# Patient Record
Sex: FEMALE | Race: WHITE | NOT HISPANIC OR LATINO | Employment: OTHER | URBAN - METROPOLITAN AREA
[De-identification: names, ages, dates, MRNs, and addresses within clinical notes are randomized per-mention and may not be internally consistent; named-entity substitution may affect disease eponyms.]

---

## 2017-02-02 DIAGNOSIS — G47.00 INSOMNIA: ICD-10-CM

## 2017-02-02 RX ORDER — QUETIAPINE FUMARATE 200 MG/1
TABLET, FILM COATED ORAL
Qty: 30 TABLET | Refills: 5 | Status: SHIPPED | OUTPATIENT
Start: 2017-02-02 | End: 2017-02-15 | Stop reason: SDUPTHER

## 2017-02-15 ENCOUNTER — OFFICE VISIT (OUTPATIENT)
Dept: INTERNAL MEDICINE | Facility: CLINIC | Age: 42
End: 2017-02-15
Payer: COMMERCIAL

## 2017-02-15 VITALS
HEIGHT: 63 IN | BODY MASS INDEX: 29.23 KG/M2 | HEART RATE: 118 BPM | OXYGEN SATURATION: 98 % | TEMPERATURE: 99 F | RESPIRATION RATE: 20 BRPM | DIASTOLIC BLOOD PRESSURE: 68 MMHG | WEIGHT: 165 LBS | SYSTOLIC BLOOD PRESSURE: 112 MMHG

## 2017-02-15 DIAGNOSIS — J06.9 VIRAL URI WITH COUGH: Primary | ICD-10-CM

## 2017-02-15 PROCEDURE — 3074F SYST BP LT 130 MM HG: CPT | Mod: S$GLB,,, | Performed by: INTERNAL MEDICINE

## 2017-02-15 PROCEDURE — 99213 OFFICE O/P EST LOW 20 MIN: CPT | Mod: S$GLB,,, | Performed by: INTERNAL MEDICINE

## 2017-02-15 PROCEDURE — 3078F DIAST BP <80 MM HG: CPT | Mod: S$GLB,,, | Performed by: INTERNAL MEDICINE

## 2017-02-15 PROCEDURE — 96372 THER/PROPH/DIAG INJ SC/IM: CPT | Mod: S$GLB,,, | Performed by: INTERNAL MEDICINE

## 2017-02-15 PROCEDURE — 99999 PR PBB SHADOW E&M-EST. PATIENT-LVL III: CPT | Mod: PBBFAC,,, | Performed by: INTERNAL MEDICINE

## 2017-02-15 RX ORDER — FOLIC ACID 1 MG/1
1 TABLET ORAL 3 TIMES DAILY PRN
COMMUNITY
Start: 2017-01-12 | End: 2021-07-06

## 2017-02-15 RX ORDER — METHYLPREDNISOLONE ACETATE 40 MG/ML
40 INJECTION, SUSPENSION INTRA-ARTICULAR; INTRALESIONAL; INTRAMUSCULAR; SOFT TISSUE
Status: COMPLETED | OUTPATIENT
Start: 2017-02-15 | End: 2017-02-15

## 2017-02-15 RX ORDER — CLOBETASOL PROPIONATE 0.5 MG/G
CREAM TOPICAL
COMMUNITY
Start: 2017-01-12 | End: 2019-01-14

## 2017-02-15 RX ORDER — LORATADINE 10 MG/1
10 TABLET ORAL DAILY
Qty: 30 TABLET | Refills: 0 | Status: SHIPPED | OUTPATIENT
Start: 2017-02-15 | End: 2018-07-23

## 2017-02-15 RX ORDER — METHOTREXATE 2.5 MG/1
TABLET ORAL
COMMUNITY
Start: 2017-01-19 | End: 2019-07-01

## 2017-02-15 RX ORDER — FLUTICASONE PROPIONATE 50 MCG
1 SPRAY, SUSPENSION (ML) NASAL DAILY
Qty: 1 BOTTLE | Refills: 3 | Status: SHIPPED | OUTPATIENT
Start: 2017-02-15 | End: 2017-03-17

## 2017-02-15 RX ADMIN — METHYLPREDNISOLONE ACETATE 40 MG: 40 INJECTION, SUSPENSION INTRA-ARTICULAR; INTRALESIONAL; INTRAMUSCULAR; SOFT TISSUE at 10:02

## 2017-02-15 NOTE — MR AVS SNAPSHOT
[facility]  [address]  [facility] [address]  Phone: [phone]  Fax: [phone]                  Eva Lane   2/15/2017 10:30 AM   Office Visit    Description:  Female : 1975   Provider:  Suzette Rodas MD   Department:  [facility] - Internal Medicine           Reason for Visit     Cough     Nasal Congestion           Diagnoses this Visit        Comments    Viral URI with cough    -  Primary            To Do List           Future Appointments        Provider Department Dept Phone    2017 9:00 AM Taco Andujar MD [facility] - Neurology [phone]    3/2/2017 9:15 AM Cintia Gustafson NP [facility] [phone]      Goals (5 Years of Data)     None       These Medications        Disp Refills Start End    loratadine (CLARITIN) 10 mg tablet 30 tablet 0 2/15/2017     Take 1 tablet (10 mg total) by mouth once daily. - Oral    Pharmacy: [facility] [address] [place] [address] Ph #: [phone]       fluticasone (FLONASE) 50 mcg/actuation nasal spray 1 Bottle 3 2/15/2017 3/17/2017    1 spray by Each Nare route once daily. - Each Nare    Pharmacy: [facility] [address] [place] [address] Ph #: [phone]         [facility] On Call     [facility] On Call Nurse Care Line -  Assistance  Registered nurses in the [facility] provide clinical advisement, health education, appointment booking, and other advisory services.  Call for this free service at [phone].             Medications           Message regarding Medications     Verify the changes and/or additions to your medication regime listed below are the same as discussed with your clinician today.  If any of these changes or additions are incorrect, please notify your healthcare provider.        START taking these NEW medications        Refills    loratadine (CLARITIN) 10 mg tablet 0    Sig: Take 1 tablet (10 mg total) by mouth  "once daily.    Class: Normal    Route: Oral    fluticasone (FLONASE) 50 mcg/actuation nasal spray 3    Si spray by Each Nare route once daily.    Class: Normal    Route: Each Nare      These medications were administered today        Dose Freq    methylPREDNISolone acetate injection 40 mg 40 mg [facility] 1 time    Sig: Inject 1 mL (40 mg total) into the muscle one time.    Class: Normal    Route: Intramuscular           Verify that the below list of medications is an accurate representation of the medications you are currently taking.  If none reported, the list may be blank. If incorrect, please contact your healthcare provider. Carry this list with you in case of emergency.           Current Medications     albuterol (PROVENTIL) 2.5 mg /3 mL (0.083 %) nebulizer solution USE ONE VIAL IN NEBULIZER EVERY 6 HOURS AS NEEDED FOR  WHEEZING.    blood-glucose meter (FREESTYLE SYSTEM KIT) kit Use as instructed    BREEZE 2 TEST STRIPS Strp USE TO TEST TWICE DAILY    clobetasol (TEMOVATE) 0.05 % cream     CONTOUR TEST STRIPS Strp TEST BLOOD SUGARS 4 TIMES DAILY    divalproex (DEPAKOTE) 500 MG Tb24 Take 1 tablet by mouth every evening.    empagliflozin (JARDIANCE) 25 mg Tab Take 25 mg by mouth once daily.    fluoxetine (PROZAC) 20 MG capsule Take 1 capsule by mouth once daily.    folic acid (FOLVITE) 1 MG tablet     gabapentin (NEURONTIN) 600 MG tablet Take 1 tablet (600 mg total) by mouth 3 (three) times daily.    hydrochlorothiazide (HYDRODIURIL) 25 MG tablet Take 1 tablet (25 mg total) by mouth daily as needed (for edema).    insulin detemir (LEVEMIR FLEXPEN) 100 unit/mL (3 mL) SubQ InPn pen Inject 30 Units into the skin 2 (two) times daily.    insulin needles, disposable, (LITE TOUCH INSULIN PEN NEEDLES) 31 X 5/16 " Ndle 1 Stick by Misc.(Non-Drug; Combo Route) route 2 (two) times daily.    ipratropium (ATROVENT) 0.02 % nebulizer solution     lancets (ONE TOUCH ULTRASOFT LANCETS) Misc 1 Stick by Misc.(Non-Drug; Combo " "Route) route 2 (two) times daily.    metformin (GLUCOPHAGE) 1000 MG tablet TAKE ONE TABLET BY MOUTH TWICE DAILY WITH MEALS    methotrexate 2.5 MG Tab     metoprolol succinate (TOPROL-XL) 50 MG 24 hr tablet Take 1 tablet (50 mg total) by mouth once daily.    naproxen (NAPROSYN) 500 MG tablet Take 1 tablet (500 mg total) by mouth 2 (two) times daily with meals.    NITROSTAT 0.4 mg SL tablet     olmesartan (BENICAR) 20 MG tablet Take 1 tablet (20 mg total) by mouth once daily.    omeprazole (PRILOSEC) 20 MG capsule Take 20 mg by mouth once daily.    ondansetron (ZOFRAN-ODT) 8 MG TbDL Take 1 tablet (8 mg total) by mouth 3 (three) times daily as needed.    quetiapine (SEROQUEL) 200 MG Tab TAKE 1 TABLET BY MOUTH NIGHTLY    sitagliptin (JANUVIA) 50 MG Tab Take 1 tablet (50 mg total) by mouth once daily.    triamcinolone acetonide 0.1% (KENALOG) 0.1 % cream Apply topically 2 (two) times daily.    valacyclovir (VALTREX) 1000 MG tablet Take 2 tablets twice a day for one day at onset of symptoms of fever blister    epinephrine (EPIPEN 2-LARS) 0.3 mg/0.3 mL (1:1,000) AtIn Inject 0.3 mLs (0.3 mg total) into the muscle once.    fluticasone (FLONASE) 50 mcg/actuation nasal spray 1 spray by Each Nare route once daily.    loratadine (CLARITIN) 10 mg tablet Take 1 tablet (10 mg total) by mouth once daily.           Clinical Reference Information           Your Vitals Were     BP Pulse Temp Resp Height Weight    112/68 118 98.6 °F (37 °C) (Oral) 20 5' 3" (1.6 m) 74.8 kg (165 lb)    Last Period SpO2 BMI          12/11/2006 98% 29.23 kg/m2        Blood Pressure          Most Recent Value    BP  112/68      Allergies as of 2/15/2017     Penicillins    Neosporin [Neomycin-bacitracin-polymyxin]    Shrimp      Immunizations Administered on Date of Encounter - 2/15/2017     None      Instructions      Viral Upper Respiratory Illness (Adult)  You have a viral upper respiratory illness (URI), which is another term for the common cold. This " illness is contagious during the first few days. It is spread through the air by coughing and sneezing. It may also be spread by direct contact (touching the sick person and then touching your own eyes, nose, or mouth). Frequent handwashing will decrease risk of spread. Most viral illnesses go away within 7 to 10 days with rest and simple home remedies. Sometimes the illness may last for several weeks. Antibiotics will not kill a virus, and they are generally not prescribed for this condition.    Home care  · If symptoms are severe, rest at home for the first 2 to 3 days. When you resume activity, don't let yourself get too tired.  · Avoid being exposed to cigarette smoke (yours or others).  · You may use acetaminophen or ibuprofen to control pain and fever, unless another medicine was prescribed. (Note: If you have chronic liver or kidney disease, have ever had a stomach ulcer or gastrointestinal bleeding, or are taking blood-thinning medicines, talk with your healthcare provider before using these medicines.) Aspirin should never be given to anyone under 18 years of age who is ill with a viral infection or fever. It may cause severe liver or brain damage.  · Your appetite may be poor, so a light diet is fine. Avoid dehydration by drinking 6 to 8 glasses of fluids per day (water, soft drinks, juices, tea, or soup). Extra fluids will help loosen secretions in the nose and lungs.  · Over-the-counter cold medicines will not shorten the length of time youre sick, but they may be helpful for the following symptoms: cough, sore throat, and nasal and sinus congestion. (Note: Do not use decongestants if you have high blood pressure.)  Follow-up care  Follow up with your healthcare provider, or as advised.  When to seek medical advice  Call your healthcare provider right away if any of these occur:  · Cough with lots of colored sputum (mucus)  · Severe headache; face, neck, or ear pain  · Difficulty swallowing due to  throat pain  · Fever of 100.4°F (38°C)  Call 911, or get immediate medical care  Call emergency services right away if any of these occur:  · Chest pain, shortness of breath, wheezing, or difficulty breathing  · Coughing up blood  · Inability to swallow due to throat pain  Date Last Reviewed: 9/13/2015  © 5168-0737 [employer]. [address] [place] [address], [place] [address]. All rights reserved. This information is not intended as a substitute for professional medical care. Always follow your healthcare professional's instructions.             Smoking Cessation     If you would like to quit smoking:   You may be eligible for free services if you are a [place] resident and started smoking cigarettes before September 1, 1988.  Call the Smoking Cessation Trust (SCT) toll free at [phone] 978-0821 or (823) 373-7732.   Call -558-QUIT-NOW if you do not meet the above criteria.            Language Assistance Services     ATTENTION: Language assistance services are available, free of charge. Please call [phone].      ATENCIÓN: Si habla yovannyañol, tiene a medrano disposición servicios gratuitos de asistencia lingüística. Llame al [phone].     [facility] Ý: N?u b?n nói Ti?ng Vi?t, có các d?ch v? h? tr? ngôn ng? mi?n phí dành cho b?n. G?i s? [phone].         [facility] complies with applicable Federal civil rights laws and does not discriminate on the basis of race, color, national origin, age, disability, or sex.

## 2017-02-15 NOTE — PROGRESS NOTES
Subjective:       Patient ID: Eva Lane is a 41 y.o. female.    Chief Complaint: Cough (x few weeks) and Nasal Congestion      HPI:  Patient is known to me and presents with cough and congestion. Sx started 2 weeks ago. She is having sweats at night but no fevers. Cough is sometimes productive of sputum. No SOB or wheezing. + sore throat with PND. No otalgia, hearing loss. Has tried NyQuil wihtout relief.     Past Medical History   Diagnosis Date    ADHD (attention deficit hyperactivity disorder)     Anxiety     Arthritis     Asthma     Behavioral problem     Bipolar 1 disorder     CHF (congestive heart failure)     COPD (chronic obstructive pulmonary disease)     Depression     Diabetes mellitus type II     Hx of psychiatric care     Hyperlipidemia     Hypertension     Lumbar radiculopathy     Neuralgia     Neuritis     Psychiatric problem     PTSD (post-traumatic stress disorder)     Seizures      Seizure-last 8/2015-shake and fall-doesnt remember anything    Self-harming behavior     Sleep apnea      on CPAP    Stroke 9/22/2015    Stroke     Therapy        Family History   Problem Relation Age of Onset    Heart disease Father     Hyperlipidemia Father     Hypertension Father     Diabetes Father     Breast cancer Neg Hx     Colon cancer Neg Hx     Ovarian cancer Neg Hx        Social History     Social History    Marital status:      Spouse name: N/A    Number of children: 6    Years of education: N/A     Occupational History    Not on file.     Social History Main Topics    Smoking status: Current Every Day Smoker     Packs/day: 0.10     Years: 26.00     Types: Cigarettes     Start date: 7/17/1986    Smokeless tobacco: Never Used      Comment: told about [facility] smoking cessation program-given smoking clinic pamphlet    Alcohol use No    Drug use: No    Sexual activity: Yes     Partners: Male     Birth control/ protection: Surgical      Comment: ,  CAYLA, BSO     Other Topics Concern    Patient Feels They Ought To Cut Down On Drinking/Drug Use No    Patient Annoyed By Others Criticizing Their Drinking/Drug Use No    Patient Has Felt Bad Or Guilty About Drinking/Drug Use No    Patient Has Had A Drink/Used Drugs As An Eye Opener In The Am No     Social History Narrative       Review of Systems   Constitutional: Positive for fatigue. Negative for activity change, fever and unexpected weight change.   HENT: Positive for congestion and sore throat. Negative for ear pain, hearing loss and rhinorrhea.    Eyes: Negative for pain, redness and visual disturbance.   Respiratory: Positive for cough. Negative for shortness of breath and wheezing.    Cardiovascular: Negative for chest pain, palpitations and leg swelling.   Gastrointestinal: Negative for abdominal pain, constipation, diarrhea, nausea and vomiting.   Genitourinary: Negative for dysuria, frequency and urgency.   Musculoskeletal: Negative for back pain, joint swelling and neck pain.   Skin: Negative for color change, rash and wound.   Neurological: Negative for dizziness, tremors, weakness, light-headedness and headaches.         Objective:      Physical Exam   Constitutional: She is oriented to person, place, and time. She appears well-developed and well-nourished. No distress.   HENT:   Head: Normocephalic and atraumatic.   Right Ear: External ear normal.   Left Ear: External ear normal.   Dull TM  Mild posterior oropharyngeal erythema without exudate   Eyes: Conjunctivae and EOM are normal. Pupils are equal, round, and reactive to light. Right eye exhibits no discharge. Left eye exhibits no discharge.   Neck: Neck supple. No tracheal deviation present.   Cardiovascular: Normal rate and regular rhythm.  Exam reveals no gallop and no friction rub.    No murmur heard.  Pulmonary/Chest: Effort normal and breath sounds normal. No respiratory distress. She has no wheezes. She has no rales.   Abdominal: Soft.  Bowel sounds are normal. She exhibits no distension. There is no tenderness.   Lymphadenopathy:     She has no cervical adenopathy.   Neurological: She is alert and oriented to person, place, and time. No cranial nerve deficit.   Skin: Skin is warm and dry.   Psychiatric: She has a normal mood and affect. Her behavior is normal.   Vitals reviewed.      Assessment:       1. Viral URI with cough        Plan:       Eva was seen today for cough and nasal congestion.    Diagnoses and all orders for this visit:    Viral URI with cough  -     methylPREDNISolone acetate injection 40 mg; Inject 1 mL (40 mg total) into the muscle one time.  -     loratadine (CLARITIN) 10 mg tablet; Take 1 tablet (10 mg total) by mouth once daily.  -     fluticasone (FLONASE) 50 mcg/actuation nasal spray; 1 spray by Each Nare route once daily.  slaine nasal spray prn  Rest. Stay hydrated    Call for worsening sx or fever > 101 F

## 2017-02-15 NOTE — PATIENT INSTRUCTIONS
Viral Upper Respiratory Illness (Adult)  You have a viral upper respiratory illness (URI), which is another term for the common cold. This illness is contagious during the first few days. It is spread through the air by coughing and sneezing. It may also be spread by direct contact (touching the sick person and then touching your own eyes, nose, or mouth). Frequent handwashing will decrease risk of spread. Most viral illnesses go away within 7 to 10 days with rest and simple home remedies. Sometimes the illness may last for several weeks. Antibiotics will not kill a virus, and they are generally not prescribed for this condition.    Home care  · If symptoms are severe, rest at home for the first 2 to 3 days. When you resume activity, don't let yourself get too tired.  · Avoid being exposed to cigarette smoke (yours or others).  · You may use acetaminophen or ibuprofen to control pain and fever, unless another medicine was prescribed. (Note: If you have chronic liver or kidney disease, have ever had a stomach ulcer or gastrointestinal bleeding, or are taking blood-thinning medicines, talk with your healthcare provider before using these medicines.) Aspirin should never be given to anyone under 18 years of age who is ill with a viral infection or fever. It may cause severe liver or brain damage.  · Your appetite may be poor, so a light diet is fine. Avoid dehydration by drinking 6 to 8 glasses of fluids per day (water, soft drinks, juices, tea, or soup). Extra fluids will help loosen secretions in the nose and lungs.  · Over-the-counter cold medicines will not shorten the length of time youre sick, but they may be helpful for the following symptoms: cough, sore throat, and nasal and sinus congestion. (Note: Do not use decongestants if you have high blood pressure.)  Follow-up care  Follow up with your healthcare provider, or as advised.  When to seek medical advice  Call your healthcare provider right away if any  of these occur:  · Cough with lots of colored sputum (mucus)  · Severe headache; face, neck, or ear pain  · Difficulty swallowing due to throat pain  · Fever of 100.4°F (38°C)  Call 911, or get immediate medical care  Call emergency services right away if any of these occur:  · Chest pain, shortness of breath, wheezing, or difficulty breathing  · Coughing up blood  · Inability to swallow due to throat pain  Date Last Reviewed: 9/13/2015  © 4791-9710 [employer]. [address] [place] [address]. All rights reserved. This information is not intended as a substitute for professional medical care. Always follow your healthcare professional's instructions.

## 2017-03-02 ENCOUNTER — TELEPHONE (OUTPATIENT)
Dept: INTERNAL MEDICINE | Facility: CLINIC | Age: 42
End: 2017-03-02

## 2017-03-02 DIAGNOSIS — R32 URINARY INCONTINENCE, UNSPECIFIED TYPE: Primary | ICD-10-CM

## 2017-03-02 RX ORDER — OXYBUTYNIN CHLORIDE 5 MG/1
5 TABLET, EXTENDED RELEASE ORAL DAILY
Qty: 30 TABLET | Refills: 2 | Status: SHIPPED | OUTPATIENT
Start: 2017-03-02 | End: 2017-08-22 | Stop reason: SDUPTHER

## 2017-03-02 NOTE — TELEPHONE ENCOUNTER
Pt states she has been wetting the bed at night for about 5months now,  she is wondering if something can be sent in for this.

## 2017-03-08 ENCOUNTER — TELEPHONE (OUTPATIENT)
Dept: INTERNAL MEDICINE | Facility: CLINIC | Age: 42
End: 2017-03-08

## 2017-03-08 DIAGNOSIS — E11.69 UNCONTROLLED TYPE 2 DIABETES MELLITUS WITH OTHER SPECIFIED COMPLICATION, UNSPECIFIED LONG TERM INSULIN USE STATUS: ICD-10-CM

## 2017-03-08 DIAGNOSIS — E11.65 UNCONTROLLED TYPE 2 DIABETES MELLITUS WITH OTHER SPECIFIED COMPLICATION, UNSPECIFIED LONG TERM INSULIN USE STATUS: ICD-10-CM

## 2017-03-08 NOTE — TELEPHONE ENCOUNTER
----- Message from Chika Rosales MA sent at 3/8/2017  1:31 PM CST -----  Contact: [facility]  Eva Lane  MRN: [id number]  : 1975  PCP: Cintia Gustafson  Home Phone      [phone]  Work Phone      Not on file.  Mobile          [phone]      MESSAGE: Please have Cintia send in a script for Glucose Test Strips.  Fax to [facility]-[phone]

## 2017-03-09 ENCOUNTER — NURSE TRIAGE (OUTPATIENT)
Dept: ADMINISTRATIVE | Facility: CLINIC | Age: 42
End: 2017-03-09

## 2017-03-09 ENCOUNTER — HOSPITAL ENCOUNTER (EMERGENCY)
Facility: HOSPITAL | Age: 42
Discharge: HOME | End: 2017-03-10
Attending: SURGERY
Payer: COMMERCIAL

## 2017-03-09 DIAGNOSIS — R73.9 HYPERGLYCEMIA: Primary | ICD-10-CM

## 2017-03-09 LAB
B-OH-BUTYR BLD STRIP-SCNC: 0.3 MMOL/L
BASOPHILS # BLD AUTO: 0.08 K/UL
BASOPHILS NFR BLD: 0.6 %
DIFFERENTIAL METHOD: ABNORMAL
EOSINOPHIL # BLD AUTO: 0.2 K/UL
EOSINOPHIL NFR BLD: 1.8 %
ERYTHROCYTE [DISTWIDTH] IN BLOOD BY AUTOMATED COUNT: 12.4 %
HCT VFR BLD AUTO: 41.1 %
HGB BLD-MCNC: 14.7 G/DL
LYMPHOCYTES # BLD AUTO: 3.9 K/UL
LYMPHOCYTES NFR BLD: 29.3 %
MCH RBC QN AUTO: 32 PG
MCHC RBC AUTO-ENTMCNC: 35.8 %
MCV RBC AUTO: 90 FL
MONOCYTES # BLD AUTO: 0.9 K/UL
MONOCYTES NFR BLD: 6.6 %
NEUTROPHILS # BLD AUTO: 8.2 K/UL
NEUTROPHILS NFR BLD: 61.7 %
PLATELET # BLD AUTO: 257 K/UL
PMV BLD AUTO: 12.5 FL
POCT GLUCOSE: 342 MG/DL (ref 70–110)
POCT GLUCOSE: 414 MG/DL (ref 70–110)
RBC # BLD AUTO: 4.59 M/UL
TROPONIN I SERPL DL<=0.01 NG/ML-MCNC: <0.006 NG/ML
WBC # BLD AUTO: 13.26 K/UL

## 2017-03-09 PROCEDURE — 82010 KETONE BODYS QUAN: CPT

## 2017-03-09 PROCEDURE — 96361 HYDRATE IV INFUSION ADD-ON: CPT

## 2017-03-09 PROCEDURE — 93010 ELECTROCARDIOGRAM REPORT: CPT | Mod: ,,, | Performed by: INTERNAL MEDICINE

## 2017-03-09 PROCEDURE — 96374 THER/PROPH/DIAG INJ IV PUSH: CPT

## 2017-03-09 PROCEDURE — 25000003 PHARM REV CODE 250: Performed by: SURGERY

## 2017-03-09 PROCEDURE — 84484 ASSAY OF TROPONIN QUANT: CPT

## 2017-03-09 PROCEDURE — 85025 COMPLETE CBC W/AUTO DIFF WBC: CPT

## 2017-03-09 PROCEDURE — 99284 EMERGENCY DEPT VISIT MOD MDM: CPT | Mod: 25

## 2017-03-09 PROCEDURE — 82962 GLUCOSE BLOOD TEST: CPT

## 2017-03-09 PROCEDURE — 82553 CREATINE MB FRACTION: CPT

## 2017-03-09 PROCEDURE — 80053 COMPREHEN METABOLIC PANEL: CPT

## 2017-03-09 PROCEDURE — 93005 ELECTROCARDIOGRAM TRACING: CPT

## 2017-03-09 PROCEDURE — 63600175 PHARM REV CODE 636 W HCPCS: Performed by: SURGERY

## 2017-03-09 RX ORDER — SODIUM CHLORIDE 9 MG/ML
1000 INJECTION, SOLUTION INTRAVENOUS
Status: COMPLETED | OUTPATIENT
Start: 2017-03-09 | End: 2017-03-10

## 2017-03-09 RX ADMIN — SODIUM CHLORIDE 1000 ML: 0.9 INJECTION, SOLUTION INTRAVENOUS at 11:03

## 2017-03-09 RX ADMIN — INSULIN HUMAN 10 UNITS: 100 INJECTION, SOLUTION PARENTERAL at 11:03

## 2017-03-09 NOTE — ED AVS SNAPSHOT
[facility]  [address] [facility] [address]               Eva Lane   3/9/2017 10:37 PM   ED    Description:  Female : 1975   Department:  [facility]           Your Care was Coordinated By:     Provider Role From To    Nadeem Cosme MD Attending Provider [phone] --      Reason for Visit     Hyperglycemia           Diagnoses this Visit        Comments    Hyperglycemia    -  Primary       ED Disposition     ED Disposition Condition Comment    Discharge             To Do List           [facility]     [facility] -  Assistance  Registered nurses in the [facility] provide clinical advisement, health education, appointment booking, and other advisory services.  Call for this free service at [phone].             Medications           Message regarding Medications     Verify the changes and/or additions to your medication regime listed below are the same as discussed with your clinician today.  If any of these changes or additions are incorrect, please notify your healthcare provider.        These medications were administered today        Dose Freq    0.9%  NaCl infusion 1,000 mL ED 1 Time    Sig: Inject 1,000 mLs into the vein ED 1 Time.    Class: Normal    Route: Intravenous    insulin regular injection 10 Units 10 Units ED 1 Time    Sig: Inject 10 Units into the vein ED 1 Time.    Class: Normal    Route: Intravenous           Verify that the below list of medications is an accurate representation of the medications you are currently taking.  If none reported, the list may be blank. If incorrect, please contact your healthcare provider. Carry this list with you in case of emergency.           Current Medications     albuterol (PROVENTIL) 2.5 mg /3 mL (0.083 %) nebulizer solution USE ONE VIAL IN NEBULIZER EVERY 6 HOURS AS NEEDED FOR  WHEEZING.    blood sugar diagnostic, disc (BREEZE 2  "TEST STRIPS) Strp USE TO TEST TWICE DAILY    blood sugar diagnostic, disc (BREEZE 2 TEST STRIPS) Strp USE TO TEST TWICE DAILY    blood-glucose meter (FREESTYLE SYSTEM KIT) kit Use as instructed    clobetasol (TEMOVATE) 0.05 % cream     CONTOUR TEST STRIPS Strp TEST BLOOD SUGARS 4 TIMES DAILY    divalproex (DEPAKOTE) 500 MG Tb24 Take 1 tablet by mouth every evening.    empagliflozin (JARDIANCE) 25 mg Tab Take 25 mg by mouth once daily.    epinephrine (EPIPEN 2-LARS) 0.3 mg/0.3 mL (1:1,000) AtIn Inject 0.3 mLs (0.3 mg total) into the muscle once.    fluoxetine (PROZAC) 20 MG capsule Take 1 capsule by mouth once daily.    fluticasone (FLONASE) 50 mcg/actuation nasal spray 1 spray by Each Nare route once daily.    folic acid (FOLVITE) 1 MG tablet     gabapentin (NEURONTIN) 600 MG tablet Take 1 tablet (600 mg total) by mouth 3 (three) times daily.    hydrochlorothiazide (HYDRODIURIL) 25 MG tablet Take 1 tablet (25 mg total) by mouth daily as needed (for edema).    insulin detemir (LEVEMIR FLEXPEN) 100 unit/mL (3 mL) SubQ InPn pen Inject 30 Units into the skin 2 (two) times daily.    insulin needles, disposable, (LITE TOUCH INSULIN PEN NEEDLES) 31 X 5/16 " Ndle 1 Stick by Misc.(Non-Drug; Combo Route) route 2 (two) times daily.    ipratropium (ATROVENT) 0.02 % nebulizer solution     lancets (ONE TOUCH ULTRASOFT LANCETS) Misc 1 Stick by Misc.(Non-Drug; Combo Route) route 2 (two) times daily.    loratadine (CLARITIN) 10 mg tablet Take 1 tablet (10 mg total) by mouth once daily.    metformin (GLUCOPHAGE) 1000 MG tablet TAKE ONE TABLET BY MOUTH TWICE DAILY WITH MEALS    methotrexate 2.5 MG Tab     metoprolol succinate (TOPROL-XL) 50 MG 24 hr tablet Take 1 tablet (50 mg total) by mouth once daily.    naproxen (NAPROSYN) 500 MG tablet Take 1 tablet (500 mg total) by mouth 2 (two) times daily with meals.    NITROSTAT 0.4 mg SL tablet     olmesartan (BENICAR) 20 MG tablet Take 1 tablet (20 mg total) by mouth once daily.    " omeprazole (PRILOSEC) 20 MG capsule Take 20 mg by mouth once daily.    ondansetron (ZOFRAN-ODT) 8 MG TbDL Take 1 tablet (8 mg total) by mouth 3 (three) times daily as needed.    oxybutynin (DITROPAN-XL) 5 MG TR24 Take 1 tablet (5 mg total) by mouth once daily.    quetiapine (SEROQUEL) 200 MG Tab TAKE 1 TABLET BY MOUTH NIGHTLY    sitagliptin (JANUVIA) 50 MG Tab Take 1 tablet (50 mg total) by mouth once daily.    triamcinolone acetonide 0.1% (KENALOG) 0.1 % cream Apply topically 2 (two) times daily.    valacyclovir (VALTREX) 1000 MG tablet Take 2 tablets twice a day for one day at onset of symptoms of fever blister           Clinical Reference Information           Your Vitals Were     BP Pulse Temp Resp Weight Last Period    144/86 (BP Location: Left arm, Patient Position: Sitting) 95 97.7 °F (36.5 °C) (Oral) 17 74.8 kg (165 lb) 12/11/2006    BMI                29.23 kg/m2          Allergies as of 3/10/2017        Reactions    Penicillins Swelling, Rash    Neosporin [Neomycin-bacitracin-polymyxin] Rash    Shrimp Hives      Immunizations Administered on Date of Encounter - 3/10/2017     None      ED Micro, Lab, POCT     Start Ordered       Status Ordering Provider    03/09/17 2358 03/09/17 2357  POCT glucose  Once      Acknowledged     03/09/17 2356 03/09/17 2356  POCT glucose  Once      Final result     03/09/17 2241 03/09/17 2240  POCT glucose  Once      Acknowledged     03/09/17 2239 03/09/17 2239  CBC auto differential  STAT      Final result     03/09/17 2239 03/09/17 2239  Comprehensive metabolic panel  STAT      In process     03/09/17 2239 03/09/17 2239  Beta - Hydroxybutyrate, Serum  STAT      Final result     03/09/17 2239 03/09/17 2239  Troponin I  Now then every 6 hours     Start Status   03/09/17 2239 Final result   03/10/17 0439 Scheduled   03/10/17 1039 Scheduled   03/10/17 1639 Scheduled   03/10/17 2239 Scheduled   03/11/17 0439 Scheduled   03/11/17 1039 Scheduled   03/11/17 1639 Scheduled   03/11/17  2239 Scheduled   03/12/17 0439 Scheduled   03/12/17 1039 Scheduled   03/12/17 1639 Scheduled   03/12/17 2239 Scheduled   03/13/17 0439 Scheduled   03/13/17 1039 Scheduled   03/13/17 1639 Scheduled   03/13/17 2239 Scheduled   03/14/17 0439 Scheduled   03/14/17 1039 Scheduled   03/14/17 1639 Scheduled   03/14/17 2239 Scheduled   03/15/17 0439 Scheduled   03/15/17 1039 Scheduled   03/15/17 1639 Scheduled   03/15/17 2239 Scheduled       Acknowledged     03/09/17 2239 03/09/17 2239  CK  STAT      In process     03/09/17 2239 03/09/17 2239  CK-MB  STAT      Preliminary result     03/09/17 2236 03/09/17 2236  POCT glucose  Once      Final result       ED Imaging Orders     Start Ordered       Status Ordering Provider    03/09/17 2239 03/09/17 2239  CT Head Without Contrast  1 time imaging      In process         Discharge Instructions         High Blood Sugar (Hyperglycemia)     When you have hyperglycemia, drink plenty of water or other sugar-free, caffeine-free liquids.   Too much glucose (sugar) in your blood is called hyperglycemia or high blood sugar. High blood sugar can lead to a dangerous condition called ketoacidosis. In severe cases, it can lead to dehydration and coma.  Possible causes of hyperglycemia  · Inadequate treatment plan for diabetes   · Being sick  · Being under stress  · Taking certain medicines, such as steroids  · Eating too much food, especially carbohydrates  · Being less active than usual  · Not taking enough diabetes medicine  Symptoms of hyperglycemia  Hyperglycemia may not cause symptoms. If you do have symptoms, they may include:  · Thirst  · Frequent need to urinate  · Feeling tired  · Nausea and vomiting  · Itchy, dry skin  · Blurry vision  · Fast breathing and breath that smells fruity   · Weakness  · Dizziness  · Wounds or skin infections that dont heal  · Unexplained weight loss if hyperglycemia lasts for more than a few days   What you should do  Make sure you do the  following:  · Check your blood sugar.  · Drink plenty of sugar-free, caffeine-free liquids such as water. Dont drink fruit juice.  · Check your blood sugar again every 4 hours. If you take insulin or diabetes medicines, follow your sick-day plan for taking medicine. Call your healthcare provider if you are not able to eat.  · Check your blood or urine for ketones as directed.  · Call your healthcare provider if your blood sugar and ketones do not return to your target range.  Preventing high blood sugar  To help keep your blood sugar from getting too high:  · Control stress.  · When you're ill, follow your sick-day plan.   · Follow your meal plan. Eat only the amount of food on your meal plan  · Follow your exercise plan.  · Take your insulin or diabetes medicines as directed by your healthcare team. Also test your blood sugar as directed. If the plan is not working for you, discuss it with your healthcare provider.  Other things to do  · Carry a medical ID card, a compact USB drive, or wear a medical alert bracelet or necklace. It should say that you have diabetes. It should also say what to do in case you pass out or go into a coma.  · Make sure family, friends, and coworkers know the signs of high blood sugar. Tell them what to do if your blood sugar gets very high and you cant help yourself.  · Talk to your healthcare team about other things you can do to prevent high blood sugar.   Special note: Drink plenty of sugar-free and caffeine-free liquids when you feel symptoms of hyperglycemia. Call your healthcare provider if you keep having episodes of hyperglycemia.   Date Last Reviewed: 5/1/2016 © 2000-2016 [employer]. [address] [place] [address], [place] [address]. All rights reserved. This information is not intended as a substitute for professional medical care. Always follow your healthcare professional's instructions.           [facility] complies with applicable Federal civil  rights laws and does not discriminate on the basis of race, color, national origin, age, disability, or sex.        Language Assistance Services     ATTENTION: Language assistance services are available, free of charge. Please call [phone].      ATENCIÓN: Si habla haider, tiene a medrano disposición servicios gratuitos de asistencia lingüística. Llame al [phone].     CHÚ Ý: N?u b?n nói Ti?ng Vi?t, có các d?ch v? h? tr? ngôn ng? mi?n phí dành cho b?n. G?i s? [phone].

## 2017-03-10 VITALS
TEMPERATURE: 98 F | SYSTOLIC BLOOD PRESSURE: 150 MMHG | RESPIRATION RATE: 16 BRPM | DIASTOLIC BLOOD PRESSURE: 70 MMHG | HEART RATE: 90 BPM | BODY MASS INDEX: 29.23 KG/M2 | WEIGHT: 165 LBS

## 2017-03-10 LAB
ALBUMIN SERPL BCP-MCNC: 4 G/DL
ALP SERPL-CCNC: 148 U/L
ALT SERPL W/O P-5'-P-CCNC: 15 U/L
ANION GAP SERPL CALC-SCNC: 14 MMOL/L
AST SERPL-CCNC: 12 U/L
BILIRUB SERPL-MCNC: 0.4 MG/DL
BUN SERPL-MCNC: 13 MG/DL
CALCIUM SERPL-MCNC: 9.6 MG/DL
CHLORIDE SERPL-SCNC: 96 MMOL/L
CK MB SERPL-MCNC: 0.4 NG/ML
CK MB SERPL-RTO: 1 %
CK SERPL-CCNC: 42 U/L
CK SERPL-CCNC: 42 U/L
CO2 SERPL-SCNC: 23 MMOL/L
CREAT SERPL-MCNC: 1.2 MG/DL
EST. GFR  (AFRICAN AMERICAN): >60 ML/MIN/1.73 M^2
EST. GFR  (NON AFRICAN AMERICAN): 56 ML/MIN/1.73 M^2
GLUCOSE SERPL-MCNC: 550 MG/DL
POCT GLUCOSE: 197 MG/DL (ref 70–110)
POTASSIUM SERPL-SCNC: 4.8 MMOL/L
PROT SERPL-MCNC: 8.1 G/DL
SODIUM SERPL-SCNC: 133 MMOL/L

## 2017-03-10 NOTE — ED PROVIDER NOTES
"[facility]                                        March 10, 2017                   Chief Complaint  41 y.o. female with Hyperglycemia    History of Present Illness  Eva Lane presents to the emergency room with hyperglycemia today  Patient states her blood sugar was elevated at home, otherwise is asymptomatic  Patient states she does have a headache which she thinks is unrelated to her sugar  Patient has no nausea vomiting, is alert and oriented ×3 with a GCS 15 in the ER  Pt has a history of anxiety and conversion disorder, she states "I'm much better"  Blood sugar responded well in emergency room to IV insulin, afebrile and stable    The history is provided by the patient    Past Medical History   -- ADHD        -- Anxiety        -- Arthritis        -- Asthma        -- Behavioral problem        -- Bipolar 1 disorder        -- CHF (congestive heart failure)        -- COPD        -- Depression        -- Diabetes mellitus type II        -- Hx of psychiatric care        -- Hyperlipidemia        -- Hypertension        -- Lumbar radiculopathy        -- Neuralgia        -- Neuritis        -- Psychiatric problem        -- PTSD (post-traumatic stress disorder)        -- Seizures        -- Self-harming behavior        -- Sleep apnea        -- Stroke        -- Stroke        -- Therapy            Past Surgical History   -- Oophorectomy           -- Tubal ligation           -- Dilation and curettage of uterus           -- Hysterectomy           -- Total abdominal hysterectomy           -- Cyst removal           -- Colonoscopy               Allergies   -- Penicillins        -- Neosporin [Neomycin-Bacitracin-Polymyxin]          Review of Systems and Physical Exam     Review of Systems  -- Constitution - no fever, denies fatigue, no weakness, no chills  -- Eyes - no tearing or redness, no visual disturbance  -- Ear, Nose - no tinnitus or earache, no nasal congestion or discharge  -- Mouth,Throat - " no sore throat, no toothache, normal voice, normal swallowing  -- Respiratory - denies cough and congestion, no shortness of breath, no PERRY  -- Cardiovascular - denies chest pain, no palpitations, denies claudication  -- Gastrointestinal - denies abdominal pain, nausea, vomiting, or diarrhea  -- Musculoskeletal - denies back pain, negative for myalgias and arthralgias   -- Neurological - headache, denies weakness or seizure; no LOC  -- Skin - denies pallor, rash, or changes in skin. no hives or welts noted    Vital Signs  -- Her blood pressure is 150/70 and her pulse is 90. Her respiration is 16.      Physical Exam  -- Nursing note and vitals reviewed  -- Constitutional: Appears well-developed and well-nourished  -- Head: Atraumatic. Normocephalic. No obvious abnormality  -- Eyes: Pupils are equal and reactive to light. Normal conjunctiva and lids  -- Cardiac: Normal rate, regular rhythm and normal heart sounds  -- Pulmonary: Normal respiratory effort, breath sounds clear to auscultation  -- Abdominal: Soft, no tenderness. Normal bowel sounds. Normal liver edge  -- Musculoskeletal: Normal range of motion, no effusions. Joints stable   -- Neurological: No focal deficits. Showed good interaction with staff  -- Vascular: Posterior tibial, dorsalis pedis and radial pulses 2+ bilaterally      Emergency Room Course     Treatment and Evaluation  -- The CT of the head performed in the ER today was negative for acute pathology     Abnormal lab values  -- WBC 13.26 (*)   -- Sodium 133 (*)   -- Glucose 550 (*)   -- Alkaline Phosphatase 148 (*)   -- eGFR if non  56 (*)   -- POCT Glucose 414 (*)   -- POCT Glucose 342 (*)   -- POCT Glucose 197 (*)     Medications Given  -- 0.9%  NaCl infusion (0 mLs Intravenous Stopped 3/10/17 0029)   -- insulin regular injection 10 Units (10 Units Intravenous Given 3/9/17 2309)     Diagnosis  -- The encounter diagnosis was Hyperglycemia.    Disposition and Plan  -- Disposition:  home  -- Condition: stable  -- Follow-up: Patient to follow up with Cintia Gustafson NP in 1-2 days.  -- I advised the patient that we have found no life threatening condition today  -- At this time, I believe the patient is clinically stable for discharge.   -- The patient acknowledges that close follow up with a MD is required   -- Patient agrees to comply with all instruction and direction given in the ER    This note is dictated on Dragon Natural Speaking word recognition program.  There are word recognition mistakes that are occasionally missed on review.           Nadeem Cosme MD  03/10/17 0819

## 2017-03-10 NOTE — TELEPHONE ENCOUNTER
Reason for Disposition   Blood glucose > 500 mg/dl (27.5 mmol/l)    Protocols used:  DIABETES - HIGH BLOOD SUGAR-A-    Patient reporting blood glucose this evening of 508. She took levemir about 1 hour ago and it was 575 and then went down to 535 which was the most recent check right before speaking to nurse. She states her vision is blurry and she has a very bad headache. Advised to be seen in ED for evaluation and have another adult to drive. She agrees to go. Please contact caller with any further care advice.

## 2017-03-10 NOTE — DISCHARGE INSTRUCTIONS
High Blood Sugar (Hyperglycemia)     When you have hyperglycemia, drink plenty of water or other sugar-free, caffeine-free liquids.   Too much glucose (sugar) in your blood is called hyperglycemia or high blood sugar. High blood sugar can lead to a dangerous condition called ketoacidosis. In severe cases, it can lead to dehydration and coma.  Possible causes of hyperglycemia  · Inadequate treatment plan for diabetes   · Being sick  · Being under stress  · Taking certain medicines, such as steroids  · Eating too much food, especially carbohydrates  · Being less active than usual  · Not taking enough diabetes medicine  Symptoms of hyperglycemia  Hyperglycemia may not cause symptoms. If you do have symptoms, they may include:  · Thirst  · Frequent need to urinate  · Feeling tired  · Nausea and vomiting  · Itchy, dry skin  · Blurry vision  · Fast breathing and breath that smells fruity   · Weakness  · Dizziness  · Wounds or skin infections that dont heal  · Unexplained weight loss if hyperglycemia lasts for more than a few days   What you should do  Make sure you do the following:  · Check your blood sugar.  · Drink plenty of sugar-free, caffeine-free liquids such as water. Dont drink fruit juice.  · Check your blood sugar again every 4 hours. If you take insulin or diabetes medicines, follow your sick-day plan for taking medicine. Call your healthcare provider if you are not able to eat.  · Check your blood or urine for ketones as directed.  · Call your healthcare provider if your blood sugar and ketones do not return to your target range.  Preventing high blood sugar  To help keep your blood sugar from getting too high:  · Control stress.  · When you're ill, follow your sick-day plan.   · Follow your meal plan. Eat only the amount of food on your meal plan  · Follow your exercise plan.  · Take your insulin or diabetes medicines as directed by your healthcare team. Also test your blood sugar as directed. If the  plan is not working for you, discuss it with your healthcare provider.  Other things to do  · Carry a medical ID card, a compact USB drive, or wear a medical alert bracelet or necklace. It should say that you have diabetes. It should also say what to do in case you pass out or go into a coma.  · Make sure family, friends, and coworkers know the signs of high blood sugar. Tell them what to do if your blood sugar gets very high and you cant help yourself.  · Talk to your healthcare team about other things you can do to prevent high blood sugar.   Special note: Drink plenty of sugar-free and caffeine-free liquids when you feel symptoms of hyperglycemia. Call your healthcare provider if you keep having episodes of hyperglycemia.   Date Last Reviewed: 5/1/2016 © 2000-2016 [employer], [employer]. [address] [place] [address], [place] [address]. All rights reserved. This information is not intended as a substitute for professional medical care. Always follow your healthcare professional's instructions.

## 2017-03-31 DIAGNOSIS — E11.9 TYPE 2 DIABETES MELLITUS WITHOUT COMPLICATION: ICD-10-CM

## 2017-06-20 DIAGNOSIS — I10 BENIGN ESSENTIAL HTN: ICD-10-CM

## 2017-06-21 RX ORDER — METOPROLOL SUCCINATE 50 MG/1
TABLET, EXTENDED RELEASE ORAL
Qty: 30 TABLET | Refills: 5 | Status: SHIPPED | OUTPATIENT
Start: 2017-06-21 | End: 2017-08-22 | Stop reason: SDUPTHER

## 2017-08-22 ENCOUNTER — OFFICE VISIT (OUTPATIENT)
Dept: INTERNAL MEDICINE | Facility: CLINIC | Age: 42
End: 2017-08-22
Payer: COMMERCIAL

## 2017-08-22 VITALS
HEART RATE: 70 BPM | RESPIRATION RATE: 20 BRPM | SYSTOLIC BLOOD PRESSURE: 110 MMHG | DIASTOLIC BLOOD PRESSURE: 82 MMHG | HEIGHT: 66 IN | BODY MASS INDEX: 23.38 KG/M2 | OXYGEN SATURATION: 96 % | WEIGHT: 145.5 LBS

## 2017-08-22 DIAGNOSIS — Z72.0 TOBACCO ABUSE: ICD-10-CM

## 2017-08-22 DIAGNOSIS — J01.90 ACUTE SINUSITIS, RECURRENCE NOT SPECIFIED, UNSPECIFIED LOCATION: ICD-10-CM

## 2017-08-22 DIAGNOSIS — Z71.6 TOBACCO ABUSE COUNSELING: ICD-10-CM

## 2017-08-22 DIAGNOSIS — I10 BENIGN ESSENTIAL HTN: ICD-10-CM

## 2017-08-22 DIAGNOSIS — R32 URINARY INCONTINENCE, UNSPECIFIED TYPE: ICD-10-CM

## 2017-08-22 DIAGNOSIS — E78.5 HYPERLIPIDEMIA LDL GOAL <70: ICD-10-CM

## 2017-08-22 DIAGNOSIS — G47.00 INSOMNIA, UNSPECIFIED TYPE: ICD-10-CM

## 2017-08-22 DIAGNOSIS — R60.9 EDEMA, UNSPECIFIED TYPE: ICD-10-CM

## 2017-08-22 DIAGNOSIS — Z12.39 BREAST CANCER SCREENING: ICD-10-CM

## 2017-08-22 PROCEDURE — 3008F BODY MASS INDEX DOCD: CPT | Mod: S$GLB,,, | Performed by: NURSE PRACTITIONER

## 2017-08-22 PROCEDURE — 96372 THER/PROPH/DIAG INJ SC/IM: CPT | Mod: S$GLB,,, | Performed by: NURSE PRACTITIONER

## 2017-08-22 PROCEDURE — 3074F SYST BP LT 130 MM HG: CPT | Mod: S$GLB,,, | Performed by: NURSE PRACTITIONER

## 2017-08-22 PROCEDURE — 99214 OFFICE O/P EST MOD 30 MIN: CPT | Mod: S$GLB,,, | Performed by: NURSE PRACTITIONER

## 2017-08-22 PROCEDURE — 3045F PR MOST RECENT HEMOGLOBIN A1C LEVEL 7.0-9.0%: CPT | Mod: S$GLB,,, | Performed by: NURSE PRACTITIONER

## 2017-08-22 PROCEDURE — 99999 PR PBB SHADOW E&M-EST. PATIENT-LVL V: CPT | Mod: PBBFAC,,, | Performed by: NURSE PRACTITIONER

## 2017-08-22 PROCEDURE — 3079F DIAST BP 80-89 MM HG: CPT | Mod: S$GLB,,, | Performed by: NURSE PRACTITIONER

## 2017-08-22 RX ORDER — METOPROLOL SUCCINATE 50 MG/1
50 TABLET, EXTENDED RELEASE ORAL DAILY
Qty: 30 TABLET | Refills: 2 | Status: SHIPPED | OUTPATIENT
Start: 2017-08-22 | End: 2018-02-26

## 2017-08-22 RX ORDER — FLUOXETINE HYDROCHLORIDE 20 MG/1
20 CAPSULE ORAL DAILY
Qty: 30 CAPSULE | Refills: 2 | Status: SHIPPED | OUTPATIENT
Start: 2017-08-22 | End: 2017-10-27

## 2017-08-22 RX ORDER — DIVALPROEX SODIUM 500 MG/1
500 TABLET, FILM COATED, EXTENDED RELEASE ORAL NIGHTLY
Qty: 30 TABLET | Refills: 2 | Status: SHIPPED | OUTPATIENT
Start: 2017-08-22 | End: 2018-05-29

## 2017-08-22 RX ORDER — OXYBUTYNIN CHLORIDE 5 MG/1
5 TABLET, EXTENDED RELEASE ORAL DAILY
Qty: 30 TABLET | Refills: 2 | Status: SHIPPED | OUTPATIENT
Start: 2017-08-22 | End: 2018-01-23 | Stop reason: SDUPTHER

## 2017-08-22 RX ORDER — HYDROCHLOROTHIAZIDE 25 MG/1
25 TABLET ORAL DAILY PRN
Qty: 30 TABLET | Refills: 3 | Status: SHIPPED | OUTPATIENT
Start: 2017-08-22 | End: 2017-09-21

## 2017-08-22 RX ORDER — QUETIAPINE FUMARATE 200 MG/1
200 TABLET, FILM COATED ORAL NIGHTLY
Qty: 30 TABLET | Refills: 2 | Status: SHIPPED | OUTPATIENT
Start: 2017-08-22 | End: 2018-01-23

## 2017-08-22 RX ORDER — METFORMIN HYDROCHLORIDE 1000 MG/1
1000 TABLET ORAL 2 TIMES DAILY WITH MEALS
Qty: 60 TABLET | Refills: 2 | Status: SHIPPED | OUTPATIENT
Start: 2017-08-22 | End: 2018-07-07 | Stop reason: SDUPTHER

## 2017-08-22 RX ORDER — METHYLPREDNISOLONE ACETATE 80 MG/ML
80 INJECTION, SUSPENSION INTRA-ARTICULAR; INTRALESIONAL; INTRAMUSCULAR; SOFT TISSUE
Status: COMPLETED | OUTPATIENT
Start: 2017-08-22 | End: 2017-08-22

## 2017-08-22 RX ADMIN — METHYLPREDNISOLONE ACETATE 80 MG: 80 INJECTION, SUSPENSION INTRA-ARTICULAR; INTRALESIONAL; INTRAMUSCULAR; SOFT TISSUE at 11:08

## 2017-08-22 NOTE — TELEPHONE ENCOUNTER
Pharmacy states that they no longer do flex pen needs flex touch Rx sent in and the needles for the flex touch is 32 gauge that also needs to be sent in with it  Please advise  Thanks

## 2017-08-22 NOTE — TELEPHONE ENCOUNTER
----- Message from Chika Rosales MA sent at 2017  2:11 PM CDT -----  Contact: Patient  Eva Lane  MRN: [id number]  : 1975  PCP: Cintia Gustafson  Home Phone      [phone]  Work Phone      Not on file.  Mobile          [phone]      MESSAGE: Patient needs the needles for the Levemir Flex pens that Cintia sent in.  Send to [facility] ([place])

## 2017-08-22 NOTE — PATIENT INSTRUCTIONS
Diabetes and Heart Disease     Take your medicines as directed each day, even if you feel fine.   If you have diabetes, you are two to four times more likely to have heart disease than someone without diabetes. This higher risk is due to diabetes, but it is also due to other risk factors for heart disease that happen in people with diabetes. But theres good news. You can help control your health risks by making some changes in your life. You can take steps to reduce your risk of heart disease by half--similar to the risk in people who don't have diabetes.  Your main risk factors  Three major risk factors for heart disease are high blood sugar, high blood pressure, and high levels of lipids. By keeping risk factors under control, you can help keep your heart and arteries healthy. This may reduce your chances of a heart attack.  · Blood sugar. High blood sugar can make artery walls tough and rough. Plaque (waxy material in the blood) can then build up along the artery walls, making it harder for blood to flow through the arteries. Having high blood sugar increases the chances of having high blood pressure and high cholesterol.  · Blood pressure. When blood pressure is high all the time it causes your heart to work harder to pump blood. Artery walls become damaged. This increases the risk for plaque build up.  · Lipids. The body needs some lipids in the blood to stay healthy. But lipid levels that are too high can damage the artery walls. Lipids include cholesterol and triglycerides. There are two kinds of cholesterol. LDL (bad) cholesterol can damage the arteries. But HDL (good) cholesterol helps clear LDL cholesterol from the blood vessels. This helps keep the arteries healthy. When blood sugar is high, the level of triglycerides in the blood may also be high. High blood triglyceride levels can cause plaque to form.   Other risk factors  Certain lifestyle factors can increase levels of your blood sugar, blood  pressure, and lipids. Such increases raise your risk of heart disease:  · Smoking damages the lining of your arteries. This allows plaque to build up in the artery walls. Smoking also constricts (narrows) the arteries. This can raise blood pressure and cause chest pain or angina. Smoking also increases your risk of getting type 2 diabetes.  · Not being active makes it harder for your heart to do its work. Inactivity is linked to many other risk factors, such as high blood pressure and poor cholesterol levels. Inactivity also increases your risk of getting type 2 diabetes.  · Being overweight makes it harder for your body to use insulin. It also makes your heart work too hard. Being overweight is also the main contributor to the development of type 2 diabetes,   Changes you can make  Following a few simple steps can help keep your risk factors under control. Work with your healthcare team to reach your goals.  · Quitting smoking could save your life. Smoking damages the lining of the blood vessels and raises blood pressure. Smoking also affects how your body uses insulin. This makes it harder to keep blood sugar under control. If you smoke and need help quitting, talk to your healthcare team.   · Testing your blood sugar is the only way to know whether it is under control. Be sure to test your blood sugar yourself. Also get your blood tested in the lab, as directed.  · Monitoring your blood pressure and lipid levels can help you achieve safe levels. Visit your healthcare team as scheduled.  · Taking medicines as directed can help control blood sugar, blood pressure, blood clotting, and/or cholesterol levels.  · Eating right can reduce your risk factors and help you lose weight. Try to limit the amount of processed or refined carbohydrates you eat at one time. Cut back on your total calorie intake. Eat foods low in saturated fat and cholesterol. Eat fiber, including vegetables and whole grains, and cut down on salt. A  dietitian or diabetes educator can help form a meal plan that works for you--even if you are on a low budget.   · Being active can help reduce your weight, strengthen your heart, and lower your lipid levels and blood pressure. Exercise and activity are good for your whole body. Talk to your healthcare team about increasing your activity safely over time.  · Keeping your appointments with your healthcare provider helps you stay healthy. Go in for checkups and lab tests as scheduled.  Date Last Reviewed: 5/19/2016  © 5375-5977 [employer]. [address] [place] [address], [place] [address]. All rights reserved. This information is not intended as a substitute for professional medical care. Always follow your healthcare professional's instructions.

## 2017-08-22 NOTE — PROGRESS NOTES
Subjective:       Patient ID: Eva Lane is a 41 y.o. female.    Chief Complaint: Follow-up (routine check up); Sore Throat (x 1 week- with cough); and Otalgia    Patient is known, to me and presents with   Chief Complaint   Patient presents with    Follow-up     routine check up    Sore Throat     x 1 week- with cough    Otalgia   .  Denies chest pain and shortness of breath.  Patient presents with check up and lab work. She is having some congestion. She also states that her blood sugars are fluctuating. She states that she has been off of her meds for anxiety and depression. She is not suicidal or homicidal     HPI  Review of Systems   Constitutional: Negative for activity change, appetite change, fatigue, fever and unexpected weight change.   HENT: Positive for congestion, postnasal drip and sore throat. Negative for ear discharge, ear pain, hearing loss and tinnitus.    Eyes: Negative for photophobia, pain and visual disturbance.   Respiratory: Positive for cough. Negative for shortness of breath, wheezing and stridor.    Cardiovascular: Negative for chest pain, palpitations and leg swelling.   Gastrointestinal: Negative for abdominal distention.   Genitourinary: Negative for difficulty urinating, dysuria, frequency, hematuria and urgency.   Musculoskeletal: Negative for arthralgias, back pain, gait problem, joint swelling and neck pain.   Skin: Negative.    Neurological: Negative for dizziness, seizures, syncope, weakness, light-headedness, numbness and headaches.   Hematological: Negative for adenopathy. Does not bruise/bleed easily.   Psychiatric/Behavioral: Negative for behavioral problems, confusion, hallucinations, sleep disturbance and suicidal ideas. The patient is not nervous/anxious.        Objective:      Physical Exam   Constitutional: She is oriented to person, place, and time. She appears well-developed and well-nourished. No distress.   HENT:   Head: Normocephalic and atraumatic.    Right Ear: External ear normal. Tympanic membrane mobility is abnormal.   Left Ear: External ear normal. Tympanic membrane mobility is abnormal.   Nose: Mucosal edema present.   Mouth/Throat: Posterior oropharyngeal erythema present. No oropharyngeal exudate.   Eyes: Conjunctivae and EOM are normal. Pupils are equal, round, and reactive to light. Right eye exhibits no discharge. Left eye exhibits no discharge.   Neck: Normal range of motion. Neck supple. No JVD present. No thyromegaly present.   Cardiovascular: Normal rate, regular rhythm, normal heart sounds and intact distal pulses.  Exam reveals no gallop and no friction rub.    No murmur heard.  Pulmonary/Chest: Effort normal and breath sounds normal. No stridor. No respiratory distress. She has no wheezes. She has no rales. She exhibits no tenderness.   Abdominal: Soft. Bowel sounds are normal. She exhibits no distension and no mass. There is no tenderness. There is no rebound.   Musculoskeletal: Normal range of motion. She exhibits no edema or tenderness.   Lymphadenopathy:     She has no cervical adenopathy.   Neurological: She is alert and oriented to person, place, and time. She has normal reflexes. She displays normal reflexes. No cranial nerve deficit. She exhibits normal muscle tone. Coordination normal.   Skin: Skin is warm and dry. Capillary refill takes less than 2 seconds. No rash noted. No erythema. No pallor.   Psychiatric: She has a normal mood and affect. Her behavior is normal. Judgment and thought content normal.       Assessment:       1. Uncontrolled type 2 diabetes mellitus without complication, with long-term current use of insulin    2. Acute sinusitis, recurrence not specified, unspecified location    3. Benign essential HTN    4. Hyperlipidemia LDL goal <70    5. Insomnia, unspecified type    6. Urinary incontinence, unspecified type    7. Edema, unspecified type    8. Tobacco abuse    9. Tobacco abuse counseling    10. Breast cancer  screening        Plan:   Eva was seen today for follow-up, sore throat and otalgia.    Diagnoses and all orders for this visit:    Uncontrolled type 2 diabetes mellitus without complication, with long-term current use of insulin  -     CBC auto differential; Future  -     Comprehensive metabolic panel; Future  -     Microalbumin/creatinine urine ratio; Future  -     Hemoglobin A1c; Future    Acute sinusitis, recurrence not specified, unspecified location  -     methylPREDNISolone acetate injection 80 mg; Inject 1 mL (80 mg total) into the muscle one time.    Benign essential HTN  -     CBC auto differential; Future  -     Comprehensive metabolic panel; Future  -     Microalbumin/creatinine urine ratio; Future  -     metoprolol succinate (TOPROL-XL) 50 MG 24 hr tablet; Take 1 tablet (50 mg total) by mouth once daily.  -     hydrochlorothiazide (HYDRODIURIL) 25 MG tablet; Take 1 tablet (25 mg total) by mouth daily as needed (for edema).    Hyperlipidemia LDL goal <70  -     CBC auto differential; Future  -     Comprehensive metabolic panel; Future  -     Lipid panel; Future  -     TSH; Future    Insomnia, unspecified type  -     CBC auto differential; Future  -     Comprehensive metabolic panel; Future  -     quetiapine (SEROQUEL) 200 MG Tab; Take 1 tablet (200 mg total) by mouth nightly.    Urinary incontinence, unspecified type  -     CBC auto differential; Future  -     Comprehensive metabolic panel; Future  -     oxybutynin (DITROPAN-XL) 5 MG TR24; Take 1 tablet (5 mg total) by mouth once daily.    Edema, unspecified type  -     CBC auto differential; Future  -     Comprehensive metabolic panel; Future  -     hydrochlorothiazide (HYDRODIURIL) 25 MG tablet; Take 1 tablet (25 mg total) by mouth daily as needed (for edema).    Tobacco abuse    Tobacco abuse counseling    Breast cancer screening  -     Mammo Digital Screening Bilateral With CAD; Future    Other orders  -     metformin (GLUCOPHAGE) 1000 MG tablet;  "Take 1 tablet (1,000 mg total) by mouth 2 (two) times daily with meals.  -     insulin detemir (LEVEMIR FLEXPEN) 100 unit/mL (3 mL) SubQ InPn pen; Inject 30 Units into the skin 2 (two) times daily.  -     fluoxetine (PROZAC) 20 MG capsule; Take 1 capsule (20 mg total) by mouth once daily.  -     divalproex ER (DEPAKOTE) 500 MG Tb24; Take 1 tablet (500 mg total) by mouth every evening.    "This note will not be shared with the patient."  Check CBC, CMP, TSH, Fasting lipid profile, HgA1c, Microalbuminuria in three months.  Medication compliance was discussed with the patient.  The patient was instructed to monitor glucoses closely using glucometer at home and to record the values in a log.  The patient was instructed to obtain an Optometry exam and microalbumin q year.  The patient was instructed to obtain a hemoglobin A1C q 3-4 months.  The patient was instructed to check the feet visually daily and to monitor for infection.  The patient was instructed to exercise at least 3 times a week.  The patient was instructed to follow a 1800 ADA diet.  The patient was instructed to monitor weight daily and try to keep it as close to ideal body weight as possible.  The patient was instructed on using exercise frequently to reduce BP.  The patient was instructed on using a low salt diet.  Monitor BP at home and to record the values in a log.  Watch diet with steroid shot  RTC in three months.  "

## 2017-08-23 ENCOUNTER — HOSPITAL ENCOUNTER (OUTPATIENT)
Dept: RADIOLOGY | Facility: HOSPITAL | Age: 42
Discharge: HOME | End: 2017-08-23
Attending: NURSE PRACTITIONER
Payer: COMMERCIAL

## 2017-08-23 VITALS — HEIGHT: 66 IN | WEIGHT: 145 LBS | BODY MASS INDEX: 23.3 KG/M2

## 2017-08-23 DIAGNOSIS — Z12.39 BREAST CANCER SCREENING: ICD-10-CM

## 2017-08-23 PROCEDURE — 77067 SCR MAMMO BI INCL CAD: CPT | Mod: 26,,, | Performed by: RADIOLOGY

## 2017-08-23 PROCEDURE — 77063 BREAST TOMOSYNTHESIS BI: CPT | Mod: 26,,, | Performed by: RADIOLOGY

## 2017-08-23 PROCEDURE — 77067 SCR MAMMO BI INCL CAD: CPT | Mod: TC

## 2017-08-24 ENCOUNTER — HOSPITAL ENCOUNTER (OUTPATIENT)
Dept: RADIOLOGY | Facility: HOSPITAL | Age: 42
Discharge: HOME | End: 2017-08-24
Attending: NURSE PRACTITIONER
Payer: COMMERCIAL

## 2017-08-24 DIAGNOSIS — R92.8 ABNORMAL MAMMOGRAM: ICD-10-CM

## 2017-08-24 PROCEDURE — 77061 BREAST TOMOSYNTHESIS UNI: CPT | Mod: TC,LT

## 2017-08-24 PROCEDURE — 77061 BREAST TOMOSYNTHESIS UNI: CPT | Mod: 26,LT,, | Performed by: RADIOLOGY

## 2017-08-24 PROCEDURE — 77065 DX MAMMO INCL CAD UNI: CPT | Mod: 26,LT,, | Performed by: RADIOLOGY

## 2017-08-25 ENCOUNTER — TELEPHONE (OUTPATIENT)
Dept: INTERNAL MEDICINE | Facility: CLINIC | Age: 42
End: 2017-08-25

## 2017-08-25 DIAGNOSIS — E78.5 HYPERLIPIDEMIA LDL GOAL <70: ICD-10-CM

## 2017-08-25 RX ORDER — PEN NEEDLE, DIABETIC 30 GX3/16"
1 NEEDLE, DISPOSABLE MISCELLANEOUS 2 TIMES DAILY
Qty: 100 EACH | Refills: 3 | Status: SHIPPED | OUTPATIENT
Start: 2017-08-25 | End: 2018-09-21 | Stop reason: SDUPTHER

## 2017-08-25 NOTE — TELEPHONE ENCOUNTER
----- Message from Chika Rosales MA sent at 2017  3:07 PM CDT -----  Contact: Patient  Eva Lane  MRN: [id number]  : 1975  PCP: Cintia Gustafson  Home Phone      [phone]  Work Phone      Not on file.  Mobile          [phone]      MESSAGE: Patient is waiting on needles for her Levemir Flex Touch pens to be sent in to [facility])  Also inquring about her bloodwork results.    Her phone # [phone]

## 2017-08-25 NOTE — TELEPHONE ENCOUNTER
Pt notified , voiced understanding stating she is taking all her meds as prescribed but will redo her BW in 3 months   Please advise  Thanks!

## 2017-10-27 ENCOUNTER — OFFICE VISIT (OUTPATIENT)
Dept: INTERNAL MEDICINE | Facility: CLINIC | Age: 42
End: 2017-10-27
Payer: COMMERCIAL

## 2017-10-27 VITALS
RESPIRATION RATE: 18 BRPM | SYSTOLIC BLOOD PRESSURE: 124 MMHG | WEIGHT: 149.94 LBS | HEART RATE: 91 BPM | BODY MASS INDEX: 25.6 KG/M2 | OXYGEN SATURATION: 97 % | DIASTOLIC BLOOD PRESSURE: 88 MMHG | HEIGHT: 64 IN

## 2017-10-27 DIAGNOSIS — T14.8XXA ABRASION: ICD-10-CM

## 2017-10-27 DIAGNOSIS — F41.9 ANXIETY: ICD-10-CM

## 2017-10-27 DIAGNOSIS — Z23 NEEDS FLU SHOT: ICD-10-CM

## 2017-10-27 PROCEDURE — 99213 OFFICE O/P EST LOW 20 MIN: CPT | Mod: 25,S$GLB,, | Performed by: NURSE PRACTITIONER

## 2017-10-27 PROCEDURE — 90686 IIV4 VACC NO PRSV 0.5 ML IM: CPT | Mod: S$GLB,,, | Performed by: INTERNAL MEDICINE

## 2017-10-27 PROCEDURE — 99999 PR PBB SHADOW E&M-EST. PATIENT-LVL V: CPT | Mod: PBBFAC,,, | Performed by: NURSE PRACTITIONER

## 2017-10-27 PROCEDURE — 90471 IMMUNIZATION ADMIN: CPT | Mod: S$GLB,,, | Performed by: INTERNAL MEDICINE

## 2017-10-27 RX ORDER — SULFAMETHOXAZOLE AND TRIMETHOPRIM 800; 160 MG/1; MG/1
1 TABLET ORAL 2 TIMES DAILY
Qty: 14 TABLET | Refills: 0 | Status: SHIPPED | OUTPATIENT
Start: 2017-10-27 | End: 2017-11-03

## 2017-10-27 RX ORDER — FLUOXETINE HYDROCHLORIDE 40 MG/1
40 CAPSULE ORAL DAILY
Qty: 30 CAPSULE | Refills: 2 | Status: SHIPPED | OUTPATIENT
Start: 2017-10-27 | End: 2017-11-26

## 2017-10-27 RX ORDER — HYDROCHLOROTHIAZIDE 25 MG/1
25 TABLET ORAL DAILY
COMMUNITY
Start: 2017-10-19 | End: 2021-04-27

## 2017-10-27 NOTE — PROGRESS NOTES
Subjective:       Patient ID: Eva Lane is a 41 y.o. female.    Chief Complaint: Fall (x 2 days- scuffed R. knee - wih redness ) and Flu Vaccine    Patient is known, to me and presents with   Chief Complaint   Patient presents with    Fall     x 2 days- scuffed R. knee - wih redness     Flu Vaccine   .  Denies chest pain and shortness of breath.  Patient presents with fall and now with abrasion to right knee with erythema surrounding sore. No drainage noted. Also wants to have flu shot  HPI  Review of Systems   Constitutional: Negative.    Respiratory: Negative.    Cardiovascular: Negative.    Musculoskeletal: Negative.    Skin: Positive for color change. Negative for pallor, rash and wound.   Hematological: Negative.    Psychiatric/Behavioral: Negative for sleep disturbance and suicidal ideas. The patient is nervous/anxious.        Objective:      Physical Exam   Constitutional: She is oriented to person, place, and time. She appears well-developed and well-nourished. No distress.   Neck: Normal range of motion. Neck supple. No JVD present. No tracheal deviation present. No thyromegaly present.   Cardiovascular: Normal rate, regular rhythm and normal heart sounds.    No murmur heard.  Pulmonary/Chest: Effort normal and breath sounds normal. No stridor. She has no wheezes.   Musculoskeletal: She exhibits no deformity.   Lymphadenopathy:     She has no cervical adenopathy.   Neurological: She is alert and oriented to person, place, and time. She displays normal reflexes. No cranial nerve deficit or sensory deficit. She exhibits normal muscle tone. Coordination normal.   Skin: Skin is warm and dry. Capillary refill takes less than 2 seconds. Abrasion noted. No rash noted. She is not diaphoretic. There is erythema. No pallor.        Scabbing noted to right knee with mild erythema no drainage   Psychiatric: She has a normal mood and affect. Her behavior is normal. Judgment and thought content normal.  "  Negative suicidal or homicidal ideations       Assessment:       1. Needs flu shot    2. Abrasion    3. Anxiety        Plan:   Eva was seen today for fall and flu vaccine.    Diagnoses and all orders for this visit:    Abrasion  -     sulfamethoxazole-trimethoprim 800-160mg (BACTRIM DS) 800-160 mg Tab; Take 1 tablet by mouth 2 (two) times daily.    Anxiety  -     FLUoxetine (PROZAC) 40 MG capsule; Take 1 capsule (40 mg total) by mouth once daily.    Needs flu shot  -     Influenza - Quadrivalent (3 years & older) (PF)    "This note will not be shared with the patient."  Clean with dial soap and water  Apply bactroban bid  Will go up on prozac and she will let me know if helps  RTC as scheduled  "

## 2017-10-27 NOTE — PATIENT INSTRUCTIONS
Anxiety Reaction  Anxiety is the feeling we all get when we think something bad might happen. It is a normal response to stress and usually causes only a mild reaction. When anxiety becomes more severe, it can interfere with daily life. In some cases, you may not even be aware of what it is youre anxious about. There may also be a genetic link or it may be a learned behavior in the home.  Both psychological and physical triggers cause stress reaction. It's often a response to fear or emotional stress, real or imagined. This stress may come from home, family, work, or social relationships.  During an anxiety reaction, you may feel:  · Helpless  · Nervous  · Depressed  · Irritable  Your body may show signs of anxiety in many ways. You may experience:  · Dry mouth  · Shakiness  · Dizziness  · Weakness  · Trouble breathing  · Breathing fast (hyperventilating)  · Chest pressure  · Sweating  · Headache  · Nausea  · Diarrhea  · Tiredness  · Inability to sleep  · Sexual problems  Home care  · Try to locate the sources of stress in your life. They may not be obvious. These may include:  ¨ Daily hassles of life (traffic jams, missed appointments, car troubles, etc.)  ¨ Major life changes, both good (new baby, job promotion) and bad (loss of job, loss of loved one)  ¨ Overload: feeling that you have too many responsibilities and can't take care of all of them at once  ¨ Feeling helpless, feeling that your problems are beyond what youre able to solve  · Notice how your body reacts to stress. Learn to listen to your body signals. This will help you take action before the stress becomes severe.  · When you can, do something about the source of your stress. (Avoid hassles, limit the amount of change that happens in your life at one time and take a break when you feel overloaded).  · Unfortunately, many stressful situations can't be avoided. It is necessary to learn how to better manage stress. There are many proven methods  that will reduce your anxiety. These include simple things like exercise, good nutrition and adequate rest. Also, there are certain techniques that are helpful:  ¨ Relaxation  ¨ Breathing exercises  ¨ Visualization  ¨ Biofeedback  ¨ Meditation  For more information about this, consult your doctor or go to a local bookstore and review the many books and tapes available on this subject.  Follow-up care  If you feel that your anxiety is not responding to self-help measures, contact your doctor or make an appointment with a counselor. You may need short-term psychological counseling and temporary medicine to help you manage stress.  Call 911  Call your healthcare provider right away if any of these occur:  · Trouble breathing  · Confusion  · Drowsiness or trouble wakening  · Fainting or loss of consciousness  · Rapid heart rate  · Seizure  · New chest pain that becomes more severe, lasts longer, or spreads into your shoulder, arm, neck, jaw, or back  When to seek medical advice  Call your healthcare provider right away if any of these occur:  · Your symptoms get worse  · Severe headache not relieved by rest and mild pain reliever  Date Last Reviewed: 9/29/2015  © 8621-7017 [employer]. [address] [place] [address]. All rights reserved. This information is not intended as a substitute for professional medical care. Always follow your healthcare professional's instructions.

## 2018-01-23 ENCOUNTER — OFFICE VISIT (OUTPATIENT)
Dept: INTERNAL MEDICINE | Facility: CLINIC | Age: 43
End: 2018-01-23
Payer: COMMERCIAL

## 2018-01-23 VITALS
DIASTOLIC BLOOD PRESSURE: 80 MMHG | SYSTOLIC BLOOD PRESSURE: 122 MMHG | HEIGHT: 64 IN | HEART RATE: 82 BPM | RESPIRATION RATE: 18 BRPM | BODY MASS INDEX: 26.29 KG/M2 | OXYGEN SATURATION: 100 % | TEMPERATURE: 98 F | WEIGHT: 154 LBS

## 2018-01-23 DIAGNOSIS — F33.1 MDD (MAJOR DEPRESSIVE DISORDER), RECURRENT EPISODE, MODERATE: ICD-10-CM

## 2018-01-23 DIAGNOSIS — E78.5 HYPERLIPIDEMIA LDL GOAL <70: ICD-10-CM

## 2018-01-23 DIAGNOSIS — G47.00 INSOMNIA, UNSPECIFIED TYPE: ICD-10-CM

## 2018-01-23 DIAGNOSIS — Z72.0 TOBACCO USE: ICD-10-CM

## 2018-01-23 DIAGNOSIS — J06.9 VIRAL UPPER RESPIRATORY ILLNESS: ICD-10-CM

## 2018-01-23 DIAGNOSIS — I10 BENIGN ESSENTIAL HTN: ICD-10-CM

## 2018-01-23 DIAGNOSIS — E66.3 OVERWEIGHT (BMI 25.0-29.9): ICD-10-CM

## 2018-01-23 DIAGNOSIS — Z71.6 TOBACCO ABUSE COUNSELING: ICD-10-CM

## 2018-01-23 DIAGNOSIS — J45.909 ASTHMA, UNSPECIFIED ASTHMA SEVERITY, UNSPECIFIED WHETHER COMPLICATED, UNSPECIFIED WHETHER PERSISTENT: ICD-10-CM

## 2018-01-23 DIAGNOSIS — R32 URINARY INCONTINENCE, UNSPECIFIED TYPE: ICD-10-CM

## 2018-01-23 DIAGNOSIS — F41.1 GAD (GENERALIZED ANXIETY DISORDER): ICD-10-CM

## 2018-01-23 LAB
ALBUMIN SERPL BCP-MCNC: 3.5 G/DL
ALP SERPL-CCNC: 105 U/L
ALT SERPL W/O P-5'-P-CCNC: 16 U/L
ANION GAP SERPL CALC-SCNC: 9 MMOL/L
AST SERPL-CCNC: 19 U/L
BASOPHILS # BLD AUTO: 0.06 K/UL
BASOPHILS NFR BLD: 0.6 %
BILIRUB SERPL-MCNC: 0.2 MG/DL
BUN SERPL-MCNC: 11 MG/DL
CALCIUM SERPL-MCNC: 9.2 MG/DL
CHLORIDE SERPL-SCNC: 106 MMOL/L
CHOLEST SERPL-MCNC: 212 MG/DL
CHOLEST/HDLC SERPL: 8.2 {RATIO}
CO2 SERPL-SCNC: 24 MMOL/L
CREAT SERPL-MCNC: 0.7 MG/DL
CREAT UR-MCNC: 155 MG/DL
DIFFERENTIAL METHOD: ABNORMAL
EOSINOPHIL # BLD AUTO: 0.3 K/UL
EOSINOPHIL NFR BLD: 2.7 %
ERYTHROCYTE [DISTWIDTH] IN BLOOD BY AUTOMATED COUNT: 11.8 %
EST. GFR  (AFRICAN AMERICAN): >60 ML/MIN/1.73 M^2
EST. GFR  (NON AFRICAN AMERICAN): >60 ML/MIN/1.73 M^2
ESTIMATED AVG GLUCOSE: 146 MG/DL
GLUCOSE SERPL-MCNC: 164 MG/DL
HBA1C MFR BLD HPLC: 6.7 %
HCT VFR BLD AUTO: 40.5 %
HDLC SERPL-MCNC: 26 MG/DL
HDLC SERPL: 12.3 %
HGB BLD-MCNC: 13.5 G/DL
IMM GRANULOCYTES # BLD AUTO: 0.04 K/UL
IMM GRANULOCYTES NFR BLD AUTO: 0.4 %
LDLC SERPL CALC-MCNC: ABNORMAL MG/DL
LYMPHOCYTES # BLD AUTO: 3.7 K/UL
LYMPHOCYTES NFR BLD: 34.7 %
MCH RBC QN AUTO: 31.2 PG
MCHC RBC AUTO-ENTMCNC: 33.3 G/DL
MCV RBC AUTO: 94 FL
MICROALBUMIN UR DL<=1MG/L-MCNC: 12 UG/ML
MICROALBUMIN/CREATININE RATIO: 7.7 UG/MG
MONOCYTES # BLD AUTO: 0.8 K/UL
MONOCYTES NFR BLD: 7.2 %
NEUTROPHILS # BLD AUTO: 5.8 K/UL
NEUTROPHILS NFR BLD: 54.4 %
NONHDLC SERPL-MCNC: 186 MG/DL
NRBC BLD-RTO: 0 /100 WBC
PLATELET # BLD AUTO: 304 K/UL
PMV BLD AUTO: 12.8 FL
POTASSIUM SERPL-SCNC: 4 MMOL/L
PROT SERPL-MCNC: 7.4 G/DL
RBC # BLD AUTO: 4.33 M/UL
SODIUM SERPL-SCNC: 139 MMOL/L
TRIGL SERPL-MCNC: 432 MG/DL
TSH SERPL DL<=0.005 MIU/L-ACNC: 1.82 UIU/ML
WBC # BLD AUTO: 10.63 K/UL

## 2018-01-23 PROCEDURE — 84443 ASSAY THYROID STIM HORMONE: CPT

## 2018-01-23 PROCEDURE — 99999 PR PBB SHADOW E&M-EST. PATIENT-LVL V: CPT | Mod: PBBFAC,,, | Performed by: NURSE PRACTITIONER

## 2018-01-23 PROCEDURE — 80061 LIPID PANEL: CPT

## 2018-01-23 PROCEDURE — 99214 OFFICE O/P EST MOD 30 MIN: CPT | Mod: 25,S$GLB,, | Performed by: NURSE PRACTITIONER

## 2018-01-23 PROCEDURE — 83036 HEMOGLOBIN GLYCOSYLATED A1C: CPT

## 2018-01-23 PROCEDURE — 80053 COMPREHEN METABOLIC PANEL: CPT

## 2018-01-23 PROCEDURE — 82043 UR ALBUMIN QUANTITATIVE: CPT

## 2018-01-23 PROCEDURE — 85025 COMPLETE CBC W/AUTO DIFF WBC: CPT

## 2018-01-23 PROCEDURE — 36415 COLL VENOUS BLD VENIPUNCTURE: CPT | Mod: S$GLB,,, | Performed by: NURSE PRACTITIONER

## 2018-01-23 PROCEDURE — 96372 THER/PROPH/DIAG INJ SC/IM: CPT | Mod: S$GLB,,, | Performed by: INTERNAL MEDICINE

## 2018-01-23 PROCEDURE — 36415 COLL VENOUS BLD VENIPUNCTURE: CPT

## 2018-01-23 RX ORDER — METHYLPREDNISOLONE ACETATE 80 MG/ML
80 INJECTION, SUSPENSION INTRA-ARTICULAR; INTRALESIONAL; INTRAMUSCULAR; SOFT TISSUE
Status: COMPLETED | OUTPATIENT
Start: 2018-01-23 | End: 2018-01-23

## 2018-01-23 RX ORDER — QUETIAPINE FUMARATE 400 MG/1
400 TABLET, FILM COATED ORAL NIGHTLY
Qty: 30 TABLET | Refills: 2 | Status: SHIPPED | OUTPATIENT
Start: 2018-01-23 | End: 2018-05-26 | Stop reason: SDUPTHER

## 2018-01-23 RX ORDER — ALBUTEROL SULFATE 90 UG/1
2 AEROSOL, METERED RESPIRATORY (INHALATION) EVERY 6 HOURS PRN
Qty: 18 G | Refills: 1 | Status: SHIPPED | OUTPATIENT
Start: 2018-01-23 | End: 2019-01-23

## 2018-01-23 RX ORDER — OXYBUTYNIN CHLORIDE 5 MG/1
5 TABLET, EXTENDED RELEASE ORAL DAILY
Qty: 30 TABLET | Refills: 2 | Status: SHIPPED | OUTPATIENT
Start: 2018-01-23 | End: 2019-07-01

## 2018-01-23 RX ADMIN — METHYLPREDNISOLONE ACETATE 80 MG: 80 INJECTION, SUSPENSION INTRA-ARTICULAR; INTRALESIONAL; INTRAMUSCULAR; SOFT TISSUE at 08:01

## 2018-01-23 NOTE — PATIENT INSTRUCTIONS
Diabetes: Activity Tips    Being more active can help you manage your diabetes. The tips on this sheet can help you get the most from your exercise. They can also help you stay safe.  Staying Active  Its important for adults to spend less time sitting and being inactive. This is especially true if you have type 2 diabetes. When you are sitting for long periods of time, get up for short sessions of light activity every 30 minutes.  You should aim for at least 150 minutes a week of exercise or physical activity. Dont let more than 2 days go by without being active.  Benefit from briskness  Brisk activity gets your heart beating faster. This can help you increase your fitness, lose extra weight, and manage your blood sugar level. Try brisk walking. Or, if you have foot or leg problems, you can try swimming or bike riding. You can break up your exercise into chunks throughout the day. Work up to at least 30 minutes of steady, brisk exercise on most days.  Warm up and cool down  Warming up and cooling down reduce your risk of injury. They also help limit muscle soreness. Do a mild version of your activity for 5 minutes before and after your routine. You can also learn stretches that will help keep your muscles loose. Your healthcare provider may show you good ways to warm up and stretch.  Do the talk-sing test  The talk-sing test is a simple way to tell how hard youre exercising. If you can talk while exercising, youre in a safe range. If youre out of breath, slow down. If you can carry a tune, its time to  the pace. Walk up a hill. Increase the resistance on your stationary bike. Or swim faster.  What about eating?  You may be told to plan your exercise for 1 to 2 hours after a meal. In most cases, you dont need to eat while being active. If you take insulin or medicine that can cause low blood sugar, test your blood sugar before exercising. And carry a fast-acting sugar that will raise your blood sugar  level quickly. This includes glucose tablets or hard candy. Use it if you feel low blood sugar symptoms.  Safety tips  These tips can help you stay safe as you become fit:  · Exercise with a friend or carry a cell phone if you have one.  · Carry or wear identification, such as a necklace or bracelet, that says you have diabetes.  · Use the proper footwear and safety equipment for your activity.  · Drink water before, during, and after exercise.  · Dress properly for the weather.  · Dont exercise in very hot or very cold weather.  · Dont exercise if you are sick.  · If you are instructed to do so, test your blood sugar before and after you exercise. Have a small carbohydrate snack if your blood sugar is low before you start exercising.   When to stop exercising and call your healthcare provider  Stop exercising and call your healthcare provider right away if you notice any of the following:  · Pain, pressure, tightness, or heaviness in the chest  · Pain or heaviness in the neck, shoulders, back, arms, legs, or feet  · Unusual shortness of breath  · Dizziness or lightheadedness  · Unusually rapid or slow pulse  · Increased joint or muscle pain  · Nausea or vomiting  Date Last Reviewed: 5/1/2016  © 6668-0981 [employer]. [address] [place] [address], [place] [address]. All rights reserved. This information is not intended as a substitute for professional medical care. Always follow your healthcare professional's instructions.

## 2018-01-23 NOTE — PROGRESS NOTES
Subjective:       Patient ID: Eva Lane is a 42 y.o. female.    Chief Complaint: Follow-up (3 month check up ); Cough (x 3 weeks); and Nasal Congestion    Patient is known, to me and presents with   Chief Complaint   Patient presents with    Follow-up     3 month check up     Cough     x 3 weeks    Nasal Congestion   .  Denies chest pain and shortness of breath.  Patient presents with check up and also having cough and nasal congestion. Her blood sugars range from over 400 at times. Not adherent to diet or exercise and is trying to quit smoking   HPI  Review of Systems   Constitutional: Negative for activity change, appetite change, fatigue, fever and unexpected weight change.   HENT: Positive for congestion and postnasal drip. Negative for ear discharge, ear pain, hearing loss and tinnitus.    Eyes: Negative for photophobia, pain and visual disturbance.   Respiratory: Positive for cough. Negative for shortness of breath, wheezing and stridor.    Cardiovascular: Negative for chest pain, palpitations and leg swelling.   Gastrointestinal: Negative for abdominal distention.   Genitourinary: Negative for difficulty urinating, dysuria, frequency, hematuria and urgency.   Musculoskeletal: Negative for arthralgias, back pain, gait problem, joint swelling and neck pain.   Skin: Negative.    Neurological: Negative for dizziness, seizures, syncope, weakness, light-headedness, numbness and headaches.   Hematological: Negative for adenopathy. Does not bruise/bleed easily.   Psychiatric/Behavioral: Positive for sleep disturbance. Negative for behavioral problems, confusion, hallucinations and suicidal ideas. The patient is nervous/anxious.        Objective:      Physical Exam   Constitutional: She is oriented to person, place, and time. She appears well-developed and well-nourished. No distress.   HENT:   Head: Normocephalic and atraumatic.   Right Ear: External ear normal. Tympanic membrane mobility is abnormal.    Left Ear: External ear normal. Tympanic membrane mobility is abnormal.   Nose: Mucosal edema present.   Mouth/Throat: Oropharynx is clear and moist. No oropharyngeal exudate or posterior oropharyngeal erythema.   Eyes: Conjunctivae and EOM are normal. Pupils are equal, round, and reactive to light. Right eye exhibits no discharge. Left eye exhibits no discharge.   Neck: Normal range of motion. Neck supple. No JVD present. No thyromegaly present.   Cardiovascular: Normal rate, regular rhythm, normal heart sounds and intact distal pulses.  Exam reveals no gallop and no friction rub.    No murmur heard.  Pulmonary/Chest: Effort normal. No stridor. No respiratory distress. She has wheezes in the right upper field and the left upper field. She has no rales. She exhibits no tenderness.   Abdominal: Soft. Bowel sounds are normal. She exhibits no distension and no mass. There is no tenderness. There is no rebound.   Musculoskeletal: Normal range of motion. She exhibits no edema or tenderness.   Lymphadenopathy:     She has no cervical adenopathy.   Neurological: She is alert and oriented to person, place, and time. She has normal reflexes. She displays normal reflexes. No cranial nerve deficit. She exhibits normal muscle tone. Coordination normal.   Skin: Skin is warm and dry. Capillary refill takes less than 2 seconds. No rash noted. No erythema. No pallor.   Psychiatric: She has a normal mood and affect. Her behavior is normal. Judgment and thought content normal.   Negative SI/HI       Assessment:       1. Uncontrolled type 2 diabetes mellitus without complication, with long-term current use of insulin    2. Benign essential HTN    3. Hyperlipidemia LDL goal <70    4. MDD (major depressive disorder), recurrent episode, moderate    5. HILARIA (generalized anxiety disorder)    6. Tobacco use    7. Tobacco abuse counseling    8. Urinary incontinence, unspecified type    9. Insomnia, unspecified type    10. Viral upper  respiratory illness    11. Asthma, unspecified asthma severity, unspecified whether complicated, unspecified whether persistent    12. Overweight (BMI 25.0-29.9)        Plan:   Eva was seen today for follow-up, cough, nasal congestion, insomnia, diarrhea and blister.    Diagnoses and all orders for this visit:    Uncontrolled type 2 diabetes mellitus without complication, with long-term current use of insulin  -     CBC auto differential; Future  -     Comprehensive metabolic panel; Future  -     Microalbumin/creatinine urine ratio; Future  -     Hemoglobin A1c; Future    Benign essential HTN  -     CBC auto differential; Future  -     Comprehensive metabolic panel; Future  -     Microalbumin/creatinine urine ratio; Future    Hyperlipidemia LDL goal <70  -     CBC auto differential; Future  -     Comprehensive metabolic panel; Future  -     TSH; Future  -     Lipid panel; Future    MDD (major depressive disorder), recurrent episode, moderate  -     CBC auto differential; Future  -     Comprehensive metabolic panel; Future    HILARIA (generalized anxiety disorder)  -     CBC auto differential; Future  -     Comprehensive metabolic panel; Future    Tobacco use  -     CBC auto differential; Future  -     Comprehensive metabolic panel; Future    Tobacco abuse counseling  -     CBC auto differential; Future  -     Comprehensive metabolic panel; Future    Urinary incontinence, unspecified type  -     oxybutynin (DITROPAN-XL) 5 MG TR24; Take 1 tablet (5 mg total) by mouth once daily.    Insomnia, unspecified type  -     QUEtiapine (SEROQUEL) 400 MG tablet; Take 1 tablet (400 mg total) by mouth every evening.    Viral upper respiratory illness  -     methylPREDNISolone acetate injection 80 mg; Inject 1 mL (80 mg total) into the muscle one time.    Asthma, unspecified asthma severity, unspecified whether complicated, unspecified whether persistent  -     albuterol 90 mcg/actuation inhaler; Inhale 2 puffs into the lungs every 6  "(six) hours as needed for Wheezing. Rescue    Overweight (BMI 25.0-29.9)  -     CBC auto differential; Future  -     Comprehensive metabolic panel; Future    "This note will not be shared with the patient."  Trying to quit smoking  Watch diet with steroid shot  Check CBC, CMP, TSH, Fasting lipid profile, HgA1c, Microalbuminuria in three months.  Medication compliance was discussed with the patient.  The patient was instructed to monitor glucoses closely using glucometer at home and to record the values in a log.  The patient was instructed to obtain an Optometry exam and microalbumin q year.  The patient was instructed to obtain a hemoglobin A1C q 3-4 months.  The patient was instructed to check the feet visually daily and to monitor for infection.  The patient was instructed to exercise at least 3 times a week.  The patient was instructed to follow a 1800 ADA diet.  The patient was instructed to monitor weight daily and try to keep it as close to ideal body weight as possible.  The patient was instructed on using exercise frequently to reduce BP.  The patient was instructed on using a low salt diet.  Monitor BP at home and to record the values in a log.  RTC in three months.  "

## 2018-02-05 ENCOUNTER — TELEPHONE (OUTPATIENT)
Dept: INTERNAL MEDICINE | Facility: CLINIC | Age: 43
End: 2018-02-05

## 2018-02-05 DIAGNOSIS — E78.5 HYPERLIPIDEMIA, UNSPECIFIED HYPERLIPIDEMIA TYPE: Primary | ICD-10-CM

## 2018-02-05 NOTE — TELEPHONE ENCOUNTER
----- Message from Chika Rosales MA sent at 2018  9:56 AM CST -----  Contact: Patient  Eva Lane  MRN: [id number]  : 1975  PCP: Cintia Gsutafson  Home Phone      [phone]  Work Phone      Not on file.  Mobile          [phone]      MESSAGE: Patient is calling for results of bloodwork from 2018.  Please call [phone]

## 2018-02-06 NOTE — TELEPHONE ENCOUNTER
Pt notified of results, voiced understanding stating she thinks she was on something in the past but not sure but is ok with starting to send it over to [facility]  Please advise  Thanks!

## 2018-02-07 RX ORDER — ATORVASTATIN CALCIUM 20 MG/1
20 TABLET, FILM COATED ORAL NIGHTLY
Qty: 30 TABLET | Refills: 5 | Status: SHIPPED | OUTPATIENT
Start: 2018-02-07 | End: 2018-03-09

## 2018-02-22 ENCOUNTER — HOSPITAL ENCOUNTER (EMERGENCY)
Facility: HOSPITAL | Age: 43
Discharge: HOME | End: 2018-02-22
Attending: SURGERY
Payer: COMMERCIAL

## 2018-02-22 VITALS
RESPIRATION RATE: 18 BRPM | OXYGEN SATURATION: 98 % | HEART RATE: 119 BPM | WEIGHT: 145 LBS | SYSTOLIC BLOOD PRESSURE: 139 MMHG | TEMPERATURE: 97 F | DIASTOLIC BLOOD PRESSURE: 102 MMHG | BODY MASS INDEX: 24.89 KG/M2

## 2018-02-22 DIAGNOSIS — M79.672 LEFT FOOT PAIN: Primary | ICD-10-CM

## 2018-02-22 DIAGNOSIS — M25.572 LEFT ANKLE PAIN: ICD-10-CM

## 2018-02-22 PROBLEM — I10 BENIGN ESSENTIAL HTN: Status: RESOLVED | Noted: 2017-08-22 | Resolved: 2018-02-22

## 2018-02-22 PROBLEM — Z87.19 HISTORY OF ESOPHAGITIS: Status: ACTIVE | Noted: 2018-02-22

## 2018-02-22 PROBLEM — K21.9 HIATAL HERNIA WITH GERD: Status: ACTIVE | Noted: 2018-02-22

## 2018-02-22 PROBLEM — E78.5 HYPERLIPIDEMIA LDL GOAL <70: Status: RESOLVED | Noted: 2017-08-22 | Resolved: 2018-02-22

## 2018-02-22 PROBLEM — K44.9 HIATAL HERNIA WITH GERD: Status: ACTIVE | Noted: 2018-02-22

## 2018-02-22 PROBLEM — I70.0 AORTIC ATHEROSCLEROSIS: Status: ACTIVE | Noted: 2018-02-22

## 2018-02-22 PROCEDURE — 99283 EMERGENCY DEPT VISIT LOW MDM: CPT | Mod: 25

## 2018-02-22 PROCEDURE — 63600175 PHARM REV CODE 636 W HCPCS: Performed by: SURGERY

## 2018-02-22 PROCEDURE — 96372 THER/PROPH/DIAG INJ SC/IM: CPT

## 2018-02-22 RX ORDER — CYCLOBENZAPRINE HCL 10 MG
10 TABLET ORAL 3 TIMES DAILY PRN
Qty: 10 TABLET | Refills: 0 | Status: SHIPPED | OUTPATIENT
Start: 2018-02-22 | End: 2018-02-27

## 2018-02-22 RX ORDER — KETOROLAC TROMETHAMINE 10 MG/1
10 TABLET, FILM COATED ORAL EVERY 6 HOURS PRN
Qty: 15 TABLET | Refills: 0 | Status: SHIPPED | OUTPATIENT
Start: 2018-02-22 | End: 2018-02-26

## 2018-02-22 RX ORDER — KETOROLAC TROMETHAMINE 30 MG/ML
60 INJECTION, SOLUTION INTRAMUSCULAR; INTRAVENOUS
Status: COMPLETED | OUTPATIENT
Start: 2018-02-22 | End: 2018-02-22

## 2018-02-22 RX ADMIN — KETOROLAC TROMETHAMINE 60 MG: 30 INJECTION, SOLUTION INTRAMUSCULAR at 02:02

## 2018-02-22 NOTE — ED PROVIDER NOTES
[facility]                                        February 21, 2018               Chief Complaint  42 y.o. female with Foot Pain    History of Present Illness  Eva Lane presents to the emergency room with left foot and ankle pain today  Patient had an awkward twist and fall with residual left foot and ankle pain after the incident  Pt on exam is no obvious bruising or deformity, patient states she has pain with walking  Pt has no fractures on ER evaluation and x-ray, good distal pulses and capillary refill noted  Patient's only complaint today is left foot and ankle pain, afebrile with stable vital signs now    The history is provided by the patient     Past Medical History   -- ADHD        -- Anxiety        -- Arthritis        -- Asthma        -- Behavioral problem        -- Bipolar 1 disorder        -- CHF (congestive heart failure)        -- COPD        -- Depression        -- Diabetes mellitus type II        -- Hx of psychiatric care        -- Hyperlipidemia        -- Hypertension        -- Lumbar radiculopathy        -- Neuralgia        -- Neuritis        -- Psychiatric problem        -- PTSD (post-traumatic stress disorder)        -- Seizures        -- Self-harming behavior        -- Sleep apnea        -- Stroke        -- Stroke        -- Therapy            Past Surgical History   -- Oophorectomy           -- Tubal ligation           -- Dilation and curettage of uterus           -- Hysterectomy           -- Total abdominal hysterectomy           -- Cyst removal           -- Colonoscopy               Allergies   -- Penicillins        -- Neosporin [Neomycin-Bacitracin-Polymyxin]       Review of Systems and Physical Exam      Review of Systems  -- Constitution - no fever, denies fatigue, no weakness, no chills  -- Eyes - no tearing or redness, no visual disturbance  -- Ear, Nose - no tinnitus or earache, no nasal congestion or discharge  -- Mouth,Throat - no sore throat, no  toothache, normal voice, normal swallowing  -- Respiratory - denies cough and congestion, no shortness of breath, no PERRY  -- Cardiovascular - denies chest pain, no palpitations, denies claudication  -- Gastrointestinal - denies abdominal pain, nausea, vomiting, or diarrhea  -- Musculoskeletal - left foot and ankle pain after a slip and fall  -- Neurological - no headache, denies weakness or seizure; no LOC  -- Skin - denies pallor, rash, or changes in skin. no hives or welts noted    BP (!) 139/102  Pulse (!) 119   Temp 97 °F (36.1 °C) (Oral)   Resp 18     Physical Exam  -- Nursing note and vitals reviewed  -- Constitutional: Appears well-developed and well-nourished  -- Head: Atraumatic. Normocephalic. No obvious abnormality  -- Eyes: Pupils are equal and reactive to light. Normal conjunctiva and lids  -- Neck: Normal range of motion. Neck supple. No masses, trachea midline  -- Cardiac: Normal rate, regular rhythm and normal heart sounds  -- Pulmonary: Normal respiratory effort, breath sounds clear to auscultation  -- Abdominal: Soft, no tenderness. Normal bowel sounds. Normal liver edge  -- Musculoskeletal: Normal range of motion, no effusions. Joints stable   -- Neurological: No focal deficits. Showed good interaction with staff  -- Vascular: Posterior tibial, dorsalis pedis and radial pulses 2+ bilaterally      Emergency Room Course      Treatment and Evaluation  -- Preliminary ER x-ray readings showed no evidence of fracture or dislocation  -- All x-rays are reviewed with a final disposition given by the radiologist   -- An ankle ace wrap is placed on the affected ankle by the CNA  -- Crutches were also given and taught for ambulation      Medications Given  -- IM 60 mg Toradol given today in the ER    Diagnosis  -- The primary encounter diagnosis was Left foot pain.  --  A diagnosis of Left ankle pain was also pertinent to this visit.    Disposition and Plan  -- Disposition: home  -- Condition: stable  --  Follow-up: Patient to follow up with Cintia Gustafson NP in 1-2 days.  -- I advised the patient that we have found no life threatening condition today  -- At this time, I believe the patient is clinically stable for discharge.   -- The patient acknowledges that close follow up with a MD is required   -- Patient agrees to comply with all instruction and direction given in the ER    This note is dictated on Dragon Natural Speaking word recognition program.  There are word recognition mistakes that are occasionally missed on review.           Nadeem Cosme MD  02/22/18 2644

## 2018-02-26 ENCOUNTER — OFFICE VISIT (OUTPATIENT)
Dept: INTERNAL MEDICINE | Facility: CLINIC | Age: 43
End: 2018-02-26
Payer: COMMERCIAL

## 2018-02-26 VITALS
OXYGEN SATURATION: 98 % | HEART RATE: 70 BPM | BODY MASS INDEX: 28.17 KG/M2 | HEIGHT: 64 IN | DIASTOLIC BLOOD PRESSURE: 80 MMHG | RESPIRATION RATE: 20 BRPM | SYSTOLIC BLOOD PRESSURE: 136 MMHG | WEIGHT: 165 LBS

## 2018-02-26 DIAGNOSIS — I10 BENIGN ESSENTIAL HTN: ICD-10-CM

## 2018-02-26 DIAGNOSIS — K58.0 IRRITABLE BOWEL SYNDROME WITH DIARRHEA: ICD-10-CM

## 2018-02-26 DIAGNOSIS — S93.402D SPRAIN OF LEFT ANKLE, UNSPECIFIED LIGAMENT, SUBSEQUENT ENCOUNTER: ICD-10-CM

## 2018-02-26 PROCEDURE — 99999 PR PBB SHADOW E&M-EST. PATIENT-LVL IV: CPT | Mod: PBBFAC,,, | Performed by: NURSE PRACTITIONER

## 2018-02-26 PROCEDURE — 3008F BODY MASS INDEX DOCD: CPT | Mod: S$GLB,,, | Performed by: NURSE PRACTITIONER

## 2018-02-26 PROCEDURE — 99214 OFFICE O/P EST MOD 30 MIN: CPT | Mod: S$GLB,,, | Performed by: NURSE PRACTITIONER

## 2018-02-26 PROCEDURE — 96372 THER/PROPH/DIAG INJ SC/IM: CPT | Mod: S$GLB,,, | Performed by: INTERNAL MEDICINE

## 2018-02-26 RX ORDER — OLMESARTAN MEDOXOMIL 20 MG/1
20 TABLET ORAL DAILY
Qty: 30 TABLET | Refills: 5 | Status: SHIPPED | OUTPATIENT
Start: 2018-02-26 | End: 2021-04-27

## 2018-02-26 RX ORDER — KETOROLAC TROMETHAMINE 30 MG/ML
60 INJECTION, SOLUTION INTRAMUSCULAR; INTRAVENOUS
Status: COMPLETED | OUTPATIENT
Start: 2018-02-26 | End: 2018-02-26

## 2018-02-26 RX ORDER — DICYCLOMINE HYDROCHLORIDE 20 MG/1
20 TABLET ORAL 2 TIMES DAILY PRN
Qty: 60 TABLET | Refills: 5 | Status: SHIPPED | OUTPATIENT
Start: 2018-02-26 | End: 2018-03-28

## 2018-02-26 RX ORDER — TRAMADOL HYDROCHLORIDE 50 MG/1
50 TABLET ORAL EVERY 6 HOURS PRN
Qty: 28 TABLET | Refills: 0 | Status: SHIPPED | OUTPATIENT
Start: 2018-02-26 | End: 2018-03-05

## 2018-02-26 RX ADMIN — KETOROLAC TROMETHAMINE 60 MG: 30 INJECTION, SOLUTION INTRAMUSCULAR; INTRAVENOUS at 11:02

## 2018-02-26 NOTE — PATIENT INSTRUCTIONS
ACE Wrap  Minor muscle or joint injuries are often treated with an elastic bandage. The bandage provides support and compression to the injured area. An elastic bandage is a stretchy, rolled bandage. Elastic bandages range in width from 2 to 6 inches. They can be used for a variety of injuries. The bandages are often called ACE bandages, after the most common brand name.  If used correctly, elastic bandages help control swelling and ease pain. An elastic bandage is also a good reminder not to overuse the injured area. However, elastic bandages do not provide a lot of support and will not prevent reinjury.  Home care    To apply an elastic bandage:  · Check the skin before wrapping the injury. It should be clean, dry, and free of drainage.  · Start wrapping below the injury and work your way toward the body. For an ankle sprain, start wrapping around the foot and work up toward the calf. This will help control swelling.  · Overlap the edges of the bandage so it stays snuggly in place.  · Wrap the bandage firmly, but not too tightly. A tight bandage can increase swelling on either end of the bandage. Make sure the bandage is wrinkle free.  · Leave fingers and toes exposed.  · Secure ends of the bandage (even self-sticking ones) with clips or tape.  · Check frequently to ensure adequate circulation, especially in the fingers and toes. Loosen the bandage if there is local swelling, numbness, tingling, discomfort, coldness, or discoloration (skin pale or bluish in color).  · Rewrap the bandage as needed during the day. Reroll the bandage as you unwind it.  Continue using the elastic bandage until the pain and swelling are gone or as your healthcare provider advises.  If you have been told to ice the area, the ice can be secured in place with the elastic bandage. Wrap the ice pack with a thin towel to protect the skin. Do not put ice or an ice pack directly on the skin.  Ice the area for no more than 20 minutes at a  time.    Follow-up care  Follow up with your healthcare provider, as advised.  When to seek medical advice  Call your healthcare provider for any of the following:  · Pain and swelling that doesn't get better or gets worse  · Trouble moving injured area  · Skin discoloration, numbness, or tingling that doesnt go away after bandage is removed  Date Last Reviewed: 9/13/2015  © 8553-3967 [employer]. [address] [place] [address], [place] [address]. All rights reserved. This information is not intended as a substitute for professional medical care. Always follow your healthcare professional's instructions.        Understanding Ankle Sprain    The ankle is the joint where the leg and foot meet. Bones are held in place by connective tissue called ligaments. When ankle ligaments are stretched to the point of pain and injury, it is called an ankle sprain. A sprain can tear the ligaments. These tears can be very small but still cause pain. Ankle sprains can be mild or severe.  What causes an ankle sprain?  A sprain may occur when you twist your ankle or bend it too far. This can happen when you stumble or fall. Things that can make an ankle sprain more likely include:  · Having had an ankle sprain before  · Playing sports that involve running and jumping. Or playing contact sports such as football or hockey.  · Wearing shoes that dont support your feet and ankles well  · Having ankles with poor strength and flexibility  Symptoms of an ankle sprain  Symptoms may include:  · Pain or soreness in the ankle  · Swelling  · Redness or bruising  · Not being able to walk or put weight on the affected foot  · Reduced range of motion in the ankle  · A popping or tearing feeling at the time the sprain occurs  · An abnormal or dislocated look to the ankle  · Instability or too much range of motion in the ankle  Treatment for an ankle sprain  Treatment focuses on reducing pain and swelling, and avoiding further injury.  Treatments may include:  · Resting the ankle. Avoid putting weight on it. This may mean using crutches until the sprain heals.  · Prescription or over-the-counter pain medicines. These help reduce swelling and pain.  · Cold packs. These help reduce pain and swelling.  · Raising your ankle above your heart. This helps reduce swelling.  · Wrapping the ankle with an elastic bandage or ankle brace. This helps reduce swelling and gives some support to the ankle. In rare cases, you may need a cast or boot.  · Stretching and other exercises. These improve flexibility and strength.  · Heat packs. These may be recommended before doing ankle exercises.  Possible complications of an ankle sprain  An ankle that has been weakened by a sprain can be more likely to have repeated sprains afterward. Doing exercises to strengthen your ankle and improve balance can reduce your risk for repeated sprains. Other possible complications are long-term (chronic) pain or an ankle that remains unstable.  When to call your healthcare provider  Call your healthcare provider right away if you have any of these:  · Fever of 100.4°F (38°C) or higher, or as directed  · Pain, numbness, discoloration, or coldness in the foot or toes  · Pain that gets worse  · Symptoms that dont get better, or get worse  · New symptoms   Date Last Reviewed: 3/10/2016  © 8161-6010 [employer]. [address] [place] [address], [place] [address]. All rights reserved. This information is not intended as a substitute for professional medical care. Always follow your healthcare professional's instructions.

## 2018-02-26 NOTE — PROGRESS NOTES
Subjective:       Patient ID: Eva Lane is a 42 y.o. female.    Chief Complaint: Follow-up ( X ER - L. foot  and L. hip - fall PS:10); Hypertension; and Abdominal Pain (with diarrhea - x 1month )    Patient is known, to me and presents with   Chief Complaint   Patient presents with    Follow-up      X ER - L. foot  and L. hip - fall PS:10    Hypertension    Abdominal Pain     with diarrhea - x 1month    .  Denies chest pain and shortness of breath.  Patient presents with ER follow up for left ankle roll and now with swelling, pain, and bruising. X-rays reveal soft tissue swelling Still with some diarrhea after meals.   States that she blood pressure has been elevated and thinks that is why she fell due to elevated blood pressure  States that she is having pain to left ankle about a 10 on pain scale. Does have hx of IBS and has not been taking her blood pressure meds so will need refill on benicar  HPI  Review of Systems   Constitutional: Negative for activity change, appetite change, fatigue, fever and unexpected weight change.   HENT: Negative for congestion, ear discharge, ear pain, hearing loss, postnasal drip and tinnitus.    Eyes: Negative for photophobia, pain and visual disturbance.   Respiratory: Negative for cough, shortness of breath, wheezing and stridor.    Cardiovascular: Negative for chest pain, palpitations and leg swelling.   Gastrointestinal: Positive for diarrhea. Negative for abdominal distention, abdominal pain, anal bleeding, blood in stool, constipation, nausea, rectal pain and vomiting.   Genitourinary: Negative for difficulty urinating, dysuria, frequency, hematuria and urgency.   Musculoskeletal: Positive for arthralgias. Negative for back pain, gait problem, joint swelling, myalgias and neck pain.   Skin: Negative.    Neurological: Negative for dizziness, seizures, syncope, weakness, light-headedness, numbness and headaches.   Hematological: Negative for adenopathy. Does not  bruise/bleed easily.   Psychiatric/Behavioral: Negative for behavioral problems, confusion, hallucinations, sleep disturbance and suicidal ideas. The patient is not nervous/anxious.        Objective:      Physical Exam   Constitutional: She is oriented to person, place, and time. She appears well-developed and well-nourished. No distress.   HENT:   Head: Normocephalic and atraumatic.   Right Ear: External ear normal.   Left Ear: External ear normal.   Mouth/Throat: Oropharynx is clear and moist. No oropharyngeal exudate.   Eyes: Conjunctivae and EOM are normal. Pupils are equal, round, and reactive to light. Right eye exhibits no discharge. Left eye exhibits no discharge.   Neck: Normal range of motion. Neck supple. No JVD present. No thyromegaly present.   Cardiovascular: Normal rate, regular rhythm, normal heart sounds and intact distal pulses.  Exam reveals no gallop and no friction rub.    No murmur heard.  Pulmonary/Chest: Effort normal and breath sounds normal. No stridor. No respiratory distress. She has no wheezes. She has no rales. She exhibits no tenderness.   Abdominal: Soft. Bowel sounds are normal. She exhibits no distension and no mass. There is no tenderness. There is no rebound.   Musculoskeletal: She exhibits no edema.        Left ankle: She exhibits decreased range of motion, swelling and ecchymosis. Tenderness.        Feet:    Lymphadenopathy:     She has no cervical adenopathy.   Neurological: She is alert and oriented to person, place, and time. She has normal reflexes. She displays normal reflexes. No cranial nerve deficit. She exhibits normal muscle tone. Coordination normal.   Skin: Skin is warm and dry. Capillary refill takes less than 2 seconds. No rash noted. No erythema. No pallor.   Psychiatric: She has a normal mood and affect. Her behavior is normal. Judgment and thought content normal.       Assessment:       1. Sprain of left ankle, unspecified ligament, subsequent encounter    2.  "Benign essential HTN    3. Irritable bowel syndrome with diarrhea        Plan:   Eva was seen today for follow-up, hypertension and abdominal pain.    Diagnoses and all orders for this visit:    Sprain of left ankle, unspecified ligament, subsequent encounter  -     traMADol (ULTRAM) 50 mg tablet; Take 1 tablet (50 mg total) by mouth every 6 (six) hours as needed.  -     ketorolac injection 60 mg; Inject 60 mg into the muscle one time.    Benign essential HTN  -     olmesartan (BENICAR) 20 MG tablet; Take 1 tablet (20 mg total) by mouth once daily.    Irritable bowel syndrome with diarrhea  -     dicyclomine (BENTYL) 20 mg tablet; Take 1 tablet (20 mg total) by mouth 2 (two) times daily as needed.    "This note will not be shared with the patient."  Will give tramadol for seven days to help with pain  Instructed ON RICE  Has not been taking benicar so will resume that  Also will begin bentyl for IBS  RTC as scheduled  "

## 2018-05-26 DIAGNOSIS — G47.00 INSOMNIA, UNSPECIFIED TYPE: ICD-10-CM

## 2018-05-26 RX ORDER — QUETIAPINE FUMARATE 400 MG/1
TABLET, FILM COATED ORAL
Qty: 30 TABLET | Refills: 2 | Status: SHIPPED | OUTPATIENT
Start: 2018-05-26 | End: 2018-07-16

## 2018-05-29 ENCOUNTER — OFFICE VISIT (OUTPATIENT)
Dept: INTERNAL MEDICINE | Facility: CLINIC | Age: 43
End: 2018-05-29
Payer: COMMERCIAL

## 2018-05-29 ENCOUNTER — LAB VISIT (OUTPATIENT)
Dept: LAB | Facility: HOSPITAL | Age: 43
End: 2018-05-29
Attending: NURSE PRACTITIONER
Payer: COMMERCIAL

## 2018-05-29 VITALS
TEMPERATURE: 99 F | OXYGEN SATURATION: 97 % | RESPIRATION RATE: 20 BRPM | WEIGHT: 154.31 LBS | DIASTOLIC BLOOD PRESSURE: 84 MMHG | BODY MASS INDEX: 26.34 KG/M2 | HEIGHT: 64 IN | SYSTOLIC BLOOD PRESSURE: 124 MMHG | HEART RATE: 80 BPM

## 2018-05-29 DIAGNOSIS — F41.9 ANXIETY: ICD-10-CM

## 2018-05-29 DIAGNOSIS — R31.9 URINARY TRACT INFECTION WITH HEMATURIA, SITE UNSPECIFIED: ICD-10-CM

## 2018-05-29 DIAGNOSIS — F32.A DEPRESSION, UNSPECIFIED DEPRESSION TYPE: ICD-10-CM

## 2018-05-29 DIAGNOSIS — N39.0 URINARY TRACT INFECTION WITH HEMATURIA, SITE UNSPECIFIED: ICD-10-CM

## 2018-05-29 DIAGNOSIS — G44.229 CHRONIC TENSION-TYPE HEADACHE, NOT INTRACTABLE: Primary | ICD-10-CM

## 2018-05-29 PROCEDURE — 99999 PR PBB SHADOW E&M-EST. PATIENT-LVL V: CPT | Mod: PBBFAC,,, | Performed by: NURSE PRACTITIONER

## 2018-05-29 PROCEDURE — 99214 OFFICE O/P EST MOD 30 MIN: CPT | Mod: 25,S$GLB,, | Performed by: NURSE PRACTITIONER

## 2018-05-29 PROCEDURE — 3079F DIAST BP 80-89 MM HG: CPT | Mod: CPTII,S$GLB,, | Performed by: NURSE PRACTITIONER

## 2018-05-29 PROCEDURE — 87086 URINE CULTURE/COLONY COUNT: CPT

## 2018-05-29 PROCEDURE — 81002 URINALYSIS NONAUTO W/O SCOPE: CPT | Mod: S$GLB,,, | Performed by: NURSE PRACTITIONER

## 2018-05-29 PROCEDURE — 96372 THER/PROPH/DIAG INJ SC/IM: CPT | Mod: S$GLB,,, | Performed by: NURSE PRACTITIONER

## 2018-05-29 PROCEDURE — 3008F BODY MASS INDEX DOCD: CPT | Mod: CPTII,S$GLB,, | Performed by: NURSE PRACTITIONER

## 2018-05-29 PROCEDURE — 3074F SYST BP LT 130 MM HG: CPT | Mod: CPTII,S$GLB,, | Performed by: NURSE PRACTITIONER

## 2018-05-29 RX ORDER — FLUOXETINE HYDROCHLORIDE 40 MG/1
CAPSULE ORAL
COMMUNITY
Start: 2018-03-16 | End: 2018-05-29 | Stop reason: SDUPTHER

## 2018-05-29 RX ORDER — ALPRAZOLAM 0.5 MG/1
0.5 TABLET ORAL 2 TIMES DAILY PRN
Qty: 60 TABLET | Refills: 2 | Status: SHIPPED | OUTPATIENT
Start: 2018-05-29 | End: 2018-06-28

## 2018-05-29 RX ORDER — NITROFURANTOIN (MACROCRYSTALS) 100 MG/1
100 CAPSULE ORAL EVERY 12 HOURS
Qty: 14 CAPSULE | Refills: 0 | Status: SHIPPED | OUTPATIENT
Start: 2018-05-29 | End: 2018-06-05

## 2018-05-29 RX ORDER — KETOROLAC TROMETHAMINE 30 MG/ML
60 INJECTION, SOLUTION INTRAMUSCULAR; INTRAVENOUS
Status: COMPLETED | OUTPATIENT
Start: 2018-05-29 | End: 2018-05-29

## 2018-05-29 RX ORDER — FLUOXETINE HYDROCHLORIDE 40 MG/1
40 CAPSULE ORAL DAILY
Qty: 30 CAPSULE | Refills: 2 | Status: SHIPPED | OUTPATIENT
Start: 2018-05-29 | End: 2018-07-16

## 2018-05-29 RX ORDER — METOPROLOL SUCCINATE 50 MG/1
TABLET, EXTENDED RELEASE ORAL
COMMUNITY
Start: 2018-03-16 | End: 2018-07-16 | Stop reason: SDUPTHER

## 2018-05-29 RX ADMIN — KETOROLAC TROMETHAMINE 60 MG: 30 INJECTION, SOLUTION INTRAMUSCULAR; INTRAVENOUS at 02:05

## 2018-05-29 NOTE — PATIENT INSTRUCTIONS
Understanding Anxiety Disorders  Almost everyone gets nervous now and then. Its normal to have knots in your stomach before a test, or for your heart to race on a first date. But an anxiety disorder is much more than a case of nerves. In fact, its symptoms may be overwhelming. But treatment can relieve many of these symptoms. Talking to your healthcare provider is the first step.    What are anxiety disorders?  An anxiety disorder causes intense feelings of panic and fear. These feelings may arise for no apparent reason. And they tend to recur again and again. They may prevent you from coping with life and cause you great distress. As a result, you may avoid anything that triggers your fear. In extreme cases, you may never leave the house. Anxiety disorders may cause other symptoms, such as:  · Obsessive thoughts you cant control  · Constant nightmares or painful thoughts of the past  · Nausea, sweating, and muscle tension  · Trouble sleeping or concentrating  What causes anxiety disorders?  Anxiety disorders tend to run in families. For some people, childhood abuse or neglect may play a role. For others, stressful life events or trauma may trigger anxiety disorders. Anxiety can trigger low self-esteem and poor coping skills.  Common anxiety disorders  · Panic disorder. This causes an intense fear of being in danger.  · Phobias. These are extreme fears of certain objects, places, or events.  · Obsessive-compulsive disorder. This causes you to have unwanted thoughts and urges. You also may perform certain actions over and over.  · Posttraumatic stress disorder. This occurs in people who have survived a terrible ordeal. It can cause nightmares and flashbacks about the event.  · Generalized anxiety disorder. This causes constant worry that can greatly disrupt your life.   Getting better  You may believe that nothing can help you. Or, you might fear what others may think. But most anxiety symptoms can be eased.  Having an anxiety disorder is nothing to be ashamed of. Most people do best with treatment that combines medicine and therapy. These arent cures. But they can help you live a healthier life.  Date Last Reviewed: 2/1/2017  © 8020-9528 [employer]. [address] [place] [address], [place] [address]. All rights reserved. This information is not intended as a substitute for professional medical care. Always follow your healthcare professional's instructions.

## 2018-05-29 NOTE — PROGRESS NOTES
Subjective:       Patient ID: Eva Lane is a 42 y.o. female.    Chief Complaint: Abdominal Pain (in lower R. side - x 1 week with sharp sudden pain PS:0); Back Pain (in Lower R. side ); Migraine (x 2 weeks); Leg Pain (in upper thigh area and lower ankle - comes and goes ); and Nasal Congestion (with sore throat and R. ear pain - sinus x 3 days )    Patient is known, to me and presents with   Chief Complaint   Patient presents with    Abdominal Pain     in lower R. side - x 1 week with sharp sudden pain PS:0    Back Pain     in Lower R. side     Migraine     x 2 weeks    Leg Pain     in upper thigh area and lower ankle - comes and goes     Nasal Congestion     with sore throat and R. ear pain - sinus x 3 days    .  Denies chest pain and shortness of breath.  Patient presents with above s/s. Taking tylenol for the leg pain to right thigh region. Also states that she is congested and having headaches. Having a lot of stress due to personal issues. Needs to get back on her anxiety and depression meds    HPI  Review of Systems   Constitutional: Negative for activity change, appetite change, fatigue, fever and unexpected weight change.   HENT: Positive for congestion and postnasal drip. Negative for ear discharge, ear pain, hearing loss and tinnitus.    Eyes: Negative for photophobia, pain and visual disturbance.   Respiratory: Negative for cough, shortness of breath, wheezing and stridor.    Cardiovascular: Negative for chest pain, palpitations and leg swelling.   Gastrointestinal: Positive for abdominal pain. Negative for abdominal distention, anal bleeding, blood in stool, constipation, diarrhea, nausea, rectal pain and vomiting.   Genitourinary: Negative for difficulty urinating, dysuria, frequency, hematuria and urgency.   Musculoskeletal: Positive for arthralgias and back pain. Negative for gait problem, joint swelling and neck pain.   Skin: Negative.    Neurological: Positive for headaches. Negative  for dizziness, seizures, syncope, weakness, light-headedness and numbness.   Hematological: Negative for adenopathy. Does not bruise/bleed easily.   Psychiatric/Behavioral: Negative for behavioral problems, confusion, hallucinations, sleep disturbance and suicidal ideas. The patient is not nervous/anxious.        Objective:      Physical Exam   Constitutional: She is oriented to person, place, and time. She appears well-developed and well-nourished. No distress.   HENT:   Head: Normocephalic and atraumatic.   Right Ear: Tympanic membrane mobility is abnormal.   Left Ear: Tympanic membrane mobility is abnormal.   Nose: Mucosal edema present.   Mouth/Throat: No oropharyngeal exudate or posterior oropharyngeal erythema.   Eyes: Conjunctivae and EOM are normal. Pupils are equal, round, and reactive to light. Right eye exhibits no discharge. Left eye exhibits no discharge.   Neck: Normal range of motion. Neck supple. No JVD present. No thyromegaly present.   Cardiovascular: Normal rate, regular rhythm, normal heart sounds and intact distal pulses.  Exam reveals no gallop and no friction rub.    No murmur heard.  Pulmonary/Chest: Effort normal and breath sounds normal. No stridor. No respiratory distress. She has no wheezes. She has no rales. She exhibits no tenderness.   Abdominal: Soft. Bowel sounds are normal. She exhibits no distension and no mass. There is no tenderness. There is no rebound.   Genitourinary:   Genitourinary Comments: See dip   Musculoskeletal: Normal range of motion. She exhibits no edema or tenderness.   Lymphadenopathy:     She has no cervical adenopathy.   Neurological: She is alert and oriented to person, place, and time. She has normal reflexes. She displays normal reflexes. No cranial nerve deficit. She exhibits normal muscle tone. Coordination normal.   Skin: Skin is warm and dry. Capillary refill takes less than 2 seconds. No rash noted. No erythema. No pallor.   Psychiatric: She has a normal  "mood and affect. Her behavior is normal. Judgment and thought content normal.   Negative SI/HI       Assessment:       1. Chronic tension-type headache, not intractable    2. Urinary tract infection with hematuria, site unspecified    3. Anxiety    4. Depression, unspecified depression type        Plan:   Eva was seen today for abdominal pain, back pain, migraine, leg pain and nasal congestion.    Diagnoses and all orders for this visit:    Chronic tension-type headache, not intractable  -     ketorolac injection 60 mg; Inject 2 mLs (60 mg total) into the muscle one time.    Urinary tract infection with hematuria, site unspecified  -     POCT URINE DIPSTICK WITHOUT MICROSCOPE  -     Urine culture; Future  -     nitrofurantoin (MACRODANTIN) 100 MG capsule; Take 1 capsule (100 mg total) by mouth every 12 (twelve) hours.    Anxiety  -     FLUoxetine (PROZAC) 40 MG capsule; Take 1 capsule (40 mg total) by mouth once daily.  -     ALPRAZolam (XANAX) 0.5 MG tablet; Take 1 tablet (0.5 mg total) by mouth 2 (two) times daily as needed for Anxiety.    Depression, unspecified depression type  -     FLUoxetine (PROZAC) 40 MG capsule; Take 1 capsule (40 mg total) by mouth once daily.    "This note will not be shared with the patient."  Will start again on prozac and xanax  Stop meds if any thoughts of suicide or homicide occur  If headaches are not relieved will need to let me k now  rtc as scheduled  "

## 2018-05-30 LAB
BILIRUB SERPL-MCNC: ABNORMAL MG/DL
BLOOD URINE, POC: ABNORMAL
COLOR, POC UA: CLEAR
GLUCOSE UR QL STRIP: 100
KETONES UR QL STRIP: ABNORMAL
LEUKOCYTE ESTERASE URINE, POC: ABNORMAL
NITRITE, POC UA: ABNORMAL
PH, POC UA: 5
PROTEIN, POC: ABNORMAL
SPECIFIC GRAVITY, POC UA: 1.01
UROBILINOGEN, POC UA: 1

## 2018-05-31 LAB
BACTERIA UR CULT: NORMAL
BACTERIA UR CULT: NORMAL

## 2018-06-07 DIAGNOSIS — Z13.5 DIABETIC RETINOPATHY SCREENING: ICD-10-CM

## 2018-07-07 DIAGNOSIS — I10 BENIGN ESSENTIAL HTN: ICD-10-CM

## 2018-07-09 RX ORDER — METOPROLOL SUCCINATE 50 MG/1
TABLET, EXTENDED RELEASE ORAL
Qty: 30 TABLET | Refills: 2 | Status: SHIPPED | OUTPATIENT
Start: 2018-07-09 | End: 2022-03-22

## 2018-07-09 RX ORDER — METFORMIN HYDROCHLORIDE 1000 MG/1
TABLET ORAL
Qty: 60 TABLET | Refills: 2 | Status: SHIPPED | OUTPATIENT
Start: 2018-07-09 | End: 2019-08-23 | Stop reason: SDUPTHER

## 2018-07-16 ENCOUNTER — OFFICE VISIT (OUTPATIENT)
Dept: INTERNAL MEDICINE | Facility: CLINIC | Age: 43
End: 2018-07-16
Payer: COMMERCIAL

## 2018-07-16 VITALS
BODY MASS INDEX: 26.63 KG/M2 | SYSTOLIC BLOOD PRESSURE: 120 MMHG | HEIGHT: 64 IN | WEIGHT: 156 LBS | HEART RATE: 80 BPM | DIASTOLIC BLOOD PRESSURE: 70 MMHG | OXYGEN SATURATION: 97 % | RESPIRATION RATE: 20 BRPM

## 2018-07-16 DIAGNOSIS — M47.816 LUMBAR FACET ARTHROPATHY: ICD-10-CM

## 2018-07-16 DIAGNOSIS — Z72.0 TOBACCO USE: ICD-10-CM

## 2018-07-16 DIAGNOSIS — F60.3 BORDERLINE PERSONALITY DISORDER: ICD-10-CM

## 2018-07-16 DIAGNOSIS — F33.1 MDD (MAJOR DEPRESSIVE DISORDER), RECURRENT EPISODE, MODERATE: ICD-10-CM

## 2018-07-16 DIAGNOSIS — I70.0 AORTIC ATHEROSCLEROSIS: ICD-10-CM

## 2018-07-16 DIAGNOSIS — E78.2 MIXED HYPERLIPIDEMIA: ICD-10-CM

## 2018-07-16 DIAGNOSIS — F41.1 GAD (GENERALIZED ANXIETY DISORDER): ICD-10-CM

## 2018-07-16 DIAGNOSIS — E66.3 OVERWEIGHT (BMI 25.0-29.9): ICD-10-CM

## 2018-07-16 DIAGNOSIS — I10 ESSENTIAL HYPERTENSION: ICD-10-CM

## 2018-07-16 DIAGNOSIS — Z71.6 TOBACCO ABUSE COUNSELING: ICD-10-CM

## 2018-07-16 PROCEDURE — 3074F SYST BP LT 130 MM HG: CPT | Mod: CPTII,S$GLB,, | Performed by: NURSE PRACTITIONER

## 2018-07-16 PROCEDURE — 99999 PR PBB SHADOW E&M-EST. PATIENT-LVL V: CPT | Mod: PBBFAC,,, | Performed by: NURSE PRACTITIONER

## 2018-07-16 PROCEDURE — 3008F BODY MASS INDEX DOCD: CPT | Mod: CPTII,S$GLB,, | Performed by: NURSE PRACTITIONER

## 2018-07-16 PROCEDURE — 3044F HG A1C LEVEL LT 7.0%: CPT | Mod: CPTII,S$GLB,, | Performed by: NURSE PRACTITIONER

## 2018-07-16 PROCEDURE — 3078F DIAST BP <80 MM HG: CPT | Mod: CPTII,S$GLB,, | Performed by: NURSE PRACTITIONER

## 2018-07-16 PROCEDURE — 99214 OFFICE O/P EST MOD 30 MIN: CPT | Mod: S$GLB,,, | Performed by: NURSE PRACTITIONER

## 2018-07-16 RX ORDER — QUETIAPINE FUMARATE 400 MG/1
800 TABLET, FILM COATED ORAL DAILY
Qty: 60 TABLET | Refills: 5 | Status: SHIPPED | OUTPATIENT
Start: 2018-07-16 | End: 2019-02-20 | Stop reason: SDUPTHER

## 2018-07-16 RX ORDER — ALPRAZOLAM 0.5 MG/1
0.5 TABLET ORAL
COMMUNITY
Start: 2018-07-08 | End: 2018-07-16

## 2018-07-16 RX ORDER — ATORVASTATIN CALCIUM 20 MG/1
20 TABLET, FILM COATED ORAL DAILY
COMMUNITY
Start: 2018-07-08 | End: 2021-04-13 | Stop reason: SDUPTHER

## 2018-07-16 RX ORDER — DICYCLOMINE HYDROCHLORIDE 20 MG/1
20 TABLET ORAL 4 TIMES DAILY
COMMUNITY
Start: 2018-07-08 | End: 2021-04-27

## 2018-07-16 RX ORDER — ALPRAZOLAM 1 MG/1
1 TABLET ORAL 2 TIMES DAILY PRN
Qty: 60 TABLET | Refills: 5 | Status: SHIPPED | OUTPATIENT
Start: 2018-07-16 | End: 2019-02-20 | Stop reason: SDUPTHER

## 2018-07-16 RX ORDER — FLUOXETINE HYDROCHLORIDE 20 MG/1
20 CAPSULE ORAL DAILY
Qty: 30 CAPSULE | Refills: 5 | Status: SHIPPED | OUTPATIENT
Start: 2018-07-16 | End: 2019-03-25

## 2018-07-16 RX ORDER — FLUOXETINE HYDROCHLORIDE 40 MG/1
40 CAPSULE ORAL DAILY
Qty: 30 CAPSULE | Refills: 5 | Status: SHIPPED | OUTPATIENT
Start: 2018-07-16 | End: 2018-08-15

## 2018-07-16 NOTE — PROGRESS NOTES
Subjective:       Patient ID: Eva Lane is a 42 y.o. female.    Chief Complaint: Follow-up (med check up)    Patient is known, to me and presents with   Chief Complaint   Patient presents with    Follow-up     med check up   .  Denies chest pain and shortness of breath.  Patient presents for check up and needs to have labs. She is having breakthrough anxiety and will need to go up on meds. No SI/HI noted.   HPI  Review of Systems   Constitutional: Negative for activity change, appetite change, fatigue, fever and unexpected weight change.   HENT: Negative for congestion, ear discharge, ear pain, hearing loss, postnasal drip and tinnitus.    Eyes: Negative for photophobia, pain and visual disturbance.   Respiratory: Negative for cough, shortness of breath, wheezing and stridor.    Cardiovascular: Negative for chest pain, palpitations and leg swelling.   Gastrointestinal: Negative for abdominal distention.   Genitourinary: Negative for difficulty urinating, dysuria, frequency, hematuria and urgency.   Musculoskeletal: Negative for arthralgias, back pain, gait problem, joint swelling and neck pain.   Skin: Negative.    Neurological: Negative for dizziness, seizures, syncope, weakness, light-headedness, numbness and headaches.   Hematological: Negative for adenopathy. Does not bruise/bleed easily.   Psychiatric/Behavioral: Positive for sleep disturbance. Negative for behavioral problems, confusion, hallucinations and suicidal ideas. The patient is nervous/anxious.        Objective:      Physical Exam   Constitutional: She is oriented to person, place, and time. She appears well-developed and well-nourished. No distress.   HENT:   Head: Normocephalic and atraumatic.   Right Ear: External ear normal.   Left Ear: External ear normal.   Mouth/Throat: Oropharynx is clear and moist. No oropharyngeal exudate.   Eyes: Conjunctivae and EOM are normal. Pupils are equal, round, and reactive to light. Right eye exhibits  no discharge. Left eye exhibits no discharge.   Neck: Normal range of motion. Neck supple. No JVD present. No thyromegaly present.   Cardiovascular: Normal rate, regular rhythm, normal heart sounds and intact distal pulses.  Exam reveals no gallop and no friction rub.    No murmur heard.  Pulmonary/Chest: Effort normal and breath sounds normal. No stridor. No respiratory distress. She has no wheezes. She has no rales. She exhibits no tenderness.   Abdominal: Soft. Bowel sounds are normal. She exhibits no distension and no mass. There is no tenderness. There is no rebound.   Musculoskeletal: Normal range of motion. She exhibits no edema or tenderness.   Lymphadenopathy:     She has no cervical adenopathy.   Neurological: She is alert and oriented to person, place, and time. She has normal reflexes. She displays normal reflexes. No cranial nerve deficit. She exhibits normal muscle tone. Coordination normal.   Skin: Skin is warm and dry. Capillary refill takes less than 2 seconds. No rash noted. No erythema. No pallor.   Psychiatric: She has a normal mood and affect. Her behavior is normal. Judgment and thought content normal.   Negative SI/HI       Assessment:       1. Uncontrolled type 2 diabetes mellitus without complication, with long-term current use of insulin    2. Mixed hyperlipidemia    3. Aortic atherosclerosis    4. Borderline personality disorder    5. Essential hypertension    6. HILARIA (generalized anxiety disorder)    7. Lumbar facet arthropathy    8. MDD (major depressive disorder), recurrent episode, moderate    9. Overweight (BMI 25.0-29.9)    10. Tobacco use    11. Tobacco abuse counseling        Plan:   Eva was seen today for follow-up and medication refill.    Diagnoses and all orders for this visit:    Uncontrolled type 2 diabetes mellitus without complication, with long-term current use of insulin  -     CBC auto differential; Future  -     Comprehensive metabolic panel; Future  -      Microalbumin/creatinine urine ratio; Future  -     Hemoglobin A1c; Future    Mixed hyperlipidemia  -     CBC auto differential; Future  -     Comprehensive metabolic panel; Future  -     TSH; Future  -     Lipid panel; Future    Aortic atherosclerosis  -     CBC auto differential; Future  -     Comprehensive metabolic panel; Future    Borderline personality disorder  -     CBC auto differential; Future  -     Comprehensive metabolic panel; Future  -     FLUoxetine (PROZAC) 40 MG capsule; Take 1 capsule (40 mg total) by mouth once daily.  -     FLUoxetine (PROZAC) 20 MG capsule; Take 1 capsule (20 mg total) by mouth once daily.  -     QUEtiapine (SEROQUEL) 400 MG tablet; Take 2 tablets (800 mg total) by mouth once daily.    Essential hypertension  -     CBC auto differential; Future  -     Comprehensive metabolic panel; Future  -     Microalbumin/creatinine urine ratio; Future    HILARIA (generalized anxiety disorder)  -     CBC auto differential; Future  -     Comprehensive metabolic panel; Future  -     FLUoxetine (PROZAC) 40 MG capsule; Take 1 capsule (40 mg total) by mouth once daily.  -     FLUoxetine (PROZAC) 20 MG capsule; Take 1 capsule (20 mg total) by mouth once daily.  -     QUEtiapine (SEROQUEL) 400 MG tablet; Take 2 tablets (800 mg total) by mouth once daily.  -     ALPRAZolam (XANAX) 1 MG tablet; Take 1 tablet (1 mg total) by mouth 2 (two) times daily as needed for Anxiety.    Lumbar facet arthropathy  -     CBC auto differential; Future  -     Comprehensive metabolic panel; Future    MDD (major depressive disorder), recurrent episode, moderate  -     CBC auto differential; Future  -     Comprehensive metabolic panel; Future  -     FLUoxetine (PROZAC) 40 MG capsule; Take 1 capsule (40 mg total) by mouth once daily.  -     FLUoxetine (PROZAC) 20 MG capsule; Take 1 capsule (20 mg total) by mouth once daily.  -     QUEtiapine (SEROQUEL) 400 MG tablet; Take 2 tablets (800 mg total) by mouth once  "daily.    Overweight (BMI 25.0-29.9)  -     CBC auto differential; Future  -     Comprehensive metabolic panel; Future    Tobacco use  -     CBC auto differential; Future  -     Comprehensive metabolic panel; Future    Tobacco abuse counseling    "This note will not be shared with the patient."  Check CBC, CMP, TSH, Fasting lipid profile, HgA1c, Microalbuminuria in tomorrow   Medication compliance was discussed with the patient.  The patient was instructed to monitor glucoses closely using glucometer at home and to record the values in a log.  The patient was instructed to obtain an Optometry exam and microalbumin q year.  The patient was instructed to obtain a hemoglobin A1C q 3-4 months.  The patient was instructed to check the feet visually daily and to monitor for infection.  The patient was instructed to exercise at least 3 times a week.  The patient was instructed to follow a 1800 ADA diet.  The patient was instructed to monitor weight daily and try to keep it as close to ideal body weight as possible.  The patient was instructed on using exercise frequently to reduce BP.  The patient was instructed on using a low salt diet.  Monitor BP at home and to record the values in a log.  Changes in meds see above  If any thoughts of suicide or homicide occur stop meds and go to ER  RTC in FOUR WEEKS  "

## 2018-07-16 NOTE — PATIENT INSTRUCTIONS
Diabetes: Activity Tips    Being more active can help you manage your diabetes. The tips on this sheet can help you get the most from your exercise. They can also help you stay safe.  Staying Active  Its important for adults to spend less time sitting and being inactive. This is especially true if you have type 2 diabetes. When you are sitting for long periods of time, get up for short sessions of light activity every 30 minutes.  You should aim for at least 150 minutes a week of exercise or physical activity. Dont let more than 2 days go by without being active.  Benefit from briskness  Brisk activity gets your heart beating faster. This can help you increase your fitness, lose extra weight, and manage your blood sugar level. Try brisk walking. Or, if you have foot or leg problems, you can try swimming or bike riding. You can break up your exercise into chunks throughout the day. Work up to at least 30 minutes of steady, brisk exercise on most days.  Warm up and cool down  Warming up and cooling down reduce your risk of injury. They also help limit muscle soreness. Do a mild version of your activity for 5 minutes before and after your routine. You can also learn stretches that will help keep your muscles loose. Your healthcare provider may show you good ways to warm up and stretch.  Do the talk-sing test  The talk-sing test is a simple way to tell how hard youre exercising. If you can talk while exercising, youre in a safe range. If youre out of breath, slow down. If you can carry a tune, its time to  the pace. Walk up a hill. Increase the resistance on your stationary bike. Or swim faster.  What about eating?  You may be told to plan your exercise for 1 to 2 hours after a meal. In most cases, you dont need to eat while being active. If you take insulin or medicine that can cause low blood sugar, test your blood sugar before exercising. And carry a fast-acting sugar that will raise your blood sugar  level quickly. This includes glucose tablets or hard candy. Use it if you feel low blood sugar symptoms.  Safety tips  These tips can help you stay safe as you become fit:  · Exercise with a friend or carry a cell phone if you have one.  · Carry or wear identification, such as a necklace or bracelet, that says you have diabetes.  · Use the proper footwear and safety equipment for your activity.  · Drink water before, during, and after exercise.  · Dress properly for the weather.  · Dont exercise in very hot or very cold weather.  · Dont exercise if you are sick.  · If you are instructed to do so, test your blood sugar before and after you exercise. Have a small carbohydrate snack if your blood sugar is low before you start exercising.   When to stop exercising and call your healthcare provider  Stop exercising and call your healthcare provider right away if you notice any of the following:  · Pain, pressure, tightness, or heaviness in the chest  · Pain or heaviness in the neck, shoulders, back, arms, legs, or feet  · Unusual shortness of breath  · Dizziness or lightheadedness  · Unusually rapid or slow pulse  · Increased joint or muscle pain  · Nausea or vomiting  Date Last Reviewed: 5/1/2016  © 4570-0169 [employer]. [address] [place] [address], [place] [address]. All rights reserved. This information is not intended as a substitute for professional medical care. Always follow your healthcare professional's instructions.

## 2018-07-18 ENCOUNTER — CLINICAL SUPPORT (OUTPATIENT)
Dept: INTERNAL MEDICINE | Facility: CLINIC | Age: 43
End: 2018-07-18
Payer: COMMERCIAL

## 2018-07-18 DIAGNOSIS — M47.816 LUMBAR FACET ARTHROPATHY: ICD-10-CM

## 2018-07-18 DIAGNOSIS — E66.3 OVERWEIGHT (BMI 25.0-29.9): ICD-10-CM

## 2018-07-18 DIAGNOSIS — Z72.0 TOBACCO USE: ICD-10-CM

## 2018-07-18 DIAGNOSIS — E78.2 MIXED HYPERLIPIDEMIA: ICD-10-CM

## 2018-07-18 DIAGNOSIS — F41.1 GAD (GENERALIZED ANXIETY DISORDER): ICD-10-CM

## 2018-07-18 DIAGNOSIS — F33.1 MDD (MAJOR DEPRESSIVE DISORDER), RECURRENT EPISODE, MODERATE: ICD-10-CM

## 2018-07-18 DIAGNOSIS — I10 ESSENTIAL HYPERTENSION: ICD-10-CM

## 2018-07-18 DIAGNOSIS — F60.3 BORDERLINE PERSONALITY DISORDER: ICD-10-CM

## 2018-07-18 DIAGNOSIS — I70.0 AORTIC ATHEROSCLEROSIS: ICD-10-CM

## 2018-07-18 LAB
ALBUMIN SERPL BCP-MCNC: 3.3 G/DL
ALP SERPL-CCNC: 134 U/L
ALT SERPL W/O P-5'-P-CCNC: 28 U/L
ANION GAP SERPL CALC-SCNC: 11 MMOL/L
AST SERPL-CCNC: 41 U/L
BASOPHILS # BLD AUTO: 0.06 K/UL
BASOPHILS NFR BLD: 0.6 %
BILIRUB SERPL-MCNC: 0.3 MG/DL
BUN SERPL-MCNC: 13 MG/DL
CALCIUM SERPL-MCNC: 8.9 MG/DL
CHLORIDE SERPL-SCNC: 105 MMOL/L
CHOLEST SERPL-MCNC: 170 MG/DL
CHOLEST/HDLC SERPL: 5.7 {RATIO}
CO2 SERPL-SCNC: 23 MMOL/L
CREAT SERPL-MCNC: 0.8 MG/DL
CREAT UR-MCNC: 54 MG/DL
DIFFERENTIAL METHOD: ABNORMAL
EOSINOPHIL # BLD AUTO: 0.4 K/UL
EOSINOPHIL NFR BLD: 4.4 %
ERYTHROCYTE [DISTWIDTH] IN BLOOD BY AUTOMATED COUNT: 12.8 %
EST. GFR  (AFRICAN AMERICAN): >60 ML/MIN/1.73 M^2
EST. GFR  (NON AFRICAN AMERICAN): >60 ML/MIN/1.73 M^2
ESTIMATED AVG GLUCOSE: 240 MG/DL
GLUCOSE SERPL-MCNC: 167 MG/DL
HBA1C MFR BLD HPLC: 10 %
HCT VFR BLD AUTO: 42 %
HDLC SERPL-MCNC: 30 MG/DL
HDLC SERPL: 17.6 %
HGB BLD-MCNC: 13.5 G/DL
IMM GRANULOCYTES # BLD AUTO: 0.06 K/UL
IMM GRANULOCYTES NFR BLD AUTO: 0.6 %
LDLC SERPL CALC-MCNC: ABNORMAL MG/DL
LYMPHOCYTES # BLD AUTO: 3.5 K/UL
LYMPHOCYTES NFR BLD: 35 %
MCH RBC QN AUTO: 30.3 PG
MCHC RBC AUTO-ENTMCNC: 32.1 G/DL
MCV RBC AUTO: 94 FL
MICROALBUMIN UR DL<=1MG/L-MCNC: 5 UG/ML
MICROALBUMIN/CREATININE RATIO: 9.3 UG/MG
MONOCYTES # BLD AUTO: 0.6 K/UL
MONOCYTES NFR BLD: 6.2 %
NEUTROPHILS # BLD AUTO: 5.3 K/UL
NEUTROPHILS NFR BLD: 53.2 %
NONHDLC SERPL-MCNC: 140 MG/DL
NRBC BLD-RTO: 0 /100 WBC
PLATELET # BLD AUTO: 287 K/UL
PMV BLD AUTO: 12.3 FL
POTASSIUM SERPL-SCNC: 3.7 MMOL/L
PROT SERPL-MCNC: 7 G/DL
RBC # BLD AUTO: 4.45 M/UL
SODIUM SERPL-SCNC: 139 MMOL/L
TRIGL SERPL-MCNC: 512 MG/DL
TSH SERPL DL<=0.005 MIU/L-ACNC: 2.95 UIU/ML
WBC # BLD AUTO: 10.02 K/UL

## 2018-07-18 PROCEDURE — 99999 PR PBB SHADOW E&M-EST. PATIENT-LVL I: CPT | Mod: PBBFAC,,,

## 2018-07-18 PROCEDURE — 82043 UR ALBUMIN QUANTITATIVE: CPT

## 2018-07-18 PROCEDURE — 80053 COMPREHEN METABOLIC PANEL: CPT

## 2018-07-18 PROCEDURE — 80061 LIPID PANEL: CPT

## 2018-07-18 PROCEDURE — 84443 ASSAY THYROID STIM HORMONE: CPT

## 2018-07-18 PROCEDURE — 36415 COLL VENOUS BLD VENIPUNCTURE: CPT | Mod: S$GLB,,, | Performed by: NURSE PRACTITIONER

## 2018-07-18 PROCEDURE — 36415 COLL VENOUS BLD VENIPUNCTURE: CPT

## 2018-07-18 PROCEDURE — 83036 HEMOGLOBIN GLYCOSYLATED A1C: CPT

## 2018-07-18 PROCEDURE — 85025 COMPLETE CBC W/AUTO DIFF WBC: CPT

## 2018-07-21 ENCOUNTER — NURSE TRIAGE (OUTPATIENT)
Dept: ADMINISTRATIVE | Facility: CLINIC | Age: 43
End: 2018-07-21

## 2018-07-21 NOTE — TELEPHONE ENCOUNTER
Reason for Disposition   Confusion, disorientation, or hallucinations is main symptom   Shock suspected (e.g., cold/pale/clammy skin, too weak to stand, low BP, rapid pulse)    Protocols used: ST NEUROLOGIC DEFICIT-A-AH, ST CONFUSION - DELIRIUM-A-AH    Eva reporting she has long standing problems with confusion (years) but it has gotten worse in the last two weeks. She states that she doesn't sometimes recognize her  when he gets in the bed or her children when they walk past her. She states she is also weak. She states sometimes she is too weak to stand which is how she feels today. Advised per protocol to call 911 and be seen in ED for evaluation. She states she is going to take it easy and see how she feels today and follow up with her pcp on Monday. Please contact caller with any further care advice.

## 2018-07-23 ENCOUNTER — OFFICE VISIT (OUTPATIENT)
Dept: INTERNAL MEDICINE | Facility: CLINIC | Age: 43
End: 2018-07-23
Payer: COMMERCIAL

## 2018-07-23 VITALS
RESPIRATION RATE: 20 BRPM | WEIGHT: 156.75 LBS | DIASTOLIC BLOOD PRESSURE: 68 MMHG | HEIGHT: 64 IN | SYSTOLIC BLOOD PRESSURE: 90 MMHG | HEART RATE: 82 BPM | OXYGEN SATURATION: 98 % | BODY MASS INDEX: 26.76 KG/M2

## 2018-07-23 DIAGNOSIS — R42 DIZZINESS: ICD-10-CM

## 2018-07-23 DIAGNOSIS — R53.1 WEAKNESS: ICD-10-CM

## 2018-07-23 PROCEDURE — 3074F SYST BP LT 130 MM HG: CPT | Mod: CPTII,S$GLB,, | Performed by: NURSE PRACTITIONER

## 2018-07-23 PROCEDURE — 3008F BODY MASS INDEX DOCD: CPT | Mod: CPTII,S$GLB,, | Performed by: NURSE PRACTITIONER

## 2018-07-23 PROCEDURE — 3078F DIAST BP <80 MM HG: CPT | Mod: CPTII,S$GLB,, | Performed by: NURSE PRACTITIONER

## 2018-07-23 PROCEDURE — 99999 PR PBB SHADOW E&M-EST. PATIENT-LVL III: CPT | Mod: PBBFAC,,, | Performed by: NURSE PRACTITIONER

## 2018-07-23 PROCEDURE — 99214 OFFICE O/P EST MOD 30 MIN: CPT | Mod: S$GLB,,, | Performed by: NURSE PRACTITIONER

## 2018-07-23 PROCEDURE — 3046F HEMOGLOBIN A1C LEVEL >9.0%: CPT | Mod: CPTII,S$GLB,, | Performed by: NURSE PRACTITIONER

## 2018-07-23 RX ORDER — GLIMEPIRIDE 1 MG/1
1 TABLET ORAL 2 TIMES DAILY WITH MEALS
Qty: 60 TABLET | Refills: 2 | Status: SHIPPED | OUTPATIENT
Start: 2018-07-23 | End: 2020-05-11 | Stop reason: SDUPTHER

## 2018-07-23 NOTE — PATIENT INSTRUCTIONS
Diabetes: Activity Tips    Being more active can help you manage your diabetes. The tips on this sheet can help you get the most from your exercise. They can also help you stay safe.  Staying Active  Its important for adults to spend less time sitting and being inactive. This is especially true if you have type 2 diabetes. When you are sitting for long periods of time, get up for short sessions of light activity every 30 minutes.  You should aim for at least 150 minutes a week of exercise or physical activity. Dont let more than 2 days go by without being active.  Benefit from briskness  Brisk activity gets your heart beating faster. This can help you increase your fitness, lose extra weight, and manage your blood sugar level. Try brisk walking. Or, if you have foot or leg problems, you can try swimming or bike riding. You can break up your exercise into chunks throughout the day. Work up to at least 30 minutes of steady, brisk exercise on most days.  Warm up and cool down  Warming up and cooling down reduce your risk of injury. They also help limit muscle soreness. Do a mild version of your activity for 5 minutes before and after your routine. You can also learn stretches that will help keep your muscles loose. Your healthcare provider may show you good ways to warm up and stretch.  Do the talk-sing test  The talk-sing test is a simple way to tell how hard youre exercising. If you can talk while exercising, youre in a safe range. If youre out of breath, slow down. If you can carry a tune, its time to  the pace. Walk up a hill. Increase the resistance on your stationary bike. Or swim faster.  What about eating?  You may be told to plan your exercise for 1 to 2 hours after a meal. In most cases, you dont need to eat while being active. If you take insulin or medicine that can cause low blood sugar, test your blood sugar before exercising. And carry a fast-acting sugar that will raise your blood sugar  level quickly. This includes glucose tablets or hard candy. Use it if you feel low blood sugar symptoms.  Safety tips  These tips can help you stay safe as you become fit:  · Exercise with a friend or carry a cell phone if you have one.  · Carry or wear identification, such as a necklace or bracelet, that says you have diabetes.  · Use the proper footwear and safety equipment for your activity.  · Drink water before, during, and after exercise.  · Dress properly for the weather.  · Dont exercise in very hot or very cold weather.  · Dont exercise if you are sick.  · If you are instructed to do so, test your blood sugar before and after you exercise. Have a small carbohydrate snack if your blood sugar is low before you start exercising.   When to stop exercising and call your healthcare provider  Stop exercising and call your healthcare provider right away if you notice any of the following:  · Pain, pressure, tightness, or heaviness in the chest  · Pain or heaviness in the neck, shoulders, back, arms, legs, or feet  · Unusual shortness of breath  · Dizziness or lightheadedness  · Unusually rapid or slow pulse  · Increased joint or muscle pain  · Nausea or vomiting  Date Last Reviewed: 5/1/2016  © 0465-4393 [employer]. [address] [place] [address], [place] [id number]. All rights reserved. This information is not intended as a substitute for professional medical care. Always follow your healthcare professional's instructions.        Diabetes and Heart Disease     Take your medicines as directed each day, even if you feel fine.   If you have diabetes, you are two to four times more likely to have heart disease than someone without diabetes. This higher risk is due to diabetes, but it is also due to other risk factors for heart disease that happen in people with diabetes. But theres good news. You can help control your health risks by making some changes in your life. You can take steps to reduce your risk of  heart disease by half--similar to the risk in people who don't have diabetes.  Your main risk factors  Three major risk factors for heart disease are high blood sugar, high blood pressure, and high levels of lipids. By keeping risk factors under control, you can help keep your heart and arteries healthy. This may reduce your chances of a heart attack.  · Blood sugar. High blood sugar can make artery walls tough and rough. Plaque (waxy material in the blood) can then build up along the artery walls, making it harder for blood to flow through the arteries. Having high blood sugar increases the chances of having high blood pressure and high cholesterol.  · Blood pressure. When blood pressure is high all the time it causes your heart to work harder to pump blood. Artery walls become damaged. This increases the risk for plaque build up.  · Lipids. The body needs some lipids in the blood to stay healthy. But lipid levels that are too high can damage the artery walls. Lipids include cholesterol and triglycerides. There are two kinds of cholesterol. LDL (bad) cholesterol can damage the arteries. But HDL (good) cholesterol helps clear LDL cholesterol from the blood vessels. This helps keep the arteries healthy. When blood sugar is high, the level of triglycerides in the blood may also be high. High blood triglyceride levels can cause plaque to form.   Other risk factors  Certain lifestyle factors can increase levels of your blood sugar, blood pressure, and lipids. Such increases raise your risk of heart disease:  · Smoking damages the lining of your arteries. This allows plaque to build up in the artery walls. Smoking also constricts (narrows) the arteries. This can raise blood pressure and cause chest pain or angina. Smoking also increases your risk of getting type 2 diabetes.  · Not being active makes it harder for your heart to do its work. Inactivity is linked to many other risk factors, such as high blood pressure  and poor cholesterol levels. Inactivity also increases your risk of getting type 2 diabetes.  · Being overweight makes it harder for your body to use insulin. It also makes your heart work too hard. Being overweight is also the main contributor to the development of type 2 diabetes,   Changes you can make  Following a few simple steps can help keep your risk factors under control. Work with your healthcare team to reach your goals.  · Quitting smoking could save your life. Smoking damages the lining of the blood vessels and raises blood pressure. Smoking also affects how your body uses insulin. This makes it harder to keep blood sugar under control. If you smoke and need help quitting, talk to your healthcare team.   · Testing your blood sugar is the only way to know whether it is under control. Be sure to test your blood sugar yourself. Also get your blood tested in the lab, as directed.  · Monitoring your blood pressure and lipid levels can help you achieve safe levels. Visit your healthcare team as scheduled.  · Taking medicines as directed can help control blood sugar, blood pressure, blood clotting, and/or cholesterol levels.  · Eating right can reduce your risk factors and help you lose weight. Try to limit the amount of processed or refined carbohydrates you eat at one time. Cut back on your total calorie intake. Eat foods low in saturated fat and cholesterol. Eat fiber, including vegetables and whole grains, and cut down on salt. A dietitian or diabetes educator can help form a meal plan that works for you--even if you are on a low budget.   · Being active can help reduce your weight, strengthen your heart, and lower your lipid levels and blood pressure. Exercise and activity are good for your whole body. Talk to your healthcare team about increasing your activity safely over time.  · Keeping your appointments with your healthcare provider helps you stay healthy. Go in for checkups and lab tests as  scheduled.  Date Last Reviewed: 5/19/2016  © 6241-0630 [employer]. [address] [place] [address], [place] [address]. All rights reserved. This information is not intended as a substitute for professional medical care. Always follow your healthcare professional's instructions.

## 2018-07-23 NOTE — PROGRESS NOTES
Subjective:       Patient ID: Eva Lane is a 42 y.o. female.    Chief Complaint: Follow-up    Patient is known, to me and presents with   Chief Complaint   Patient presents with    Follow-up   .  Denies chest pain and shortness of breath.  Patient presents with dizziness and weakness more to the left. For the past two days she has been noticing this as well as her . She reports that she is not able to sit up due to feeling like she will fall to left side. Also when occurs will slur speech. She does not adhere to diet or exercise. She eats little wolfgang cakes and drinks coke every day. Does not really check her blood sugars regularly.   HPI  Review of Systems   Constitutional: Negative for activity change, appetite change, fatigue, fever and unexpected weight change.   HENT: Negative for congestion, ear discharge, ear pain, hearing loss, postnasal drip and tinnitus.    Eyes: Negative for photophobia, pain and visual disturbance.   Respiratory: Negative for cough, shortness of breath, wheezing and stridor.    Cardiovascular: Negative for chest pain, palpitations and leg swelling.   Gastrointestinal: Negative for abdominal distention.   Genitourinary: Negative for difficulty urinating, dysuria, frequency, hematuria and urgency.   Musculoskeletal: Negative for arthralgias, back pain, gait problem, joint swelling and neck pain.   Skin: Negative.    Neurological: Positive for dizziness, weakness and headaches. Negative for seizures, syncope, light-headedness and numbness.   Hematological: Negative for adenopathy. Does not bruise/bleed easily.   Psychiatric/Behavioral: Negative for behavioral problems, confusion, hallucinations, sleep disturbance and suicidal ideas. The patient is not nervous/anxious.        Objective:      Physical Exam   Constitutional: She is oriented to person, place, and time. She appears well-developed and well-nourished. No distress.   HENT:   Head: Normocephalic and atraumatic.    Right Ear: External ear normal.   Left Ear: External ear normal.   Mouth/Throat: Oropharynx is clear and moist. No oropharyngeal exudate.   Eyes: Conjunctivae and EOM are normal. Pupils are equal, round, and reactive to light. Right eye exhibits no discharge. Left eye exhibits no discharge.   Neck: Normal range of motion. Neck supple. No JVD present. No thyromegaly present.   Cardiovascular: Normal rate, regular rhythm, normal heart sounds and intact distal pulses.  Exam reveals no gallop and no friction rub.    No murmur heard.  Pulmonary/Chest: Effort normal and breath sounds normal. No stridor. No respiratory distress. She has no wheezes. She has no rales. She exhibits no tenderness.   Abdominal: Soft. Bowel sounds are normal. She exhibits no distension and no mass. There is no tenderness. There is no rebound.   Musculoskeletal: Normal range of motion. She exhibits no edema or tenderness.   Lymphadenopathy:     She has no cervical adenopathy.   Neurological: She is alert and oriented to person, place, and time. She has normal reflexes. She displays normal reflexes. No cranial nerve deficit. She exhibits normal muscle tone. Coordination normal.   Skin: Skin is warm and dry. Capillary refill takes less than 2 seconds. No rash noted. No erythema. No pallor.   Psychiatric: She has a normal mood and affect. Her behavior is normal. Judgment and thought content normal.   Negative si/hi       Assessment:       1. Uncontrolled type 2 diabetes mellitus without complication, with long-term current use of insulin    2. Dizziness    3. Weakness        Plan:   Eva was seen today for follow-up.    Diagnoses and all orders for this visit:    Uncontrolled type 2 diabetes mellitus without complication, with long-term current use of insulin  -     glimepiride (AMARYL) 1 MG tablet; Take 1 tablet (1 mg total) by mouth 2 (two) times daily with meals.    Dizziness  -     CT Head Without Contrast; Future    Weakness  -     CT Head  "Without Contrast; Future    "This note will not be shared with the patient."  Will add amaryl but needs to stop drinking coke and eating sweets  Also will do CT to rule out any pathology   Check CBC, CMP, TSH, Fasting lipid profile, HgA1c, Microalbuminuria in .Medication compliance was discussed with the patient.  The patient was instructed to monitor glucoses closely using glucometer at home and to record the values in a log.  The patient was instructed to obtain an Optometry exam and microalbumin q year.  The patient was instructed to obtain a hemoglobin A1C q 3-4 months.  The patient was instructed to check the feet visually daily and to monitor for infection.  The patient was instructed to exercise at least 3 times a week.  The patient was instructed to follow a 1800 ADA diet.  The patient was instructed to monitor weight daily and try to keep it as close to ideal body weight as possible.  The patient was instructed on using exercise frequently to reduce BP.  The patient was instructed on using a low salt diet.  Monitor BP at home and to record the values in a log.  RTC in rtc in two weeks  "

## 2018-07-24 ENCOUNTER — HOSPITAL ENCOUNTER (OUTPATIENT)
Dept: RADIOLOGY | Facility: HOSPITAL | Age: 43
Discharge: HOME | End: 2018-07-24
Attending: NURSE PRACTITIONER
Payer: COMMERCIAL

## 2018-07-24 DIAGNOSIS — R53.1 WEAKNESS: ICD-10-CM

## 2018-07-24 DIAGNOSIS — R42 DIZZINESS: ICD-10-CM

## 2018-07-24 PROCEDURE — 70450 CT HEAD/BRAIN W/O DYE: CPT | Mod: TC

## 2018-07-24 PROCEDURE — 70450 CT HEAD/BRAIN W/O DYE: CPT | Mod: 26,,, | Performed by: RADIOLOGY

## 2018-08-16 ENCOUNTER — OFFICE VISIT (OUTPATIENT)
Dept: INTERNAL MEDICINE | Facility: CLINIC | Age: 43
End: 2018-08-16
Payer: COMMERCIAL

## 2018-08-16 VITALS
HEART RATE: 83 BPM | DIASTOLIC BLOOD PRESSURE: 66 MMHG | SYSTOLIC BLOOD PRESSURE: 112 MMHG | BODY MASS INDEX: 26.98 KG/M2 | OXYGEN SATURATION: 97 % | WEIGHT: 158 LBS | RESPIRATION RATE: 20 BRPM | HEIGHT: 64 IN

## 2018-08-16 DIAGNOSIS — F41.1 GAD (GENERALIZED ANXIETY DISORDER): ICD-10-CM

## 2018-08-16 DIAGNOSIS — E78.2 MIXED HYPERLIPIDEMIA: ICD-10-CM

## 2018-08-16 DIAGNOSIS — Z72.0 TOBACCO USE: ICD-10-CM

## 2018-08-16 DIAGNOSIS — I10 ESSENTIAL HYPERTENSION: ICD-10-CM

## 2018-08-16 DIAGNOSIS — Z71.6 TOBACCO ABUSE COUNSELING: ICD-10-CM

## 2018-08-16 DIAGNOSIS — L03.311 CELLULITIS OF ABDOMINAL WALL: ICD-10-CM

## 2018-08-16 DIAGNOSIS — F33.1 MDD (MAJOR DEPRESSIVE DISORDER), RECURRENT EPISODE, MODERATE: ICD-10-CM

## 2018-08-16 PROCEDURE — 3078F DIAST BP <80 MM HG: CPT | Mod: CPTII,S$GLB,, | Performed by: NURSE PRACTITIONER

## 2018-08-16 PROCEDURE — 99999 PR PBB SHADOW E&M-EST. PATIENT-LVL III: CPT | Mod: PBBFAC,,, | Performed by: NURSE PRACTITIONER

## 2018-08-16 PROCEDURE — 3074F SYST BP LT 130 MM HG: CPT | Mod: CPTII,S$GLB,, | Performed by: NURSE PRACTITIONER

## 2018-08-16 PROCEDURE — 3008F BODY MASS INDEX DOCD: CPT | Mod: CPTII,S$GLB,, | Performed by: NURSE PRACTITIONER

## 2018-08-16 PROCEDURE — 3046F HEMOGLOBIN A1C LEVEL >9.0%: CPT | Mod: CPTII,S$GLB,, | Performed by: NURSE PRACTITIONER

## 2018-08-16 PROCEDURE — 99214 OFFICE O/P EST MOD 30 MIN: CPT | Mod: S$GLB,,, | Performed by: NURSE PRACTITIONER

## 2018-08-16 RX ORDER — MUPIROCIN 20 MG/G
OINTMENT TOPICAL 3 TIMES DAILY
Qty: 30 G | Refills: 1 | Status: SHIPPED | OUTPATIENT
Start: 2018-08-16 | End: 2018-08-23

## 2018-08-16 NOTE — PATIENT INSTRUCTIONS
Diabetes: Activity Tips    Being more active can help you manage your diabetes. The tips on this sheet can help you get the most from your exercise. They can also help you stay safe.  Staying Active  Its important for adults to spend less time sitting and being inactive. This is especially true if you have type 2 diabetes. When you are sitting for long periods of time, get up for short sessions of light activity every 30 minutes.  You should aim for at least 150 minutes a week of exercise or physical activity. Dont let more than 2 days go by without being active.  Benefit from briskness  Brisk activity gets your heart beating faster. This can help you increase your fitness, lose extra weight, and manage your blood sugar level. Try brisk walking. Or, if you have foot or leg problems, you can try swimming or bike riding. You can break up your exercise into chunks throughout the day. Work up to at least 30 minutes of steady, brisk exercise on most days.  Warm up and cool down  Warming up and cooling down reduce your risk of injury. They also help limit muscle soreness. Do a mild version of your activity for 5 minutes before and after your routine. You can also learn stretches that will help keep your muscles loose. Your healthcare provider may show you good ways to warm up and stretch.  Do the talk-sing test  The talk-sing test is a simple way to tell how hard youre exercising. If you can talk while exercising, youre in a safe range. If youre out of breath, slow down. If you can carry a tune, its time to  the pace. Walk up a hill. Increase the resistance on your stationary bike. Or swim faster.  What about eating?  You may be told to plan your exercise for 1 to 2 hours after a meal. In most cases, you dont need to eat while being active. If you take insulin or medicine that can cause low blood sugar, test your blood sugar before exercising. And carry a fast-acting sugar that will raise your blood sugar  level quickly. This includes glucose tablets or hard candy. Use it if you feel low blood sugar symptoms.  Safety tips  These tips can help you stay safe as you become fit:  · Exercise with a friend or carry a cell phone if you have one.  · Carry or wear identification, such as a necklace or bracelet, that says you have diabetes.  · Use the proper footwear and safety equipment for your activity.  · Drink water before, during, and after exercise.  · Dress properly for the weather.  · Dont exercise in very hot or very cold weather.  · Dont exercise if you are sick.  · If you are instructed to do so, test your blood sugar before and after you exercise. Have a small carbohydrate snack if your blood sugar is low before you start exercising.   When to stop exercising and call your healthcare provider  Stop exercising and call your healthcare provider right away if you notice any of the following:  · Pain, pressure, tightness, or heaviness in the chest  · Pain or heaviness in the neck, shoulders, back, arms, legs, or feet  · Unusual shortness of breath  · Dizziness or lightheadedness  · Unusually rapid or slow pulse  · Increased joint or muscle pain  · Nausea or vomiting  Date Last Reviewed: 5/1/2016  © 0711-8005 [employer]. [address] [place] [address], [place] [address]. All rights reserved. This information is not intended as a substitute for professional medical care. Always follow your healthcare professional's instructions.

## 2018-08-16 NOTE — PROGRESS NOTES
Subjective:       Patient ID: Eva Lane is a 42 y.o. female.    Chief Complaint: Follow-up (x 1 month)    Patient is known, to me and presents with   Chief Complaint   Patient presents with    Follow-up     x 1 month   .  Denies chest pain and shortness of breath.  Patient presents with follow up blood sugars. She reports that her blood sugars are much better but her blood sugars drop to 40. Since it it dropping to 40 she is off of amaryl. Also her anxiety is improved. Accidentally had hot grease hit her stomach and now with small amount of redness and scabbing to mid abdomen   HPI  Review of Systems   Constitutional: Negative for activity change, appetite change, fatigue, fever and unexpected weight change.   HENT: Negative for congestion, ear discharge, ear pain, hearing loss, postnasal drip and tinnitus.    Eyes: Negative for photophobia, pain and visual disturbance.   Respiratory: Negative for cough, shortness of breath, wheezing and stridor.    Cardiovascular: Negative for chest pain, palpitations and leg swelling.   Gastrointestinal: Negative for abdominal distention.   Genitourinary: Negative for difficulty urinating, dysuria, frequency, hematuria and urgency.   Musculoskeletal: Negative for arthralgias, back pain, gait problem, joint swelling and neck pain.   Skin: Positive for color change and wound. Negative for pallor and rash.   Neurological: Negative for dizziness, seizures, syncope, weakness, light-headedness, numbness and headaches.   Hematological: Negative for adenopathy. Does not bruise/bleed easily.   Psychiatric/Behavioral: Positive for sleep disturbance. Negative for behavioral problems, confusion, decreased concentration, dysphoric mood, hallucinations, self-injury and suicidal ideas. The patient is not nervous/anxious and is not hyperactive.        Objective:      Physical Exam   Constitutional: She is oriented to person, place, and time. She appears well-developed and  well-nourished. No distress.   HENT:   Head: Normocephalic and atraumatic.   Right Ear: External ear normal.   Left Ear: External ear normal.   Mouth/Throat: Oropharynx is clear and moist. No oropharyngeal exudate.   Eyes: Conjunctivae and EOM are normal. Pupils are equal, round, and reactive to light. Right eye exhibits no discharge. Left eye exhibits no discharge.   Neck: Normal range of motion. Neck supple. No JVD present. No thyromegaly present.   Cardiovascular: Normal rate, regular rhythm, normal heart sounds and intact distal pulses. Exam reveals no gallop and no friction rub.   No murmur heard.  Pulmonary/Chest: Effort normal and breath sounds normal. No stridor. No respiratory distress. She has no wheezes. She has no rales. She exhibits no tenderness.   Abdominal: Soft. Bowel sounds are normal. She exhibits no distension and no mass. There is no tenderness. There is no rebound.   Musculoskeletal: Normal range of motion. She exhibits no edema or tenderness.   Lymphadenopathy:     She has no cervical adenopathy.   Neurological: She is alert and oriented to person, place, and time. She has normal reflexes. She displays normal reflexes. No cranial nerve deficit. She exhibits normal muscle tone. Coordination normal.   Skin: Skin is warm and dry. Capillary refill takes less than 2 seconds. No rash noted. There is erythema. No pallor.        Erythema noted to umbilical region with scabbing no drainage noted   Psychiatric: She has a normal mood and affect. Her behavior is normal. Judgment and thought content normal.   Negative si/hi       Assessment:       1. Uncontrolled type 2 diabetes mellitus without complication, with long-term current use of insulin    2. Essential hypertension    3. Mixed hyperlipidemia    4. MDD (major depressive disorder), recurrent episode, moderate    5. HILARIA (generalized anxiety disorder)    6. Tobacco use    7. Tobacco abuse counseling    8. Cellulitis of abdominal wall        Plan:  "  Eva was seen today for follow-up.    Diagnoses and all orders for this visit:    Uncontrolled type 2 diabetes mellitus without complication, with long-term current use of insulin  -     CBC auto differential; Future  -     Comprehensive metabolic panel; Future  -     Microalbumin/creatinine urine ratio; Future  -     Hemoglobin A1c; Future    Essential hypertension  -     CBC auto differential; Future  -     Comprehensive metabolic panel; Future  -     Microalbumin/creatinine urine ratio; Future    Mixed hyperlipidemia  -     CBC auto differential; Future  -     Comprehensive metabolic panel; Future  -     TSH; Future  -     Lipid panel; Future    MDD (major depressive disorder), recurrent episode, moderate  -     CBC auto differential; Future  -     Comprehensive metabolic panel; Future    HILARIA (generalized anxiety disorder)  -     CBC auto differential; Future  -     Comprehensive metabolic panel; Future    Tobacco use  -     CBC auto differential; Future  -     Comprehensive metabolic panel; Future    Tobacco abuse counseling    Cellulitis of abdominal wall  -     mupirocin (BACTROBAN) 2 % ointment; Apply topically 3 (three) times daily. for 7 days    "This note will not be shared with the patient."  Check CBC, CMP, TSH, Fasting lipid profile, HgA1c, Microalbuminuria in three months.  Medication compliance was discussed with the patient.  The patient was instructed to monitor glucoses closely using glucometer at home and to record the values in a log.  The patient was instructed to obtain an Optometry exam and microalbumin q year.  The patient was instructed to obtain a hemoglobin A1C q 3-4 months.  The patient was instructed to check the feet visually daily and to monitor for infection.  The patient was instructed to exercise at least 3 times a week.  The patient was instructed to follow a 1800 ADA diet.  The patient was instructed to monitor weight daily and try to keep it as close to ideal body weight as " possible.  The patient was instructed on using exercise frequently to reduce BP.  The patient was instructed on using a low salt diet.  Monitor BP at home and to record the values in a log.  RTC in three months.

## 2018-09-07 ENCOUNTER — TELEPHONE (OUTPATIENT)
Dept: INTERNAL MEDICINE | Facility: CLINIC | Age: 43
End: 2018-09-07

## 2018-09-07 ENCOUNTER — TELEPHONE (OUTPATIENT)
Dept: OBSTETRICS AND GYNECOLOGY | Facility: CLINIC | Age: 43
End: 2018-09-07

## 2018-09-07 NOTE — TELEPHONE ENCOUNTER
----- Message from Suzette Balderas MA sent at 9/7/2018  2:57 PM CDT -----  Contact: self  Eva called stating she had a hysterectomy years ago and has been falling a lot lately. If she falls hard enough she starts with vaginal bleeding. She fell a few days ago and has been bleeding since. She put in a call to her PCP and he referred her to contact her GYN.     Eva Ariana [phone]

## 2018-09-25 RX ORDER — INSULIN DETEMIR 100 [IU]/ML
INJECTION, SOLUTION SUBCUTANEOUS
Qty: 15 ML | Refills: 17 | Status: SHIPPED | OUTPATIENT
Start: 2018-09-25 | End: 2019-03-30

## 2018-09-25 RX ORDER — PEN NEEDLE, DIABETIC 30 GX3/16"
NEEDLE, DISPOSABLE MISCELLANEOUS
Qty: 50 EACH | Refills: 7 | Status: SHIPPED | OUTPATIENT
Start: 2018-09-25 | End: 2020-05-11 | Stop reason: SDUPTHER

## 2018-10-16 ENCOUNTER — TELEPHONE (OUTPATIENT)
Dept: INTERNAL MEDICINE | Facility: CLINIC | Age: 43
End: 2018-10-16

## 2018-10-16 RX ORDER — HYDROCORTISONE 25 MG/G
CREAM TOPICAL 2 TIMES DAILY
Qty: 30 G | Refills: 1 | Status: SHIPPED | OUTPATIENT
Start: 2018-10-16 | End: 2018-10-26

## 2018-10-16 NOTE — TELEPHONE ENCOUNTER
Pt called with issues with Constipation and blood in stool with pain seeing if something can be called in for hemorrhoids  Please advise  Thanks!  WM

## 2018-11-02 ENCOUNTER — TELEPHONE (OUTPATIENT)
Dept: INTERNAL MEDICINE | Facility: CLINIC | Age: 43
End: 2018-11-02

## 2018-11-02 DIAGNOSIS — K62.89 RECTAL PAIN: Primary | ICD-10-CM

## 2018-11-02 NOTE — TELEPHONE ENCOUNTER
Lump on inside of rectum with pain , x 1.5 weeks seeing if a referral can be put to get checked out   Please advise  Thanks!

## 2018-11-05 ENCOUNTER — TELEPHONE (OUTPATIENT)
Dept: INTERNAL MEDICINE | Facility: CLINIC | Age: 43
End: 2018-11-05

## 2018-11-05 DIAGNOSIS — K62.89 RECTAL PAIN: Primary | ICD-10-CM

## 2018-11-05 NOTE — TELEPHONE ENCOUNTER
----- Message from Chika Rosales MA sent at 11/5/2018  2:40 PM CST -----  Contact: Patient   Please have Cintia put in referral to Dr. Cabrera.   [facility] at [facility] has no availability until December.

## 2019-01-03 ENCOUNTER — TELEPHONE (OUTPATIENT)
Dept: INTERNAL MEDICINE | Facility: CLINIC | Age: 44
End: 2019-01-03

## 2019-01-03 RX ORDER — IBUPROFEN 800 MG/1
800 TABLET ORAL 3 TIMES DAILY PRN
Qty: 90 TABLET | Refills: 0 | Status: SHIPPED | OUTPATIENT
Start: 2019-01-03 | End: 2019-06-21 | Stop reason: SDUPTHER

## 2019-01-03 NOTE — TELEPHONE ENCOUNTER
----- Message from Chika oRsales MA sent at 1/3/2019  2:11 PM CST -----  Contact: Patient   Patient c/o falling a few days ago, now with hip pain.  Requesting meds for pain.      Please Advise [phone]    Send meds to [facility] ([place])

## 2019-01-14 ENCOUNTER — OFFICE VISIT (OUTPATIENT)
Dept: OBSTETRICS AND GYNECOLOGY | Facility: CLINIC | Age: 44
End: 2019-01-14
Payer: COMMERCIAL

## 2019-01-14 VITALS
DIASTOLIC BLOOD PRESSURE: 68 MMHG | HEART RATE: 72 BPM | SYSTOLIC BLOOD PRESSURE: 114 MMHG | BODY MASS INDEX: 25.03 KG/M2 | RESPIRATION RATE: 14 BRPM | WEIGHT: 146.63 LBS | HEIGHT: 64 IN

## 2019-01-14 DIAGNOSIS — L30.9 VULVAR DERMATITIS: Primary | ICD-10-CM

## 2019-01-14 PROCEDURE — 3074F SYST BP LT 130 MM HG: CPT | Mod: CPTII,S$GLB,, | Performed by: OBSTETRICS & GYNECOLOGY

## 2019-01-14 PROCEDURE — 3074F PR MOST RECENT SYSTOLIC BLOOD PRESSURE < 130 MM HG: ICD-10-PCS | Mod: CPTII,S$GLB,, | Performed by: OBSTETRICS & GYNECOLOGY

## 2019-01-14 PROCEDURE — 3008F PR BODY MASS INDEX (BMI) DOCUMENTED: ICD-10-PCS | Mod: CPTII,S$GLB,, | Performed by: OBSTETRICS & GYNECOLOGY

## 2019-01-14 PROCEDURE — 99213 OFFICE O/P EST LOW 20 MIN: CPT | Mod: S$GLB,,, | Performed by: OBSTETRICS & GYNECOLOGY

## 2019-01-14 PROCEDURE — 99213 PR OFFICE/OUTPT VISIT, EST, LEVL III, 20-29 MIN: ICD-10-PCS | Mod: S$GLB,,, | Performed by: OBSTETRICS & GYNECOLOGY

## 2019-01-14 PROCEDURE — 3008F BODY MASS INDEX DOCD: CPT | Mod: CPTII,S$GLB,, | Performed by: OBSTETRICS & GYNECOLOGY

## 2019-01-14 PROCEDURE — 99999 PR PBB SHADOW E&M-EST. PATIENT-LVL III: ICD-10-PCS | Mod: PBBFAC,,, | Performed by: OBSTETRICS & GYNECOLOGY

## 2019-01-14 PROCEDURE — 3078F DIAST BP <80 MM HG: CPT | Mod: CPTII,S$GLB,, | Performed by: OBSTETRICS & GYNECOLOGY

## 2019-01-14 PROCEDURE — 3078F PR MOST RECENT DIASTOLIC BLOOD PRESSURE < 80 MM HG: ICD-10-PCS | Mod: CPTII,S$GLB,, | Performed by: OBSTETRICS & GYNECOLOGY

## 2019-01-14 PROCEDURE — 99999 PR PBB SHADOW E&M-EST. PATIENT-LVL III: CPT | Mod: PBBFAC,,, | Performed by: OBSTETRICS & GYNECOLOGY

## 2019-01-14 RX ORDER — NYSTATIN AND TRIAMCINOLONE ACETONIDE 100000; 1 [USP'U]/G; MG/G
CREAM TOPICAL
Qty: 30 G | Refills: 1 | Status: SHIPPED | OUTPATIENT
Start: 2019-01-14 | End: 2022-03-22

## 2019-01-14 NOTE — PROGRESS NOTES
Subjective:    Patient ID: Eva Lane is a 43 y.o. y.o. female.     Chief Complaint:   Chief Complaint   Patient presents with    Rash     vagina        History of Present Illness   Eva presents today complaining of burning, pain and vulvar itching. Symptoms began about 3-4 days ago and have been persistent. She reports that she has noticed some light bleeding on her underwear where the rash is.  Patient's last menstrual period was 12/11/2006.. She also denies dysuria and abdominal pain. She is sexually active.    ROS:   CONSTITUTIONAL: fever, chills, diaphoresis, weakness, fatigue  GASTROINTESTINAL: nausea, constipation, Denies: abdominal pain, flank pain, vomiting, diarrhea  BREAST: negative for breast  tenderness, breast mass, nipple discharge, or skin changes  GENITOURINARY: negative for dysuria, frequency/urgency, hematuria, genital discharge, vaginal bleeding, irregular menses, heavy menses, pelvic pain  HEMATOLOGIC/LYMPHATIC: negative for swollen lymph nodes, bleeding, bruising  MUSCULOSKELETAL: negative for back pain, joint pain, joint stiffness, joint swelling, muscle pain, muscle weakness  ENDOCRINE: negative for polydipsia/polyuria, palpitations, skin changes, temperature intolerance, unexpected weight changes      Objective:    Vital Signs:  Vitals:    01/14/19 1412   BP: 114/68   Pulse: 72   Resp: 14       Physical Exam:  General:  alert, cooperative, no distress   Neuro/Psych: AAOx3, appropriate mood and affect   Head: Normocephalic, atraumatic   Neck: Supple, normal ROM   Resp: Normal effort   Skin:  Skin color, texture, turgor normal. No rashes or lesions   Abdomen:  soft, NT   Pelvis: External genitalia: abnormal pigmentation; erythematous on labia majora and minor; no sores/ulcerations         Assessment:      1. Vulvar dermatitis          Plan:      Vulvar dermatitis  -     nystatin-triamcinolone (MYCOLOG II) cream; Apply to affected area 2 times daily  Dispense: 30 g; Refill:  1

## 2019-02-15 ENCOUNTER — TELEPHONE (OUTPATIENT)
Dept: INTERNAL MEDICINE | Facility: CLINIC | Age: 44
End: 2019-02-15

## 2019-02-15 RX ORDER — ACYCLOVIR 50 MG/G
OINTMENT TOPICAL
Qty: 30 G | Refills: 0 | Status: SHIPPED | OUTPATIENT
Start: 2019-02-15 | End: 2019-07-01

## 2019-02-20 DIAGNOSIS — F60.3 BORDERLINE PERSONALITY DISORDER: ICD-10-CM

## 2019-02-20 DIAGNOSIS — F33.1 MDD (MAJOR DEPRESSIVE DISORDER), RECURRENT EPISODE, MODERATE: ICD-10-CM

## 2019-02-20 DIAGNOSIS — F41.1 GAD (GENERALIZED ANXIETY DISORDER): ICD-10-CM

## 2019-02-21 ENCOUNTER — TELEPHONE (OUTPATIENT)
Dept: INTERNAL MEDICINE | Facility: CLINIC | Age: 44
End: 2019-02-21

## 2019-02-21 RX ORDER — QUETIAPINE FUMARATE 400 MG/1
TABLET, FILM COATED ORAL
Qty: 60 TABLET | Refills: 5 | Status: SHIPPED | OUTPATIENT
Start: 2019-02-21 | End: 2019-08-23 | Stop reason: SDUPTHER

## 2019-02-21 RX ORDER — ALPRAZOLAM 1 MG/1
TABLET ORAL
Qty: 60 TABLET | Refills: 5 | Status: SHIPPED | OUTPATIENT
Start: 2019-02-21 | End: 2019-06-21

## 2019-02-21 RX ORDER — ACYCLOVIR 50 MG/G
OINTMENT TOPICAL
Qty: 30 G | Refills: 1 | Status: SHIPPED | OUTPATIENT
Start: 2019-02-21 | End: 2019-07-01

## 2019-02-21 NOTE — TELEPHONE ENCOUNTER
----- Message from Chika Rosales MA sent at 2/21/2019  2:42 PM CST -----  Contact: Patient  Patient needs refills on Xanax and Seroquel.  Send to [facility] ([place])    Also, the cream for her fever blisters could not be filled until [facility] calls them,   Possible needs PA

## 2019-03-25 ENCOUNTER — OFFICE VISIT (OUTPATIENT)
Dept: INTERNAL MEDICINE | Facility: CLINIC | Age: 44
End: 2019-03-25
Payer: COMMERCIAL

## 2019-03-25 VITALS
HEART RATE: 106 BPM | OXYGEN SATURATION: 95 % | RESPIRATION RATE: 16 BRPM | WEIGHT: 146.19 LBS | HEIGHT: 64 IN | DIASTOLIC BLOOD PRESSURE: 60 MMHG | SYSTOLIC BLOOD PRESSURE: 100 MMHG | BODY MASS INDEX: 24.96 KG/M2

## 2019-03-25 DIAGNOSIS — F33.1 MDD (MAJOR DEPRESSIVE DISORDER), RECURRENT EPISODE, MODERATE: ICD-10-CM

## 2019-03-25 DIAGNOSIS — I70.0 AORTIC ATHEROSCLEROSIS: ICD-10-CM

## 2019-03-25 DIAGNOSIS — Z72.0 TOBACCO USE: ICD-10-CM

## 2019-03-25 DIAGNOSIS — F41.1 GAD (GENERALIZED ANXIETY DISORDER): ICD-10-CM

## 2019-03-25 DIAGNOSIS — F43.10 PTSD (POST-TRAUMATIC STRESS DISORDER): ICD-10-CM

## 2019-03-25 DIAGNOSIS — I10 ESSENTIAL HYPERTENSION: ICD-10-CM

## 2019-03-25 DIAGNOSIS — G47.00 INSOMNIA, UNSPECIFIED TYPE: ICD-10-CM

## 2019-03-25 DIAGNOSIS — E11.65 UNCONTROLLED TYPE 2 DIABETES MELLITUS WITH HYPERGLYCEMIA: Primary | ICD-10-CM

## 2019-03-25 DIAGNOSIS — Z71.6 TOBACCO ABUSE COUNSELING: ICD-10-CM

## 2019-03-25 DIAGNOSIS — Z12.39 BREAST CANCER SCREENING: ICD-10-CM

## 2019-03-25 DIAGNOSIS — E78.2 MIXED HYPERLIPIDEMIA: ICD-10-CM

## 2019-03-25 LAB
ALBUMIN SERPL BCP-MCNC: 3.9 G/DL (ref 3.5–5.2)
ALBUMIN/CREAT UR: 12.5 UG/MG (ref 0–30)
ALP SERPL-CCNC: 142 U/L (ref 55–135)
ALT SERPL W/O P-5'-P-CCNC: 9 U/L (ref 10–44)
ANION GAP SERPL CALC-SCNC: 13 MMOL/L (ref 8–16)
AST SERPL-CCNC: 17 U/L (ref 10–40)
BASOPHILS # BLD AUTO: 0.05 K/UL (ref 0–0.2)
BASOPHILS NFR BLD: 0.5 % (ref 0–1.9)
BILIRUB SERPL-MCNC: 0.3 MG/DL (ref 0.1–1)
BUN SERPL-MCNC: 15 MG/DL (ref 6–20)
CALCIUM SERPL-MCNC: 10.5 MG/DL (ref 8.7–10.5)
CHLORIDE SERPL-SCNC: 97 MMOL/L (ref 95–110)
CHOLEST SERPL-MCNC: 257 MG/DL (ref 120–199)
CHOLEST/HDLC SERPL: 8.9 {RATIO} (ref 2–5)
CO2 SERPL-SCNC: 26 MMOL/L (ref 23–29)
CREAT SERPL-MCNC: 1 MG/DL (ref 0.5–1.4)
CREAT UR-MCNC: 56 MG/DL (ref 15–325)
DIFFERENTIAL METHOD: ABNORMAL
EOSINOPHIL # BLD AUTO: 0.3 K/UL (ref 0–0.5)
EOSINOPHIL NFR BLD: 2.5 % (ref 0–8)
ERYTHROCYTE [DISTWIDTH] IN BLOOD BY AUTOMATED COUNT: 12.2 % (ref 11.5–14.5)
EST. GFR  (AFRICAN AMERICAN): >60 ML/MIN/1.73 M^2
EST. GFR  (NON AFRICAN AMERICAN): >60 ML/MIN/1.73 M^2
ESTIMATED AVG GLUCOSE: 315 MG/DL (ref 68–131)
GLUCOSE SERPL-MCNC: 400 MG/DL (ref 70–110)
HBA1C MFR BLD HPLC: 12.6 % (ref 4–5.6)
HCT VFR BLD AUTO: 44.4 % (ref 37–48.5)
HDLC SERPL-MCNC: 29 MG/DL (ref 40–75)
HDLC SERPL: 11.3 % (ref 20–50)
HGB BLD-MCNC: 14.6 G/DL (ref 12–16)
IMM GRANULOCYTES # BLD AUTO: 0.04 K/UL (ref 0–0.04)
IMM GRANULOCYTES NFR BLD AUTO: 0.4 % (ref 0–0.5)
LDLC SERPL CALC-MCNC: ABNORMAL MG/DL (ref 63–159)
LYMPHOCYTES # BLD AUTO: 3.3 K/UL (ref 1–4.8)
LYMPHOCYTES NFR BLD: 33.2 % (ref 18–48)
MCH RBC QN AUTO: 30.9 PG (ref 27–31)
MCHC RBC AUTO-ENTMCNC: 32.9 G/DL (ref 32–36)
MCV RBC AUTO: 94 FL (ref 82–98)
MICROALBUMIN UR DL<=1MG/L-MCNC: 7 UG/ML
MONOCYTES # BLD AUTO: 0.6 K/UL (ref 0.3–1)
MONOCYTES NFR BLD: 6.3 % (ref 4–15)
NEUTROPHILS # BLD AUTO: 5.7 K/UL (ref 1.8–7.7)
NEUTROPHILS NFR BLD: 57.1 % (ref 38–73)
NONHDLC SERPL-MCNC: 228 MG/DL
NRBC BLD-RTO: 0 /100 WBC
PLATELET # BLD AUTO: 250 K/UL (ref 150–350)
PMV BLD AUTO: 13.1 FL (ref 9.2–12.9)
POTASSIUM SERPL-SCNC: 3.8 MMOL/L (ref 3.5–5.1)
PROT SERPL-MCNC: 7.8 G/DL (ref 6–8.4)
RBC # BLD AUTO: 4.73 M/UL (ref 4–5.4)
SODIUM SERPL-SCNC: 136 MMOL/L (ref 136–145)
TRIGL SERPL-MCNC: 489 MG/DL (ref 30–150)
TSH SERPL DL<=0.005 MIU/L-ACNC: 1.64 UIU/ML (ref 0.4–4)
WBC # BLD AUTO: 9.99 K/UL (ref 3.9–12.7)

## 2019-03-25 PROCEDURE — 3008F PR BODY MASS INDEX (BMI) DOCUMENTED: ICD-10-PCS | Mod: CPTII,S$GLB,, | Performed by: NURSE PRACTITIONER

## 2019-03-25 PROCEDURE — 36415 COLL VENOUS BLD VENIPUNCTURE: CPT

## 2019-03-25 PROCEDURE — 3074F PR MOST RECENT SYSTOLIC BLOOD PRESSURE < 130 MM HG: ICD-10-PCS | Mod: CPTII,S$GLB,, | Performed by: NURSE PRACTITIONER

## 2019-03-25 PROCEDURE — 85025 COMPLETE CBC W/AUTO DIFF WBC: CPT

## 2019-03-25 PROCEDURE — 3078F PR MOST RECENT DIASTOLIC BLOOD PRESSURE < 80 MM HG: ICD-10-PCS | Mod: CPTII,S$GLB,, | Performed by: NURSE PRACTITIONER

## 2019-03-25 PROCEDURE — 80061 LIPID PANEL: CPT

## 2019-03-25 PROCEDURE — 99214 PR OFFICE/OUTPT VISIT, EST, LEVL IV, 30-39 MIN: ICD-10-PCS | Mod: S$GLB,,, | Performed by: NURSE PRACTITIONER

## 2019-03-25 PROCEDURE — 3008F BODY MASS INDEX DOCD: CPT | Mod: CPTII,S$GLB,, | Performed by: NURSE PRACTITIONER

## 2019-03-25 PROCEDURE — 99999 PR PBB SHADOW E&M-EST. PATIENT-LVL V: ICD-10-PCS | Mod: PBBFAC,,, | Performed by: NURSE PRACTITIONER

## 2019-03-25 PROCEDURE — 99214 OFFICE O/P EST MOD 30 MIN: CPT | Mod: S$GLB,,, | Performed by: NURSE PRACTITIONER

## 2019-03-25 PROCEDURE — 99999 PR PBB SHADOW E&M-EST. PATIENT-LVL V: CPT | Mod: PBBFAC,,, | Performed by: NURSE PRACTITIONER

## 2019-03-25 PROCEDURE — 3074F SYST BP LT 130 MM HG: CPT | Mod: CPTII,S$GLB,, | Performed by: NURSE PRACTITIONER

## 2019-03-25 PROCEDURE — 83036 HEMOGLOBIN GLYCOSYLATED A1C: CPT

## 2019-03-25 PROCEDURE — 3046F PR MOST RECENT HEMOGLOBIN A1C LEVEL > 9.0%: ICD-10-PCS | Mod: CPTII,S$GLB,, | Performed by: NURSE PRACTITIONER

## 2019-03-25 PROCEDURE — 80053 COMPREHEN METABOLIC PANEL: CPT

## 2019-03-25 PROCEDURE — 84443 ASSAY THYROID STIM HORMONE: CPT

## 2019-03-25 PROCEDURE — 82043 UR ALBUMIN QUANTITATIVE: CPT

## 2019-03-25 PROCEDURE — 3046F HEMOGLOBIN A1C LEVEL >9.0%: CPT | Mod: CPTII,S$GLB,, | Performed by: NURSE PRACTITIONER

## 2019-03-25 PROCEDURE — 3078F DIAST BP <80 MM HG: CPT | Mod: CPTII,S$GLB,, | Performed by: NURSE PRACTITIONER

## 2019-03-25 RX ORDER — AMITRIPTYLINE HYDROCHLORIDE 25 MG/1
25 TABLET, FILM COATED ORAL NIGHTLY PRN
Qty: 30 TABLET | Refills: 5 | Status: SHIPPED | OUTPATIENT
Start: 2019-03-25 | End: 2021-04-27

## 2019-03-25 RX ORDER — FLUOXETINE HYDROCHLORIDE 40 MG/1
40 CAPSULE ORAL DAILY
Qty: 30 CAPSULE | Refills: 5 | Status: SHIPPED | OUTPATIENT
Start: 2019-03-25 | End: 2019-07-01

## 2019-03-25 NOTE — PATIENT INSTRUCTIONS
Diabetes: Activity Tips    Being more active can help you manage your diabetes. The tips on this sheet can help you get the most from your exercise. They can also help you stay safe.  Staying Active  Its important for adults to spend less time sitting and being inactive. This is especially true if you have type 2 diabetes. When you are sitting for long periods of time, get up for short sessions of light activity every 30 minutes.  You should aim for at least 150 minutes a week of exercise or physical activity. Dont let more than 2 days go by without being active.  Benefit from briskness  Brisk activity gets your heart beating faster. This can help you increase your fitness, lose extra weight, and manage your blood sugar level. Try brisk walking. Or, if you have foot or leg problems, you can try swimming or bike riding. You can break up your exercise into chunks throughout the day. Work up to at least 30 minutes of steady, brisk exercise on most days.  Warm up and cool down  Warming up and cooling down reduce your risk of injury. They also help limit muscle soreness. Do a mild version of your activity for 5 minutes before and after your routine. You can also learn stretches that will help keep your muscles loose. Your healthcare provider may show you good ways to warm up and stretch.  Do the talk-sing test  The talk-sing test is a simple way to tell how hard youre exercising. If you can talk while exercising, youre in a safe range. If youre out of breath, slow down. If you can carry a tune, its time to  the pace. Walk up a hill. Increase the resistance on your stationary bike. Or swim faster.  What about eating?  You may be told to plan your exercise for 1 to 2 hours after a meal. In most cases, you dont need to eat while being active. If you take insulin or medicine that can cause low blood sugar, test your blood sugar before exercising. And carry a fast-acting sugar that will raise your blood sugar  level quickly. This includes glucose tablets or hard candy. Use it if you feel low blood sugar symptoms.  Safety tips  These tips can help you stay safe as you become fit:  · Exercise with a friend or carry a cell phone if you have one.  · Carry or wear identification, such as a necklace or bracelet, that says you have diabetes.  · Use the proper footwear and safety equipment for your activity.  · Drink water before, during, and after exercise.  · Dress properly for the weather.  · Dont exercise in very hot or very cold weather.  · Dont exercise if you are sick.  · If you are instructed to do so, test your blood sugar before and after you exercise. Have a small carbohydrate snack if your blood sugar is low before you start exercising.   When to stop exercising and call your healthcare provider  Stop exercising and call your healthcare provider right away if you notice any of the following:  · Pain, pressure, tightness, or heaviness in the chest  · Pain or heaviness in the neck, shoulders, back, arms, legs, or feet  · Unusual shortness of breath  · Dizziness or lightheadedness  · Unusually rapid or slow pulse  · Increased joint or muscle pain  · Nausea or vomiting  Date Last Reviewed: 5/1/2016  © 4927-9225 [employer]. [address] [place] [address], [place] [address]. All rights reserved. This information is not intended as a substitute for professional medical care. Always follow your healthcare professional's instructions.

## 2019-03-25 NOTE — PROGRESS NOTES
Subjective:       Patient ID: Eva Lane is a 43 y.o. female.    Chief Complaint: Follow-up and Insomnia    Patient is known, to me and presents with   Chief Complaint   Patient presents with    Follow-up    Insomnia   .  Denies chest pain and shortness of breath.  Patient presents with check up and suffering with insomnia. She reports that she started with worsening of insomnia for the past week  HPI  Review of Systems   Constitutional: Negative for activity change, appetite change, fatigue, fever and unexpected weight change.   HENT: Negative for congestion, ear discharge, ear pain, hearing loss, postnasal drip and tinnitus.    Eyes: Negative for photophobia, pain and visual disturbance.   Respiratory: Negative for cough, shortness of breath, wheezing and stridor.    Cardiovascular: Negative for chest pain, palpitations and leg swelling.   Gastrointestinal: Negative for abdominal distention.   Genitourinary: Negative for difficulty urinating, dysuria, frequency, hematuria and urgency.   Musculoskeletal: Negative for arthralgias, back pain, gait problem, joint swelling and neck pain.   Skin: Negative.    Neurological: Negative for dizziness, seizures, syncope, weakness, light-headedness, numbness and headaches.   Hematological: Negative for adenopathy. Does not bruise/bleed easily.   Psychiatric/Behavioral: Positive for sleep disturbance. Negative for behavioral problems, confusion, dysphoric mood, hallucinations and suicidal ideas. The patient is not nervous/anxious.        Objective:      Physical Exam   Constitutional: She is oriented to person, place, and time. She appears well-developed and well-nourished. No distress.   HENT:   Head: Normocephalic and atraumatic.   Right Ear: External ear normal.   Left Ear: External ear normal.   Mouth/Throat: Oropharynx is clear and moist. No oropharyngeal exudate.   Eyes: Pupils are equal, round, and reactive to light. Conjunctivae and EOM are normal. Right eye  exhibits no discharge. Left eye exhibits no discharge.   Neck: Normal range of motion. Neck supple. No JVD present. No thyromegaly present.   Cardiovascular: Normal rate, regular rhythm, normal heart sounds and intact distal pulses. Exam reveals no gallop and no friction rub.   No murmur heard.  Pulmonary/Chest: Effort normal and breath sounds normal. No stridor. No respiratory distress. She has no wheezes. She has no rales. She exhibits no tenderness.   Abdominal: Soft. Bowel sounds are normal. She exhibits no distension and no mass. There is no tenderness. There is no rebound.   Musculoskeletal: Normal range of motion. She exhibits no edema or tenderness.   Lymphadenopathy:     She has no cervical adenopathy.   Neurological: She is alert and oriented to person, place, and time. She has normal reflexes. She displays normal reflexes. No cranial nerve deficit. She exhibits normal muscle tone. Coordination normal.   Skin: Skin is warm and dry. Capillary refill takes less than 2 seconds. No rash noted. No erythema. No pallor.   Psychiatric: She has a normal mood and affect. Her behavior is normal. Judgment and thought content normal.       Assessment:       1. Uncontrolled type 2 diabetes mellitus with hyperglycemia    2. Essential hypertension    3. Mixed hyperlipidemia    4. Aortic atherosclerosis    5. MDD (major depressive disorder), recurrent episode, moderate    6. HILARIA (generalized anxiety disorder)    7. PTSD (post-traumatic stress disorder)    8. Tobacco use    9. Tobacco abuse counseling    10. Breast cancer screening    11. Insomnia, unspecified type        Plan:   Eva was seen today for follow-up and insomnia.    Diagnoses and all orders for this visit:    Uncontrolled type 2 diabetes mellitus with hyperglycemia  -     CBC auto differential; Future  -     Comprehensive metabolic panel; Future  -     Microalbumin/creatinine urine ratio; Future  -     Hemoglobin A1c; Future    Essential hypertension  -      "CBC auto differential; Future  -     Comprehensive metabolic panel; Future  -     Microalbumin/creatinine urine ratio; Future  -     Ambulatory referral to Cardiology    Mixed hyperlipidemia  -     CBC auto differential; Future  -     Comprehensive metabolic panel; Future  -     TSH; Future  -     Lipid panel; Future  -     Ambulatory referral to Cardiology    Aortic atherosclerosis  -     CBC auto differential; Future  -     Comprehensive metabolic panel; Future    MDD (major depressive disorder), recurrent episode, moderate  -     CBC auto differential; Future  -     Comprehensive metabolic panel; Future  -     FLUoxetine 40 MG capsule; Take 1 capsule (40 mg total) by mouth once daily.    HILARIA (generalized anxiety disorder)  -     CBC auto differential; Future  -     Comprehensive metabolic panel; Future  -     FLUoxetine 40 MG capsule; Take 1 capsule (40 mg total) by mouth once daily.    PTSD (post-traumatic stress disorder)  -     CBC auto differential; Future  -     Comprehensive metabolic panel; Future  -     FLUoxetine 40 MG capsule; Take 1 capsule (40 mg total) by mouth once daily.    Tobacco use  -     CBC auto differential; Future  -     Comprehensive metabolic panel; Future    Tobacco abuse counseling    Breast cancer screening  -     Mammo Digital Screening Bilateral With CAD; Future    Insomnia, unspecified type  -     amitriptyline (ELAVIL) 25 MG tablet; Take 1 tablet (25 mg total) by mouth nightly as needed for Insomnia.    "This note will not be shared with the patient."  Continue with plan of care  Will send to Dr Curtis for check up  Will add elavil to regimen and increase prozac  Check CBC, CMP, TSH, Fasting lipid profile, HgA1c, Microalbuminuria in 6 months.  Medication compliance was discussed with the patient.  The patient was instructed to monitor glucoses closely using glucometer at home and to record the values in a log.  The patient was instructed to obtain an Optometry exam and microalbumin q " year.  The patient was instructed to obtain a hemoglobin A1C q 3-4 months.  The patient was instructed to check the feet visually daily and to monitor for infection.  The patient was instructed to exercise at least 3 times a week.  The patient was instructed to follow a 1800 ADA diet.  The patient was instructed to monitor weight daily and try to keep it as close to ideal body weight as possible.  The patient was instructed on using exercise frequently to reduce BP.  The patient was instructed on using a low salt diet.  Monitor BP at home and to record the values in a log.  RTC in 6 months.

## 2019-03-29 ENCOUNTER — TELEPHONE (OUTPATIENT)
Dept: INTERNAL MEDICINE | Facility: CLINIC | Age: 44
End: 2019-03-29

## 2019-03-29 NOTE — TELEPHONE ENCOUNTER
Needs to do 40 units bid for now and really watch what she eats. Also drink plenty water. Have let me know how her blood sugars are running end of next week. Also make sure she is taking her chol meds

## 2019-03-29 NOTE — TELEPHONE ENCOUNTER
----- Message from Chika Rosales MA sent at 3/29/2019 11:33 AM CDT -----  Contact: Patient  Patient is calling for results from her bloodwork.    Please call  [phone]

## 2019-04-29 ENCOUNTER — TELEPHONE (OUTPATIENT)
Dept: INTERNAL MEDICINE | Facility: CLINIC | Age: 44
End: 2019-04-29

## 2019-04-29 NOTE — TELEPHONE ENCOUNTER
----- Message from Chika Rosales MA sent at 4/29/2019  2:34 PM CDT -----  Contact: --Amado  Patient's  states she is stumbling and very weak.  Her blood sugar was 45,  She ate candy and drank a coke, it went up to 210,  She gave herself an injection and it dropped down to 32.     states she is weak and stumbling again.  Please call him @ [phone]

## 2019-04-29 NOTE — TELEPHONE ENCOUNTER
----- Message from Chika Rosales MA sent at 4/29/2019  2:34 PM CDT -----  Contact: --Amado  Patient's  states she is stumbling and very weak.  Her blood sugar was 45,  She ate candy and drank a coke, it went up to 210,  She gave herself an injection and it dropped down to 32.     states she is weak and stumbling again.  Please call him @ [phone]

## 2019-04-29 NOTE — TELEPHONE ENCOUNTER
If blood sugars are that low, she should report to ER for evaluation based on s/s    Quality 226: Preventive Care And Screening: Tobacco Use: Screening And Cessation Intervention: Patient screened for tobacco and never smoked Detail Level: Detailed Quality 111:Pneumonia Vaccination Status For Older Adults: Pneumococcal Vaccination Previously Received

## 2019-04-30 ENCOUNTER — HOSPITAL ENCOUNTER (EMERGENCY)
Facility: HOSPITAL | Age: 44
Discharge: HOME | End: 2019-04-30
Attending: SURGERY
Payer: COMMERCIAL

## 2019-04-30 VITALS
WEIGHT: 150.69 LBS | BODY MASS INDEX: 25.86 KG/M2 | DIASTOLIC BLOOD PRESSURE: 78 MMHG | OXYGEN SATURATION: 98 % | TEMPERATURE: 97 F | SYSTOLIC BLOOD PRESSURE: 132 MMHG | HEART RATE: 77 BPM

## 2019-04-30 DIAGNOSIS — R42 DIZZINESS: ICD-10-CM

## 2019-04-30 LAB
ALBUMIN SERPL BCP-MCNC: 3.7 G/DL (ref 3.5–5.2)
ALP SERPL-CCNC: 70 U/L (ref 55–135)
ALT SERPL W/O P-5'-P-CCNC: 25 U/L (ref 10–44)
ANION GAP SERPL CALC-SCNC: 7 MMOL/L (ref 8–16)
AST SERPL-CCNC: 21 U/L (ref 10–40)
BASOPHILS # BLD AUTO: 0.03 K/UL (ref 0–0.2)
BASOPHILS NFR BLD: 0.4 % (ref 0–1.9)
BILIRUB SERPL-MCNC: 0.2 MG/DL (ref 0.1–1)
BUN SERPL-MCNC: 15 MG/DL (ref 6–20)
CALCIUM SERPL-MCNC: 9.5 MG/DL (ref 8.7–10.5)
CHLORIDE SERPL-SCNC: 106 MMOL/L (ref 95–110)
CK MB SERPL-MCNC: 0.4 NG/ML (ref 0.1–6.5)
CK MB SERPL-RTO: 1.4 % (ref 0–5)
CK SERPL-CCNC: 29 U/L (ref 20–180)
CK SERPL-CCNC: 29 U/L (ref 20–180)
CO2 SERPL-SCNC: 27 MMOL/L (ref 23–29)
CREAT SERPL-MCNC: 0.9 MG/DL (ref 0.5–1.4)
DIFFERENTIAL METHOD: ABNORMAL
EOSINOPHIL # BLD AUTO: 0.3 K/UL (ref 0–0.5)
EOSINOPHIL NFR BLD: 3.9 % (ref 0–8)
ERYTHROCYTE [DISTWIDTH] IN BLOOD BY AUTOMATED COUNT: 12.7 % (ref 11.5–14.5)
EST. GFR  (AFRICAN AMERICAN): >60 ML/MIN/1.73 M^2
EST. GFR  (NON AFRICAN AMERICAN): >60 ML/MIN/1.73 M^2
GLUCOSE SERPL-MCNC: 177 MG/DL (ref 70–110)
HCT VFR BLD AUTO: 40.5 % (ref 37–48.5)
HGB BLD-MCNC: 13.4 G/DL (ref 12–16)
LYMPHOCYTES # BLD AUTO: 2.5 K/UL (ref 1–4.8)
LYMPHOCYTES NFR BLD: 36.5 % (ref 18–48)
MCH RBC QN AUTO: 30.9 PG (ref 27–31)
MCHC RBC AUTO-ENTMCNC: 33.1 G/DL (ref 32–36)
MCV RBC AUTO: 94 FL (ref 82–98)
MONOCYTES # BLD AUTO: 0.4 K/UL (ref 0.3–1)
MONOCYTES NFR BLD: 6.2 % (ref 4–15)
NEUTROPHILS # BLD AUTO: 3.7 K/UL (ref 1.8–7.7)
NEUTROPHILS NFR BLD: 53 % (ref 38–73)
PLATELET # BLD AUTO: 375 K/UL (ref 150–350)
PMV BLD AUTO: 10.4 FL (ref 9.2–12.9)
POCT GLUCOSE: 206 MG/DL (ref 70–110)
POTASSIUM SERPL-SCNC: 3.8 MMOL/L (ref 3.5–5.1)
PROT SERPL-MCNC: 7.5 G/DL (ref 6–8.4)
RBC # BLD AUTO: 4.33 M/UL (ref 4–5.4)
SODIUM SERPL-SCNC: 140 MMOL/L (ref 136–145)
TROPONIN I SERPL DL<=0.01 NG/ML-MCNC: <0.006 NG/ML (ref 0–0.03)
WBC # BLD AUTO: 6.96 K/UL (ref 3.9–12.7)

## 2019-04-30 PROCEDURE — 93010 EKG 12-LEAD: ICD-10-PCS | Mod: ,,, | Performed by: INTERNAL MEDICINE

## 2019-04-30 PROCEDURE — 99285 EMERGENCY DEPT VISIT HI MDM: CPT | Mod: 25

## 2019-04-30 PROCEDURE — 93010 ELECTROCARDIOGRAM REPORT: CPT | Mod: ,,, | Performed by: INTERNAL MEDICINE

## 2019-04-30 PROCEDURE — 80053 COMPREHEN METABOLIC PANEL: CPT

## 2019-04-30 PROCEDURE — 36415 COLL VENOUS BLD VENIPUNCTURE: CPT

## 2019-04-30 PROCEDURE — 85025 COMPLETE CBC W/AUTO DIFF WBC: CPT

## 2019-04-30 PROCEDURE — 82550 ASSAY OF CK (CPK): CPT

## 2019-04-30 PROCEDURE — 82962 GLUCOSE BLOOD TEST: CPT

## 2019-04-30 PROCEDURE — 84484 ASSAY OF TROPONIN QUANT: CPT

## 2019-04-30 PROCEDURE — 82553 CREATINE MB FRACTION: CPT

## 2019-04-30 PROCEDURE — 93005 ELECTROCARDIOGRAM TRACING: CPT

## 2019-04-30 NOTE — ED PROVIDER NOTES
Encounter Date: 4/30/2019       History     Chief Complaint   Patient presents with    Dizziness     Patient is 43-year-old white female who complains of dizziness associated with some left-sided weakness.  She believes that she may have had some voice changes over the past day or so.  The symptoms started about a day ago.  She has history of similar complaints in the past, but negative workup for organic causes.        Review of patient's allergies indicates:   Allergen Reactions    Penicillins Swelling and Rash    Neosporin [neomycin-bacitracin-polymyxin] Rash    Shellfish containing products     Shrimp Hives     Past Medical History:   Diagnosis Date    ADHD (attention deficit hyperactivity disorder)     Anxiety     Arthritis     Asthma     Behavioral problem     Bipolar 1 disorder     CHF (congestive heart failure)     COPD (chronic obstructive pulmonary disease)     Depression     Diabetes mellitus type II     Hx of psychiatric care     Hyperlipidemia     Hypertension     Lumbar radiculopathy     Neuralgia     Neuritis     Psychiatric problem     PTSD (post-traumatic stress disorder)     Seizures     Seizure-last 8/2015-shake and fall-doesnt remember anything    Self-harming behavior     Sleep apnea     on CPAP    Stroke 9/22/2015    Stroke     Therapy      Past Surgical History:   Procedure Laterality Date    BLOCK-LUMBAR-SYMPATHETIC Left 9/4/2015    [facility]    BLOCK-LUMBAR-SYMPATHETIC Left 7/18/2014    [facility] at [facility]    COLONOSCOPY N/A 7/1/2016    Performed [facility] ([facility]TH F[facility])    CYST REMOVAL  2000    Fatty cyst on right face cheek and left upper arm     DILATION AND CURETTAGE OF UTERUS  2006    Miscarriage    ESOPHAGOGASTRODUODENOSCOPY (EGD) N/A 7/1/2016    Performed by Shoaib Naylor MD at [facility] ENDO (4TH FLR)    HYSTERECTOMY  7/08    DUB - Total    INJECTION, STEROID, SPINE, LUMBAR,  EPIDURAL N/A 4/25/2014    [facility]    OOPHORECTOMY  7/2008    TOTAL ABDOMINAL HYSTERECTOMY  7/2008    TUBAL LIGATION  2007     Family History   Problem Relation Age of Onset    Heart disease Father     Hyperlipidemia Father     Hypertension Father     Diabetes Father     Breast cancer Neg Hx     Colon cancer Neg Hx     Ovarian cancer Neg Hx      Social History     Tobacco Use    Smoking status: Current Every Day Smoker     Packs/day: 0.10     Years: 26.00     Pack years: 2.60     Types: Cigarettes     Start date: 7/17/1986    Smokeless tobacco: Never Used    Tobacco comment: told about [facility] smoking cessation program-given smoking clinic pamphlet   Substance Use Topics    Alcohol use: No    Drug use: No     Review of Systems   Constitutional: Negative for fever.   HENT: Negative for congestion, ear pain, facial swelling, rhinorrhea, sore throat and trouble swallowing.    Eyes: Negative for pain.   Respiratory: Negative for cough, shortness of breath and wheezing.    Cardiovascular: Negative for chest pain, palpitations and leg swelling.   Gastrointestinal: Negative for abdominal pain, constipation, diarrhea and nausea.   Genitourinary: Negative for difficulty urinating, dysuria, flank pain, frequency, hematuria and urgency.   Musculoskeletal: Negative for arthralgias, back pain, myalgias and neck pain.   Skin: Negative for rash and wound.   Neurological: Positive for dizziness and weakness. Negative for facial asymmetry, speech difficulty and headaches.   Hematological: Does not bruise/bleed easily.       Physical Exam     Initial Vitals [04/30/19 1244]   BP Pulse Resp Temp SpO2   -- -- -- -- 98 %      MAP       --         Physical Exam    Nursing note and vitals reviewed.  Constitutional: No distress.   HENT:   Head: Normocephalic and atraumatic.   Right Ear: External ear normal.   Left Ear: External ear normal.   Nose: Nose normal.   Mouth/Throat: Oropharynx is  clear and moist.   Eyes: Conjunctivae and EOM are normal. Pupils are equal, round, and reactive to light.   Neck: Neck supple.   Cardiovascular: Normal rate, regular rhythm, normal heart sounds and intact distal pulses.   Pulmonary/Chest: Breath sounds normal. No respiratory distress. She has no wheezes. She has no rhonchi. She has no rales.   Abdominal: Soft. Bowel sounds are normal. There is no tenderness.   Musculoskeletal: Normal range of motion.        Left elbow: Normal.        Arms:  Neurological: She is alert and oriented to person, place, and time. She has normal strength.   Skin: Skin is warm and dry.         ED Course   Procedures  Labs Reviewed   CBC W/ AUTO DIFFERENTIAL - Abnormal; Notable for the following components:       Result Value    Platelets 375 (*)     All other components within normal limits   COMPREHENSIVE METABOLIC PANEL - Abnormal; Notable for the following components:    Glucose 177 (*)     Anion Gap 7 (*)     All other components within normal limits   POCT GLUCOSE - Abnormal; Notable for the following components:    POCT Glucose 206 (*)     All other components within normal limits   TROPONIN I   CK   CK-MB   POCT GLUCOSE MONITORING CONTINUOUS          Imaging Results          CT Head Without Contrast (Final result)  Result time 04/30/19 13:30:17    Final result by Chandu Webb MD (04/30/19 13:30:17)                 Impression:      No CT evidence of an acute intracranial abnormality.      Electronically signed by: Chandu Webb MD  Date:    04/30/2019  Time:    13:30             Narrative:    EXAMINATION:  CT HEAD WITHOUT CONTRAST    CLINICAL HISTORY:  Dizziness;    TECHNIQUE:  Axial images were obtained through the head.  Coronal and sagittal reformations were also performed.  Contrast was not administered.    COMPARISON:  July 24, 2018.    FINDINGS:  CT head without contrast dated April 30, 2019.    There is no CT evidence of an acute intracranial abnormality.  No evidence  of acute infarct, hemorrhage, mass or midline shift.    No displaced skull fracture.  No visualized significant paranasal sinus disease.  Hypoplastic right mastoid air cells.                                neurological workup in the ED today unremarkable.  No acute changes noted on head CT scan.  In discussing insulin management with the patient and her  there may be some confusion as to when the patient is to take a full dose of daily insulin.  She may be becoming hypoglycemic from being insulin overdosed when her blood sugar is not elevated.  I have asked her to get in touch with Ms. Gustafson about further guidelines and parameters as to when and how much insulin to take.                       Clinical Impression:       ICD-10-CM ICD-9-CM   1. Dizziness R42 780.4         Disposition:   Disposition: Discharged  Condition: Stable                        Ashish Daniels Jr., MD  04/30/19 4218

## 2019-04-30 NOTE — ED TRIAGE NOTES
43 y.o. female presents to ER LEPE 01/LEPE 01   Chief Complaint   Patient presents with    Dizziness   Pt reports dizziness since yesterday, reports she feels like she is having weakness on her left side. No deficiets noted. No acute distress noted.

## 2019-06-18 ENCOUNTER — TELEPHONE (OUTPATIENT)
Dept: INTERNAL MEDICINE | Facility: CLINIC | Age: 44
End: 2019-06-18

## 2019-06-18 RX ORDER — IBUPROFEN 800 MG/1
800 TABLET ORAL 3 TIMES DAILY PRN
Qty: 90 TABLET | Refills: 0 | Status: SHIPPED | OUTPATIENT
Start: 2019-06-18 | End: 2019-08-19 | Stop reason: SDUPTHER

## 2019-06-18 NOTE — TELEPHONE ENCOUNTER
----- Message from Chika Rosales MA sent at 6/18/2019 10:04 AM CDT -----  Contact: Patient  Patient c/o falling last night, she hit her ribs and head on the bathtub.     Requesting something for pain.  Please Advise--[phone]    Send meds to [facility] ([place])

## 2019-06-19 NOTE — TELEPHONE ENCOUNTER
Pt called still having L. Side rib area pain and her head too where she hit from fall , tried ibuprofen that was sent in seeing if something else she can try for pain bc its not helping  please advise  Thanks!

## 2019-06-20 RX ORDER — TRAMADOL HYDROCHLORIDE 50 MG/1
50 TABLET ORAL EVERY 6 HOURS
Qty: 28 TABLET | Refills: 0 | Status: SHIPPED | OUTPATIENT
Start: 2019-06-20 | End: 2019-06-27

## 2019-06-21 ENCOUNTER — OFFICE VISIT (OUTPATIENT)
Dept: INTERNAL MEDICINE | Facility: CLINIC | Age: 44
End: 2019-06-21
Payer: COMMERCIAL

## 2019-06-21 VITALS
OXYGEN SATURATION: 97 % | BODY MASS INDEX: 26.34 KG/M2 | SYSTOLIC BLOOD PRESSURE: 124 MMHG | HEIGHT: 64 IN | DIASTOLIC BLOOD PRESSURE: 78 MMHG | HEART RATE: 95 BPM | RESPIRATION RATE: 16 BRPM | WEIGHT: 154.31 LBS

## 2019-06-21 DIAGNOSIS — R56.9 SEIZURE: Primary | ICD-10-CM

## 2019-06-21 DIAGNOSIS — F41.9 ANXIETY: ICD-10-CM

## 2019-06-21 DIAGNOSIS — E11.9 TYPE 2 DIABETES MELLITUS WITHOUT COMPLICATION, UNSPECIFIED WHETHER LONG TERM INSULIN USE: ICD-10-CM

## 2019-06-21 PROCEDURE — 99214 OFFICE O/P EST MOD 30 MIN: CPT | Mod: S$GLB,,, | Performed by: NURSE PRACTITIONER

## 2019-06-21 PROCEDURE — 99999 PR PBB SHADOW E&M-EST. PATIENT-LVL V: CPT | Mod: PBBFAC,,, | Performed by: NURSE PRACTITIONER

## 2019-06-21 PROCEDURE — 3074F SYST BP LT 130 MM HG: CPT | Mod: CPTII,S$GLB,, | Performed by: NURSE PRACTITIONER

## 2019-06-21 PROCEDURE — 3078F PR MOST RECENT DIASTOLIC BLOOD PRESSURE < 80 MM HG: ICD-10-PCS | Mod: CPTII,S$GLB,, | Performed by: NURSE PRACTITIONER

## 2019-06-21 PROCEDURE — 3074F PR MOST RECENT SYSTOLIC BLOOD PRESSURE < 130 MM HG: ICD-10-PCS | Mod: CPTII,S$GLB,, | Performed by: NURSE PRACTITIONER

## 2019-06-21 PROCEDURE — 3008F BODY MASS INDEX DOCD: CPT | Mod: CPTII,S$GLB,, | Performed by: NURSE PRACTITIONER

## 2019-06-21 PROCEDURE — 3008F PR BODY MASS INDEX (BMI) DOCUMENTED: ICD-10-PCS | Mod: CPTII,S$GLB,, | Performed by: NURSE PRACTITIONER

## 2019-06-21 PROCEDURE — 99999 PR PBB SHADOW E&M-EST. PATIENT-LVL V: ICD-10-PCS | Mod: PBBFAC,,, | Performed by: NURSE PRACTITIONER

## 2019-06-21 PROCEDURE — 99214 PR OFFICE/OUTPT VISIT, EST, LEVL IV, 30-39 MIN: ICD-10-PCS | Mod: S$GLB,,, | Performed by: NURSE PRACTITIONER

## 2019-06-21 PROCEDURE — 3078F DIAST BP <80 MM HG: CPT | Mod: CPTII,S$GLB,, | Performed by: NURSE PRACTITIONER

## 2019-06-21 RX ORDER — ALPRAZOLAM 1 MG/1
1 TABLET ORAL 3 TIMES DAILY PRN
Qty: 90 TABLET | Refills: 2 | Status: SHIPPED | OUTPATIENT
Start: 2019-06-21 | End: 2019-08-06

## 2019-06-21 RX ORDER — GABAPENTIN 600 MG/1
600 TABLET ORAL 3 TIMES DAILY
Qty: 90 TABLET | Refills: 2 | Status: SHIPPED | OUTPATIENT
Start: 2019-06-21 | End: 2021-04-27

## 2019-06-21 NOTE — PROGRESS NOTES
Subjective:       Patient ID: Eva Lane is a 43 y.o. female.    Chief Complaint: Fall (x 2 days- fell in tub and cant remember what happen ); Seizures (x yesterday - and another episode today); and Hip Pain (pain in both sides PS:10)    Patient is known, to me and presents with   Chief Complaint   Patient presents with    Fall     x 2 days- fell in tub and cant remember what happen     Seizures     x yesterday - and another episode today    Hip Pain     pain in both sides PS:10   .  Denies chest pain and shortness of breath.  Patient presents with fall secondary to seizure. Has been off her seizure meds for two months now. States that she fell on her head and now with small hematoma to posterior head and hip pain to both sides. No seizure activity today. Family is driving her now  HPI  Review of Systems   Constitutional: Negative for activity change, appetite change, fatigue, fever and unexpected weight change.   HENT: Negative for congestion, ear discharge, ear pain, hearing loss, postnasal drip and tinnitus.    Eyes: Negative for photophobia, pain and visual disturbance.   Respiratory: Negative for cough, shortness of breath, wheezing and stridor.    Cardiovascular: Negative for chest pain, palpitations and leg swelling.   Gastrointestinal: Negative for abdominal distention.   Genitourinary: Negative for difficulty urinating, dysuria, frequency, hematuria and urgency.   Musculoskeletal: Negative for arthralgias, back pain, gait problem, joint swelling and neck pain.   Skin: Negative.    Neurological: Positive for seizures. Negative for dizziness, syncope, weakness, light-headedness, numbness and headaches.   Hematological: Negative for adenopathy. Does not bruise/bleed easily.   Psychiatric/Behavioral: Positive for sleep disturbance. Negative for behavioral problems, confusion, hallucinations and suicidal ideas. The patient is nervous/anxious.        Objective:      Physical Exam   Constitutional:  She is oriented to person, place, and time. She appears well-developed and well-nourished. No distress.   HENT:   Head: Normocephalic and atraumatic.   Right Ear: External ear normal.   Left Ear: External ear normal.   Mouth/Throat: Oropharynx is clear and moist. No oropharyngeal exudate.   Eyes: Pupils are equal, round, and reactive to light. Conjunctivae and EOM are normal. Right eye exhibits no discharge. Left eye exhibits no discharge.   Neck: Normal range of motion. Neck supple. No JVD present. No thyromegaly present.   Cardiovascular: Normal rate, regular rhythm, normal heart sounds and intact distal pulses. Exam reveals no gallop and no friction rub.   No murmur heard.  Pulmonary/Chest: Effort normal and breath sounds normal. No stridor. No respiratory distress. She has no wheezes. She has no rales. She exhibits no tenderness.   Abdominal: Soft. Bowel sounds are normal. She exhibits no distension and no mass. There is no tenderness. There is no rebound.   Musculoskeletal: Normal range of motion. She exhibits no edema or tenderness.   Lymphadenopathy:     She has no cervical adenopathy.   Neurological: She is alert and oriented to person, place, and time. She has normal reflexes. She displays normal reflexes. No cranial nerve deficit. She exhibits normal muscle tone. Coordination normal.   Skin: Skin is warm and dry. Capillary refill takes less than 2 seconds. No rash noted. No erythema. No pallor.   Quarter size hematoma to posterior head    Psychiatric: She has a normal mood and affect. Her behavior is normal. Judgment and thought content normal.   Negative si/hi        Assessment:       1. Seizure    2. Anxiety        Plan:   Eva was seen today for fall, seizures and hip pain.    Diagnoses and all orders for this visit:    Seizure  -     gabapentin (NEURONTIN) 600 MG tablet; Take 1 tablet (600 mg total) by mouth 3 (three) times daily.    Anxiety  -     ALPRAZolam (XANAX) 1 MG tablet; Take 1 tablet (1 mg  "total) by mouth 3 (three) times daily as needed for Anxiety.    "This note will not be shared with the patient."  Will send in her seizure meds and start taking three times a day  Also will go up on xanax due to increase anxiety   No driving for six months and may need neuro consult if seizures do not improve  Will let me know how she is doing next week  rtc as sheduled    "

## 2019-06-24 ENCOUNTER — HOSPITAL ENCOUNTER (EMERGENCY)
Facility: HOSPITAL | Age: 44
Discharge: HOME | End: 2019-06-24
Attending: EMERGENCY MEDICINE
Payer: COMMERCIAL

## 2019-06-24 VITALS
HEART RATE: 106 BPM | TEMPERATURE: 99 F | RESPIRATION RATE: 18 BRPM | DIASTOLIC BLOOD PRESSURE: 80 MMHG | OXYGEN SATURATION: 96 % | SYSTOLIC BLOOD PRESSURE: 118 MMHG | WEIGHT: 154 LBS | BODY MASS INDEX: 26.43 KG/M2
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
## 2019-06-24 DIAGNOSIS — S80.02XA CONTUSION OF LEFT KNEE, INITIAL ENCOUNTER: Primary | ICD-10-CM

## 2019-06-24 DIAGNOSIS — S80.12XA CONTUSION OF MULTIPLE SITES OF LEFT LOWER EXTREMITY, INITIAL ENCOUNTER: ICD-10-CM

## 2019-06-24 DIAGNOSIS — R56.9 SEIZURE: ICD-10-CM

## 2019-06-24 PROCEDURE — 25000003 PHARM REV CODE 250: Performed by: EMERGENCY MEDICINE

## 2019-06-24 PROCEDURE — 99284 EMERGENCY DEPT VISIT MOD MDM: CPT

## 2019-06-24 RX ORDER — TRAMADOL HYDROCHLORIDE 50 MG/1
50 TABLET ORAL EVERY 6 HOURS PRN
Qty: 12 TABLET | Refills: 0 | Status: SHIPPED | OUTPATIENT
Start: 2019-06-24 | End: 2019-07-04

## 2019-06-24 RX ORDER — TRAMADOL HYDROCHLORIDE 50 MG/1
50 TABLET ORAL
Status: COMPLETED | OUTPATIENT
Start: 2019-06-24 | End: 2019-06-24

## 2019-06-24 RX ORDER — CYCLOBENZAPRINE HCL 10 MG
10 TABLET ORAL
Status: COMPLETED | OUTPATIENT
Start: 2019-06-24 | End: 2019-06-24

## 2019-06-24 RX ORDER — ACETAMINOPHEN 500 MG
500 TABLET ORAL
Status: COMPLETED | OUTPATIENT
Start: 2019-06-24 | End: 2019-06-24

## 2019-06-24 RX ADMIN — TRAMADOL HYDROCHLORIDE 50 MG: 50 TABLET, FILM COATED ORAL at 09:06

## 2019-06-24 RX ADMIN — CYCLOBENZAPRINE HYDROCHLORIDE 10 MG: 10 TABLET, FILM COATED ORAL at 09:06

## 2019-06-24 RX ADMIN — ACETAMINOPHEN 500 MG: 500 TABLET, FILM COATED ORAL at 08:06

## 2019-06-25 NOTE — ED PROVIDER NOTES
Encounter Date: 6/24/2019       History     Chief Complaint   Patient presents with    Fall     Patient states possible seizure.   States long-standing history of seizures    The history is provided by the patient.   Fall   The accident occurred just prior to arrival. The fall occurred while standing. She landed on a hard floor. The point of impact was the head and left knee. The pain is at a severity of 3/10. She was ambulatory at the scene. There was no entrapment after the fall. There was no drug use involved in the accident. There was no alcohol use involved in the accident. Associated symptoms include headaches. Pertinent negatives include no neck pain, no back pain, no fever, no numbness, no abdominal pain, no nausea, no vomiting and no loss of consciousness. The symptoms are aggravated by activity. She has tried nothing for the symptoms.     Review of patient's allergies indicates:   Allergen Reactions    Penicillins Swelling and Rash    Neosporin [neomycin-bacitracin-polymyxin] Rash    Shellfish containing products     Shrimp Hives     Past Medical History:   Diagnosis Date    ADHD (attention deficit hyperactivity disorder)     Anxiety     Arthritis     Asthma     Behavioral problem     Bipolar 1 disorder     CHF (congestive heart failure)     COPD (chronic obstructive pulmonary disease)     Depression     Diabetes mellitus type II     Hx of psychiatric care     Hyperlipidemia     Hypertension     Lumbar radiculopathy     Neuralgia     Neuritis     Psychiatric problem     PTSD (post-traumatic stress disorder)     Seizures     Seizure-last 8/2015-shake and fall-doesnt remember anything    Self-harming behavior     Sleep apnea     on CPAP    Stroke 9/22/2015    Stroke     Therapy      Past Surgical History:   Procedure Laterality Date    BLOCK-LUMBAR-SYMPATHETIC Left 9/4/2015    Performed by [facility]ine [facility]arks [facility] at [facility] OR    BLOCK-LUMBAR-SYMPATHETIC Left 7/18/2014    Per[facility] OR    COLONOSCOPY N/A 7/1/2016    Performed by Robert Hernandez [facility] at [facility] ENDO (4TH FLR)    CYST REMOVAL  2000    Fatty cyst on right face cheek and left upper arm     DILATION AND CURETTAGE OF UTERUS  2006    Miscarriage    ESOPHAGOGASTRODUODENOSCOPY (EGD) N/A 7/1/2016    Performed by Shoaib Naylor MD at [facility] ENDO (4TH FLR)    HYSTERECTOMY  7/08    DUB - Total    INJECTION, STEROID, SPINE, LUMBAR, EPIDURAL N/A 4/25/2014    Per[facility] OR    OOPHORECTOMY  7/2008    TOTAL ABDOMINAL HYSTERECTOMY  7/2008    TUBAL LIGATION  2007     Family History   Problem Relation Age of Onset    Heart disease Father     Hyperlipidemia Father     Hypertension Father     Diabetes Father     Breast cancer Neg Hx     Colon cancer Neg Hx     Ovarian cancer Neg Hx      Social History     Tobacco Use    Smoking status: Current Every Day Smoker     Packs/day: 0.10     Years: 26.00     Pack years: 2.60     Types: Cigarettes     Start date: 7/17/1986    Smokeless tobacco: Never Used    Tobacco comment: told about [facility] smoking cessation program-given smoking clinic pamphlet   Substance Use Topics    Alcohol use: No    Drug use: No     Review of Systems   Constitutional: Negative for fever.   HENT: Negative for ear discharge and ear pain.    Eyes: Negative for pain and redness.   Respiratory: Negative for cough.    Cardiovascular: Negative for chest pain.   Gastrointestinal: Negative for abdominal pain, nausea and vomiting.   Genitourinary: Negative for dysuria.   Musculoskeletal: Positive for joint swelling. Negative for back pain and neck pain.   Skin: Negative for rash.   Neurological: Positive for headaches. Negative for loss of consciousness, weakness and numbness.       Physical Exam     Initial Vitals [06/24/19 1931]   BP Pulse Resp Temp SpO2   118/80 106 18 99 °F (37.2 °C) 96 %      MAP       --         Physical Exam    Nursing note and vitals  reviewed.  Constitutional: She appears well-developed and well-nourished. She is not diaphoretic. No distress.   Eyes: EOM are normal. Pupils are equal, round, and reactive to light. Right eye exhibits no discharge. Left eye exhibits no discharge.   Neck: Normal range of motion. Neck supple. No JVD present.   Pulmonary/Chest: Breath sounds normal. No stridor. No respiratory distress. She has no wheezes. She has no rhonchi. She has no rales. She exhibits no tenderness.   Abdominal: Soft. Bowel sounds are normal. She exhibits no distension. There is no tenderness. There is no rebound and no guarding.   Musculoskeletal: Normal range of motion. She exhibits tenderness. She exhibits no edema.   Neurological: She is alert and oriented to person, place, and time. No sensory deficit.   Skin: No rash noted.         ED Course   Procedures  Labs Reviewed - No data to display       Imaging Results    None                               Clinical Impression:       ICD-10-CM ICD-9-CM   1. Contusion of left knee, initial encounter S80.02XA 924.11   2. Fall W19.XXXA E888.9   3. Contusion of multiple sites of left lower extremity, initial encounter S80.12XA 924.4   4. Seizure R56.9 780.39         Disposition:   Disposition: Discharged  Condition: Stable                        Sidney Nicolas MD  06/24/19 5546

## 2019-06-25 NOTE — ED TRIAGE NOTES
43 y.o. female presents to ER ED 01/ED 01B   Chief Complaint   Patient presents with    Fall   Pt fell after having seizure, c/o pain to left ankle/leg. No acute distress noted.

## 2019-06-27 ENCOUNTER — HOSPITAL ENCOUNTER (EMERGENCY)
Facility: HOSPITAL | Age: 44
Discharge: HOME | End: 2019-06-27
Attending: SURGERY
Payer: COMMERCIAL

## 2019-06-27 VITALS
RESPIRATION RATE: 18 BRPM | DIASTOLIC BLOOD PRESSURE: 83 MMHG | OXYGEN SATURATION: 99 % | SYSTOLIC BLOOD PRESSURE: 128 MMHG | HEART RATE: 100 BPM | TEMPERATURE: 98 F

## 2019-06-27 DIAGNOSIS — R53.83 FATIGUE: ICD-10-CM

## 2019-06-27 DIAGNOSIS — F44.9 CONVERSION DISORDER: Primary | ICD-10-CM

## 2019-06-27 LAB
ALBUMIN SERPL BCP-MCNC: 4.1 G/DL (ref 3.5–5.2)
ALP SERPL-CCNC: 86 U/L (ref 55–135)
ALT SERPL W/O P-5'-P-CCNC: 39 U/L (ref 10–44)
AMPHET+METHAMPHET UR QL: NEGATIVE
ANION GAP SERPL CALC-SCNC: 12 MMOL/L (ref 8–16)
AST SERPL-CCNC: 31 U/L (ref 10–40)
BARBITURATES UR QL SCN>200 NG/ML: NEGATIVE
BASOPHILS # BLD AUTO: 0.08 K/UL (ref 0–0.2)
BASOPHILS NFR BLD: 0.7 % (ref 0–1.9)
BENZODIAZ UR QL SCN>200 NG/ML: NORMAL
BILIRUB SERPL-MCNC: 0.3 MG/DL (ref 0.1–1)
BILIRUB UR QL STRIP: NEGATIVE
BNP SERPL-MCNC: <10 PG/ML (ref 0–99)
BUN SERPL-MCNC: 9 MG/DL (ref 6–20)
BZE UR QL SCN: NEGATIVE
CALCIUM SERPL-MCNC: 9.5 MG/DL (ref 8.7–10.5)
CANNABINOIDS UR QL SCN: NEGATIVE
CHLORIDE SERPL-SCNC: 100 MMOL/L (ref 95–110)
CK MB SERPL-MCNC: 1.1 NG/ML (ref 0.1–6.5)
CK MB SERPL-RTO: 0.7 % (ref 0–5)
CK SERPL-CCNC: 152 U/L (ref 20–180)
CK SERPL-CCNC: 152 U/L (ref 20–180)
CLARITY UR: CLEAR
CO2 SERPL-SCNC: 24 MMOL/L (ref 23–29)
COLOR UR: YELLOW
CREAT SERPL-MCNC: 0.9 MG/DL (ref 0.5–1.4)
CREAT UR-MCNC: 30.9 MG/DL (ref 15–325)
DIFFERENTIAL METHOD: ABNORMAL
EOSINOPHIL # BLD AUTO: 0.4 K/UL (ref 0–0.5)
EOSINOPHIL NFR BLD: 3.3 % (ref 0–8)
ERYTHROCYTE [DISTWIDTH] IN BLOOD BY AUTOMATED COUNT: 12.8 % (ref 11.5–14.5)
EST. GFR  (AFRICAN AMERICAN): >60 ML/MIN/1.73 M^2
EST. GFR  (NON AFRICAN AMERICAN): >60 ML/MIN/1.73 M^2
GLUCOSE SERPL-MCNC: 205 MG/DL (ref 70–110)
GLUCOSE UR QL STRIP: ABNORMAL
HCT VFR BLD AUTO: 38.8 % (ref 37–48.5)
HGB BLD-MCNC: 13.1 G/DL (ref 12–16)
HGB UR QL STRIP: NEGATIVE
IMM GRANULOCYTES # BLD AUTO: 0.09 K/UL (ref 0–0.04)
IMM GRANULOCYTES NFR BLD AUTO: 0.8 % (ref 0–0.5)
KETONES UR QL STRIP: NEGATIVE
LEUKOCYTE ESTERASE UR QL STRIP: NEGATIVE
LYMPHOCYTES # BLD AUTO: 3.1 K/UL (ref 1–4.8)
LYMPHOCYTES NFR BLD: 28.5 % (ref 18–48)
MAGNESIUM SERPL-MCNC: 1.8 MG/DL (ref 1.6–2.6)
MCH RBC QN AUTO: 30.5 PG (ref 27–31)
MCHC RBC AUTO-ENTMCNC: 33.8 G/DL (ref 32–36)
MCV RBC AUTO: 90 FL (ref 82–98)
METHADONE UR QL SCN>300 NG/ML: NEGATIVE
MONOCYTES # BLD AUTO: 0.7 K/UL (ref 0.3–1)
MONOCYTES NFR BLD: 6.1 % (ref 4–15)
NEUTROPHILS # BLD AUTO: 6.7 K/UL (ref 1.8–7.7)
NEUTROPHILS NFR BLD: 60.6 % (ref 38–73)
NITRITE UR QL STRIP: NEGATIVE
NRBC BLD-RTO: 0 /100 WBC
OPIATES UR QL SCN: NEGATIVE
PCP UR QL SCN>25 NG/ML: NEGATIVE
PH UR STRIP: 6 [PH] (ref 5–8)
PHOSPHATE SERPL-MCNC: 3.3 MG/DL (ref 2.7–4.5)
PLATELET # BLD AUTO: 366 K/UL (ref 150–350)
PMV BLD AUTO: 11 FL (ref 9.2–12.9)
POTASSIUM SERPL-SCNC: 3.9 MMOL/L (ref 3.5–5.1)
PROT SERPL-MCNC: 8.2 G/DL (ref 6–8.4)
PROT UR QL STRIP: NEGATIVE
RBC # BLD AUTO: 4.3 M/UL (ref 4–5.4)
SODIUM SERPL-SCNC: 136 MMOL/L (ref 136–145)
SP GR UR STRIP: <=1.005 (ref 1–1.03)
TOXICOLOGY INFORMATION: NORMAL
TROPONIN I SERPL DL<=0.01 NG/ML-MCNC: <0.006 NG/ML (ref 0–0.03)
TSH SERPL DL<=0.005 MIU/L-ACNC: 1.31 UIU/ML (ref 0.4–4)
URN SPEC COLLECT METH UR: ABNORMAL
UROBILINOGEN UR STRIP-ACNC: NEGATIVE EU/DL
WBC # BLD AUTO: 10.99 K/UL (ref 3.9–12.7)

## 2019-06-27 PROCEDURE — 36415 COLL VENOUS BLD VENIPUNCTURE: CPT

## 2019-06-27 PROCEDURE — 99285 EMERGENCY DEPT VISIT HI MDM: CPT | Mod: 25

## 2019-06-27 PROCEDURE — 84100 ASSAY OF PHOSPHORUS: CPT

## 2019-06-27 PROCEDURE — 84443 ASSAY THYROID STIM HORMONE: CPT

## 2019-06-27 PROCEDURE — 84484 ASSAY OF TROPONIN QUANT: CPT

## 2019-06-27 PROCEDURE — 82550 ASSAY OF CK (CPK): CPT

## 2019-06-27 PROCEDURE — 83735 ASSAY OF MAGNESIUM: CPT

## 2019-06-27 PROCEDURE — 80307 DRUG TEST PRSMV CHEM ANLYZR: CPT

## 2019-06-27 PROCEDURE — 81003 URINALYSIS AUTO W/O SCOPE: CPT | Mod: 59

## 2019-06-27 PROCEDURE — 93005 ELECTROCARDIOGRAM TRACING: CPT

## 2019-06-27 PROCEDURE — 82553 CREATINE MB FRACTION: CPT

## 2019-06-27 PROCEDURE — 80053 COMPREHEN METABOLIC PANEL: CPT

## 2019-06-27 PROCEDURE — 93010 ELECTROCARDIOGRAM REPORT: CPT | Mod: ,,, | Performed by: INTERNAL MEDICINE

## 2019-06-27 PROCEDURE — 85025 COMPLETE CBC W/AUTO DIFF WBC: CPT

## 2019-06-27 PROCEDURE — 93010 EKG 12-LEAD: ICD-10-PCS | Mod: ,,, | Performed by: INTERNAL MEDICINE

## 2019-06-27 PROCEDURE — 83880 ASSAY OF NATRIURETIC PEPTIDE: CPT
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
## 2019-06-28 NOTE — ED NOTES
Pt given urine speciman cup, mark soap towelettewipe, and instructions for [facility]; understanding verbalized

## 2019-07-01 ENCOUNTER — OFFICE VISIT (OUTPATIENT)
Dept: NEUROLOGY | Facility: CLINIC | Age: 44
End: 2019-07-01
Payer: COMMERCIAL

## 2019-07-01 VITALS
HEIGHT: 63 IN | DIASTOLIC BLOOD PRESSURE: 80 MMHG | HEART RATE: 110 BPM | BODY MASS INDEX: 26.8 KG/M2 | SYSTOLIC BLOOD PRESSURE: 108 MMHG | WEIGHT: 151.25 LBS | RESPIRATION RATE: 16 BRPM

## 2019-07-01 DIAGNOSIS — Z86.69 HISTORY OF SEIZURES AS A CHILD: ICD-10-CM

## 2019-07-01 DIAGNOSIS — F44.5 PSEUDOSEIZURE: Primary | ICD-10-CM

## 2019-07-01 DIAGNOSIS — M54.16 LUMBAR RADICULOPATHY: ICD-10-CM

## 2019-07-01 PROCEDURE — 99999 PR PBB SHADOW E&M-EST. PATIENT-LVL III: CPT | Mod: PBBFAC,,, | Performed by: PSYCHIATRY & NEUROLOGY

## 2019-07-01 PROCEDURE — 99214 PR OFFICE/OUTPT VISIT, EST, LEVL IV, 30-39 MIN: ICD-10-PCS | Mod: S$GLB,,, | Performed by: PSYCHIATRY & NEUROLOGY

## 2019-07-01 PROCEDURE — 3079F DIAST BP 80-89 MM HG: CPT | Mod: CPTII,S$GLB,, | Performed by: PSYCHIATRY & NEUROLOGY

## 2019-07-01 PROCEDURE — 3074F PR MOST RECENT SYSTOLIC BLOOD PRESSURE < 130 MM HG: ICD-10-PCS | Mod: CPTII,S$GLB,, | Performed by: PSYCHIATRY & NEUROLOGY

## 2019-07-01 PROCEDURE — 3074F SYST BP LT 130 MM HG: CPT | Mod: CPTII,S$GLB,, | Performed by: PSYCHIATRY & NEUROLOGY

## 2019-07-01 PROCEDURE — 3079F PR MOST RECENT DIASTOLIC BLOOD PRESSURE 80-89 MM HG: ICD-10-PCS | Mod: CPTII,S$GLB,, | Performed by: PSYCHIATRY & NEUROLOGY

## 2019-07-01 PROCEDURE — 99214 OFFICE O/P EST MOD 30 MIN: CPT | Mod: S$GLB,,, | Performed by: PSYCHIATRY & NEUROLOGY

## 2019-07-01 PROCEDURE — 99999 PR PBB SHADOW E&M-EST. PATIENT-LVL III: ICD-10-PCS | Mod: PBBFAC,,, | Performed by: PSYCHIATRY & NEUROLOGY

## 2019-07-01 PROCEDURE — 3008F PR BODY MASS INDEX (BMI) DOCUMENTED: ICD-10-PCS | Mod: CPTII,S$GLB,, | Performed by: PSYCHIATRY & NEUROLOGY

## 2019-07-01 PROCEDURE — 3008F BODY MASS INDEX DOCD: CPT | Mod: CPTII,S$GLB,, | Performed by: PSYCHIATRY & NEUROLOGY
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
## 2019-08-06 ENCOUNTER — OFFICE VISIT (OUTPATIENT)
Dept: NEUROLOGY | Facility: CLINIC | Age: 44
End: 2019-08-06
Payer: COMMERCIAL

## 2019-08-06 VITALS
SYSTOLIC BLOOD PRESSURE: 119 MMHG | HEIGHT: 63 IN | BODY MASS INDEX: 27.93 KG/M2 | HEART RATE: 92 BPM | DIASTOLIC BLOOD PRESSURE: 80 MMHG | WEIGHT: 157.63 LBS

## 2019-08-06 DIAGNOSIS — R56.9 CONVULSIONS, UNSPECIFIED CONVULSION TYPE: Primary | ICD-10-CM

## 2019-08-06 PROCEDURE — 99215 OFFICE O/P EST HI 40 MIN: CPT | Mod: S$GLB,,, | Performed by: PSYCHIATRY & NEUROLOGY

## 2019-08-06 PROCEDURE — 3074F SYST BP LT 130 MM HG: CPT | Mod: CPTII,S$GLB,, | Performed by: PSYCHIATRY & NEUROLOGY

## 2019-08-06 PROCEDURE — 99999 PR PBB SHADOW E&M-EST. PATIENT-LVL III: ICD-10-PCS | Mod: PBBFAC,,, | Performed by: PSYCHIATRY & NEUROLOGY

## 2019-08-06 PROCEDURE — 3074F PR MOST RECENT SYSTOLIC BLOOD PRESSURE < 130 MM HG: ICD-10-PCS | Mod: CPTII,S$GLB,, | Performed by: PSYCHIATRY & NEUROLOGY

## 2019-08-06 PROCEDURE — 99215 PR OFFICE/OUTPT VISIT, EST, LEVL V, 40-54 MIN: ICD-10-PCS | Mod: S$GLB,,, | Performed by: PSYCHIATRY & NEUROLOGY

## 2019-08-06 PROCEDURE — 3008F BODY MASS INDEX DOCD: CPT | Mod: CPTII,S$GLB,, | Performed by: PSYCHIATRY & NEUROLOGY

## 2019-08-06 PROCEDURE — 3008F PR BODY MASS INDEX (BMI) DOCUMENTED: ICD-10-PCS | Mod: CPTII,S$GLB,, | Performed by: PSYCHIATRY & NEUROLOGY

## 2019-08-06 PROCEDURE — 3079F DIAST BP 80-89 MM HG: CPT | Mod: CPTII,S$GLB,, | Performed by: PSYCHIATRY & NEUROLOGY

## 2019-08-06 PROCEDURE — 99354 PR PROLONGED SVC, OUPT, 1ST HR: ICD-10-PCS | Mod: S$GLB,,, | Performed by: PSYCHIATRY & NEUROLOGY

## 2019-08-06 PROCEDURE — 99354 PR PROLONGED SVC, OUPT, 1ST HR: CPT | Mod: S$GLB,,, | Performed by: PSYCHIATRY & NEUROLOGY

## 2019-08-06 PROCEDURE — 3079F PR MOST RECENT DIASTOLIC BLOOD PRESSURE 80-89 MM HG: ICD-10-PCS | Mod: CPTII,S$GLB,, | Performed by: PSYCHIATRY & NEUROLOGY

## 2019-08-06 PROCEDURE — 99999 PR PBB SHADOW E&M-EST. PATIENT-LVL III: CPT | Mod: PBBFAC,,, | Performed by: PSYCHIATRY & NEUROLOGY

## 2019-08-06 NOTE — LETTER
August 7, 2019      Taco Andujar MD  [address]  [facility] [address]           [facility] - Neurology Epilepsy  [address]  [facility] [address]  Phone: [phone]  Fax: [phone]          Patient: Eva Lane   MR Number: [id number]   YOB: 1975   Date of Visit: 8/6/2019       Dear Dr. Taco Andujar:    Thank you for referring Eva Lane to me for evaluation. Attached you will find relevant portions of my assessment and plan of care.    If you have questions, please do not hesitate to call me. I look forward to following Eva Lane along with you.    Sincerely,    Julián Bills MD    Enclosure  CC:  No Recipients    If you would like to receive this communication electronically, please contact [email or web address] or [phone] to request more information on bright box Link access.    For providers and/or their staff who would like to refer a patient to [facility], please contact us through our one-stop-shop provider referral line, [facility], at [phone].    If you feel you have received this communication in error or would no longer like to receive these types of communications, please e-mail [email or web address]
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
## 2019-08-09 ENCOUNTER — TELEPHONE (OUTPATIENT)
Dept: INTERNAL MEDICINE | Facility: CLINIC | Age: 44
End: 2019-08-09

## 2019-08-19 ENCOUNTER — TELEPHONE (OUTPATIENT)
Dept: INTERNAL MEDICINE | Facility: CLINIC | Age: 44
End: 2019-08-19

## 2019-08-19 RX ORDER — IBUPROFEN 800 MG/1
800 TABLET ORAL 3 TIMES DAILY PRN
Qty: 90 TABLET | Refills: 0 | Status: SHIPPED | OUTPATIENT
Start: 2019-08-19 | End: 2020-04-13

## 2019-08-22 DIAGNOSIS — F33.1 MDD (MAJOR DEPRESSIVE DISORDER), RECURRENT EPISODE, MODERATE: ICD-10-CM

## 2019-08-22 DIAGNOSIS — F60.3 BORDERLINE PERSONALITY DISORDER: ICD-10-CM

## 2019-08-22 DIAGNOSIS — F41.1 GAD (GENERALIZED ANXIETY DISORDER): ICD-10-CM

## 2019-08-23 RX ORDER — QUETIAPINE FUMARATE 400 MG/1
TABLET, FILM COATED ORAL
Qty: 60 TABLET | Refills: 5 | Status: SHIPPED | OUTPATIENT
Start: 2019-08-23 | End: 2020-04-13

## 2019-08-23 RX ORDER — METFORMIN HYDROCHLORIDE 1000 MG/1
TABLET ORAL
Qty: 60 TABLET | Refills: 2 | Status: SHIPPED | OUTPATIENT
Start: 2019-08-23 | End: 2020-05-11 | Stop reason: SDUPTHER

## 2019-08-25 ENCOUNTER — HOSPITAL ENCOUNTER (EMERGENCY)
Facility: HOSPITAL | Age: 44
Discharge: HOME | End: 2019-08-25
Attending: SURGERY
Payer: COMMERCIAL

## 2019-08-25 VITALS
SYSTOLIC BLOOD PRESSURE: 159 MMHG | HEART RATE: 89 BPM | RESPIRATION RATE: 20 BRPM | OXYGEN SATURATION: 96 % | TEMPERATURE: 98 F | WEIGHT: 140 LBS | DIASTOLIC BLOOD PRESSURE: 93 MMHG | BODY MASS INDEX: 24.8 KG/M2

## 2019-08-25 DIAGNOSIS — S41.112A LACERATION OF LEFT UPPER EXTREMITY, INITIAL ENCOUNTER: Primary | ICD-10-CM

## 2019-08-25 PROCEDURE — 99284 EMERGENCY DEPT VISIT MOD MDM: CPT | Mod: 25

## 2019-08-25 PROCEDURE — 90471 IMMUNIZATION ADMIN: CPT | Performed by: SURGERY

## 2019-08-25 PROCEDURE — 63600175 PHARM REV CODE 636 W HCPCS: Performed by: SURGERY

## 2019-08-25 PROCEDURE — 25000003 PHARM REV CODE 250: Performed by: SURGERY

## 2019-08-25 PROCEDURE — 90715 TDAP VACCINE 7 YRS/> IM: CPT | Performed by: SURGERY

## 2019-08-25 PROCEDURE — 12002 RPR S/N/AX/GEN/TRNK2.6-7.5CM: CPT

## 2019-08-25 RX ORDER — LIDOCAINE HYDROCHLORIDE 10 MG/ML
10 INJECTION, SOLUTION EPIDURAL; INFILTRATION; INTRACAUDAL; PERINEURAL
Status: COMPLETED | OUTPATIENT
Start: 2019-08-25 | End: 2019-08-25

## 2019-08-25 RX ORDER — CLINDAMYCIN HYDROCHLORIDE 300 MG/1
300 CAPSULE ORAL 4 TIMES DAILY
Qty: 28 CAPSULE | Refills: 0 | Status: SHIPPED | OUTPATIENT
Start: 2019-08-25 | End: 2019-09-01

## 2019-08-25 RX ORDER — MUPIROCIN 20 MG/G
1 OINTMENT TOPICAL
Status: COMPLETED | OUTPATIENT
Start: 2019-08-25 | End: 2019-08-25

## 2019-08-25 RX ORDER — MUPIROCIN 20 MG/G
OINTMENT TOPICAL 3 TIMES DAILY
Qty: 15 G | Refills: 0 | Status: SHIPPED | OUTPATIENT
Start: 2019-08-25 | End: 2019-09-04

## 2019-08-25 RX ADMIN — MUPIROCIN 22 G: 20 OINTMENT TOPICAL at 11:08

## 2019-08-25 RX ADMIN — LIDOCAINE HYDROCHLORIDE 100 MG: 10 INJECTION, SOLUTION EPIDURAL; INFILTRATION; INTRACAUDAL; PERINEURAL at 11:08

## 2019-08-25 RX ADMIN — CLOSTRIDIUM TETANI TOXOID ANTIGEN (FORMALDEHYDE INACTIVATED), CORYNEBACTERIUM DIPHTHERIAE TOXOID ANTIGEN (FORMALDEHYDE INACTIVATED), BORDETELLA PERTUSSIS TOXOID ANTIGEN (GLUTARALDEHYDE INACTIVATED), BORDETELLA PERTUSSIS FILAMENTOUS HEMAGGLUTININ ANTIGEN (FORMALDEHYDE INACTIVATED), BORDETELLA PERTUSSIS PERTACTIN ANTIGEN, AND BORDETELLA PERTUSSIS FIMBRIAE 2/3 ANTIGEN 0.5 ML: 5; 2; 2.5; 5; 3; 5 INJECTION, SUSPENSION INTRAMUSCULAR at 11:08

## 2019-08-26 ENCOUNTER — HOSPITAL ENCOUNTER (EMERGENCY)
Facility: HOSPITAL | Age: 44
Discharge: PSYCHIATRIC OR SUBSTANCE USE FACILITY | End: 2019-08-26
Attending: SURGERY
Payer: COMMERCIAL

## 2019-08-26 VITALS
SYSTOLIC BLOOD PRESSURE: 137 MMHG | HEIGHT: 64 IN | DIASTOLIC BLOOD PRESSURE: 85 MMHG | BODY MASS INDEX: 26.89 KG/M2 | OXYGEN SATURATION: 98 % | TEMPERATURE: 98 F | HEART RATE: 75 BPM | WEIGHT: 157.5 LBS | RESPIRATION RATE: 18 BRPM

## 2019-08-26 DIAGNOSIS — R45.851 DEPRESSION WITH SUICIDAL IDEATION: Primary | ICD-10-CM

## 2019-08-26 DIAGNOSIS — F32.A DEPRESSION WITH SUICIDAL IDEATION: Primary | ICD-10-CM

## 2019-08-26 LAB
25(OH)D3+25(OH)D2 SERPL-MCNC: 7 NG/ML (ref 30–96)
ALBUMIN SERPL BCP-MCNC: 4 G/DL (ref 3.5–5.2)
ALP SERPL-CCNC: 75 U/L (ref 55–135)
ALT SERPL W/O P-5'-P-CCNC: 17 U/L (ref 10–44)
AMPHET+METHAMPHET UR QL: NEGATIVE
ANION GAP SERPL CALC-SCNC: 12 MMOL/L (ref 8–16)
APAP SERPL-MCNC: <3 UG/ML (ref 10–20)
AST SERPL-CCNC: 15 U/L (ref 10–40)
BACTERIA #/AREA URNS HPF: NORMAL /HPF
BARBITURATES UR QL SCN>200 NG/ML: NEGATIVE
BASOPHILS # BLD AUTO: 0.06 K/UL (ref 0–0.2)
BASOPHILS NFR BLD: 0.6 % (ref 0–1.9)
BENZODIAZ UR QL SCN>200 NG/ML: NORMAL
BILIRUB SERPL-MCNC: <0.1 MG/DL (ref 0.1–1)
BILIRUB UR QL STRIP: NEGATIVE
BUN SERPL-MCNC: 13 MG/DL (ref 6–20)
BZE UR QL SCN: NEGATIVE
CALCIUM SERPL-MCNC: 9.8 MG/DL (ref 8.7–10.5)
CANNABINOIDS UR QL SCN: NEGATIVE
CHLORIDE SERPL-SCNC: 98 MMOL/L (ref 95–110)
CLARITY UR: CLEAR
CO2 SERPL-SCNC: 25 MMOL/L (ref 23–29)
COLOR UR: YELLOW
CREAT SERPL-MCNC: 1.1 MG/DL (ref 0.5–1.4)
CREAT UR-MCNC: 42.6 MG/DL (ref 15–325)
DIFFERENTIAL METHOD: ABNORMAL
EOSINOPHIL # BLD AUTO: 0.4 K/UL (ref 0–0.5)
EOSINOPHIL NFR BLD: 4.2 % (ref 0–8)
ERYTHROCYTE [DISTWIDTH] IN BLOOD BY AUTOMATED COUNT: 12.2 % (ref 11.5–14.5)
EST. GFR  (AFRICAN AMERICAN): >60 ML/MIN/1.73 M^2
EST. GFR  (NON AFRICAN AMERICAN): >60 ML/MIN/1.73 M^2
ETHANOL SERPL-MCNC: <10 MG/DL
FOLATE SERPL-MCNC: 6.8 NG/ML (ref 4–24)
GLUCOSE SERPL-MCNC: 377 MG/DL (ref 70–110)
GLUCOSE UR QL STRIP: ABNORMAL
HCT VFR BLD AUTO: 38.8 % (ref 37–48.5)
HGB BLD-MCNC: 12.8 G/DL (ref 12–16)
HGB UR QL STRIP: NEGATIVE
IMM GRANULOCYTES # BLD AUTO: 0.06 K/UL (ref 0–0.04)
IMM GRANULOCYTES NFR BLD AUTO: 0.6 % (ref 0–0.5)
KETONES UR QL STRIP: NEGATIVE
LEUKOCYTE ESTERASE UR QL STRIP: NEGATIVE
LYMPHOCYTES # BLD AUTO: 3.1 K/UL (ref 1–4.8)
LYMPHOCYTES NFR BLD: 32.7 % (ref 18–48)
MCH RBC QN AUTO: 30.2 PG (ref 27–31)
MCHC RBC AUTO-ENTMCNC: 33 G/DL (ref 32–36)
MCV RBC AUTO: 92 FL (ref 82–98)
METHADONE UR QL SCN>300 NG/ML: NEGATIVE
MICROSCOPIC COMMENT: NORMAL
MONOCYTES # BLD AUTO: 0.6 K/UL (ref 0.3–1)
MONOCYTES NFR BLD: 5.7 % (ref 4–15)
NEUTROPHILS # BLD AUTO: 5.4 K/UL (ref 1.8–7.7)
NEUTROPHILS NFR BLD: 56.2 % (ref 38–73)
NITRITE UR QL STRIP: NEGATIVE
NRBC BLD-RTO: 0 /100 WBC
OPIATES UR QL SCN: NEGATIVE
PCP UR QL SCN>25 NG/ML: NEGATIVE
PH UR STRIP: 6 [PH] (ref 5–8)
PLATELET # BLD AUTO: 326 K/UL (ref 150–350)
PMV BLD AUTO: 11.5 FL (ref 9.2–12.9)
POTASSIUM SERPL-SCNC: 3.7 MMOL/L (ref 3.5–5.1)
PROT SERPL-MCNC: 8 G/DL (ref 6–8.4)
PROT UR QL STRIP: NEGATIVE
RBC # BLD AUTO: 4.24 M/UL (ref 4–5.4)
RBC #/AREA URNS HPF: 1 /HPF (ref 0–4)
SALICYLATES SERPL-MCNC: <5 MG/DL (ref 15–30)
SODIUM SERPL-SCNC: 135 MMOL/L (ref 136–145)
SP GR UR STRIP: 1.01 (ref 1–1.03)
SQUAMOUS #/AREA URNS HPF: 1 /HPF
T3FREE SERPL-MCNC: 1.9 PG/ML (ref 2.3–4.2)
T4 FREE SERPL-MCNC: 0.83 NG/DL (ref 0.71–1.51)
TOXICOLOGY INFORMATION: NORMAL
TSH SERPL DL<=0.005 MIU/L-ACNC: 2.97 UIU/ML (ref 0.4–4)
URN SPEC COLLECT METH UR: ABNORMAL
UROBILINOGEN UR STRIP-ACNC: NEGATIVE EU/DL
VIT B12 SERPL-MCNC: 345 PG/ML (ref 210–950)
WBC # BLD AUTO: 9.61 K/UL (ref 3.9–12.7)
WBC #/AREA URNS HPF: 1 /HPF (ref 0–5)
YEAST URNS QL MICRO: NORMAL

## 2019-08-26 PROCEDURE — 84481 FREE ASSAY (FT-3): CPT

## 2019-08-26 PROCEDURE — 80053 COMPREHEN METABOLIC PANEL: CPT

## 2019-08-26 PROCEDURE — 80307 DRUG TEST PRSMV CHEM ANLYZR: CPT

## 2019-08-26 PROCEDURE — 84439 ASSAY OF FREE THYROXINE: CPT

## 2019-08-26 PROCEDURE — 82746 ASSAY OF FOLIC ACID SERUM: CPT

## 2019-08-26 PROCEDURE — 36415 COLL VENOUS BLD VENIPUNCTURE: CPT

## 2019-08-26 PROCEDURE — 82607 VITAMIN B-12: CPT

## 2019-08-26 PROCEDURE — 80329 ANALGESICS NON-OPIOID 1 OR 2: CPT

## 2019-08-26 PROCEDURE — 82306 VITAMIN D 25 HYDROXY: CPT

## 2019-08-26 PROCEDURE — 81000 URINALYSIS NONAUTO W/SCOPE: CPT | Mod: 59

## 2019-08-26 PROCEDURE — 84443 ASSAY THYROID STIM HORMONE: CPT

## 2019-08-26 PROCEDURE — 85025 COMPLETE CBC W/AUTO DIFF WBC: CPT

## 2019-08-26 PROCEDURE — 80320 DRUG SCREEN QUANTALCOHOLS: CPT

## 2019-08-26 PROCEDURE — 99285 EMERGENCY DEPT VISIT HI MDM: CPT | Mod: 25

## 2019-08-26 RX ORDER — LORAZEPAM 2 MG/ML
2 INJECTION INTRAMUSCULAR EVERY 4 HOURS PRN
Status: DISCONTINUED | OUTPATIENT
Start: 2019-08-26 | End: 2019-08-26 | Stop reason: HOSPADM

## 2019-08-26 RX ORDER — HALOPERIDOL 5 MG/ML
5 INJECTION INTRAMUSCULAR EVERY 4 HOURS PRN
Status: DISCONTINUED | OUTPATIENT
Start: 2019-08-26 | End: 2019-08-26 | Stop reason: HOSPADM

## 2019-08-26 RX ORDER — DIPHENHYDRAMINE HYDROCHLORIDE 50 MG/ML
50 INJECTION INTRAMUSCULAR; INTRAVENOUS EVERY 4 HOURS PRN
Status: DISCONTINUED | OUTPATIENT
Start: 2019-08-26 | End: 2019-08-26 | Stop reason: HOSPADM

## 2019-08-26 RX ORDER — IBUPROFEN 200 MG
1 TABLET ORAL
Status: DISCONTINUED | OUTPATIENT
Start: 2019-08-26 | End: 2019-08-26 | Stop reason: HOSPADM

## 2019-08-26 NOTE — ED PROVIDER NOTES
[facility]                                                 Chief Complaint  43 y.o. female with Laceration    History of Present Illness  Eva Lane presents to the emergency room with left forearm laceration  Patient accidentally dropped a  while she was cutting something this p.m.  Patient has a 4 centimeter laceration left wrist, superficial, no active bleeding noted  Patient's tetanus status is up-to-date, will require sutures in the emergency room  Patient has good distal pulses and capillary refill the normal motor neuro exam now    The history is provided by the patient   device was not used during this ER visit    Past Medical History   -- ADHD        -- Anxiety        -- Arthritis        -- Asthma        -- Behavioral problem        -- Bipolar 1 disorder        -- CHF (congestive heart failure)        -- COPD        -- Depression        -- Diabetes mellitus type II        -- Hx of psychiatric care        -- Hyperlipidemia        -- Hypertension        -- Lumbar radiculopathy        -- Neuralgia        -- Neuritis        -- Psychiatric problem        -- PTSD (post-traumatic stress disorder)        -- Seizures        -- Self-harming behavior        -- Sleep apnea        -- Stroke        -- Stroke        -- Therapy            Past Surgical History   -- Oophorectomy           -- Tubal ligation           -- Dilation and curettage of uterus           -- Hysterectomy           -- Total abdominal hysterectomy           -- Cyst removal           -- Colonoscopy               Allergies:  Penicillin, Neosporin, shellfish and shrimp    I have reviewed all of this patient's past medical, surgical, family, and social   histories as well as active allergies and medications documented in the  electronic medical record    Review of Systems and Physical Exam      Review of Systems  -- Constitution - no fever, denies fatigue, no weakness, no chills  -- Eyes - no  tearing or redness, no visual disturbance  -- Ear, Nose - no tinnitus or earache, no nasal congestion or discharge  -- Mouth,Throat - no sore throat, no toothache, normal voice, normal swallowing  -- Respiratory - denies cough and congestion, no shortness of breath, no PERRY  -- Cardiovascular - denies chest pain, no palpitations, denies claudication  -- Gastrointestinal - denies abdominal pain, nausea, vomiting, or diarrhea  -- Genitourinary - no dysuria, no hematuria, no flank pain, no bladder pain  -- Musculoskeletal - denies back pain, negative for trauma or injury  -- Neurological - no headache, denies weakness or seizure; no LOC  -- Skin - left forearm laceration    Vital Signs  Her oral temperature is 97.7 °F (36.5 °C).   Her blood pressure is 159/93 and her pulse is 89.   Her respiration is 20 and oxygen saturation is 96%.     Physical Exam  -- Nursing note and vitals reviewed  -- Constitutional: Appears well-developed and well-nourished  -- Head: Atraumatic. Normocephalic. No obvious abnormality  -- Eyes: Pupils are equal and reactive to light. Normal conjunctiva and lids  -- Cardiac: Normal rate, regular rhythm and normal heart sounds  -- Pulmonary: Normal respiratory effort, breath sounds clear to auscultation  -- Abdominal: Soft, no tenderness. Normal bowel sounds. Normal liver edge  -- Musculoskeletal: Normal range of motion, no effusions. Joints stable   -- Neurological: No focal deficits. Showed good interaction with staff  -- Skin: 4 centimeter longitudinal left ventral forearm laceration    Emergency Room Course      Laceration Repair  -- Performed by: NO NORWOOD  -- Consent Done: Emergent Situation  -- Body area:  Left forearm  -- Laceration length: 4 centimeter  -- Tendon involvement: none  -- Nerve involvement: none  -- Vascular damage: no  -- Anesthesia: local infiltration  -- Local anesthetic: lidocaine 1%   -- Anesthetic total: 10 ml  -- Irrigation solution: saline  -- Amount of cleaning:  standard  -- Skin closure: 4-0 nylon  -- Number of sutures: 5  -- Approximation difficulty: simple  -- The affected area was cleaned and bactroban applied in the ER today   -- The area was bandaged prior to discharge         Medications Given  lidocaine (PF) 10 mg/ml (1%) injection 100 mg (100 mg Infiltration Given 8/25/19 2302)   Tdap vaccine injection 0.5 mL (0.5 mLs Intramuscular Given 8/25/19 2301)   mupirocin 2 % ointment 22 g (22 g Topical (Top) Given 8/25/19 2302)     ED Management  -- this patient needed left forearm sutures in the emergency room this evening  -- patient states this injury was accidental, her daughter agrees with this assessment  -- patient has no previous history of suicidal ideation, no previous suicide attempt  -- patient has never been a Behavioral Health Unit before per her family's history  -- I discussed this with the patient at length with her father and daughter in discussion  -- patient denies any suicidal ideation, family agrees that this was accidental tonight    Diagnosis  -- The encounter diagnosis was Laceration of left upper extremity, initial encounter.    Disposition and Plan  -- Disposition: home  -- Condition: stable  -- Follow-up: Patient to follow up with Cintia Gustafson NP in 1-2 days.  -- I advised the patient that we have found no life threatening condition today  -- At this time, I believe the patient is clinically stable for discharge.   -- The patient acknowledges that close follow up with a MD is required   -- Patient agrees to comply with all instruction and direction given in the ER    This note is dictated on M*Modal word recognition program.  There are word recognition mistakes that are occasionally missed on review.          Nadeem Cosme MD  08/25/19 [id number]

## 2019-08-26 NOTE — ED NOTES
: Amado: [phone].  Daughter: Ricardo: [phone]    Requesting updates on patient's disposition and transfer.  Will bring clothes.

## 2019-08-26 NOTE — ED TRIAGE NOTES
43 y.o. female presents to ER   Chief Complaint   Patient presents with    Suicide Attempt     laceration to left wrist. States + SI, + visual & hallucinations. Denies HI.   . No acute distress noted.  Was seen yesterday for laceration.  Sutures in place.  Dressing removed.  Patient states she has been depressed x 1 month secondary to issues with her mother.  Denies previous psychiatric history or suicide attempts (however, chart says multiple psychiatry dx and medications).  Patient acknowledged that she gave false information during the previous visit.    Daughters stated that last night patient had medication bottles lined up to take the medication and that the patient said multiple times that she wanted to hurt her  (during these visits.)  Daughters stated they were scared to tell the MD before about those facts.
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
## 2019-08-26 NOTE — ED TRIAGE NOTES
"Patient cut left wrist with  while trying to "shave a table to make it even and fell holding the ".  "

## 2019-09-03 ENCOUNTER — TELEPHONE (OUTPATIENT)
Dept: NEUROLOGY | Facility: CLINIC | Age: 44
End: 2019-09-03

## 2019-09-09 ENCOUNTER — TELEPHONE (OUTPATIENT)
Dept: NEUROLOGY | Facility: CLINIC | Age: 44
End: 2019-09-09

## 2019-09-09 ENCOUNTER — HOSPITAL ENCOUNTER (INPATIENT)
Facility: HOSPITAL | Age: 44
LOS: 1 days | Discharge: HOME | DRG: 101 | End: 2019-09-09
Attending: PSYCHIATRY & NEUROLOGY | Admitting: PSYCHIATRY & NEUROLOGY
Payer: COMMERCIAL

## 2019-09-09 VITALS
OXYGEN SATURATION: 95 % | WEIGHT: 157.63 LBS | DIASTOLIC BLOOD PRESSURE: 62 MMHG | RESPIRATION RATE: 16 BRPM | BODY MASS INDEX: 26.26 KG/M2 | SYSTOLIC BLOOD PRESSURE: 110 MMHG | TEMPERATURE: 98 F | HEIGHT: 65 IN | HEART RATE: 91 BPM

## 2019-09-09 DIAGNOSIS — E11.65 UNCONTROLLED TYPE 2 DIABETES MELLITUS WITH HYPERGLYCEMIA: ICD-10-CM

## 2019-09-09 DIAGNOSIS — G40.219 COMPLEX PARTIAL EPILEPSY WITH GENERALIZATION AND WITH INTRACTABLE EPILEPSY: ICD-10-CM

## 2019-09-09 DIAGNOSIS — R56.9 SEIZURES: Primary | ICD-10-CM

## 2019-09-09 LAB
ALBUMIN SERPL BCP-MCNC: 3.6 G/DL (ref 3.5–5.2)
ALP SERPL-CCNC: 52 U/L (ref 55–135)
ALT SERPL W/O P-5'-P-CCNC: 23 U/L (ref 10–44)
ANION GAP SERPL CALC-SCNC: 9 MMOL/L (ref 8–16)
AST SERPL-CCNC: 22 U/L (ref 10–40)
BASOPHILS # BLD AUTO: 0.07 K/UL (ref 0–0.2)
BASOPHILS NFR BLD: 0.7 % (ref 0–1.9)
BILIRUB SERPL-MCNC: 0.3 MG/DL (ref 0.1–1)
BUN SERPL-MCNC: 35 MG/DL (ref 6–20)
CALCIUM SERPL-MCNC: 9.2 MG/DL (ref 8.7–10.5)
CHLORIDE SERPL-SCNC: 101 MMOL/L (ref 95–110)
CO2 SERPL-SCNC: 25 MMOL/L (ref 23–29)
CREAT SERPL-MCNC: 1.2 MG/DL (ref 0.5–1.4)
DIFFERENTIAL METHOD: ABNORMAL
EOSINOPHIL # BLD AUTO: 0.4 K/UL (ref 0–0.5)
EOSINOPHIL NFR BLD: 3.3 % (ref 0–8)
ERYTHROCYTE [DISTWIDTH] IN BLOOD BY AUTOMATED COUNT: 12.3 % (ref 11.5–14.5)
EST. GFR  (AFRICAN AMERICAN): >60 ML/MIN/1.73 M^2
EST. GFR  (NON AFRICAN AMERICAN): 55.1 ML/MIN/1.73 M^2
ESTIMATED AVG GLUCOSE: 243 MG/DL (ref 68–131)
GLUCOSE SERPL-MCNC: 241 MG/DL (ref 70–110)
HBA1C MFR BLD HPLC: 10.1 % (ref 4–5.6)
HCT VFR BLD AUTO: 34.8 % (ref 37–48.5)
HGB BLD-MCNC: 11.1 G/DL (ref 12–16)
IMM GRANULOCYTES # BLD AUTO: 0.1 K/UL (ref 0–0.04)
IMM GRANULOCYTES NFR BLD AUTO: 1 % (ref 0–0.5)
LYMPHOCYTES # BLD AUTO: 3.1 K/UL (ref 1–4.8)
LYMPHOCYTES NFR BLD: 29.6 % (ref 18–48)
MCH RBC QN AUTO: 30.2 PG (ref 27–31)
MCHC RBC AUTO-ENTMCNC: 31.9 G/DL (ref 32–36)
MCV RBC AUTO: 95 FL (ref 82–98)
MONOCYTES # BLD AUTO: 0.8 K/UL (ref 0.3–1)
MONOCYTES NFR BLD: 7.8 % (ref 4–15)
NEUTROPHILS # BLD AUTO: 6 K/UL (ref 1.8–7.7)
NEUTROPHILS NFR BLD: 57.6 % (ref 38–73)
NRBC BLD-RTO: 0 /100 WBC
PLATELET # BLD AUTO: 412 K/UL (ref 150–350)
PMV BLD AUTO: 11.3 FL (ref 9.2–12.9)
POTASSIUM SERPL-SCNC: 4.2 MMOL/L (ref 3.5–5.1)
PROT SERPL-MCNC: 7.9 G/DL (ref 6–8.4)
RBC # BLD AUTO: 3.67 M/UL (ref 4–5.4)
SODIUM SERPL-SCNC: 135 MMOL/L (ref 136–145)
WBC # BLD AUTO: 10.45 K/UL (ref 3.9–12.7)

## 2019-09-09 PROCEDURE — 20600001 HC STEP DOWN PRIVATE ROOM

## 2019-09-09 PROCEDURE — 93010 ELECTROCARDIOGRAM REPORT: CPT | Mod: ,,, | Performed by: INTERNAL MEDICINE

## 2019-09-09 PROCEDURE — 83036 HEMOGLOBIN GLYCOSYLATED A1C: CPT

## 2019-09-09 PROCEDURE — 36415 COLL VENOUS BLD VENIPUNCTURE: CPT

## 2019-09-09 PROCEDURE — 99223 1ST HOSP IP/OBS HIGH 75: CPT | Mod: ,,, | Performed by: PSYCHIATRY & NEUROLOGY

## 2019-09-09 PROCEDURE — 93005 ELECTROCARDIOGRAM TRACING: CPT

## 2019-09-09 PROCEDURE — 93010 EKG 12-LEAD: ICD-10-PCS | Mod: ,,, | Performed by: INTERNAL MEDICINE

## 2019-09-09 PROCEDURE — 80053 COMPREHEN METABOLIC PANEL: CPT

## 2019-09-09 PROCEDURE — 85025 COMPLETE CBC W/AUTO DIFF WBC: CPT

## 2019-09-09 PROCEDURE — 99223 PR INITIAL HOSPITAL CARE,LEVL III: ICD-10-PCS | Mod: ,,, | Performed by: PSYCHIATRY & NEUROLOGY

## 2019-09-09 RX ORDER — ARIPIPRAZOLE 2 MG/1
2 TABLET ORAL DAILY
COMMUNITY
End: 2021-04-12

## 2019-09-09 RX ORDER — IBUPROFEN 200 MG
24 TABLET ORAL
Status: DISCONTINUED | OUTPATIENT
Start: 2019-09-09 | End: 2019-09-09 | Stop reason: HOSPADM

## 2019-09-09 RX ORDER — FLUOXETINE HYDROCHLORIDE 20 MG/1
40 CAPSULE ORAL DAILY
Status: DISCONTINUED | OUTPATIENT
Start: 2019-09-09 | End: 2019-09-09 | Stop reason: HOSPADM

## 2019-09-09 RX ORDER — ACETAMINOPHEN 325 MG/1
650 TABLET ORAL EVERY 4 HOURS PRN
Status: DISCONTINUED | OUTPATIENT
Start: 2019-09-09 | End: 2019-09-09 | Stop reason: HOSPADM

## 2019-09-09 RX ORDER — ONDANSETRON 8 MG/1
8 TABLET, ORALLY DISINTEGRATING ORAL EVERY 8 HOURS PRN
Status: DISCONTINUED | OUTPATIENT
Start: 2019-09-09 | End: 2019-09-09 | Stop reason: HOSPADM

## 2019-09-09 RX ORDER — ARIPIPRAZOLE 2 MG/1
2 TABLET ORAL DAILY
Status: DISCONTINUED | OUTPATIENT
Start: 2019-09-09 | End: 2019-09-09 | Stop reason: HOSPADM

## 2019-09-09 RX ORDER — SODIUM CHLORIDE 0.9 % (FLUSH) 0.9 %
10 SYRINGE (ML) INJECTION
Status: DISCONTINUED | OUTPATIENT
Start: 2019-09-09 | End: 2019-09-09 | Stop reason: HOSPADM

## 2019-09-09 RX ORDER — IBUPROFEN 200 MG
16 TABLET ORAL
Status: DISCONTINUED | OUTPATIENT
Start: 2019-09-09 | End: 2019-09-09 | Stop reason: HOSPADM

## 2019-09-09 RX ORDER — INSULIN ASPART 100 [IU]/ML
0-5 INJECTION, SOLUTION INTRAVENOUS; SUBCUTANEOUS
Status: DISCONTINUED | OUTPATIENT
Start: 2019-09-09 | End: 2019-09-09 | Stop reason: HOSPADM

## 2019-09-09 RX ORDER — ALBUTEROL SULFATE 2.5 MG/.5ML
2.5 SOLUTION RESPIRATORY (INHALATION) EVERY 6 HOURS PRN
Status: DISCONTINUED | OUTPATIENT
Start: 2019-09-09 | End: 2019-09-09 | Stop reason: HOSPADM

## 2019-09-09 RX ORDER — PROPRANOLOL HYDROCHLORIDE 10 MG/1
10 TABLET ORAL 3 TIMES DAILY
Status: DISCONTINUED | OUTPATIENT
Start: 2019-09-09 | End: 2019-09-09 | Stop reason: HOSPADM

## 2019-09-09 RX ORDER — DOCUSATE SODIUM 100 MG/1
100 CAPSULE, LIQUID FILLED ORAL 2 TIMES DAILY PRN
Status: DISCONTINUED | OUTPATIENT
Start: 2019-09-09 | End: 2019-09-09 | Stop reason: HOSPADM

## 2019-09-09 RX ORDER — METOPROLOL SUCCINATE 50 MG/1
50 TABLET, EXTENDED RELEASE ORAL DAILY
Status: DISCONTINUED | OUTPATIENT
Start: 2019-09-10 | End: 2019-09-09 | Stop reason: HOSPADM

## 2019-09-09 RX ORDER — FLUOXETINE HYDROCHLORIDE 40 MG/1
40 CAPSULE ORAL DAILY
COMMUNITY
End: 2021-04-27

## 2019-09-09 RX ORDER — GLUCAGON 1 MG
1 KIT INJECTION
Status: DISCONTINUED | OUTPATIENT
Start: 2019-09-09 | End: 2019-09-09 | Stop reason: HOSPADM

## 2019-09-09 NOTE — NURSING
PIV d/c, pt tolerated well. Pt. Left facility after it being determined that pt. Had what was assumed to be head lice. Pt left floor stable with family members.

## 2019-09-09 NOTE — HOSPITAL COURSE
Patient presented as direct admit to EMU 9/9/19 for characterization of events. When EEG technologist working to hook patient up to vEEG, patient noted to have lice on her scalp/in her hair. Patient discharged home for further treatment, recommended to reschedule EMU stay once confirmed that it has been treated. No medication changes at this time as unable to gather any information during this admission. Recommend close follow up with PCP for diabetes management and medication adjustment.

## 2019-09-09 NOTE — NURSING
EMU NURSING INTERVIEW  Pt admitted to EMU room 744, EMU team notified. Yes - Line, PA   Onset-When did your seizures start? Infant   Aura-Do you experience an aura?feels like butterflies in flies   Symptoms-What symptoms do you experience? Falls and shakes   Triggers-Do you have any Triggers? anger   Do you bite your tongue? No   Do become incontinent of bladder or bowels?no  Have you been told your BP or oxygen drops during an event? No     Bed locked, bed alarm on and call light in reach. Educated to call staff before ambulating. ducated to use event button when patient feels like they will have an event or when an event is suspected. Pt and family verbalize understanding.

## 2019-09-09 NOTE — TELEPHONE ENCOUNTER
Patient noted to have head lice by eeg tech. Confirmed by Luz Elena jain. Rashad devine. Spoke to patient. She will call to make appt to return to emu after full lice treatment done.

## 2019-09-09 NOTE — ASSESSMENT & PLAN NOTE
- Recent admission to psychiatric facility after suicide attempt/suicidal ideation  - Continue home meds - Abilify, Fluoxetine, Seroquel  - Hold home Xanax

## 2019-09-09 NOTE — SUBJECTIVE & OBJECTIVE
Past Medical History:   Diagnosis Date    ADHD (attention deficit hyperactivity disorder)     Anxiety     Arthritis     Asthma     Behavioral problem     Bipolar 1 disorder     CHF (congestive heart failure)     COPD (chronic obstructive pulmonary disease)     Depression     Diabetes mellitus type II     Hx of psychiatric care     Hyperlipidemia     Hypertension     Lumbar radiculopathy     Neuralgia     Neuritis     Psychiatric problem     PTSD (post-traumatic stress disorder)     Seizures     Seizure-last 8/2015-shake and fall-doesnt remember anything    Self-harming behavior     Sleep apnea     on CPAP    Stroke 9/22/2015    Stroke     Therapy        Past Surgical History:   Procedure Laterality Date    BLOCK-LUMBAR-SYMPATHETIC Left 9/4/2015    [facility]    BLOCK-LUMBAR-SYMPATHETIC Left 7/18/2014    [facility] at [facility]    COLONOSCOPY N/A 7/1/2016    Performed by [facility] ([facility]    CYST REMOVAL  2000    Fatty cyst on right face cheek and left upper arm     DILATION AND CURETTAGE OF UTERUS  2006    Miscarriage    ESOPHAGOGASTRODUODENOSCOPY (EGD) N/A 7/1/2016    Performed by Shoaib Naylor [facility] at [facility] (4TH FLR)    HYSTERECTOMY  7/08    DUB - Total    INJECTION, STEROID, SPINE, LUMBAR, EPIDURAL N/A 4/25/2014    Per[facility] at [facility] OR    OOPHORECTOMY  7/2008    TOTAL ABDOMINAL HYSTERECTOMY  7/2008    TUBAL LIGATION  2007       Review of patient's allergies indicates:   Allergen Reactions    Penicillins Swelling and Rash    Neosporin [neomycin-bacitracin-polymyxin] Rash    Shellfish containing products     Shrimp Hives       No current facility-administered medications on file prior to encounter.      Current Outpatient Medications on File Prior to Encounter   Medication Sig    ARIPiprazole (ABILIFY) 2 MG Tab Take 2 mg by mouth once daily.    FLUoxetine 40 MG capsule Take 40  "mg by mouth once daily.    metoprolol succinate (TOPROL-XL) 50 MG 24 hr tablet TAKE ONE TABLET BY MOUTH ONCE DAILY    albuterol (PROVENTIL) 2.5 mg /3 mL (0.083 %) nebulizer solution USE ONE VIAL IN NEBULIZER EVERY 6 HOURS AS NEEDED FOR  WHEEZING.    ALPRAZolam (XANAX) 1 MG tablet Take 1 tablet (1 mg total) by mouth 3 (three) times daily as needed for Anxiety.    amitriptyline (ELAVIL) 25 MG tablet Take 1 tablet (25 mg total) by mouth nightly as needed for Insomnia.    atorvastatin (LIPITOR) 20 MG tablet Take 20 mg by mouth once daily.     blood-glucose meter (FREESTYLE SYSTEM KIT) kit Use as instructed    CONTOUR TEST STRIPS Strp TEST BLOOD SUGARS 4 TIMES DAILY    dicyclomine (BENTYL) 20 mg tablet Take 20 mg by mouth 4 (four) times daily.     epinephrine (EPIPEN 2-LARS) 0.3 mg/0.3 mL (1:1,000) AtIn Inject 0.3 mLs (0.3 mg total) into the muscle once.    folic acid (FOLVITE) 1 MG tablet Take 1 mg by mouth 3 (three) times daily as needed.     gabapentin (NEURONTIN) 600 MG tablet Take 1 tablet (600 mg total) by mouth 3 (three) times daily.    glimepiride (AMARYL) 1 MG tablet Take 1 tablet (1 mg total) by mouth 2 (two) times daily with meals.    hydroCHLOROthiazide (HYDRODIURIL) 25 MG tablet Take 25 mg by mouth once daily.     insulin detemir U-100 (LEVEMIR FLEXTOUCH U-100 INSULN) 100 unit/mL (3 mL) SubQ InPn pen Inject 40 Units into the skin 2 (two) times daily.    ipratropium (ATROVENT) 0.02 % nebulizer solution     lancets (ONE TOUCH ULTRASOFT LANCETS) Misc 1 Stick by Misc.(Non-Drug; Combo Route) route 2 (two) times daily.    NITROSTAT 0.4 mg SL tablet     nystatin-triamcinolone (MYCOLOG II) cream Apply to affected area 2 times daily    olmesartan (BENICAR) 20 MG tablet Take 1 tablet (20 mg total) by mouth once daily.    omeprazole (PRILOSEC) 20 MG capsule Take 20 mg by mouth once daily.    pen needle, diabetic 31 gauge x 5/16" Ndle USE ONE PEN NEEDLE SUBCUTANEOUSLY TWICE DAILY     Continuous " Infusions:    Family History     Problem Relation (Age of Onset)    Diabetes Father    Heart disease Father    Hyperlipidemia Father    Hypertension Father        Tobacco Use    Smoking status: Current Every Day Smoker     Packs/day: 0.10     Years: 26.00     Pack years: 2.60     Types: Cigarettes     Start date: 7/17/1986    Smokeless tobacco: Never Used    Tobacco comment: told about [facility] smoking cessation program-given smoking clinic pamphlet   Substance and Sexual Activity    Alcohol use: No    Drug use: No    Sexual activity: Yes     Partners: Male     Birth control/protection: Surgical     Comment: , CAYLA, BSO     Review of Systems   Constitutional: Negative for chills, fatigue and fever.   HENT: Negative for sore throat and trouble swallowing.    Eyes: Negative for visual disturbance.   Respiratory: Positive for cough. Negative for shortness of breath and wheezing.    Gastrointestinal: Negative for nausea and vomiting.   Musculoskeletal: Negative for gait problem and neck pain.   Skin: Negative for pallor and rash.   Neurological: Negative for dizziness, speech difficulty, weakness and headaches.   Psychiatric/Behavioral: Negative for agitation and confusion.     Objective:     Vital Signs (Most Recent):  Temp: 97.6 °F (36.4 °C) (09/09/19 1056)  Pulse: 91 (09/09/19 1056)  Resp: 16 (09/09/19 1056)  BP: 110/62 (09/09/19 1056)  SpO2: 95 % (09/09/19 1056) Vital Signs (24h Range):  Temp:  [97.6 °F (36.4 °C)] 97.6 °F (36.4 °C)  Pulse:  [91] 91  Resp:  [16] 16  SpO2:  [95 %] 95 %  BP: (110)/(62) 110/62        There is no height or weight on file to calculate BMI.    Physical Exam   Constitutional: She is oriented to person, place, and time. She appears well-developed and well-nourished.   HENT:   Head: Normocephalic and atraumatic.   Right Ear: External ear normal.   Left Ear: External ear normal.   Eyes: Pupils are equal, round, and reactive to light. Conjunctivae and EOM are normal. No scleral  icterus.   Neck: Normal range of motion. No thyromegaly present.   Pulmonary/Chest: Effort normal. No respiratory distress.   Musculoskeletal: Normal range of motion. She exhibits no edema or deformity.   Neurological: She is oriented to person, place, and time.   Skin: Skin is warm and dry. She is not diaphoretic.   Psychiatric: She has a normal mood and affect. Her speech is normal and behavior is normal.       NEUROLOGICAL EXAMINATION:     MENTAL STATUS   Oriented to person, place, and time.   Attention: normal. Concentration: normal.   Speech: speech is normal   Level of consciousness: alert    CRANIAL NERVES     CN III, IV, VI   Pupils are equal, round, and reactive to light.  Extraocular motions are normal.     CN V   Facial sensation intact.     CN VII   Facial expression full, symmetric.     CN VIII   CN VIII normal.     CN IX, X   CN IX normal.   CN X normal.     CN XI   CN XI normal.     CN XII   CN XII normal.       Significant Labs: All pertinent lab results from the past 24 hours have been reviewed.    Significant Studies: I have reviewed all pertinent imaging results/findings within the past 24 hours.

## 2019-09-09 NOTE — ASSESSMENT & PLAN NOTE
- Recent admission to psychiatric facility after suicide attempt/suicidal ideation  - Continue home meds - Abilify, Fluoxetine, Seroquel

## 2019-09-09 NOTE — H&P
"[facility]  Neurology-Epilepsy  History & Physical    Patient Name: Eva Lane  MRN: [id number]   Admission Date: 9/9/2019  Code Status: Full Code   Attending Provider: Julián Bills MD   Primary Care Physician: Cintia Gustafson NP  Principal Problem:Complex partial epilepsy with generalization and with intractable epilepsy    Subjective:     Chief Complaint:  spells     HPI:   Ms. Lane is a 42 yo female who presents as direct admit to Epilepsy Monitoring Unit for characterization of spells. She is a prior patient of Dr. Andujar, recently seen by Dr. Bills who recommended EMU admit. She reports a history of spells since childhood, and recalls taking a medication briefly in childhood. She reports she would have times with no episodes, but they most recently began to occur again after her daughter moved out of the house. She describes an aura of "butterflies in her stomach", and states she remembers nothing else of the events. Her  reports she typically lifts her left arm up in the air, then falls to the floor and will use her left arm to catch her. She subsequently has some generalized shaking movements for approximately 1-2 minutes. Afterwards, she quickly returns to baseline per family. She denies bowel/bladder incontinence or tongue biting. Most recent event was yesterday, she reports the frequency is sporadic, and sometimes will have several per day, other times she will go a few days without one. Reports history of a stroke in 2015, chart reviewed and MRI negative for acute or remote infarct at that time, conversion disorder was suspected by Neuro Vascular team.    Of note, patient recently presented to the ER for suicidal ideation/suicide attempt with a superficial left wrist laceration. She was PEC'd, and placed in a psychiatric facility for approximately a week. She was discharged home with medication adjustments.     Past Medical History:   Diagnosis Date    " ADHD (attention deficit hyperactivity disorder)     Anxiety     Arthritis     Asthma     Behavioral problem     Bipolar 1 disorder     CHF (congestive heart failure)     COPD (chronic obstructive pulmonary disease)     Depression     Diabetes mellitus type II     Hx of psychiatric care     Hyperlipidemia     Hypertension     Lumbar radiculopathy     Neuralgia     Neuritis     Psychiatric problem     PTSD (post-traumatic stress disorder)     Seizures     Seizure-last 8/2015-shake and fall-doesnt remember anything    Self-harming behavior     Sleep apnea     on CPAP    Stroke 9/22/2015    Stroke     Therapy        Past Surgical History:   Procedure Laterality Date    BLOCK-LUMBAR-SYMPATHETIC Left 9/4/2015    Performed [facility]arks [facility]    BLOCK-LUMBAR-SYMPATHETIC Left 7/18/2014    [facility]    COLONOSCOPY N/A 7/1/2016    Performed by Robert Hernandez MD at [facility] ENDO (4TH FLR)    CYST REMOVAL  2000    Fatty cyst on right face cheek and left upper arm     DILATION AND CURETTAGE OF UTERUS  2006    Miscarriage    ESOPHAGOGASTRODUODENOSCOPY (EGD) N/A 7/1/2016    Performed by Shoaib Naylor MD at [facility] ENDO (4TH FLR)    HYSTERECTOMY  7/08    DUB - Total    INJECTION, STEROID, SPINE, LUMBAR, EPIDURAL N/A 4/25/2014    Performed [facility]arks [facility]    OOPHORECTOMY  7/2008    TOTAL ABDOMINAL HYSTERECTOMY  7/2008    TUBAL LIGATION  2007       Review of patient's allergies indicates:   Allergen Reactions    Penicillins Swelling and Rash    Neosporin [neomycin-bacitracin-polymyxin] Rash    Shellfish containing products     Shrimp Hives       No current facility-administered medications on file prior to encounter.      Current Outpatient Medications on File Prior to Encounter   Medication Sig    ARIPiprazole (ABILIFY) 2 MG Tab Take 2 mg by mouth once daily.    FLUoxetine 40 MG capsule Take 40 mg by mouth once daily.    metoprolol  "succinate (TOPROL-XL) 50 MG 24 hr tablet TAKE ONE TABLET BY MOUTH ONCE DAILY    albuterol (PROVENTIL) 2.5 mg /3 mL (0.083 %) nebulizer solution USE ONE VIAL IN NEBULIZER EVERY 6 HOURS AS NEEDED FOR  WHEEZING.    ALPRAZolam (XANAX) 1 MG tablet Take 1 tablet (1 mg total) by mouth 3 (three) times daily as needed for Anxiety.    amitriptyline (ELAVIL) 25 MG tablet Take 1 tablet (25 mg total) by mouth nightly as needed for Insomnia.    atorvastatin (LIPITOR) 20 MG tablet Take 20 mg by mouth once daily.     blood-glucose meter (FREESTYLE SYSTEM KIT) kit Use as instructed    CONTOUR TEST STRIPS Strp TEST BLOOD SUGARS 4 TIMES DAILY    dicyclomine (BENTYL) 20 mg tablet Take 20 mg by mouth 4 (four) times daily.     epinephrine (EPIPEN 2-LARS) 0.3 mg/0.3 mL (1:1,000) AtIn Inject 0.3 mLs (0.3 mg total) into the muscle once.    folic acid (FOLVITE) 1 MG tablet Take 1 mg by mouth 3 (three) times daily as needed.     gabapentin (NEURONTIN) 600 MG tablet Take 1 tablet (600 mg total) by mouth 3 (three) times daily.    glimepiride (AMARYL) 1 MG tablet Take 1 tablet (1 mg total) by mouth 2 (two) times daily with meals.    hydroCHLOROthiazide (HYDRODIURIL) 25 MG tablet Take 25 mg by mouth once daily.     insulin detemir U-100 (LEVEMIR FLEXTOUCH U-100 INSULN) 100 unit/mL (3 mL) SubQ InPn pen Inject 40 Units into the skin 2 (two) times daily.    ipratropium (ATROVENT) 0.02 % nebulizer solution     lancets (ONE TOUCH ULTRASOFT LANCETS) Misc 1 Stick by Misc.(Non-Drug; Combo Route) route 2 (two) times daily.    NITROSTAT 0.4 mg SL tablet     nystatin-triamcinolone (MYCOLOG II) cream Apply to affected area 2 times daily    olmesartan (BENICAR) 20 MG tablet Take 1 tablet (20 mg total) by mouth once daily.    omeprazole (PRILOSEC) 20 MG capsule Take 20 mg by mouth once daily.    pen needle, diabetic 31 gauge x 5/16" Ndle USE ONE PEN NEEDLE SUBCUTANEOUSLY TWICE DAILY     Continuous Infusions:    Family History     Problem " Relation (Age of Onset)    Diabetes Father    Heart disease Father    Hyperlipidemia Father    Hypertension Father        Tobacco Use    Smoking status: Current Every Day Smoker     Packs/day: 0.10     Years: 26.00     Pack years: 2.60     Types: Cigarettes     Start date: 7/17/1986    Smokeless tobacco: Never Used    Tobacco comment: told about [facility] smoking cessation program-given smoking clinic pamphlet   Substance and Sexual Activity    Alcohol use: No    Drug use: No    Sexual activity: Yes     Partners: Male     Birth control/protection: Surgical     Comment: , CAYLA, BSO     Review of Systems   Constitutional: Negative for chills, fatigue and fever.   HENT: Negative for sore throat and trouble swallowing.    Eyes: Negative for visual disturbance.   Respiratory: Positive for cough. Negative for shortness of breath and wheezing.    Gastrointestinal: Negative for nausea and vomiting.   Musculoskeletal: Negative for gait problem and neck pain.   Skin: Negative for pallor and rash.   Neurological: Negative for dizziness, speech difficulty, weakness and headaches.   Psychiatric/Behavioral: Negative for agitation and confusion.     Objective:     Vital Signs (Most Recent):  Temp: 97.6 °F (36.4 °C) (09/09/19 1056)  Pulse: 91 (09/09/19 1056)  Resp: 16 (09/09/19 1056)  BP: 110/62 (09/09/19 1056)  SpO2: 95 % (09/09/19 1056) Vital Signs (24h Range):  Temp:  [97.6 °F (36.4 °C)] 97.6 °F (36.4 °C)  Pulse:  [91] 91  Resp:  [16] 16  SpO2:  [95 %] 95 %  BP: (110)/(62) 110/62        There is no height or weight on file to calculate BMI.    Physical Exam   Constitutional: She is oriented to person, place, and time. She appears well-developed and well-nourished.   HENT:   Head: Normocephalic and atraumatic.   Right Ear: External ear normal.   Left Ear: External ear normal.   Eyes: Pupils are equal, round, and reactive to light. Conjunctivae and EOM are normal. No scleral icterus.   Neck: Normal range of motion. No  thyromegaly present.   Pulmonary/Chest: Effort normal. No respiratory distress.   Musculoskeletal: Normal range of motion. She exhibits no edema or deformity.   Neurological: She is oriented to person, place, and time.   Skin: Skin is warm and dry. She is not diaphoretic.   Psychiatric: She has a normal mood and affect. Her speech is normal and behavior is normal.       NEUROLOGICAL EXAMINATION:     MENTAL STATUS   Oriented to person, place, and time.   Attention: normal. Concentration: normal.   Speech: speech is normal   Level of consciousness: alert    CRANIAL NERVES     CN III, IV, VI   Pupils are equal, round, and reactive to light.  Extraocular motions are normal.     CN V   Facial sensation intact.     CN VII   Facial expression full, symmetric.     CN VIII   CN VIII normal.     CN IX, X   CN IX normal.   CN X normal.     CN XI   CN XI normal.     CN XII   CN XII normal.       Significant Labs: All pertinent lab results from the past 24 hours have been reviewed.    Significant Studies: I have reviewed all pertinent imaging results/findings within the past 24 hours.    Assessment and Plan:     * Complex partial epilepsy with generalization and with intractable epilepsy  Reports history of events since childhood, ? focal epilepsy with secondary generalization versus non-epileptic events. Currently on Gabapentin as outpatient for peripheral neuropathy, no other AEDs. Prior EEG normal, MRI brain 2015 normal. Admit to EMU for definitive characterization of events.    - Start VEEG  - Hold home Gabapentin, Ativan  - Seizure precautions  - Activation procedures per protocol   - IV Ativan PRN for GTC greater than 5 min - hospital medicine to call epilepsy on call before administering      MDD (major depressive disorder), recurrent episode, moderate  - Recent admission to psychiatric facility after suicide attempt/suicidal ideation  - Continue home meds - Abilify, Fluoxetine, Seroquel  - Hold home Xanax    Diabetes  mellitus type II, uncontrolled  - Hold home Metformin  - A1c pending  - Accuchecks TIDWM, Low dose SSI coverage for now    Essential hypertension  - Continue home Propanolol, Metoprolol    Mixed hyperlipidemia  - Continue home fenofibrate    Peripheral neuropathy  - Gabapentin 600 mg TID at home  - Hold during admission        VTE Risk Mitigation (From admission, onward)        Ordered     Place MIKE hose  Until discontinued      09/09/19 1137     Place sequential compression device  Until discontinued      09/09/19 1137     IP VTE LOW RISK PATIENT  Once      09/09/19 1137          Natacha Barahona PA-C  Neurology-Epilepsy  [facility]  Staff: Dr. Phillips

## 2019-09-09 NOTE — DISCHARGE SUMMARY
"[facility]  Neurology-Epilepsy  Discharge Summary      Patient Name: Eva Lane  MRN: [id number]  Admission Date: 9/9/2019  Hospital Length of Stay: 0 days  Discharge Date and Time:  09/09/2019 3:52 PM  Attending Physician: Julián Bills MD   Discharging Provider: Natacha Barahona PA-C  Primary Care Physician: Cintia Gustafson NP    HPI:   Ms. Lane is a 42 yo female who presents as direct admit to Epilepsy Monitoring Unit for characterization of spells. She is a prior patient of Dr. Andujar, recently seen by Dr. Bills who recommended EMU admit. She reports a history of spells since childhood, and recalls taking a medication briefly in childhood. She reports she would have times with no episodes, but they most recently began to occur again after her daughter moved out of the house. She describes an aura of "butterflies in her stomach", and states she remembers nothing else of the events. Her  reports she typically lifts her left arm up in the air, then falls to the floor and will use her left arm to catch her. She subsequently has some generalized shaking movements for approximately 1-2 minutes. Afterwards, she quickly returns to baseline per family. She denies bowel/bladder incontinence or tongue biting. Most recent event was yesterday, she reports the frequency is sporadic, and sometimes will have several per day, other times she will go a few days without one. Reports history of a stroke in 2015, chart reviewed and MRI negative for acute or remote infarct at that time, conversion disorder was suspected by Neuro Vascular team.    Of note, patient recently presented to the ER for suicidal ideation/suicide attempt with a superficial left wrist laceration. She was PEC'd, and placed in a psychiatric facility for approximately a week. She was discharged home with medication adjustments.     * No surgery found *     Indwelling Lines/Drains at time of discharge: "   Lines/Drains/Airways          None        Hospital Course:   Patient presented as direct admit to EMU 9/9/19 for characterization of events. When EEG technologist working to hook patient up to vEEG, patient noted to have lice on her scalp/in her hair. Patient discharged home for further treatment, recommended to reschedule EMU stay once confirmed that it has been treated. No medication changes at this time as unable to gather any information during this admission. Recommend close follow up with PCP for diabetes management and medication adjustment.     Consults:     Significant Labs: All pertinent lab results from the past 24 hours have been reviewed.    Significant Studies: I have reviewed all pertinent imaging results/findings within the past 24 hours.    Pending Diagnostic Studies:     None        Final Active Diagnoses:    Diagnosis Date Noted POA    PRINCIPAL PROBLEM:  Complex partial epilepsy with generalization and with intractable epilepsy [G40.219] 09/09/2019 Yes    MDD (major depressive disorder), recurrent episode, moderate [F33.1] 02/04/2016 Yes    Essential hypertension [I10] 09/23/2015 Yes    Diabetes mellitus type II, uncontrolled [E11.65] 09/23/2015 Yes    Peripheral neuropathy [G62.9] 12/20/2012 Yes    Mixed hyperlipidemia [E78.2] 12/20/2012 Yes      Problems Resolved During this Admission:       * Complex partial epilepsy with generalization and with intractable epilepsy  Reports history of events since childhood, ? focal epilepsy with secondary generalization versus non-epileptic events. Currently on Gabapentin as outpatient for peripheral neuropathy, no other AEDs. Prior EEG normal, MRI brain 2015 normal. Admit to EMU for definitive characterization of events.    - Unable to hook up to vEEG due to head lice - recommend rescheduling EMU admission once treated  - Can continue home Gabapentin, Ativan until EMU complete  - Seizure precautions  - Follow up in Neurology Epilepsy clinic once EMU  admit complete     MDD (major depressive disorder), recurrent episode, moderate  - Recent admission to psychiatric facility after suicide attempt/suicidal ideation  - Continue home meds - Abilify, Fluoxetine, Seroquel      Diabetes mellitus type II, uncontrolled  - Hold home Metformin  - A1c 10.1 - recommend close follow up with PCP for further management, patient had to be discharged due to lice, unable to consult with medicine/endocrinology on medication adjustments    Essential hypertension  - Continue home Propanolol, Metoprolol    Mixed hyperlipidemia  - Continue home fenofibrate    Peripheral neuropathy  - Gabapentin 600 mg TID at home          Discharged Condition: fair    Disposition: Home or Self Care    Follow Up:    Patient Instructions:      Diet diabetic     Notify your health care provider if you experience any of the following:  increased confusion or weakness     Notify your health care provider if you experience any of the following:  persistent dizziness, light-headedness, or visual disturbances     Notify your health care provider if you experience any of the following:  difficulty breathing or increased cough     Notify your health care provider if you experience any of the following:  redness, tenderness, or signs of infection (pain, swelling, redness, odor or green/yellow discharge around incision site)     Activity as tolerated       Medications:  Reconciled Home Medications:      Medication List      CONTINUE taking these medications    albuterol 2.5 mg /3 mL (0.083 %) nebulizer solution  Commonly known as:  PROVENTIL  USE ONE VIAL IN NEBULIZER EVERY 6 HOURS AS NEEDED FOR  WHEEZING.     ALPRAZolam 1 MG tablet  Commonly known as:  XANAX  Take 1 tablet (1 mg total) by mouth 3 (three) times daily as needed for Anxiety.     amitriptyline 25 MG tablet  Commonly known as:  ELAVIL  Take 1 tablet (25 mg total) by mouth nightly as needed for Insomnia.     ARIPiprazole 2 MG Tab  Commonly known as:   ABILIFY  Take 2 mg by mouth once daily.     atorvastatin 20 MG tablet  Commonly known as:  LIPITOR  Take 20 mg by mouth once daily.     blood-glucose meter kit  Commonly known as:  FREESTYLE SYSTEM KIT  Use as instructed     CONTOUR TEST STRIPS Strp  Generic drug:  blood sugar diagnostic  TEST BLOOD SUGARS 4 TIMES DAILY     dicyclomine 20 mg tablet  Commonly known as:  BENTYL  Take 20 mg by mouth 4 (four) times daily.     EPINEPHrine 0.3 mg/0.3 mL Atin  Commonly known as:  EPIPEN 2-LARS  Inject 0.3 mLs (0.3 mg total) into the muscle once.     FLUoxetine 40 MG capsule  Take 40 mg by mouth once daily.     folic acid 1 MG tablet  Commonly known as:  FOLVITE  Take 1 mg by mouth 3 (three) times daily as needed.     gabapentin 600 MG tablet  Commonly known as:  NEURONTIN  Take 1 tablet (600 mg total) by mouth 3 (three) times daily.     glimepiride 1 MG tablet  Commonly known as:  AMARYL  Take 1 tablet (1 mg total) by mouth 2 (two) times daily with meals.     hydroCHLOROthiazide 25 MG tablet  Commonly known as:  HYDRODIURIL  Take 25 mg by mouth once daily.     ibuprofen 800 MG tablet  Commonly known as:  ADVIL,MOTRIN  Take 1 tablet (800 mg total) by mouth 3 (three) times daily as needed.     insulin detemir U-100 100 unit/mL (3 mL) Inpn pen  Commonly known as:  LEVEMIR FLEXTOUCH U-100 INSULN  Inject 40 Units into the skin 2 (two) times daily.     ipratropium 0.02 % nebulizer solution  Commonly known as:  ATROVENT     lancets Misc  Commonly known as:  ONETOUCH ULTRASOFT LANCETS  1 Stick by Misc.(Non-Drug; Combo Route) route 2 (two) times daily.     metFORMIN 1000 MG tablet  Commonly known as:  GLUCOPHAGE  TAKE 1 TABLET BY MOUTH TWICE DAILY WITH MEALS     metoprolol succinate 50 MG 24 hr tablet  Commonly known as:  TOPROL-XL  TAKE ONE TABLET BY MOUTH ONCE DAILY     NITROSTAT 0.4 MG SL tablet  Generic drug:  nitroGLYCERIN     nystatin-triamcinolone cream  Commonly known as:  MYCOLOG II  Apply to affected area 2 times daily    "  olmesartan 20 MG tablet  Commonly known as:  BENICAR  Take 1 tablet (20 mg total) by mouth once daily.     omeprazole 20 MG capsule  Commonly known as:  PRILOSEC  Take 20 mg by mouth once daily.     pen needle, diabetic 31 gauge x 5/16" Ndle  USE ONE PEN NEEDLE SUBCUTANEOUSLY TWICE DAILY     QUEtiapine 400 MG tablet  Commonly known as:  SEROQUEL  TAKE 2 TABLETS BY MOUTH ONCE DAILY          Time spent on the discharge of patient: 25 minutes    Natacha Barahona PA-C  Neurology-Epilepsy  [facility]  Staff: Dr. Phillips  "

## 2019-09-09 NOTE — ASSESSMENT & PLAN NOTE
Reports history of events since childhood, ? focal epilepsy with secondary generalization versus non-epileptic events. Currently on Gabapentin as outpatient for peripheral neuropathy, no other AEDs. Prior EEG normal, MRI brain 2015 normal. Admit to EMU for definitive characterization of events.    - Unable to hook up to vEEG due to head lice - recommend rescheduling EMU admission once treated  - Can continue home Gabapentin, Ativan until EMU complete  - Seizure precautions  - Follow up in Neurology Epilepsy clinic once EMU admit complete

## 2019-09-09 NOTE — ASSESSMENT & PLAN NOTE
- Hold home Metformin  - A1c 10.1 - recommend close follow up with PCP for further management, patient had to be discharged due to lice, unable to consult with medicine/endocrinology on medication adjustments

## 2019-09-09 NOTE — HPI
"Ms. Lane is a 44 yo female who presents as direct admit to Epilepsy Monitoring Unit for characterization of spells. She is a prior patient of Dr. Andujar, recently seen by Dr. Bills who recommended EMU admit. She reports a history of spells since childhood, and recalls taking a medication briefly in childhood. She reports she would have times with no episodes, but they most recently began to occur again after her daughter moved out of the house. She describes an aura of "butterflies in her stomach", and states she remembers nothing else of the events. Her  reports she typically lifts her left arm up in the air, then falls to the floor and will use her left arm to catch her. She subsequently has some generalized shaking movements for approximately 1-2 minutes. Afterwards, she quickly returns to baseline per family. She denies bowel/bladder incontinence or tongue biting. Most recent event was yesterday, she reports the frequency is sporadic, and sometimes will have several per day, other times she will go a few days without one. Reports history of a stroke in 2015, chart reviewed and MRI negative for acute or remote infarct at that time, conversion disorder was suspected by Neuro Vascular team.    Of note, patient recently presented to the ER for suicidal ideation/suicide attempt with a superficial left wrist laceration. She was PEC'd, and placed in a psychiatric facility for approximately a week. She was discharged home with medication adjustments.   "

## 2019-09-09 NOTE — ASSESSMENT & PLAN NOTE
Reports history of events since childhood, ? focal epilepsy with secondary generalization versus non-epileptic events. Currently on Gabapentin as outpatient for peripheral neuropathy, no other AEDs. Prior EEG normal, MRI brain 2015 normal. Admit to EMU for definitive characterization of events.    - Start VEEG  - Hold home Gabapentin, Ativan  - Seizure precautions  - Activation procedures per protocol   - IV Ativan PRN for GTC greater than 5 min - hospital medicine to call epilepsy on call before administering

## 2019-09-11 ENCOUNTER — HOSPITAL ENCOUNTER (INPATIENT)
Facility: HOSPITAL | Age: 44
LOS: 1 days | Discharge: HOME | DRG: 101 | End: 2019-09-12
Attending: PSYCHIATRY & NEUROLOGY | Admitting: PSYCHIATRY & NEUROLOGY
Payer: COMMERCIAL

## 2019-09-11 DIAGNOSIS — R56.9 SEIZURES: ICD-10-CM

## 2019-09-11 DIAGNOSIS — G40.219 COMPLEX PARTIAL EPILEPSY WITH GENERALIZATION AND WITH INTRACTABLE EPILEPSY: ICD-10-CM

## 2019-09-11 DIAGNOSIS — F44.9 CONVERSION DISORDER: Primary | ICD-10-CM

## 2019-09-11 LAB
POCT GLUCOSE: 133 MG/DL (ref 70–110)
POCT GLUCOSE: 156 MG/DL (ref 70–110)
POCT GLUCOSE: 167 MG/DL (ref 70–110)

## 2019-09-11 PROCEDURE — 20600001 HC STEP DOWN PRIVATE ROOM

## 2019-09-11 PROCEDURE — 95951 PR EEG MONITORING/VIDEORECORD: ICD-10-PCS | Mod: 26,,, | Performed by: PSYCHIATRY & NEUROLOGY

## 2019-09-11 PROCEDURE — 99223 1ST HOSP IP/OBS HIGH 75: CPT | Mod: ,,, | Performed by: PSYCHIATRY & NEUROLOGY

## 2019-09-11 PROCEDURE — 95951 PR EEG MONITORING/VIDEORECORD: CPT | Mod: 26,,, | Performed by: PSYCHIATRY & NEUROLOGY

## 2019-09-11 PROCEDURE — 95951 HC EEG MONITORING/VIDEO RECORD: CPT

## 2019-09-11 PROCEDURE — 25000003 PHARM REV CODE 250: Performed by: PSYCHIATRY & NEUROLOGY

## 2019-09-11 PROCEDURE — 99223 PR INITIAL HOSPITAL CARE,LEVL III: ICD-10-PCS | Mod: ,,, | Performed by: PSYCHIATRY & NEUROLOGY

## 2019-09-11 RX ORDER — GLUCAGON 1 MG
1 KIT INJECTION
Status: DISCONTINUED | OUTPATIENT
Start: 2019-09-11 | End: 2019-09-12 | Stop reason: HOSPADM

## 2019-09-11 RX ORDER — DOCUSATE SODIUM 100 MG/1
100 CAPSULE, LIQUID FILLED ORAL 2 TIMES DAILY PRN
Status: DISCONTINUED | OUTPATIENT
Start: 2019-09-11 | End: 2019-09-12 | Stop reason: HOSPADM

## 2019-09-11 RX ORDER — ALBUTEROL SULFATE 2.5 MG/.5ML
2.5 SOLUTION RESPIRATORY (INHALATION) EVERY 6 HOURS PRN
Status: DISCONTINUED | OUTPATIENT
Start: 2019-09-11 | End: 2019-09-12 | Stop reason: HOSPADM

## 2019-09-11 RX ORDER — IBUPROFEN 200 MG
24 TABLET ORAL
Status: DISCONTINUED | OUTPATIENT
Start: 2019-09-11 | End: 2019-09-12 | Stop reason: HOSPADM

## 2019-09-11 RX ORDER — FLUOXETINE HYDROCHLORIDE 20 MG/1
40 CAPSULE ORAL DAILY
Status: DISCONTINUED | OUTPATIENT
Start: 2019-09-11 | End: 2019-09-12 | Stop reason: HOSPADM

## 2019-09-11 RX ORDER — INSULIN ASPART 100 [IU]/ML
0-5 INJECTION, SOLUTION INTRAVENOUS; SUBCUTANEOUS
Status: DISCONTINUED | OUTPATIENT
Start: 2019-09-11 | End: 2019-09-12 | Stop reason: HOSPADM

## 2019-09-11 RX ORDER — SODIUM CHLORIDE 0.9 % (FLUSH) 0.9 %
10 SYRINGE (ML) INJECTION
Status: DISCONTINUED | OUTPATIENT
Start: 2019-09-11 | End: 2019-09-12 | Stop reason: HOSPADM

## 2019-09-11 RX ORDER — ONDANSETRON 8 MG/1
8 TABLET, ORALLY DISINTEGRATING ORAL EVERY 8 HOURS PRN
Status: DISCONTINUED | OUTPATIENT
Start: 2019-09-11 | End: 2019-09-12 | Stop reason: HOSPADM

## 2019-09-11 RX ORDER — ARIPIPRAZOLE 2 MG/1
4 TABLET ORAL DAILY
Status: DISCONTINUED | OUTPATIENT
Start: 2019-09-12 | End: 2019-09-12 | Stop reason: HOSPADM

## 2019-09-11 RX ORDER — PROPRANOLOL HYDROCHLORIDE 10 MG/1
10 TABLET ORAL 3 TIMES DAILY
Status: DISCONTINUED | OUTPATIENT
Start: 2019-09-11 | End: 2019-09-11

## 2019-09-11 RX ORDER — PROPRANOLOL HYDROCHLORIDE 10 MG/1
10 TABLET ORAL 3 TIMES DAILY PRN
Status: DISCONTINUED | OUTPATIENT
Start: 2019-09-11 | End: 2019-09-12 | Stop reason: HOSPADM

## 2019-09-11 RX ORDER — ACETAMINOPHEN 325 MG/1
650 TABLET ORAL EVERY 4 HOURS PRN
Status: DISCONTINUED | OUTPATIENT
Start: 2019-09-11 | End: 2019-09-12 | Stop reason: HOSPADM

## 2019-09-11 RX ORDER — METOPROLOL SUCCINATE 50 MG/1
50 TABLET, EXTENDED RELEASE ORAL DAILY
Status: DISCONTINUED | OUTPATIENT
Start: 2019-09-11 | End: 2019-09-12 | Stop reason: HOSPADM

## 2019-09-11 RX ORDER — IBUPROFEN 200 MG
16 TABLET ORAL
Status: DISCONTINUED | OUTPATIENT
Start: 2019-09-11 | End: 2019-09-12 | Stop reason: HOSPADM

## 2019-09-11 RX ORDER — ARIPIPRAZOLE 2 MG/1
2 TABLET ORAL DAILY
Status: DISCONTINUED | OUTPATIENT
Start: 2019-09-11 | End: 2019-09-11

## 2019-09-11 RX ADMIN — QUETIAPINE FUMARATE 800 MG: 200 TABLET ORAL at 08:09

## 2019-09-11 RX ADMIN — FLUOXETINE 40 MG: 20 CAPSULE ORAL at 11:09

## 2019-09-11 NOTE — SUBJECTIVE & OBJECTIVE
Past Medical History:   Diagnosis Date    ADHD (attention deficit hyperactivity disorder)     Anxiety     Arthritis     Asthma     Behavioral problem     Bipolar 1 disorder     CHF (congestive heart failure)     COPD (chronic obstructive pulmonary disease)     Depression     Diabetes mellitus type II     Hx of psychiatric care     Hyperlipidemia     Hypertension     Lumbar radiculopathy     Neuralgia     Neuritis     Psychiatric problem     PTSD (post-traumatic stress disorder)     Seizures     Seizure-last 8/2015-shake and fall-doesnt remember anything    Self-harming behavior     Sleep apnea     on CPAP    Stroke 9/22/2015    Stroke     Therapy        Past Surgical History:   Procedure Laterality Date    BLOCK-LUMBAR-SYMPATHETIC Left 9/4/2015    [facility] at [facility]    BLOCK-LUMBAR-SYMPATHETIC Left 7/18/2014    [facility]    COLONOSCOPY N/A 7/1/2016    [facility] by [facility] at [facility] ENDO (4TH FLR)    CYST REMOVAL  2000    Fatty cyst on right face cheek and left upper arm     DILATION AND CURETTAGE OF UTERUS  2006    Miscarriage    ESOPHAGOGASTRODUODENOSCOPY (EGD) N/A 7/1/2016    Performed by Shoaib Naylor [facility] at [facility] ENDO (4TH FLR)    HYSTERECTOMY  7/08    DUB - Total    INJECTION, STEROID, SPINE, LUMBAR, EPIDURAL N/A 4/25/2014    [facility] at [facility] OR    OOPHORECTOMY  7/2008    TOTAL ABDOMINAL HYSTERECTOMY  7/2008    TUBAL LIGATION  2007       Review of patient's allergies indicates:   Allergen Reactions    Penicillins Swelling and Rash    Neosporin [neomycin-bacitracin-polymyxin] Rash    Shellfish containing products     Shrimp Hives       No current facility-administered medications on file prior to encounter.      Current Outpatient Medications on File Prior to Encounter   Medication Sig    albuterol (PROVENTIL) 2.5 mg /3 mL (0.083 %) nebulizer solution USE ONE VIAL IN NEBULIZER EVERY 6  HOURS AS NEEDED FOR  WHEEZING.    ALPRAZolam (XANAX) 1 MG tablet Take 1 tablet (1 mg total) by mouth 3 (three) times daily as needed for Anxiety.    amitriptyline (ELAVIL) 25 MG tablet Take 1 tablet (25 mg total) by mouth nightly as needed for Insomnia.    ARIPiprazole (ABILIFY) 2 MG Tab Take 2 mg by mouth once daily.    atorvastatin (LIPITOR) 20 MG tablet Take 20 mg by mouth once daily.     blood-glucose meter (FREESTYLE SYSTEM KIT) kit Use as instructed    CONTOUR TEST STRIPS Strp TEST BLOOD SUGARS 4 TIMES DAILY    dicyclomine (BENTYL) 20 mg tablet Take 20 mg by mouth 4 (four) times daily.     epinephrine (EPIPEN 2-LARS) 0.3 mg/0.3 mL (1:1,000) AtIn Inject 0.3 mLs (0.3 mg total) into the muscle once.    FLUoxetine 40 MG capsule Take 40 mg by mouth once daily.    folic acid (FOLVITE) 1 MG tablet Take 1 mg by mouth 3 (three) times daily as needed.     gabapentin (NEURONTIN) 600 MG tablet Take 1 tablet (600 mg total) by mouth 3 (three) times daily.    glimepiride (AMARYL) 1 MG tablet Take 1 tablet (1 mg total) by mouth 2 (two) times daily with meals.    hydroCHLOROthiazide (HYDRODIURIL) 25 MG tablet Take 25 mg by mouth once daily.     ibuprofen (ADVIL,MOTRIN) 800 MG tablet Take 1 tablet (800 mg total) by mouth 3 (three) times daily as needed.    insulin detemir U-100 (LEVEMIR FLEXTOUCH U-100 INSULN) 100 unit/mL (3 mL) SubQ InPn pen Inject 40 Units into the skin 2 (two) times daily.    ipratropium (ATROVENT) 0.02 % nebulizer solution     lancets (ONE TOUCH ULTRASOFT LANCETS) Misc 1 Stick by Misc.(Non-Drug; Combo Route) route 2 (two) times daily.    metFORMIN (GLUCOPHAGE) 1000 MG tablet TAKE 1 TABLET BY MOUTH TWICE DAILY WITH MEALS    metoprolol succinate (TOPROL-XL) 50 MG 24 hr tablet TAKE ONE TABLET BY MOUTH ONCE DAILY    NITROSTAT 0.4 mg SL tablet     nystatin-triamcinolone (MYCOLOG II) cream Apply to affected area 2 times daily    olmesartan (BENICAR) 20 MG tablet Take 1 tablet (20 mg total) by  "mouth once daily.    omeprazole (PRILOSEC) 20 MG capsule Take 20 mg by mouth once daily.    pen needle, diabetic 31 gauge x 5/16" Ndle USE ONE PEN NEEDLE SUBCUTANEOUSLY TWICE DAILY    QUEtiapine (SEROQUEL) 400 MG tablet TAKE 2 TABLETS BY MOUTH ONCE DAILY     Continuous Infusions:    Family History     Problem Relation (Age of Onset)    Diabetes Father    Heart disease Father    Hyperlipidemia Father    Hypertension Father        Tobacco Use    Smoking status: Current Every Day Smoker     Packs/day: 0.10     Years: 26.00     Pack years: 2.60     Types: Cigarettes     Start date: 7/17/1986    Smokeless tobacco: Never Used    Tobacco comment: told about [facility] smoking cessation program-given smoking clinic pamphlet   Substance and Sexual Activity    Alcohol use: No    Drug use: No    Sexual activity: Yes     Partners: Male     Birth control/protection: Surgical     Comment: , CAYLA, BSO     Review of Systems   Constitutional: Negative for chills and fever.   HENT: Negative for congestion and sore throat.    Eyes: Negative for photophobia and pain.   Respiratory: Positive for cough.    Cardiovascular: Negative for chest pain.   Gastrointestinal: Negative for diarrhea, nausea and vomiting.   Genitourinary: Negative for hematuria.   Musculoskeletal: Negative for back pain.   Neurological: Negative for numbness and headaches.     Objective:     Vital Signs (Most Recent):    Vital Signs (24h Range):  BP: ()/()   Arterial Line BP: ()/()         There is no height or weight on file to calculate BMI.    Physical Exam   Constitutional: She is oriented to person, place, and time. She appears well-developed and well-nourished.   HENT:   Head: Normocephalic and atraumatic.   Eyes: Pupils are equal, round, and reactive to light. Conjunctivae and EOM are normal. No scleral icterus.   Neck: Normal range of motion. Neck supple.   Pulmonary/Chest: Effort normal.   Abdominal: Soft.   Neurological: She is alert and " oriented to person, place, and time. She has a normal Finger-Nose-Finger Test. Gait normal.   Reflex Scores:       Tricep reflexes are 2+ on the right side and 2+ on the left side.       Bicep reflexes are 2+ on the right side and 2+ on the left side.       Brachioradialis reflexes are 2+ on the right side and 2+ on the left side.       Patellar reflexes are 2+ on the right side and 2+ on the left side.       Achilles reflexes are 2+ on the right side and 2+ on the left side.  Skin: Skin is warm and dry.   Psychiatric: Her speech is normal.       NEUROLOGICAL EXAMINATION:     MENTAL STATUS   Oriented to person, place, and time.   Attention: normal. Concentration: normal.   Speech: speech is normal   Level of consciousness: alert    CRANIAL NERVES   Cranial nerves II through XII intact.     CN III, IV, VI   Pupils are equal, round, and reactive to light.  Extraocular motions are normal.     MOTOR EXAM   Muscle bulk: normal  Overall muscle tone: normal    REFLEXES     Reflexes   Right brachioradialis: 2+  Left brachioradialis: 2+  Right biceps: 2+  Left biceps: 2+  Right triceps: 2+  Left triceps: 2+  Right patellar: 2+  Left patellar: 2+  Right achilles: 2+  Left achilles: 2+  Right plantar: normal  Left plantar: normal    SENSORY EXAM   Light touch normal.   Proprioception normal.     GAIT AND COORDINATION     Gait  Gait: normal     Coordination   Finger to nose coordination: normal      Significant Labs:   Recent Lab Results     None        All pertinent lab results from the past 24 hours have been reviewed.    Significant Studies: CT: I have reviewed all pertinent results/findings within the past 24 hours

## 2019-09-11 NOTE — H&P
"[facility]  Neurology-Epilepsy  History & Physical    Patient Name: Eva Lane  MRN: [id number]   Admission Date: 9/11/2019  Code Status: Full Code   Attending Provider: Julián Bills MD   Primary Care Physician: Cintia Gustafson NP  Principal Problem:Complex partial epilepsy with generalization and with intractable epilepsy    Subjective:     Chief Complaint:  Spells     HPI:   Ms. Lane is a 44 yo female who presents as direct admit to Epilepsy Monitoring Unit for characterization of spells. She is a prior patient of Dr. Andujar, recently seen by Dr. Bills who recommended EMU admit. She reports a history of spells since childhood, and recalls taking a medication briefly in childhood. She reports she would have times with no episodes, but they most recently began to occur again after her daughter moved out of the house. She describes an aura of "butterflies in her stomach", and states she remembers nothing else of the events. Her  reports she typically lifts her left arm up in the air, then falls to the floor and will use her left arm to catch her. She subsequently has some generalized shaking movements for approximately 1-2 minutes. Afterwards, she quickly returns to baseline per family. She denies bowel/bladder incontinence or tongue biting. Most recent event was yesterday, she reports the frequency is sporadic, and sometimes will have several per day, other times she will go a few days without one. Reports history of a stroke in 2015, chart reviewed and MRI negative for acute or remote infarct at that time, conversion disorder was suspected by Neuro Vascular team.     Of note, patient recently presented to the ER for suicidal ideation/suicide attempt with a superficial left wrist laceration. She was PEC'd, and placed in a psychiatric facility for approximately a week. She was discharged home with medication adjustments.     Patient was admitted to EMU two days ago " but upon placement of EEG she was found to have lice and was subsequently discharged. However, she has since then shaved her head and rescheduled for admittance to EMU.    Past Medical History:   Diagnosis Date    ADHD (attention deficit hyperactivity disorder)     Anxiety     Arthritis     Asthma     Behavioral problem     Bipolar 1 disorder     CHF (congestive heart failure)     COPD (chronic obstructive pulmonary disease)     Depression     Diabetes mellitus type II     Hx of psychiatric care     Hyperlipidemia     Hypertension     Lumbar radiculopathy     Neuralgia     Neuritis     Psychiatric problem     PTSD (post-traumatic stress disorder)     Seizures     Seizure-last 8/2015-shake and fall-doesnt remember anything    Self-harming behavior     Sleep apnea     on CPAP    Stroke 9/22/2015    Stroke     Therapy        Past Surgical History:   Procedure Laterality Date    BLOCK-LUMBAR-SYMPATHETIC Left 9/4/2015    [facility] OR    BLOCK-LUMBAR-SYMPATHETIC Left 7/18/2014    [facility] at [facility] OR    COLONOSCOPY N/A 7/1/2016    Performed by [facility] at [facility] ENDO (4TH FLR)    CYST REMOVAL  2000    Fatty cyst on right face cheek and left upper arm     DILATION AND CURETTAGE OF UTERUS  2006    Miscarriage    ESOPHAGOGASTRODUODENOSCOPY (EGD) N/A 7/1/2016    Performed by Shoaib Naylor [facility] at [facility] ENDO (4TH FLR)    HYSTERECTOMY  7/08    DUB - Total    INJECTION, STEROID, SPINE, LUMBAR, EPIDURAL N/A 4/25/2014    [facility] by [facility] at [facility] OR    OOPHORECTOMY  7/2008    TOTAL ABDOMINAL HYSTERECTOMY  7/2008    TUBAL LIGATION  2007       Review of patient's allergies indicates:   Allergen Reactions    Penicillins Swelling and Rash    Neosporin [neomycin-bacitracin-polymyxin] Rash    Shellfish containing products     Shrimp Hives       No current facility-administered medications on file prior to encounter.      Current  Outpatient Medications on File Prior to Encounter   Medication Sig    albuterol (PROVENTIL) 2.5 mg /3 mL (0.083 %) nebulizer solution USE ONE VIAL IN NEBULIZER EVERY 6 HOURS AS NEEDED FOR  WHEEZING.    ALPRAZolam (XANAX) 1 MG tablet Take 1 tablet (1 mg total) by mouth 3 (three) times daily as needed for Anxiety.    amitriptyline (ELAVIL) 25 MG tablet Take 1 tablet (25 mg total) by mouth nightly as needed for Insomnia.    ARIPiprazole (ABILIFY) 2 MG Tab Take 2 mg by mouth once daily.    atorvastatin (LIPITOR) 20 MG tablet Take 20 mg by mouth once daily.     blood-glucose meter (FREESTYLE SYSTEM KIT) kit Use as instructed    CONTOUR TEST STRIPS Strp TEST BLOOD SUGARS 4 TIMES DAILY    dicyclomine (BENTYL) 20 mg tablet Take 20 mg by mouth 4 (four) times daily.     epinephrine (EPIPEN 2-LARS) 0.3 mg/0.3 mL (1:1,000) AtIn Inject 0.3 mLs (0.3 mg total) into the muscle once.    FLUoxetine 40 MG capsule Take 40 mg by mouth once daily.    folic acid (FOLVITE) 1 MG tablet Take 1 mg by mouth 3 (three) times daily as needed.     gabapentin (NEURONTIN) 600 MG tablet Take 1 tablet (600 mg total) by mouth 3 (three) times daily.    glimepiride (AMARYL) 1 MG tablet Take 1 tablet (1 mg total) by mouth 2 (two) times daily with meals.    hydroCHLOROthiazide (HYDRODIURIL) 25 MG tablet Take 25 mg by mouth once daily.     ibuprofen (ADVIL,MOTRIN) 800 MG tablet Take 1 tablet (800 mg total) by mouth 3 (three) times daily as needed.    insulin detemir U-100 (LEVEMIR FLEXTOUCH U-100 INSULN) 100 unit/mL (3 mL) SubQ InPn pen Inject 40 Units into the skin 2 (two) times daily.    ipratropium (ATROVENT) 0.02 % nebulizer solution     lancets (ONE TOUCH ULTRASOFT LANCETS) Misc 1 Stick by Misc.(Non-Drug; Combo Route) route 2 (two) times daily.    metFORMIN (GLUCOPHAGE) 1000 MG tablet TAKE 1 TABLET BY MOUTH TWICE DAILY WITH MEALS    metoprolol succinate (TOPROL-XL) 50 MG 24 hr tablet TAKE ONE TABLET BY MOUTH ONCE DAILY    NITROSTAT  "0.4 mg SL tablet     nystatin-triamcinolone (MYCOLOG II) cream Apply to affected area 2 times daily    olmesartan (BENICAR) 20 MG tablet Take 1 tablet (20 mg total) by mouth once daily.    omeprazole (PRILOSEC) 20 MG capsule Take 20 mg by mouth once daily.    pen needle, diabetic 31 gauge x 5/16" Ndle USE ONE PEN NEEDLE SUBCUTANEOUSLY TWICE DAILY    QUEtiapine (SEROQUEL) 400 MG tablet TAKE 2 TABLETS BY MOUTH ONCE DAILY     Continuous Infusions:    Family History     Problem Relation (Age of Onset)    Diabetes Father    Heart disease Father    Hyperlipidemia Father    Hypertension Father        Tobacco Use    Smoking status: Current Every Day Smoker     Packs/day: 0.10     Years: 26.00     Pack years: 2.60     Types: Cigarettes     Start date: 7/17/1986    Smokeless tobacco: Never Used    Tobacco comment: told about [facility] smoking cessation program-given smoking clinic pamphlet   Substance and Sexual Activity    Alcohol use: No    Drug use: No    Sexual activity: Yes     Partners: Male     Birth control/protection: Surgical     Comment: , CAYLA, BSO     Review of Systems   Constitutional: Negative for chills and fever.   HENT: Negative for congestion and sore throat.    Eyes: Negative for photophobia and pain.   Respiratory: Positive for cough.    Cardiovascular: Negative for chest pain.   Gastrointestinal: Negative for diarrhea, nausea and vomiting.   Genitourinary: Negative for hematuria.   Musculoskeletal: Negative for back pain.   Neurological: Negative for numbness and headaches.     Objective:     Vital Signs (Most Recent):    Vital Signs (24h Range):  BP: ()/()   Arterial Line BP: ()/()         There is no height or weight on file to calculate BMI.    Physical Exam   Constitutional: She is oriented to person, place, and time. She appears well-developed and well-nourished.   HENT:   Head: Normocephalic and atraumatic.   Eyes: Pupils are equal, round, and reactive to light. Conjunctivae and EOM " are normal. No scleral icterus.   Neck: Normal range of motion. Neck supple.   Pulmonary/Chest: Effort normal.   Abdominal: Soft.   Neurological: She is alert and oriented to person, place, and time. She has a normal Finger-Nose-Finger Test. Gait normal.   Reflex Scores:       Tricep reflexes are 2+ on the right side and 2+ on the left side.       Bicep reflexes are 2+ on the right side and 2+ on the left side.       Brachioradialis reflexes are 2+ on the right side and 2+ on the left side.       Patellar reflexes are 2+ on the right side and 2+ on the left side.       Achilles reflexes are 2+ on the right side and 2+ on the left side.  Skin: Skin is warm and dry.   Psychiatric: Her speech is normal.       NEUROLOGICAL EXAMINATION:     MENTAL STATUS   Oriented to person, place, and time.   Attention: normal. Concentration: normal.   Speech: speech is normal   Level of consciousness: alert    CRANIAL NERVES   Cranial nerves II through XII intact.     CN III, IV, VI   Pupils are equal, round, and reactive to light.  Extraocular motions are normal.     MOTOR EXAM   Muscle bulk: normal  Overall muscle tone: normal    REFLEXES     Reflexes   Right brachioradialis: 2+  Left brachioradialis: 2+  Right biceps: 2+  Left biceps: 2+  Right triceps: 2+  Left triceps: 2+  Right patellar: 2+  Left patellar: 2+  Right achilles: 2+  Left achilles: 2+  Right plantar: normal  Left plantar: normal    SENSORY EXAM   Light touch normal.   Proprioception normal.     GAIT AND COORDINATION     Gait  Gait: normal     Coordination   Finger to nose coordination: normal      Significant Labs:   Recent Lab Results     None        All pertinent lab results from the past 24 hours have been reviewed.    Significant Studies: CT: I have reviewed all pertinent results/findings within the past 24 hours    Assessment and Plan:     * Complex partial epilepsy with generalization and with intractable epilepsy  Reports history of events since childhood, ?  focal epilepsy with secondary generalization versus non-epileptic events. Currently on Gabapentin as outpatient for peripheral neuropathy, no other AEDs. Prior EEG normal, MRI brain 2015 normal. Admit to EMU for definitive characterization of events.     - Start VEEG  - Hold home Gabapentin, Ativan  - Seizure precautions  - Activation procedures per protocol   - IV Ativan PRN for GTC greater than 5 min - hospital medicine to call epilepsy on call before administering      MDD (major depressive disorder), recurrent episode, moderate  - Recent admission to psychiatric facility after suicide attempt/suicidal ideation  - Continue home meds - Abilify, Fluoxetine, Seroquel  - Hold home Xanax    HILARIA (generalized anxiety disorder)  - Continue home Propranolol PRN    Diabetes mellitus type II, uncontrolled  - Hold home Metformin  - A1c pending  - Accuchecks TIDWM, Low dose SSI coverage for now    Essential hypertension  - Continue home Metoprolol    Mixed hyperlipidemia  - Continue home fenofibrate    Peripheral neuropathy  - Gabapentin 600 mg TID at home  - Hold during admission        VTE Risk Mitigation (From admission, onward)        Ordered     Place MIKE hose  Until discontinued      09/11/19 1018     Place sequential compression device  Until discontinued      09/11/19 1018          Compa Flannery MD  Neurology-Epilepsy  [facility]  Staff: Dr. Phillips

## 2019-09-11 NOTE — PLAN OF CARE
PCP Cintia Gustafson, [place]  Pharmacy   [phone] [place] [address] [address]  [address]  [facility] [address]  Phone: [phone] Fax: [phone]    [facility] [place] - [address],   [address],   [place] [address]  Phone: [phone] Fax: [phone]       09/11/19 153   Discharge Assessment   Assessment Type Discharge Planning Assessment   Confirmed/corrected address and phone number on facesheet? Yes   Assessment information obtained from? Patient   Prior to hospitilization cognitive status: Alert/Oriented;No Deficits   Prior to hospitalization functional status: Independent   Current cognitive status: Alert/Oriented;No Deficits   Current Functional Status: Independent   Facility Arrived From: home   Lives With child(rod), adult;spouse   Able to Return to Prior Arrangements yes   Is patient able to care for self after discharge? Yes   Who are your caregiver(s) and their phone number(s)? Chrissy Lane  [phone]    Patient's perception of discharge disposition home or selfcare   Readmission Within the Last 30 Days no previous admission in last 30 days   Patient currently being followed by outpatient case management? No   Patient currently receives any other outside agency services? No   Equipment Currently Used at Home none   Do you have any problems affording any of your prescribed medications? TBD   Is the patient taking medications as prescribed? yes   Does the patient have transportation home? Yes   Transportation Anticipated family or friend will provide   Dialysis Name and Scheduled days N/A   Does the patient receive services at the [facility]mad[facility] Clinic? No   Discharge Plan A Home   Discharge Plan B Home with family   DME Needed Upon Discharge  none   Patient/Family in Agreement with Plan yes

## 2019-09-11 NOTE — HPI
"Ms. Lane is a 44 yo female who presents as direct admit to Epilepsy Monitoring Unit for characterization of spells. She is a prior patient of Dr. Andujar, recently seen by Dr. Bills who recommended EMU admit. She reports a history of spells since childhood, and recalls taking a medication briefly in childhood. She reports she would have times with no episodes, but they most recently began to occur again after her daughter moved out of the house. She describes an aura of "butterflies in her stomach", and states she remembers nothing else of the events. Her  reports she typically lifts her left arm up in the air, then falls to the floor and will use her left arm to catch her. She subsequently has some generalized shaking movements for approximately 1-2 minutes. Afterwards, she quickly returns to baseline per family. She denies bowel/bladder incontinence or tongue biting. Most recent event was yesterday, she reports the frequency is sporadic, and sometimes will have several per day, other times she will go a few days without one. Reports history of a stroke in 2015, chart reviewed and MRI negative for acute or remote infarct at that time, conversion disorder was suspected by Neuro Vascular team.     Of note, patient recently presented to the ER for suicidal ideation/suicide attempt with a superficial left wrist laceration. She was PEC'd, and placed in a psychiatric facility for approximately a week. She was discharged home with medication adjustments.     Patient was admitted to EMU two days ago but upon placement of EEG she was found to have lice and was subsequently discharged. However, she has since then shaved her head and rescheduled for admittance to EMU.  "

## 2019-09-11 NOTE — PLAN OF CARE
09/11/19 1535   Post-Acute Status   Post-Acute Authorization Other   Other Status No Post-Acute Service Needs

## 2019-09-12 ENCOUNTER — PATIENT OUTREACH (OUTPATIENT)
Dept: ADMINISTRATIVE | Facility: HOSPITAL | Age: 44
End: 2019-09-12

## 2019-09-12 VITALS
RESPIRATION RATE: 18 BRPM | OXYGEN SATURATION: 91 % | HEART RATE: 85 BPM | DIASTOLIC BLOOD PRESSURE: 64 MMHG | SYSTOLIC BLOOD PRESSURE: 106 MMHG | WEIGHT: 157 LBS | HEIGHT: 66 IN | BODY MASS INDEX: 25.23 KG/M2 | TEMPERATURE: 98 F

## 2019-09-12 PROBLEM — G40.219 COMPLEX PARTIAL EPILEPSY WITH GENERALIZATION AND WITH INTRACTABLE EPILEPSY: Status: RESOLVED | Noted: 2019-09-09 | Resolved: 2019-09-12

## 2019-09-12 LAB — POCT GLUCOSE: 170 MG/DL (ref 70–110)

## 2019-09-12 PROCEDURE — 25000003 PHARM REV CODE 250: Performed by: PSYCHIATRY & NEUROLOGY

## 2019-09-12 PROCEDURE — 99233 PR SUBSEQUENT HOSPITAL CARE,LEVL III: ICD-10-PCS | Mod: ,,, | Performed by: PSYCHIATRY & NEUROLOGY

## 2019-09-12 PROCEDURE — S4991 NICOTINE PATCH NONLEGEND: HCPCS | Performed by: PSYCHIATRY & NEUROLOGY

## 2019-09-12 PROCEDURE — 99233 SBSQ HOSP IP/OBS HIGH 50: CPT | Mod: ,,, | Performed by: PSYCHIATRY & NEUROLOGY

## 2019-09-12 RX ORDER — PANTOPRAZOLE SODIUM 40 MG/1
40 TABLET, DELAYED RELEASE ORAL DAILY
Status: DISCONTINUED | OUTPATIENT
Start: 2019-09-12 | End: 2019-09-12 | Stop reason: HOSPADM

## 2019-09-12 RX ORDER — IBUPROFEN 200 MG
1 TABLET ORAL DAILY
Status: DISCONTINUED | OUTPATIENT
Start: 2019-09-12 | End: 2019-09-12 | Stop reason: HOSPADM

## 2019-09-12 RX ADMIN — ARIPIPRAZOLE 4 MG: 2 TABLET ORAL at 08:09

## 2019-09-12 RX ADMIN — METOPROLOL SUCCINATE 50 MG: 50 TABLET, EXTENDED RELEASE ORAL at 08:09

## 2019-09-12 RX ADMIN — PANTOPRAZOLE SODIUM 40 MG: 40 TABLET, DELAYED RELEASE ORAL at 08:09

## 2019-09-12 RX ADMIN — FLUOXETINE 40 MG: 20 CAPSULE ORAL at 08:09

## 2019-09-12 NOTE — PROGRESS NOTES
[facility]  Neurology-Epilepsy  Progress Note    Patient Name: Eva Lane  MRN: [id number]  Admission Date: 9/11/2019  Hospital Length of Stay: 1 days  Code Status: Full Code   Attending Provider: Julián Bills MD  Primary Care Physician: Cintia Gustafson NP   Principal Problem:Complex partial epilepsy with generalization and with intractable epilepsy    Subjective:     Hospital Course:   9/11>9/12: One event reported by patient at night, no associated abnormal epileptiform changes    Interval History: Patient experienced one episode of seizure like activity at approximately 9:40 PM with neck extension and spacing out. Episode lasted <5 minutes. No other overnight events. Requesting for discharge.    Current Facility-Administered Medications   Medication Dose Route Frequency Provider Last Rate Last Dose    acetaminophen tablet 650 mg  650 mg Oral Q4H PRN Compa Flannery MD        albuterol sulfate nebulizer solution 2.5 mg  2.5 mg Nebulization Q6H PRN Compa Flannery MD        ARIPiprazole tablet 4 mg  4 mg Oral Daily Compa Flannery MD   4 mg at 09/12/19 0842    dextrose 10% (D10W) Bolus  12.5 g Intravenous PRN Compa Flannery MD        dextrose 10% (D10W) Bolus  25 g Intravenous PRN Compa Flannery MD        docusate sodium capsule 100 mg  100 mg Oral BID PRN Compa Flannery MD        FLUoxetine capsule 40 mg  40 mg Oral Daily Compa Flannery MD   40 mg at 09/12/19 0842    glucagon (human recombinant) injection 1 mg  1 mg Intramuscular PRN Compa Flannery MD        glucose chewable tablet 16 g  16 g Oral PRN Compa Flannery MD        glucose chewable tablet 24 g  24 g Oral PRN Compa Flannery MD        insulin aspart U-100 pen 0-5 Units  0-5 Units Subcutaneous TIDWM Compa Flannery MD        metoprolol succinate (TOPROL-XL) 24 hr tablet 50 mg  50 mg Oral Daily Compa Flannery MD   50 mg at 09/12/19 0843    nicotine 14 mg/24 hr 1  patch  1 patch Transdermal Daily Marilee Montero MD PhD        ondansetron disintegrating tablet 8 mg  8 mg Oral Q8H PRN Compa Flannery MD        pantoprazole EC tablet 40 mg  40 mg Oral Daily Marilee Montero MD PhD   40 mg at 09/12/19 0843    propranolol tablet 10 mg  10 mg Oral TID PRN Compa Flannery MD        QUEtiapine tablet 800 mg  800 mg Oral QHS Compa Flannery MD   800 mg at 09/11/19 2047    sodium chloride 0.9% flush 10 mL  10 mL Intravenous PRN Compa Flannery MD         Continuous Infusions:    Review of Systems   Constitutional: Negative for chills and fever.   HENT: Negative for congestion and sore throat.    Eyes: Negative for photophobia and pain.   Respiratory: Positive for cough.    Cardiovascular: Negative for chest pain.   Gastrointestinal: Negative for diarrhea, nausea and vomiting.   Genitourinary: Negative for hematuria.   Musculoskeletal: Negative for back pain.   Neurological: Negative for numbness and headaches.     Objective:     Vital Signs (Most Recent):  Temp: 97.9 °F (36.6 °C) (09/12/19 0733)  Pulse: 85 (09/12/19 0733)  Resp: 18 (09/12/19 0733)  BP: 106/64 (09/12/19 0733)  SpO2: (!) 91 % (09/12/19 0733) Vital Signs (24h Range):  Temp:  [96.1 °F (35.6 °C)-98.1 °F (36.7 °C)] 97.9 °F (36.6 °C)  Pulse:  [] 85  Resp:  [16-18] 18  SpO2:  [91 %-96 %] 91 %  BP: (105-121)/(55-77) 106/64     Weight: 71.2 kg (157 lb)  Body mass index is 25.34 kg/m².    Physical Exam   Constitutional: She is oriented to person, place, and time. She appears well-developed and well-nourished.   HENT:   Head: Normocephalic and atraumatic.   Eyes: Pupils are equal, round, and reactive to light. Conjunctivae and EOM are normal. No scleral icterus.   Neck: Normal range of motion. Neck supple.   Pulmonary/Chest: Effort normal.   Abdominal: Soft.   Neurological: She is alert and oriented to person, place, and time. She has a normal Finger-Nose-Finger Test. Gait normal.   Reflex Scores:        Tricep reflexes are 2+ on the right side and 2+ on the left side.       Bicep reflexes are 2+ on the right side and 2+ on the left side.       Brachioradialis reflexes are 2+ on the right side and 2+ on the left side.       Patellar reflexes are 2+ on the right side and 2+ on the left side.       Achilles reflexes are 2+ on the right side and 2+ on the left side.  Skin: Skin is warm and dry.   Psychiatric: Her speech is normal.       NEUROLOGICAL EXAMINATION:     MENTAL STATUS   Oriented to person, place, and time.   Attention: normal. Concentration: normal.   Speech: speech is normal   Level of consciousness: alert    CRANIAL NERVES   Cranial nerves II through XII intact.     CN III, IV, VI   Pupils are equal, round, and reactive to light.  Extraocular motions are normal.     MOTOR EXAM   Muscle bulk: normal  Overall muscle tone: normal    REFLEXES     Reflexes   Right brachioradialis: 2+  Left brachioradialis: 2+  Right biceps: 2+  Left biceps: 2+  Right triceps: 2+  Left triceps: 2+  Right patellar: 2+  Left patellar: 2+  Right achilles: 2+  Left achilles: 2+  Right plantar: normal  Left plantar: normal    SENSORY EXAM   Light touch normal.   Proprioception normal.     GAIT AND COORDINATION     Gait  Gait: normal     Coordination   Finger to nose coordination: normal      Significant Labs:   Recent Lab Results       09/12/19  0737   09/11/19  2047   09/11/19  1713   09/11/19  1127        POCT Glucose 170 133 156 167         All pertinent lab results from the past 24 hours have been reviewed.    Significant Studies: I have reviewed all pertinent imaging results/findings within the past 24 hours.    Assessment and Plan:     MDD (major depressive disorder), recurrent episode, moderate  - Recent admission to psychiatric facility after suicide attempt/suicidal ideation  - Continue home meds - Abilify, Fluoxetine, Seroquel  - Hold home Xanax    HILARIA (generalized anxiety disorder)  - Continue home Propranolol  PRN    Conversion disorder  Reports history of events since childhood, ? focal epilepsy with secondary generalization versus non-epileptic events. Currently on Gabapentin as outpatient for peripheral neuropathy, no other AEDs. Prior EEG normal, MRI brain 2015 normal. Admit to EMU for definitive characterization of events.     - VEEG shows no abnormal epileptiform changes consistent with epilepsy  - Continue home Gabapentin, Ativan per PCP recommendations  - Seizure precautions  - Activation procedures per protocol   - IV Ativan PRN for GTC greater than 5 min - hospital medicine to call epilepsy on call before administering      Diabetes mellitus type II, uncontrolled  - Hold home Metformin  - A1c pending  - Accuchecks TIDWM, Low dose SSI coverage for now    Essential hypertension  - Continue home Metoprolol    Mixed hyperlipidemia  - Continue home fenofibrate    Peripheral neuropathy  - Gabapentin 600 mg TID at home  - Hold during admission        VTE Risk Mitigation (From admission, onward)        Ordered     Place MIKE hose  Until discontinued      09/11/19 1018     Place sequential compression device  Until discontinued      09/11/19 1018          Compa Flannery MD  Neurology-Epilepsy  [facility]  Staff: Dr. Phillips

## 2019-09-12 NOTE — NURSING
Patient allowed discharge by verbal order from Dr. Phillips.  Patient in a hurry to leave hospital due to a family emergency.   printed and education on  given.  IVs removed.  Patient refused transport via W/C and was released on their own volition.

## 2019-09-12 NOTE — PROGRESS NOTES
Spoke with MD team regarding suture removal to L wrist advising MD team to remove sutures instead of wound care nurse.  [phone]

## 2019-09-12 NOTE — SUBJECTIVE & OBJECTIVE
Interval History: Patient experienced one episode of seizure like activity at approximately 9:40 PM with neck extension and spacing out. Episode lasted <5 minutes. No other overnight events. Requesting for discharge.    Current Facility-Administered Medications   Medication Dose Route Frequency Provider Last Rate Last Dose    acetaminophen tablet 650 mg  650 mg Oral Q4H PRN Compa Flannery MD        albuterol sulfate nebulizer solution 2.5 mg  2.5 mg Nebulization Q6H PRN Compa Flannery MD        ARIPiprazole tablet 4 mg  4 mg Oral Daily Compa Flannery MD   4 mg at 09/12/19 0842    dextrose 10% (D10W) Bolus  12.5 g Intravenous PRN Compa Flannery MD        dextrose 10% (D10W) Bolus  25 g Intravenous PRN Compa Flannery MD        docusate sodium capsule 100 mg  100 mg Oral BID PRN Compa Flannery MD        FLUoxetine capsule 40 mg  40 mg Oral Daily Compa Flannery MD   40 mg at 09/12/19 0842    glucagon (human recombinant) injection 1 mg  1 mg Intramuscular PRN Compa Flannery MD        glucose chewable tablet 16 g  16 g Oral PRN Compa Flannery MD        glucose chewable tablet 24 g  24 g Oral PRN Compa Flannery MD        insulin aspart U-100 pen 0-5 Units  0-5 Units Subcutaneous TIDWM Compa Flannery MD        metoprolol succinate (TOPROL-XL) 24 hr tablet 50 mg  50 mg Oral Daily Compa Flannery MD   50 mg at 09/12/19 0843    nicotine 14 mg/24 hr 1 patch  1 patch Transdermal Daily Marilee Montero MD PhD        ondansetron disintegrating tablet 8 mg  8 mg Oral Q8H PRN Compa Flannery MD        pantoprazole EC tablet 40 mg  40 mg Oral Daily Marilee Montero MD PhD   40 mg at 09/12/19 0843    propranolol tablet 10 mg  10 mg Oral TID PRN Compa Flannery MD        QUEtiapine tablet 800 mg  800 mg Oral QHS Compa Flannery MD   800 mg at 09/11/19 2047    sodium chloride 0.9% flush 10 mL  10 mL Intravenous PRN Compa Flannery MD         Continuous  Infusions:    Review of Systems   Constitutional: Negative for chills and fever.   HENT: Negative for congestion and sore throat.    Eyes: Negative for photophobia and pain.   Respiratory: Positive for cough.    Cardiovascular: Negative for chest pain.   Gastrointestinal: Negative for diarrhea, nausea and vomiting.   Genitourinary: Negative for hematuria.   Musculoskeletal: Negative for back pain.   Neurological: Negative for numbness and headaches.     Objective:     Vital Signs (Most Recent):  Temp: 97.9 °F (36.6 °C) (09/12/19 0733)  Pulse: 85 (09/12/19 0733)  Resp: 18 (09/12/19 0733)  BP: 106/64 (09/12/19 0733)  SpO2: (!) 91 % (09/12/19 0733) Vital Signs (24h Range):  Temp:  [96.1 °F (35.6 °C)-98.1 °F (36.7 °C)] 97.9 °F (36.6 °C)  Pulse:  [] 85  Resp:  [16-18] 18  SpO2:  [91 %-96 %] 91 %  BP: (105-121)/(55-77) 106/64     Weight: 71.2 kg (157 lb)  Body mass index is 25.34 kg/m².    Physical Exam   Constitutional: She is oriented to person, place, and time. She appears well-developed and well-nourished.   HENT:   Head: Normocephalic and atraumatic.   Eyes: Pupils are equal, round, and reactive to light. Conjunctivae and EOM are normal. No scleral icterus.   Neck: Normal range of motion. Neck supple.   Pulmonary/Chest: Effort normal.   Abdominal: Soft.   Neurological: She is alert and oriented to person, place, and time. She has a normal Finger-Nose-Finger Test. Gait normal.   Reflex Scores:       Tricep reflexes are 2+ on the right side and 2+ on the left side.       Bicep reflexes are 2+ on the right side and 2+ on the left side.       Brachioradialis reflexes are 2+ on the right side and 2+ on the left side.       Patellar reflexes are 2+ on the right side and 2+ on the left side.       Achilles reflexes are 2+ on the right side and 2+ on the left side.  Skin: Skin is warm and dry.   Psychiatric: Her speech is normal.       NEUROLOGICAL EXAMINATION:     MENTAL STATUS   Oriented to person, place, and time.    Attention: normal. Concentration: normal.   Speech: speech is normal   Level of consciousness: alert    CRANIAL NERVES   Cranial nerves II through XII intact.     CN III, IV, VI   Pupils are equal, round, and reactive to light.  Extraocular motions are normal.     MOTOR EXAM   Muscle bulk: normal  Overall muscle tone: normal    REFLEXES     Reflexes   Right brachioradialis: 2+  Left brachioradialis: 2+  Right biceps: 2+  Left biceps: 2+  Right triceps: 2+  Left triceps: 2+  Right patellar: 2+  Left patellar: 2+  Right achilles: 2+  Left achilles: 2+  Right plantar: normal  Left plantar: normal    SENSORY EXAM   Light touch normal.   Proprioception normal.     GAIT AND COORDINATION     Gait  Gait: normal     Coordination   Finger to nose coordination: normal      Significant Labs:   Recent Lab Results       09/12/19  0737   09/11/19  2047   09/11/19  1713   09/11/19  1127        POCT Glucose 170 133 156 167         All pertinent lab results from the past 24 hours have been reviewed.    Significant Studies: I have reviewed all pertinent imaging results/findings within the past 24 hours.

## 2019-09-12 NOTE — ASSESSMENT & PLAN NOTE
Reports history of events since childhood, ? focal epilepsy with secondary generalization versus non-epileptic events. Currently on Gabapentin as outpatient for peripheral neuropathy, no other AEDs. Prior EEG normal, MRI brain 2015 normal. Admit to EMU for definitive characterization of events.     - VEEG shows no abnormal epileptiform changes consistent with epilepsy  - Continue home Gabapentin, Ativan per PCP recommendations  - Seizure precautions  - Activation procedures per protocol   - IV Ativan PRN for GTC greater than 5 min - hospital medicine to call epilepsy on call before administering

## 2019-09-12 NOTE — HOSPITAL COURSE
9/11>9/12: One event reported by patient at night, no associated abnormal epileptiform changes    Patient requests to be discharged due to family emergency. Explained findings and results to patient. Answered patient questions and concerns. Explained the most likely diagnosis of her symptoms and stressed the importance of follow-up with mental health. Patient understands the need for continued follow-up with PCP, neurologist and psychiatrist for next step in management of her neurologic symptoms and management of her chronic medical conditions. At this time patient denies any new neurological symptoms, objectively no focal neuro deficits.

## 2019-09-12 NOTE — DISCHARGE SUMMARY
"[facility]  Neurology-Epilepsy  Discharge Summary      Patient Name: Eva Lane  MRN: [id number]  Admission Date: 9/11/2019  Hospital Length of Stay: 1 days  Discharge Date and Time:  09/12/2019 11:33 AM  Attending Physician: Julián Bills MD   Discharging Provider: Compa Flannery MD  Primary Care Physician: Cintia Gustafson NP    HPI:   Ms. Lane is a 44 yo female who presents as direct admit to Epilepsy Monitoring Unit for characterization of spells. She is a prior patient of Dr. Andujar, recently seen by Dr. Bills who recommended EMU admit. She reports a history of spells since childhood, and recalls taking a medication briefly in childhood. She reports she would have times with no episodes, but they most recently began to occur again after her daughter moved out of the house. She describes an aura of "butterflies in her stomach", and states she remembers nothing else of the events. Her  reports she typically lifts her left arm up in the air, then falls to the floor and will use her left arm to catch her. She subsequently has some generalized shaking movements for approximately 1-2 minutes. Afterwards, she quickly returns to baseline per family. She denies bowel/bladder incontinence or tongue biting. Most recent event was yesterday, she reports the frequency is sporadic, and sometimes will have several per day, other times she will go a few days without one. Reports history of a stroke in 2015, chart reviewed and MRI negative for acute or remote infarct at that time, conversion disorder was suspected by Neuro Vascular team.     Of note, patient recently presented to the ER for suicidal ideation/suicide attempt with a superficial left wrist laceration. She was PEC'd, and placed in a psychiatric facility for approximately a week. She was discharged home with medication adjustments.     Patient was admitted to EMU two days ago but upon placement of EEG she was found " to have lice and was subsequently discharged. However, she has since then shaved her head and rescheduled for admittance to EMU.    * No surgery found *     Indwelling Lines/Drains at time of discharge:   Lines/Drains/Airways          None        Hospital Course:   9/11>9/12: One event reported by patient at night, no associated abnormal epileptiform changes    Patient requests to be discharged due to family emergency. Explained findings and results to patient. Answered patient questions and concerns. Explained the most likely diagnosis of her symptoms and stressed the importance of follow-up with mental health. Patient understands the need for continued follow-up with PCP, neurologist and psychiatrist for next step in management of her neurologic symptoms and management of her chronic medical conditions. At this time patient denies any new neurological symptoms, objectively no focal neuro deficits.    Consults:     Significant Labs:   Recent Lab Results       09/12/19  0737   09/11/19  2047   09/11/19  1713        POCT Glucose 170 133 156         All pertinent lab results from the past 24 hours have been reviewed.    Significant Studies: I have reviewed all pertinent imaging results/findings within the past 24 hours.    Pending Diagnostic Studies:     None        Final Active Diagnoses:    Diagnosis Date Noted POA    MDD (major depressive disorder), recurrent episode, moderate [F33.1] 02/04/2016 Yes    HILARIA (generalized anxiety disorder) [F41.1] 02/04/2016 Yes    Diabetes mellitus type II, uncontrolled [E11.65] 09/23/2015 Yes    Essential hypertension [I10] 09/23/2015 Yes    Conversion disorder [F44.9] 09/23/2015 Yes    Mixed hyperlipidemia [E78.2] 12/20/2012 Yes    Peripheral neuropathy [G62.9] 12/20/2012 Yes      Problems Resolved During this Admission:    Diagnosis Date Noted Date Resolved POA    PRINCIPAL PROBLEM:  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  Service: Epilepsy      Discharged Condition: good    Disposition: Home or Self Care    Follow Up:  Follow-up Information     Schedule an appointment as soon as possible for a visit to follow up.    Why:  Please follow up with Psychiatry               Patient Instructions:      Diet Adult Regular     Notify your health care provider if you experience any of the following:  persistent dizziness, light-headedness, or visual disturbances     Notify your health care provider if you experience any of the following:  increased confusion or weakness     Activity as tolerated       Medications:  Reconciled Home Medications:      Medication List      CONTINUE taking these medications    albuterol 2.5 mg /3 mL (0.083 %) nebulizer solution  Commonly known as:  PROVENTIL  USE ONE VIAL IN NEBULIZER EVERY 6 HOURS AS NEEDED FOR  WHEEZING.     ALPRAZolam 1 MG tablet  Commonly known as:  XANAX  Take 1 tablet (1 mg total) by mouth 3 (three) times daily as needed for Anxiety.     amitriptyline 25 MG tablet  Commonly known as:  ELAVIL  Take 1 tablet (25 mg total) by mouth nightly as needed for Insomnia.     ARIPiprazole 2 MG Tab  Commonly known as:  ABILIFY  Take 2 mg by mouth once daily.     atorvastatin 20 MG tablet  Commonly known as:  LIPITOR  Take 20 mg by mouth once daily.     blood-glucose meter kit  Commonly known as:  FREESTYLE SYSTEM KIT  Use as instructed     CONTOUR TEST STRIPS Strp  Generic drug:  blood sugar diagnostic  TEST BLOOD SUGARS 4 TIMES DAILY     dicyclomine 20 mg tablet  Commonly known as:  BENTYL  Take 20 mg by mouth 4 (four) times daily.     EPINEPHrine 0.3 mg/0.3 mL Atin  Commonly known as:  EPIPEN 2-LARS  Inject 0.3 mLs (0.3 mg total) into the muscle once.     FLUoxetine 40 MG capsule  Take 40 mg by mouth once daily.     folic  "acid 1 MG tablet  Commonly known as:  FOLVITE  Take 1 mg by mouth 3 (three) times daily as needed.     gabapentin 600 MG tablet  Commonly known as:  NEURONTIN  Take 1 tablet (600 mg total) by mouth 3 (three) times daily.     glimepiride 1 MG tablet  Commonly known as:  AMARYL  Take 1 tablet (1 mg total) by mouth 2 (two) times daily with meals.     hydroCHLOROthiazide 25 MG tablet  Commonly known as:  HYDRODIURIL  Take 25 mg by mouth once daily.     ibuprofen 800 MG tablet  Commonly known as:  ADVIL,MOTRIN  Take 1 tablet (800 mg total) by mouth 3 (three) times daily as needed.     insulin detemir U-100 100 unit/mL (3 mL) Inpn pen  Commonly known as:  LEVEMIR FLEXTOUCH U-100 INSULN  Inject 40 Units into the skin 2 (two) times daily.     ipratropium 0.02 % nebulizer solution  Commonly known as:  ATROVENT     lancets Misc  Commonly known as:  ONETOUCH ULTRASOFT LANCETS  1 Stick by Misc.(Non-Drug; Combo Route) route 2 (two) times daily.     metFORMIN 1000 MG tablet  Commonly known as:  GLUCOPHAGE  TAKE 1 TABLET BY MOUTH TWICE DAILY WITH MEALS     metoprolol succinate 50 MG 24 hr tablet  Commonly known as:  TOPROL-XL  TAKE ONE TABLET BY MOUTH ONCE DAILY     NITROSTAT 0.4 MG SL tablet  Generic drug:  nitroGLYCERIN     nystatin-triamcinolone cream  Commonly known as:  MYCOLOG II  Apply to affected area 2 times daily     olmesartan 20 MG tablet  Commonly known as:  BENICAR  Take 1 tablet (20 mg total) by mouth once daily.     omeprazole 20 MG capsule  Commonly known as:  PRILOSEC  Take 20 mg by mouth once daily.     pen needle, diabetic 31 gauge x 5/16" Ndle  USE ONE PEN NEEDLE SUBCUTANEOUSLY TWICE DAILY     QUEtiapine 400 MG tablet  Commonly known as:  SEROQUEL  TAKE 2 TABLETS BY MOUTH ONCE DAILY          Time spent on the discharge of patient: 25 minutes    Compa Flannery MD  Neurology-Epilepsy  [facility]  Staff: Dr. Phillips  "

## 2019-09-12 NOTE — PLAN OF CARE
09/12/19 1022   Final Note   Assessment Type Final Discharge Note   Anticipated Discharge Disposition Home   Right Care Referral Info   Post Acute Recommendation No Care

## 2019-09-13 NOTE — PHYSICIAN QUERY
PT Name: Eva Lane  MR #: [id number]     Physician Query Form - Diabetic Condition Clarification     CDS: JOSEPH Almanza       Contact information:[email or web address]    This form is a permanent document in the medical record.     Query Date: September 13, 2019    By submitting this query, we are merely seeking further clarification of documentation to reflect the severity of illness of your patient. Please utilize your independent clinical judgment when addressing the question(s) below.    The Medical record reflects the following:     Indicators   Supporting Clinical Findings Location in Medical Record   x Diabetes uncontrolled documented Diabetes mellitus type II, uncontrolled  - Hold home Metformin  - A1c pending  - Accuchecks TIDWM, Low dose SSI coverage for now H&P 9/9   x Lab Value(s), POCT glucose value(s)  9/11  11:27 9/11  17:13 9/11  20:47 9/12  07:37   Glucose 167  156  133  170       POCT Glucose   x Treatment                                 Medication insulin aspart U-100 pen 0-5 Units  Ordered Dose: 0-5 Units   Route: Subcutaneous   Frequency: 3 times daily with meals   MAR- ordered but not given    Other       Provider, please specify the meaning of the term uncontrolled:    [  x ] Diabetes mellitus Type 2 with hyperglycemia   [   ] Other diabetes complication (please specify): ____________   [   ]  Clinically Undetermined       Please document in your progress notes daily for the duration of treatment until resolved, and include in your discharge summary.

## 2019-09-13 NOTE — PHYSICIAN QUERY
PT Name: Eva Lane  MR #: [id number]    Physician Query Form - Cause and Effect Relationship Clarification      CDS: JOSEPH Amlanza    Contact information:[email or web address]    No need for query per Barbara    This form is a permanent document in the medical record.     Query Date: September 13, 2019    By submitting this query, we are merely seeking further clarification of documentation. Please utilize your independent clinical judgment when addressing the question(s) below.    The Medical record contains the following:  Supporting Clinical Findings   Location in record   Diabetes mellitus type II, uncontrolled  - Hold home Metformin  - A1c pending  - Accuchecks TIDWM, Low dose SSI coverage for now                                                                                                      H&P 9/9   Peripheral neuropathy  - Gabapentin 600 mg TID at home  - Hold during admission                                                                                                                                                       H&P 9/9         Provider, please clarify if there is any correlation between Diabetes and Peripheral Neuropathy.           Are the conditions:      [  ] Due to or associated with each other   [  ] Unrelated to each other   [  ] Other (Please Specify): _________________________   [  ] Clinically Undetermined

## 2019-09-16 ENCOUNTER — PATIENT OUTREACH (OUTPATIENT)
Dept: ADMINISTRATIVE | Facility: CLINIC | Age: 44
End: 2019-09-16

## 2019-09-16 NOTE — PATIENT INSTRUCTIONS
Discharge Instructions for Epilepsy  You have been diagnosed with epilepsy, a disorder of recurring seizures. When you have a seizure, an electrical disturbance happens in your brain. There are different kinds of seizures, and each patient may have one or many types of seizures. Here are some guidelines for you and your family.  If you have a seizure  Ask friends and family members to learn seizure management. Also, tell them to do the following if you have a seizure:  · Clear the area to prevent injury.  · Position you on a flat, carpeted surface, if possible.  · Dont try to restrain you.  · Dont put anything in your mouth.  · Turn you onto your side if you start to vomit.  · Keep track of the date and time the seizure started, how long it lasted, whether or not you lost consciousness, a description of your body movements, what provoked the seizure (if known), and any injuries you suffered. Using a watch may help keep correct time of events.    · Stay with you until you regain consciousness.  · Call 911 if the seizure is longer than 5 minutes, if there are multiple seizures, or if you do not begin to wake up after the seizure stops.    Activities  Following are some things to consider:  · Enjoy your normal activities. Most people with epilepsy lead normal lives.  · Avoid hazardous activities, such as mountain climbing or scuba diving. A seizure under these conditions could lead to a fatal accident.  · Do not swim alone or participate in other similar activities without others nearby.  · Ask your healthcare provider about any restrictions on driving or other activities.  · Check with your state department of public safety to learn whether there are any driving limitations based on your condition.  Other home care  Other considerations:  · Take your medicine exactly as directed. Skipping doses can affect the way your body handles the medicine, which could cause you to have a seizure.  · Dont drink alcohol or use  any medicine without talking with your healthcare provider first.  · Seizure medicines may interact with other medicines. Make sure all of your healthcare providers have a list of all your medicines.   · Birth control pills may not work as effectively when taking seizure medicines. Ask your healthcare provider if a change in birth control is needed.   · Wear a medical alert pendant or bracelet that alerts others to your condition, especially if you are allergic to seizure medicine.  · Join a local support group. Ask your healthcare provider for names and phone numbers.  .  When to seek medical attention  Tell your family members or friends to call 911 right away if you have:  · Seizure that lasts more than 5 minutes  · Multiple seizures in a row  · No recovery of consciousness after the seizure stops  Otherwise, have them call your healthcare provider right away if you have:  · Seizures that are getting longer and worse  · Seizures that are different from those youve had in the past  · Seizures strong enough to cause injury  · Skin rash  · Fever of 100.4°F (38°C) or higher, or as directed by your healthcare provider   Date Last Reviewed: 11/5/2015  © 3713-8166 [employer]. [address] [place] [address], [place] [address]. All rights reserved. This information is not intended as a substitute for professional medical care. Always follow your healthcare professional's instructions.

## 2019-09-26 ENCOUNTER — TELEPHONE (OUTPATIENT)
Dept: INTERNAL MEDICINE | Facility: CLINIC | Age: 44
End: 2019-09-26

## 2019-09-26 ENCOUNTER — OFFICE VISIT (OUTPATIENT)
Dept: INTERNAL MEDICINE | Facility: CLINIC | Age: 44
End: 2019-09-26
Payer: COMMERCIAL

## 2019-09-26 VITALS
HEIGHT: 64 IN | RESPIRATION RATE: 16 BRPM | SYSTOLIC BLOOD PRESSURE: 122 MMHG | DIASTOLIC BLOOD PRESSURE: 80 MMHG | HEART RATE: 102 BPM | BODY MASS INDEX: 25.97 KG/M2 | OXYGEN SATURATION: 99 % | WEIGHT: 152.13 LBS

## 2019-09-26 DIAGNOSIS — T14.91XA SUICIDAL BEHAVIOR WITH ATTEMPTED SELF-INJURY: ICD-10-CM

## 2019-09-26 DIAGNOSIS — F31.9 BIPOLAR 1 DISORDER: ICD-10-CM

## 2019-09-26 DIAGNOSIS — F31.9 BIPOLAR 1 DISORDER: Primary | ICD-10-CM

## 2019-09-26 DIAGNOSIS — F44.9 CONVERSION DISORDER: Primary | ICD-10-CM

## 2019-09-26 PROCEDURE — 3079F PR MOST RECENT DIASTOLIC BLOOD PRESSURE 80-89 MM HG: ICD-10-PCS | Mod: CPTII,S$GLB,, | Performed by: NURSE PRACTITIONER

## 2019-09-26 PROCEDURE — 99999 PR PBB SHADOW E&M-EST. PATIENT-LVL IV: CPT | Mod: PBBFAC,,, | Performed by: NURSE PRACTITIONER

## 2019-09-26 PROCEDURE — 3074F PR MOST RECENT SYSTOLIC BLOOD PRESSURE < 130 MM HG: ICD-10-PCS | Mod: CPTII,S$GLB,, | Performed by: NURSE PRACTITIONER

## 2019-09-26 PROCEDURE — 3008F BODY MASS INDEX DOCD: CPT | Mod: CPTII,S$GLB,, | Performed by: NURSE PRACTITIONER

## 2019-09-26 PROCEDURE — 3008F PR BODY MASS INDEX (BMI) DOCUMENTED: ICD-10-PCS | Mod: CPTII,S$GLB,, | Performed by: NURSE PRACTITIONER

## 2019-09-26 PROCEDURE — 99214 PR OFFICE/OUTPT VISIT, EST, LEVL IV, 30-39 MIN: ICD-10-PCS | Mod: S$GLB,,, | Performed by: NURSE PRACTITIONER

## 2019-09-26 PROCEDURE — 99214 OFFICE O/P EST MOD 30 MIN: CPT | Mod: S$GLB,,, | Performed by: NURSE PRACTITIONER

## 2019-09-26 PROCEDURE — 99999 PR PBB SHADOW E&M-EST. PATIENT-LVL IV: ICD-10-PCS | Mod: PBBFAC,,, | Performed by: NURSE PRACTITIONER

## 2019-09-26 PROCEDURE — 3074F SYST BP LT 130 MM HG: CPT | Mod: CPTII,S$GLB,, | Performed by: NURSE PRACTITIONER

## 2019-09-26 PROCEDURE — 3079F DIAST BP 80-89 MM HG: CPT | Mod: CPTII,S$GLB,, | Performed by: NURSE PRACTITIONER

## 2019-09-26 RX ORDER — DIVALPROEX SODIUM 250 MG/1
250 TABLET, DELAYED RELEASE ORAL 2 TIMES DAILY
Refills: 0 | COMMUNITY
Start: 2019-09-02 | End: 2019-09-26

## 2019-09-26 RX ORDER — FENOFIBRIC ACID 135 MG/1
1 CAPSULE, DELAYED RELEASE ORAL DAILY
Refills: 5 | COMMUNITY
Start: 2019-08-07 | End: 2021-04-13

## 2019-09-26 RX ORDER — PROPRANOLOL HYDROCHLORIDE 10 MG/1
10 TABLET ORAL 3 TIMES DAILY
Refills: 0 | COMMUNITY
Start: 2019-09-02 | End: 2021-04-27

## 2019-09-26 RX ORDER — ICOSAPENT ETHYL 1000 MG/1
2 CAPSULE ORAL 2 TIMES DAILY
Refills: 5 | COMMUNITY
Start: 2019-07-03 | End: 2021-07-06

## 2019-09-26 RX ORDER — FENOFIBRATE 145 MG/1
145 TABLET, FILM COATED ORAL EVERY MORNING
Refills: 0 | COMMUNITY
Start: 2019-09-04 | End: 2021-07-06

## 2019-09-26 NOTE — TELEPHONE ENCOUNTER
I will try vraylar on patient so have her come by and get a voucher and I printed the medication so that she can take with her.

## 2019-09-26 NOTE — PATIENT INSTRUCTIONS
Conversion Disorder (Conversion Reaction)  You are showing signs of conversion disorder. This happens when stress or conflict triggers physical symptoms. It may in some ways look like a neurological disorder, but is not. Symptoms include:  · Not being able to move or feel parts of your body  · Not being able to stand or walk normally  · Movement of your body that feels out of your control  · Loss of normal speech, sight, or hearing  · Trouble urinating or swallowing  · Tremors (shaking) or non-epileptic seizures (convulsions)  In the ER, you will probably have some tests done to make sure there are not other causes of your symptoms. You may also be given medications to help relax you. The main treatment for conversion disorder is counseling. This can be arranged through your doctor. Our staff may also give you referrals to someone you can talk to.  Home care  If you have been given medicine, take it as you have been told to by the doctor or hospital staff.  Tell each of your healthcare providers about all of the prescription drugs, over-the-counter medicines, vitamins, and supplements you take. Certain supplements interact with medicines and may result in dangerous side effects. Ask your pharmacist when you have questions about drug interactions.  Symptoms of conversion disorder most often improve over a period of a few weeks without specific medicine. The main treatment for conversion disorder is psychotherapy or counseling. This can include individual and group therapy. Medicine may be used to treat anxiety or depression. Other treatments that might help include physical therapy, hypnosis and relaxation.  Follow-up care  Follow up with your healthcare provider, or as advised.  · If X-rays or a CT scan were done, and a radiologist had not seen them while you were there, they will be reviewed, and you will be notified if there is a change in the reading, especially if it affects treatment.  Call 911  Call 911 if  any of these occur:  · Trouble breathing  · Very confused  · Very drowsy or trouble awakening  · Fainting or loss of consciousness  · Rapid heart rate  · Seizure  When to seek medical advice  Call your healthcare provider right away if any of the following occur:  · Worsening of your symptoms or symptoms not improving over time  · New symptoms  · Desire to harm yourself or others  Date Last Reviewed: 9/29/2015  © 3188-3467 [employer]. [address] [place] [address], [place] [address]. All rights reserved. This information is not intended as a substitute for professional medical care. Always follow your healthcare professional's instructions.

## 2019-09-26 NOTE — TELEPHONE ENCOUNTER
----- Message from Chika Rosales MA sent at 9/26/2019 12:41 PM CDT -----  Contact: Patient   Patient is out of refills on Depakote.     Please send new script to [facility] ([place])
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
## 2019-10-05 NOTE — PROCEDURES
EMU REPORT    DATE OF PROCEDURE:  9/11/19     EEG NUMBER:  [facility] -1, [facility] -2     REFERRING PHYSICIAN:       This EEG was performed to characterize the patient's events.     ELECTROENCEPHALOGRAM REPORT     METHODOLOGY:  Electroencephalographic (EEG) recording is recorded with electrodes placed according to the International 10-20 placement system.  Thirty two (32) channels of digital signal (sampling rate of 512/sec), including T1 and T2, were simultaneously recorded from the scalp and may include EKG, EMG, and/or eye monitors.  Recording band pass was 0.1 to 512 Hz.  Digital video recording of the patient is simultaneously recorded with the EEG.  The patient is instructed to report clinical symptoms which may occur during the recording session.  EEG and video recording are stored and archived in digital format.    Activation procedures, which include photic stimulation, hyperventilation and instructing patients to perform simple tasks, are done in selected patients.   The EEG is displayed on a monitor screen and can be reviewed using different montages.  Computer assisted-analysis is employed to detect spike and electrographic seizure activity.   The entire record is submitted for computer analysis.  The entire recording is visually reviewed, and the times identified by computer analysis as being spikes or seizures are reviewed again.    Compressed spectral analysis (CSA) is also performed on the activity recorded from each individual channel.  This is displayed as a power display of frequencies from 0 to 30 Hz over time.   The CSA is reviewed looking for asymmetries in power between homologous areas of the scalp, then compared with the original EEG recording.    [employer] software was also utilized in the review of this study.  This software suite analyzes the EEG recording in multiple domains.  Coherence and rhythmicity are computed to identify EEG sections which may contain organized seizures.  Each  channel undergoes analysis to detect the presence of spike and sharp waves which have special and morphological characteristics of epileptic activity.  The routine EEG recording is converted from special into frequency domain.  This is then displayed comparing homologous areas to identify areas of significant asymmetry.  Algorithm to identify non-cortically generated artifact is used to separate artifact from the EEG.       RECORDING TIMES:  Start on September 11, 2019 at hours 13 min and 43 sec in 15  End on September 12, 2019 at hours is 7 min 0 sec  Restart on September 12, 2019 at hours 7 min 0 sec 28  End on September 12, 2019 at hour 9 min 24 sec  The total time of video EEG recording for this study was 19 hr and 37 min    SEIZURES RECORDED:    During this study 1 of the patient's typical events was recorded on 9/11/19 from hours 21 min 36 sec 332 hr 21 min 36 sec 54     ELECTROENCEPHALOGRAM:  Interictal:  The recording was obtained with a number of standard bipolar and referential montages during wakefulness, drowsiness and sleep.  In the alert state, the posterior background rhythm was a symmetric, well-modulated 10 Hz alpha rhythm, which reacted symmetrically to eye opening. Intermittent photic stimulation evoked symmetric posterior driving responses. No abnormalities were activated by photic stimulation.   During drowsiness, the background rhythm waxed and waned and there were periods of slowing.  During stage II sleep, symmetric V waves, K complexes and sleep spindles were noted.    Ictal:  1 event was recorded without associated epileptiform abnormality on EEG     CLINICAL DESCRIPTION OF THE SEIZURES:  The event occurred during wakefulness.  Patient had sudden extension of her neck.  She then held her belly with her right arm and had subsequent upper body jerking.  This was associated with side-to-side head shaking.     FINAL SUMMARY:  ELECTROENCEPHALOGRAM:  Interictal:    This is a normal EEG during  wakefulness, drowsiness and sleep.       Ictal:  1 event was recorded without associated epileptiform abnormality on EEG    CLINICAL SEIZURE:  Classification: Non epileptic event     CLINICAL CORRELATION:    The patient is a 43-year-old female who is being evaluated for stereotypical episodes.  This is a normal EEG during wakefulness, drowsiness and sleep.  There is no evidence for either cortical dysfunction or an epileptic process on this recording.  No seizures were recorded during this study.  During this study 1 nonepileptic event was recorded.

## 2019-10-22 ENCOUNTER — TELEPHONE (OUTPATIENT)
Dept: INTERNAL MEDICINE | Facility: CLINIC | Age: 44
End: 2019-10-22

## 2019-10-22 RX ORDER — IBUPROFEN 200 MG
1 TABLET ORAL DAILY
Qty: 30 PATCH | Refills: 0 | Status: SHIPPED | OUTPATIENT
Start: 2019-10-22 | End: 2021-04-12

## 2019-10-22 NOTE — TELEPHONE ENCOUNTER
----- Message from Chika Rosales MA sent at 10/22/2019  9:05 AM CDT -----  Contact: Patient  Patient is requesting something to help her to quit smoking.     Please Advise:  [phone]    Send to [facility] ([place])  
Called in patches   
Discharged

## 2019-10-28 ENCOUNTER — TELEPHONE (OUTPATIENT)
Dept: INTERNAL MEDICINE | Facility: CLINIC | Age: 44
End: 2019-10-28

## 2019-10-28 ENCOUNTER — OFFICE VISIT (OUTPATIENT)
Dept: INTERNAL MEDICINE | Facility: CLINIC | Age: 44
End: 2019-10-28
Payer: COMMERCIAL

## 2019-10-28 VITALS
BODY MASS INDEX: 26.63 KG/M2 | DIASTOLIC BLOOD PRESSURE: 86 MMHG | SYSTOLIC BLOOD PRESSURE: 124 MMHG | HEIGHT: 64 IN | WEIGHT: 156 LBS | RESPIRATION RATE: 20 BRPM | OXYGEN SATURATION: 98 % | HEART RATE: 96 BPM

## 2019-10-28 DIAGNOSIS — I10 ESSENTIAL HYPERTENSION: ICD-10-CM

## 2019-10-28 DIAGNOSIS — Z79.899 ON VALPROATE THERAPY: ICD-10-CM

## 2019-10-28 DIAGNOSIS — F44.9 CONVERSION DISORDER: ICD-10-CM

## 2019-10-28 DIAGNOSIS — E78.2 MIXED HYPERLIPIDEMIA: ICD-10-CM

## 2019-10-28 DIAGNOSIS — F33.1 MDD (MAJOR DEPRESSIVE DISORDER), RECURRENT EPISODE, MODERATE: ICD-10-CM

## 2019-10-28 DIAGNOSIS — E11.65 UNCONTROLLED TYPE 2 DIABETES MELLITUS WITH HYPERGLYCEMIA: Primary | ICD-10-CM

## 2019-10-28 PROCEDURE — 3079F DIAST BP 80-89 MM HG: CPT | Mod: CPTII,S$GLB,, | Performed by: NURSE PRACTITIONER

## 2019-10-28 PROCEDURE — 99999 PR PBB SHADOW E&M-EST. PATIENT-LVL V: CPT | Mod: PBBFAC,,, | Performed by: NURSE PRACTITIONER

## 2019-10-28 PROCEDURE — 99214 OFFICE O/P EST MOD 30 MIN: CPT | Mod: S$GLB,,, | Performed by: NURSE PRACTITIONER

## 2019-10-28 PROCEDURE — 3074F PR MOST RECENT SYSTOLIC BLOOD PRESSURE < 130 MM HG: ICD-10-PCS | Mod: CPTII,S$GLB,, | Performed by: NURSE PRACTITIONER

## 2019-10-28 PROCEDURE — 99214 PR OFFICE/OUTPT VISIT, EST, LEVL IV, 30-39 MIN: ICD-10-PCS | Mod: S$GLB,,, | Performed by: NURSE PRACTITIONER

## 2019-10-28 PROCEDURE — 3046F PR MOST RECENT HEMOGLOBIN A1C LEVEL > 9.0%: ICD-10-PCS | Mod: CPTII,S$GLB,, | Performed by: NURSE PRACTITIONER

## 2019-10-28 PROCEDURE — 99999 PR PBB SHADOW E&M-EST. PATIENT-LVL V: ICD-10-PCS | Mod: PBBFAC,,, | Performed by: NURSE PRACTITIONER

## 2019-10-28 PROCEDURE — 3008F BODY MASS INDEX DOCD: CPT | Mod: CPTII,S$GLB,, | Performed by: NURSE PRACTITIONER

## 2019-10-28 PROCEDURE — 3046F HEMOGLOBIN A1C LEVEL >9.0%: CPT | Mod: CPTII,S$GLB,, | Performed by: NURSE PRACTITIONER

## 2019-10-28 PROCEDURE — 3074F SYST BP LT 130 MM HG: CPT | Mod: CPTII,S$GLB,, | Performed by: NURSE PRACTITIONER

## 2019-10-28 PROCEDURE — 3079F PR MOST RECENT DIASTOLIC BLOOD PRESSURE 80-89 MM HG: ICD-10-PCS | Mod: CPTII,S$GLB,, | Performed by: NURSE PRACTITIONER

## 2019-10-28 PROCEDURE — 3008F PR BODY MASS INDEX (BMI) DOCUMENTED: ICD-10-PCS | Mod: CPTII,S$GLB,, | Performed by: NURSE PRACTITIONER

## 2019-10-28 RX ORDER — ALPRAZOLAM 1 MG/1
1 TABLET ORAL 3 TIMES DAILY PRN
Refills: 2 | COMMUNITY
Start: 2019-10-09 | End: 2020-01-16

## 2019-10-28 RX ORDER — LACTULOSE 10 G/15ML
20 SOLUTION ORAL DAILY
Qty: 300 ML | Refills: 0 | Status: SHIPPED | OUTPATIENT
Start: 2019-10-28 | End: 2019-11-07

## 2019-10-28 NOTE — PATIENT INSTRUCTIONS
Diabetes: Activity Tips    Being more active can help you manage your diabetes. The tips on this sheet can help you get the most from your exercise. They can also help you stay safe.  Staying Active  Its important for adults to spend less time sitting and being inactive. This is especially true if you have type 2 diabetes. When you are sitting for long periods of time, get up for short sessions of light activity every 30 minutes.  You should aim for at least 150 minutes a week of exercise or physical activity. Dont let more than 2 days go by without being active.  Benefit from briskness  Brisk activity gets your heart beating faster. This can help you increase your fitness, lose extra weight, and manage your blood sugar level. Try brisk walking. Or, if you have foot or leg problems, you can try swimming or bike riding. You can break up your exercise into chunks throughout the day. Work up to at least 30 minutes of steady, brisk exercise on most days.  Warm up and cool down  Warming up and cooling down reduce your risk of injury. They also help limit muscle soreness. Do a mild version of your activity for 5 minutes before and after your routine. You can also learn stretches that will help keep your muscles loose. Your healthcare provider may show you good ways to warm up and stretch.  Do the talk-sing test  The talk-sing test is a simple way to tell how hard youre exercising. If you can talk while exercising, youre in a safe range. If youre out of breath, slow down. If you can carry a tune, its time to  the pace. Walk up a hill. Increase the resistance on your stationary bike. Or swim faster.  What about eating?  You may be told to plan your exercise for 1 to 2 hours after a meal. In most cases, you dont need to eat while being active. If you take insulin or medicine that can cause low blood sugar, test your blood sugar before exercising. And carry a fast-acting sugar that will raise your blood sugar  level quickly. This includes glucose tablets or hard candy. Use it if you feel low blood sugar symptoms.  Safety tips  These tips can help you stay safe as you become fit:  · Exercise with a friend or carry a cell phone if you have one.  · Carry or wear identification, such as a necklace or bracelet, that says you have diabetes.  · Use the proper footwear and safety equipment for your activity.  · Drink water before, during, and after exercise.  · Dress properly for the weather.  · Dont exercise in very hot or very cold weather.  · Dont exercise if you are sick.  · If you are instructed to do so, test your blood sugar before and after you exercise. Have a small carbohydrate snack if your blood sugar is low before you start exercising.   When to stop exercising and call your healthcare provider  Stop exercising and call your healthcare provider right away if you notice any of the following:  · Pain, pressure, tightness, or heaviness in the chest  · Pain or heaviness in the neck, shoulders, back, arms, legs, or feet  · Unusual shortness of breath  · Dizziness or lightheadedness  · Unusually rapid or slow pulse  · Increased joint or muscle pain  · Nausea or vomiting  Date Last Reviewed: 5/1/2016  © 6648-9732 [employer]. [address] [place] [address], [place] [address]. All rights reserved. This information is not intended as a substitute for professional medical care. Always follow your healthcare professional's instructions.

## 2019-10-28 NOTE — PROGRESS NOTES
"Subjective:       Patient ID: Eva Lane is a 43 y.o. female.    Chief Complaint: Follow-up (x 1 month)    Patient is known to me and presents for one month follow up after starting the medication Vraylar.  Pt states she has noticed no difference in the way she feels and complains there are "heavy people sitting on my feet at night" and "little white orbits in the air."  Pt denies thoughts of self harm.  Chief Complaint   Patient presents with    Follow-up     x 1 month   .  Denies chest pain and shortness of breath.  See above HPI   HPI  Review of Systems   Constitutional: Negative for activity change, appetite change, fatigue, fever and unexpected weight change.   HENT: Negative for congestion, ear discharge, ear pain, hearing loss, postnasal drip and tinnitus.    Eyes: Negative for photophobia, pain and visual disturbance.   Respiratory: Negative for cough, shortness of breath, wheezing and stridor.    Cardiovascular: Negative for chest pain, palpitations and leg swelling.   Gastrointestinal: Negative for abdominal distention.   Genitourinary: Negative for difficulty urinating, dysuria, frequency, hematuria and urgency.   Musculoskeletal: Negative for arthralgias, back pain, gait problem, joint swelling and neck pain.   Skin: Negative.    Neurological: Negative for dizziness, seizures, syncope, weakness, light-headedness, numbness and headaches.   Hematological: Negative for adenopathy. Does not bruise/bleed easily.   Psychiatric/Behavioral: Positive for dysphoric mood and sleep disturbance. Negative for agitation, behavioral problems, confusion, decreased concentration, hallucinations, self-injury and suicidal ideas. The patient is nervous/anxious. The patient is not hyperactive.        Objective:      Physical Exam   Constitutional: She is oriented to person, place, and time. She appears well-developed and well-nourished. No distress.   HENT:   Head: Normocephalic and atraumatic.   Right Ear: " External ear normal.   Left Ear: External ear normal.   Mouth/Throat: Oropharynx is clear and moist. No oropharyngeal exudate.   Eyes: Pupils are equal, round, and reactive to light. Conjunctivae and EOM are normal. Right eye exhibits no discharge. Left eye exhibits no discharge.   Neck: Normal range of motion. Neck supple. No JVD present. No thyromegaly present.   Cardiovascular: Normal rate, regular rhythm, normal heart sounds and intact distal pulses. Exam reveals no gallop and no friction rub.   No murmur heard.  Pulmonary/Chest: Effort normal and breath sounds normal. No stridor. No respiratory distress. She has no wheezes. She has no rales. She exhibits no tenderness.   Abdominal: Soft. Bowel sounds are normal. She exhibits no distension and no mass. There is no tenderness. There is no rebound.   Musculoskeletal: Normal range of motion. She exhibits no edema or tenderness.   Lymphadenopathy:     She has no cervical adenopathy.   Neurological: She is alert and oriented to person, place, and time. She has normal reflexes. She displays normal reflexes. No cranial nerve deficit. She exhibits normal muscle tone. Coordination normal.   Skin: Skin is warm and dry. Capillary refill takes less than 2 seconds. No rash noted. No erythema. No pallor.   Left wrist scarring healing well.    Psychiatric: She has a normal mood and affect. Her behavior is normal. Judgment and thought content normal.   Flat affect noted. No SI/HI noted this visit.        Assessment:       1. Uncontrolled type 2 diabetes mellitus with hyperglycemia    2. Mixed hyperlipidemia    3. Essential hypertension    4. MDD (major depressive disorder), recurrent episode, moderate    5. Conversion disorder    6. On valproate therapy        Plan:   Eva was seen today for follow-up.    Diagnoses and all orders for this visit:    Uncontrolled type 2 diabetes mellitus with hyperglycemia  -     CBC auto differential; Future  -     Comprehensive metabolic panel;  "Future  -     Microalbumin/creatinine urine ratio; Future  -     Hemoglobin A1c; Future    Mixed hyperlipidemia  -     CBC auto differential; Future  -     Comprehensive metabolic panel; Future  -     TSH; Future  -     Lipid panel; Future    Essential hypertension  -     CBC auto differential; Future  -     Comprehensive metabolic panel; Future  -     Microalbumin/creatinine urine ratio; Future    MDD (major depressive disorder), recurrent episode, moderate  -     CBC auto differential; Future  -     Comprehensive metabolic panel; Future    Conversion disorder  -     CBC auto differential; Future  -     Comprehensive metabolic panel; Future    On valproate therapy  -     VALPROIC ACID; Future    "This note will not be shared with the patient."  She will see mental health next month  Continue with plan of care  Check CBC, CMP, TSH, Fasting lipid profile, HgA1c, Microalbuminuria in three months.  Medication compliance was discussed with the patient.  The patient was instructed to monitor glucoses closely using glucometer at home and to record the values in a log.  The patient was instructed to obtain an Optometry exam and microalbumin q year.  The patient was instructed to obtain a hemoglobin A1C q 3-4 months.  The patient was instructed to check the feet visually daily and to monitor for infection.  The patient was instructed to exercise at least 3 times a week.  The patient was instructed to follow a 1800 ADA diet.  The patient was instructed to monitor weight daily and try to keep it as close to ideal body weight as possible.  The patient was instructed on using exercise frequently to reduce BP.  The patient was instructed on using a low salt diet.  Monitor BP at home and to record the values in a log.  RTC in three months.  rtc as scheduled  "

## 2019-10-28 NOTE — TELEPHONE ENCOUNTER
----- Message from Chika Rosales MA sent at 10/28/2019  1:29 PM CDT -----  Contact: Patient  Patient c/o being very constipated.  She has tried stool softeners with no relief.     Please Advise:  [phone]    Send meds to [facility] ([facility])

## 2019-11-11 ENCOUNTER — TELEPHONE (OUTPATIENT)
Dept: INTERNAL MEDICINE | Facility: CLINIC | Age: 44
End: 2019-11-11

## 2019-11-11 NOTE — TELEPHONE ENCOUNTER
"----- Message from Chika Rosales MA sent at 11/11/2019 10:35 AM CST -----  Contact: Patient  Patient called c/o "Having seizures all day yesterday",  She says today she feels like her legs cannot hold her up.  Wants to Know what Cintia wants her to do?    Please Advise:  [phone]  "

## 2019-11-12 ENCOUNTER — HOSPITAL ENCOUNTER (EMERGENCY)
Facility: HOSPITAL | Age: 44
Discharge: HOME | End: 2019-11-12
Attending: SURGERY
Payer: COMMERCIAL

## 2019-11-12 VITALS
OXYGEN SATURATION: 100 % | HEART RATE: 89 BPM | DIASTOLIC BLOOD PRESSURE: 74 MMHG | BODY MASS INDEX: 26.78 KG/M2 | WEIGHT: 156 LBS | RESPIRATION RATE: 18 BRPM | SYSTOLIC BLOOD PRESSURE: 110 MMHG

## 2019-11-12 DIAGNOSIS — R53.1 WEAKNESS: Primary | ICD-10-CM

## 2019-11-12 LAB
ALBUMIN SERPL BCP-MCNC: 3.5 G/DL (ref 3.5–5.2)
ALP SERPL-CCNC: 95 U/L (ref 55–135)
ALT SERPL W/O P-5'-P-CCNC: 16 U/L (ref 10–44)
ANION GAP SERPL CALC-SCNC: 8 MMOL/L (ref 8–16)
AST SERPL-CCNC: 18 U/L (ref 10–40)
BASOPHILS # BLD AUTO: 0.03 K/UL (ref 0–0.2)
BASOPHILS NFR BLD: 0.3 % (ref 0–1.9)
BILIRUB SERPL-MCNC: 0.3 MG/DL (ref 0.1–1)
BILIRUB UR QL STRIP: NEGATIVE
BUN SERPL-MCNC: 15 MG/DL (ref 6–20)
CALCIUM SERPL-MCNC: 9.2 MG/DL (ref 8.7–10.5)
CHLORIDE SERPL-SCNC: 101 MMOL/L (ref 95–110)
CLARITY UR: CLEAR
CO2 SERPL-SCNC: 29 MMOL/L (ref 23–29)
COLOR UR: YELLOW
CREAT SERPL-MCNC: 0.9 MG/DL (ref 0.5–1.4)
DIFFERENTIAL METHOD: ABNORMAL
EOSINOPHIL # BLD AUTO: 0.4 K/UL (ref 0–0.5)
EOSINOPHIL NFR BLD: 3.6 % (ref 0–8)
ERYTHROCYTE [DISTWIDTH] IN BLOOD BY AUTOMATED COUNT: 12.8 % (ref 11.5–14.5)
EST. GFR  (AFRICAN AMERICAN): >60 ML/MIN/1.73 M^2
EST. GFR  (NON AFRICAN AMERICAN): >60 ML/MIN/1.73 M^2
GLUCOSE SERPL-MCNC: 233 MG/DL (ref 70–110)
GLUCOSE UR QL STRIP: ABNORMAL
HCT VFR BLD AUTO: 39.5 % (ref 37–48.5)
HGB BLD-MCNC: 13 G/DL (ref 12–16)
HGB UR QL STRIP: NEGATIVE
IMM GRANULOCYTES # BLD AUTO: 0.1 K/UL (ref 0–0.04)
IMM GRANULOCYTES NFR BLD AUTO: 0.9 % (ref 0–0.5)
KETONES UR QL STRIP: NEGATIVE
LEUKOCYTE ESTERASE UR QL STRIP: NEGATIVE
LYMPHOCYTES # BLD AUTO: 2.2 K/UL (ref 1–4.8)
LYMPHOCYTES NFR BLD: 20.4 % (ref 18–48)
MCH RBC QN AUTO: 30.5 PG (ref 27–31)
MCHC RBC AUTO-ENTMCNC: 32.9 G/DL (ref 32–36)
MCV RBC AUTO: 93 FL (ref 82–98)
MONOCYTES # BLD AUTO: 0.7 K/UL (ref 0.3–1)
MONOCYTES NFR BLD: 6.2 % (ref 4–15)
NEUTROPHILS # BLD AUTO: 7.4 K/UL (ref 1.8–7.7)
NEUTROPHILS NFR BLD: 68.6 % (ref 38–73)
NITRITE UR QL STRIP: NEGATIVE
NRBC BLD-RTO: 0 /100 WBC
PH UR STRIP: 5 [PH] (ref 5–8)
PLATELET # BLD AUTO: 232 K/UL (ref 150–350)
PMV BLD AUTO: 11.3 FL (ref 9.2–12.9)
POCT GLUCOSE: 229 MG/DL (ref 70–110)
POTASSIUM SERPL-SCNC: 4.2 MMOL/L (ref 3.5–5.1)
PROT SERPL-MCNC: 7.5 G/DL (ref 6–8.4)
PROT UR QL STRIP: NEGATIVE
RBC # BLD AUTO: 4.26 M/UL (ref 4–5.4)
SODIUM SERPL-SCNC: 138 MMOL/L (ref 136–145)
SP GR UR STRIP: 1.02 (ref 1–1.03)
URN SPEC COLLECT METH UR: ABNORMAL
UROBILINOGEN UR STRIP-ACNC: NEGATIVE EU/DL
WBC # BLD AUTO: 10.73 K/UL (ref 3.9–12.7)

## 2019-11-12 PROCEDURE — 99284 EMERGENCY DEPT VISIT MOD MDM: CPT | Mod: 25

## 2019-11-12 PROCEDURE — 85025 COMPLETE CBC W/AUTO DIFF WBC: CPT

## 2019-11-12 PROCEDURE — 36415 COLL VENOUS BLD VENIPUNCTURE: CPT

## 2019-11-12 PROCEDURE — 81003 URINALYSIS AUTO W/O SCOPE: CPT

## 2019-11-12 PROCEDURE — 80053 COMPREHEN METABOLIC PANEL: CPT

## 2019-11-12 PROCEDURE — 82962 GLUCOSE BLOOD TEST: CPT

## 2019-11-12 NOTE — ED TRIAGE NOTES
43 y.o. female presents to ER ED 04/ED 04   Chief Complaint   Patient presents with    Extremity Weakness     right side    reports confusion for two days, reports right sided weakness onset this morning, family first noticed weakness 20 minutes ago. No acute distress noted.

## 2019-11-12 NOTE — ED PROVIDER NOTES
Encounter Date: 11/12/2019       History     Chief Complaint   Patient presents with    Extremity Weakness     right side     Patient is 43-year-old white female with reported right upper and lower extremity weakness for as much as 1-2 days.  Prior history of CVA, patient has also had similar problem to this in the past, felt to be conversion disorder after negative workup.  Patient has psychiatric history of clinical depression and suicidal ideation.  Patient cannot give much history in regards to onset or limitations in physical capacity at the present time.        Review of patient's allergies indicates:   Allergen Reactions    Penicillins Swelling and Rash    Neosporin [neomycin-bacitracin-polymyxin] Rash    Shellfish containing products     Shrimp Hives     Past Medical History:   Diagnosis Date    ADHD (attention deficit hyperactivity disorder)     Anxiety     Arthritis     Asthma     Behavioral problem     Bipolar 1 disorder     CHF (congestive heart failure)     COPD (chronic obstructive pulmonary disease)     Depression     Diabetes mellitus type II     Hx of psychiatric care     Hyperlipidemia     Hypertension     Lumbar radiculopathy     Neuralgia     Neuritis     Psychiatric problem     PTSD (post-traumatic stress disorder)     Seizures     Seizure-last 8/2015-shake and fall-doesnt remember anything    Self-harming behavior     Sleep apnea     on CPAP    Stroke 9/22/2015    Stroke     Therapy      Past Surgical History:   Procedure Laterality Date    COLONOSCOPY N/A 7/1/2016    Procedure: COLONOSCOPY;  Surgeon: Robert Hernandez MD;  Location: [facility] ([address] [place] [address]);  Service: Endoscopy;  Laterality: N/A;  Schedule for next available CRS physician - EGD @ 8:30 with Dr. Naylor    CYST REMOVAL  2000    Fatty cyst on right face cheek and left upper arm     DILATION AND CURETTAGE OF UTERUS  2006    Miscarriage    HYSTERECTOMY  7/08    DUB - Total    OOPHORECTOMY  7/2008    TOTAL  ABDOMINAL HYSTERECTOMY  7/2008    TUBAL LIGATION  2007     Family History   Problem Relation Age of Onset    Heart disease Father     Hyperlipidemia Father     Hypertension Father     Diabetes Father     Breast cancer Neg Hx     Colon cancer Neg Hx     Ovarian cancer Neg Hx      Social History     Tobacco Use    Smoking status: Current Every Day Smoker     Packs/day: 0.10     Years: 26.00     Pack years: 2.60     Types: Cigarettes     Start date: 7/17/1986    Smokeless tobacco: Never Used    Tobacco comment: told about [facility] smoking cessation program-given smoking clinic pamphlet   Substance Use Topics    Alcohol use: No    Drug use: No     Review of Systems   Neurological: Positive for weakness.   All other systems reviewed and are negative.      Physical Exam     Initial Vitals [11/12/19 1003]   BP Pulse Resp Temp SpO2   118/71 85 20 -- 97 %      MAP       --         Physical Exam    Nursing note and vitals reviewed.  Constitutional: She appears well-developed. No distress.   HENT:   Head: Normocephalic and atraumatic.   Nose: Nose normal.   Mouth/Throat: Oropharynx is clear and moist.   Eyes: Conjunctivae and EOM are normal. Pupils are equal, round, and reactive to light.   Neck: Normal range of motion. Neck supple.   Cardiovascular: Normal rate, regular rhythm, normal heart sounds and intact distal pulses.   Pulmonary/Chest: Breath sounds normal. No respiratory distress. She has no wheezes. She has no rhonchi. She has no rales.   Abdominal: Soft. Bowel sounds are normal. She exhibits no distension. There is no tenderness.   Musculoskeletal: Normal range of motion.   Neurological: She is alert and oriented to person, place, and time. She has normal strength.   Skin: Skin is warm and dry.   Psychiatric:   Depressed affect         ED Course   Procedures  Labs Reviewed   POCT GLUCOSE - Abnormal; Notable for the following components:       Result Value    POCT Glucose 229 (*)     All other components  within normal limits   COMPREHENSIVE METABOLIC PANEL   CBC W/ AUTO DIFFERENTIAL   URINALYSIS, REFLEX TO URINE CULTURE   POCT GLUCOSE MONITORING CONTINUOUS          Imaging Results          CT Head Without Contrast (Final result)  Result time 11/12/19 10:29:14    Final result by Hien Steve MD (11/12/19 10:29:14)                 Impression:      Basal ganglia calcifications.  No acute process is identified.      Electronically signed by: Hien Steve MD  Date:    11/12/2019  Time:    10:29             Narrative:    EXAMINATION:  CT HEAD WITHOUT CONTRAST    CLINICAL HISTORY:  Confusion/delirium, altered LOC, unexplained;Abn findings in other body fluids and substances;    TECHNIQUE:  Low dose axial images were obtained through the head.  Coronal and sagittal reformations were also performed. Contrast was not administered.    COMPARISON:  06/27/2019, 06/24/2019, 04/30/2019, 09/22/2015    FINDINGS:  Brain is normally formed.  There are calcifications in the region of the left basal ganglia, similar to that seen on prior examinations.  There is no evidence for acute intracranial hemorrhage or sulcal effacement.  No mass effect or midline shift.  The ventricles are normal in size and configuration without hydrocephalus.  No extra-axial fluid collections.  Visualized paranasal sinuses including the mastoid air cells are clear.                               Labs unremarkable.  CT Head NEGATIVE for acute changes.                                  Clinical Impression:       ICD-10-CM ICD-9-CM   1. Weakness R53.1 780.79         Disposition:   Disposition: Discharged  Condition: Stable                     Ashish Daniels Jr., MD  11/12/19 [id number]

## 2019-11-13 ENCOUNTER — PATIENT OUTREACH (OUTPATIENT)
Dept: ADMINISTRATIVE | Facility: OTHER | Age: 44
End: 2019-11-13

## 2019-11-18 ENCOUNTER — TELEPHONE (OUTPATIENT)
Dept: INTERNAL MEDICINE | Facility: CLINIC | Age: 44
End: 2019-11-18

## 2019-11-18 DIAGNOSIS — K04.7 TOOTH INFECTION: Primary | ICD-10-CM

## 2019-11-18 RX ORDER — DOXYCYCLINE HYCLATE 100 MG
100 TABLET ORAL EVERY 12 HOURS
Qty: 14 TABLET | Refills: 0 | Status: SHIPPED | OUTPATIENT
Start: 2019-11-18 | End: 2019-11-25

## 2019-11-18 NOTE — TELEPHONE ENCOUNTER
Additional Information   Negative: Severe difficulty breathing (e.g., struggling for each breath, speaks in single words, stridor)   Negative: Sounds like a life-threatening emergency to the triager   Negative: Followed a tooth (or teeth) injury   Negative: Followed a mouth injury   Negative: Throat is painful   Negative: Canker sore suspected (i.e., aphthous ulcer) in the mouth   Negative: Cold sore suspected (i.e., fever blister sore) on the outer lip   Negative: Tooth is painful or swelling around a tooth    Protocols used: ST MOUTH PAIN-A-AH     Pt stated all her teeth are broken and rotten and her gums are bleeding green. Pt stated she can't swallow because of the pain. Advised to go to ED and let them address the infection in her mouth. Pt became upset and stated she does not have insurance and they are not going to pull her teeth out. Pt advised to go to ED and let MD assess her mouth. Pt verbalized understanding.       Followed up with pt regarding issue who states it all started Saturday 11/16 ans still having issues with it with pain in her mouth PS:10 but hasn't followed up with anyone for it   please advise  Thanks!

## 2019-11-19 ENCOUNTER — PATIENT OUTREACH (OUTPATIENT)
Dept: ADMINISTRATIVE | Facility: OTHER | Age: 44
End: 2019-11-19

## 2019-11-21 ENCOUNTER — TELEPHONE (OUTPATIENT)
Dept: INTERNAL MEDICINE | Facility: CLINIC | Age: 44
End: 2019-11-21

## 2019-11-21 NOTE — TELEPHONE ENCOUNTER
LOV: 10/28/2019    States she fell down her states again. States ibuprofen 800 mg is no longer working. Requesting another medication for pain.    Pharmacy: [facility]

## 2019-12-03 ENCOUNTER — TELEPHONE (OUTPATIENT)
Dept: ADMINISTRATIVE | Facility: HOSPITAL | Age: 44
End: 2019-12-03

## 2019-12-04 ENCOUNTER — LAB VISIT (OUTPATIENT)
Dept: LAB | Facility: HOSPITAL | Age: 44
End: 2019-12-04
Attending: NURSE PRACTITIONER
Payer: COMMERCIAL

## 2019-12-04 DIAGNOSIS — F44.9 CONVERSION DISORDER: ICD-10-CM

## 2019-12-04 DIAGNOSIS — E78.2 MIXED HYPERLIPIDEMIA: ICD-10-CM

## 2019-12-04 DIAGNOSIS — I10 ESSENTIAL HYPERTENSION: ICD-10-CM

## 2019-12-04 DIAGNOSIS — Z79.899 ON VALPROATE THERAPY: ICD-10-CM

## 2019-12-04 DIAGNOSIS — F33.1 MDD (MAJOR DEPRESSIVE DISORDER), RECURRENT EPISODE, MODERATE: ICD-10-CM

## 2019-12-04 DIAGNOSIS — E11.65 UNCONTROLLED TYPE 2 DIABETES MELLITUS WITH HYPERGLYCEMIA: ICD-10-CM

## 2019-12-04 LAB
ALBUMIN SERPL BCP-MCNC: 4 G/DL (ref 3.5–5.2)
ALBUMIN/CREAT UR: 74.6 UG/MG (ref 0–30)
ALP SERPL-CCNC: 112 U/L (ref 55–135)
ALT SERPL W/O P-5'-P-CCNC: 11 U/L (ref 10–44)
ANION GAP SERPL CALC-SCNC: 12 MMOL/L (ref 8–16)
AST SERPL-CCNC: 11 U/L (ref 10–40)
BASOPHILS # BLD AUTO: 0.03 K/UL (ref 0–0.2)
BASOPHILS NFR BLD: 0.3 % (ref 0–1.9)
BILIRUB SERPL-MCNC: 0.4 MG/DL (ref 0.1–1)
BUN SERPL-MCNC: 14 MG/DL (ref 6–20)
CALCIUM SERPL-MCNC: 9.7 MG/DL (ref 8.7–10.5)
CHLORIDE SERPL-SCNC: 100 MMOL/L (ref 95–110)
CHOLEST SERPL-MCNC: 364 MG/DL (ref 120–199)
CHOLEST/HDLC SERPL: 13 {RATIO} (ref 2–5)
CO2 SERPL-SCNC: 23 MMOL/L (ref 23–29)
CREAT SERPL-MCNC: 1.1 MG/DL (ref 0.5–1.4)
CREAT UR-MCNC: 181 MG/DL (ref 15–325)
DIFFERENTIAL METHOD: ABNORMAL
EOSINOPHIL # BLD AUTO: 0.3 K/UL (ref 0–0.5)
EOSINOPHIL NFR BLD: 3.4 % (ref 0–8)
ERYTHROCYTE [DISTWIDTH] IN BLOOD BY AUTOMATED COUNT: 12.4 % (ref 11.5–14.5)
EST. GFR  (AFRICAN AMERICAN): >60 ML/MIN/1.73 M^2
EST. GFR  (NON AFRICAN AMERICAN): >60 ML/MIN/1.73 M^2
ESTIMATED AVG GLUCOSE: 240 MG/DL (ref 68–131)
GLUCOSE SERPL-MCNC: 337 MG/DL (ref 70–110)
HBA1C MFR BLD HPLC: 10 % (ref 4–5.6)
HCT VFR BLD AUTO: 42.3 % (ref 37–48.5)
HDLC SERPL-MCNC: 28 MG/DL (ref 40–75)
HDLC SERPL: 7.7 % (ref 20–50)
HGB BLD-MCNC: 14.3 G/DL (ref 12–16)
IMM GRANULOCYTES # BLD AUTO: 0.04 K/UL (ref 0–0.04)
IMM GRANULOCYTES NFR BLD AUTO: 0.4 % (ref 0–0.5)
LDLC SERPL CALC-MCNC: ABNORMAL MG/DL (ref 63–159)
LYMPHOCYTES # BLD AUTO: 2.6 K/UL (ref 1–4.8)
LYMPHOCYTES NFR BLD: 27.2 % (ref 18–48)
MCH RBC QN AUTO: 31.1 PG (ref 27–31)
MCHC RBC AUTO-ENTMCNC: 33.8 G/DL (ref 32–36)
MCV RBC AUTO: 92 FL (ref 82–98)
MICROALBUMIN UR DL<=1MG/L-MCNC: 135 UG/ML
MONOCYTES # BLD AUTO: 0.7 K/UL (ref 0.3–1)
MONOCYTES NFR BLD: 6.9 % (ref 4–15)
NEUTROPHILS # BLD AUTO: 5.9 K/UL (ref 1.8–7.7)
NEUTROPHILS NFR BLD: 61.8 % (ref 38–73)
NONHDLC SERPL-MCNC: 336 MG/DL
NRBC BLD-RTO: 0 /100 WBC
PLATELET # BLD AUTO: 304 K/UL (ref 150–350)
PMV BLD AUTO: 11 FL (ref 9.2–12.9)
POTASSIUM SERPL-SCNC: 3.5 MMOL/L (ref 3.5–5.1)
PROT SERPL-MCNC: 8.2 G/DL (ref 6–8.4)
RBC # BLD AUTO: 4.6 M/UL (ref 4–5.4)
SODIUM SERPL-SCNC: 135 MMOL/L (ref 136–145)
TRIGL SERPL-MCNC: 737 MG/DL (ref 30–150)
TSH SERPL DL<=0.005 MIU/L-ACNC: 1.59 UIU/ML (ref 0.4–4)
VALPROATE SERPL-MCNC: <12.5 UG/ML (ref 50–100)
WBC # BLD AUTO: 9.51 K/UL (ref 3.9–12.7)

## 2019-12-04 PROCEDURE — 85025 COMPLETE CBC W/AUTO DIFF WBC: CPT

## 2019-12-04 PROCEDURE — 80053 COMPREHEN METABOLIC PANEL: CPT

## 2019-12-04 PROCEDURE — 84443 ASSAY THYROID STIM HORMONE: CPT

## 2019-12-04 PROCEDURE — 82043 UR ALBUMIN QUANTITATIVE: CPT

## 2019-12-04 PROCEDURE — 36415 COLL VENOUS BLD VENIPUNCTURE: CPT

## 2019-12-04 PROCEDURE — 83036 HEMOGLOBIN GLYCOSYLATED A1C: CPT

## 2019-12-04 PROCEDURE — 80164 ASSAY DIPROPYLACETIC ACD TOT: CPT

## 2019-12-04 PROCEDURE — 80061 LIPID PANEL: CPT

## 2019-12-09 ENCOUNTER — TELEPHONE (OUTPATIENT)
Dept: INTERNAL MEDICINE | Facility: CLINIC | Age: 44
End: 2019-12-09

## 2019-12-10 ENCOUNTER — HOSPITAL ENCOUNTER (EMERGENCY)
Facility: HOSPITAL | Age: 44
Discharge: HOME | End: 2019-12-10
Attending: SURGERY
Payer: COMMERCIAL

## 2019-12-10 ENCOUNTER — TELEPHONE (OUTPATIENT)
Dept: INTERNAL MEDICINE | Facility: CLINIC | Age: 44
End: 2019-12-10

## 2019-12-10 VITALS
DIASTOLIC BLOOD PRESSURE: 75 MMHG | TEMPERATURE: 98 F | WEIGHT: 145 LBS | RESPIRATION RATE: 16 BRPM | SYSTOLIC BLOOD PRESSURE: 115 MMHG | HEART RATE: 80 BPM | OXYGEN SATURATION: 98 % | BODY MASS INDEX: 24.89 KG/M2

## 2019-12-10 DIAGNOSIS — R53.83 FATIGUE: ICD-10-CM

## 2019-12-10 DIAGNOSIS — F44.9 CONVERSION DISORDER: Primary | ICD-10-CM

## 2019-12-10 DIAGNOSIS — F44.5 PSEUDOSEIZURE: ICD-10-CM

## 2019-12-10 LAB
ALBUMIN SERPL BCP-MCNC: 3.5 G/DL (ref 3.5–5.2)
ALBUMIN SERPL BCP-MCNC: 3.5 G/DL (ref 3.5–5.2)
ALP SERPL-CCNC: 100 U/L (ref 55–135)
ALP SERPL-CCNC: 100 U/L (ref 55–135)
ALT SERPL W/O P-5'-P-CCNC: 10 U/L (ref 10–44)
ALT SERPL W/O P-5'-P-CCNC: 10 U/L (ref 10–44)
AMPHET+METHAMPHET UR QL: NEGATIVE
ANION GAP SERPL CALC-SCNC: 9 MMOL/L (ref 8–16)
ANION GAP SERPL CALC-SCNC: 9 MMOL/L (ref 8–16)
AST SERPL-CCNC: 9 U/L (ref 10–40)
AST SERPL-CCNC: 9 U/L (ref 10–40)
BACTERIA #/AREA URNS HPF: ABNORMAL /HPF
BARBITURATES UR QL SCN>200 NG/ML: NEGATIVE
BASOPHILS # BLD AUTO: 0.06 K/UL (ref 0–0.2)
BASOPHILS NFR BLD: 0.6 % (ref 0–1.9)
BENZODIAZ UR QL SCN>200 NG/ML: NORMAL
BILIRUB SERPL-MCNC: 0.2 MG/DL (ref 0.1–1)
BILIRUB SERPL-MCNC: 0.2 MG/DL (ref 0.1–1)
BILIRUB UR QL STRIP: NEGATIVE
BNP SERPL-MCNC: 34 PG/ML (ref 0–99)
BUN SERPL-MCNC: 7 MG/DL (ref 6–20)
BUN SERPL-MCNC: 7 MG/DL (ref 6–20)
BZE UR QL SCN: NEGATIVE
CALCIUM SERPL-MCNC: 9.1 MG/DL (ref 8.7–10.5)
CALCIUM SERPL-MCNC: 9.1 MG/DL (ref 8.7–10.5)
CANNABINOIDS UR QL SCN: NEGATIVE
CHLORIDE SERPL-SCNC: 105 MMOL/L (ref 95–110)
CHLORIDE SERPL-SCNC: 105 MMOL/L (ref 95–110)
CK MB SERPL-MCNC: 1 NG/ML (ref 0.1–6.5)
CK MB SERPL-RTO: 3 % (ref 0–5)
CK SERPL-CCNC: 33 U/L (ref 20–180)
CK SERPL-CCNC: 33 U/L (ref 20–180)
CLARITY UR: CLEAR
CO2 SERPL-SCNC: 28 MMOL/L (ref 23–29)
CO2 SERPL-SCNC: 28 MMOL/L (ref 23–29)
COLOR UR: YELLOW
CREAT SERPL-MCNC: 0.9 MG/DL (ref 0.5–1.4)
CREAT SERPL-MCNC: 0.9 MG/DL (ref 0.5–1.4)
CREAT UR-MCNC: 72.1 MG/DL (ref 15–325)
DIFFERENTIAL METHOD: ABNORMAL
EOSINOPHIL # BLD AUTO: 0.4 K/UL (ref 0–0.5)
EOSINOPHIL NFR BLD: 3.4 % (ref 0–8)
ERYTHROCYTE [DISTWIDTH] IN BLOOD BY AUTOMATED COUNT: 12.3 % (ref 11.5–14.5)
EST. GFR  (AFRICAN AMERICAN): >60 ML/MIN/1.73 M^2
EST. GFR  (AFRICAN AMERICAN): >60 ML/MIN/1.73 M^2
EST. GFR  (NON AFRICAN AMERICAN): >60 ML/MIN/1.73 M^2
EST. GFR  (NON AFRICAN AMERICAN): >60 ML/MIN/1.73 M^2
GLUCOSE SERPL-MCNC: 247 MG/DL (ref 70–110)
GLUCOSE SERPL-MCNC: 247 MG/DL (ref 70–110)
GLUCOSE UR QL STRIP: ABNORMAL
HCT VFR BLD AUTO: 38.7 % (ref 37–48.5)
HGB BLD-MCNC: 12.9 G/DL (ref 12–16)
HGB UR QL STRIP: NEGATIVE
IMM GRANULOCYTES # BLD AUTO: 0.05 K/UL (ref 0–0.04)
IMM GRANULOCYTES NFR BLD AUTO: 0.5 % (ref 0–0.5)
KETONES UR QL STRIP: NEGATIVE
LEUKOCYTE ESTERASE UR QL STRIP: NEGATIVE
LYMPHOCYTES # BLD AUTO: 3.4 K/UL (ref 1–4.8)
LYMPHOCYTES NFR BLD: 32.3 % (ref 18–48)
MAGNESIUM SERPL-MCNC: 1.4 MG/DL (ref 1.6–2.6)
MCH RBC QN AUTO: 30.9 PG (ref 27–31)
MCHC RBC AUTO-ENTMCNC: 33.3 G/DL (ref 32–36)
MCV RBC AUTO: 93 FL (ref 82–98)
METHADONE UR QL SCN>300 NG/ML: NEGATIVE
MICROSCOPIC COMMENT: ABNORMAL
MONOCYTES # BLD AUTO: 0.7 K/UL (ref 0.3–1)
MONOCYTES NFR BLD: 6.8 % (ref 4–15)
NEUTROPHILS # BLD AUTO: 5.9 K/UL (ref 1.8–7.7)
NEUTROPHILS NFR BLD: 56.4 % (ref 38–73)
NITRITE UR QL STRIP: NEGATIVE
NRBC BLD-RTO: 0 /100 WBC
OPIATES UR QL SCN: NEGATIVE
PCP UR QL SCN>25 NG/ML: NEGATIVE
PH UR STRIP: 6 [PH] (ref 5–8)
PHOSPHATE SERPL-MCNC: 2.9 MG/DL (ref 2.7–4.5)
PLATELET # BLD AUTO: 262 K/UL (ref 150–350)
PMV BLD AUTO: 11.6 FL (ref 9.2–12.9)
POTASSIUM SERPL-SCNC: 3.4 MMOL/L (ref 3.5–5.1)
POTASSIUM SERPL-SCNC: 3.4 MMOL/L (ref 3.5–5.1)
PROT SERPL-MCNC: 7.1 G/DL (ref 6–8.4)
PROT SERPL-MCNC: 7.1 G/DL (ref 6–8.4)
PROT UR QL STRIP: NEGATIVE
RBC # BLD AUTO: 4.17 M/UL (ref 4–5.4)
SODIUM SERPL-SCNC: 142 MMOL/L (ref 136–145)
SODIUM SERPL-SCNC: 142 MMOL/L (ref 136–145)
SP GR UR STRIP: 1.02 (ref 1–1.03)
TOXICOLOGY INFORMATION: NORMAL
TROPONIN I SERPL DL<=0.01 NG/ML-MCNC: <0.006 NG/ML (ref 0–0.03)
TSH SERPL DL<=0.005 MIU/L-ACNC: 0.65 UIU/ML (ref 0.4–4)
URN SPEC COLLECT METH UR: ABNORMAL
UROBILINOGEN UR STRIP-ACNC: NEGATIVE EU/DL
WBC # BLD AUTO: 10.51 K/UL (ref 3.9–12.7)
YEAST URNS QL MICRO: ABNORMAL

## 2019-12-10 PROCEDURE — 93010 EKG 12-LEAD: ICD-10-PCS | Mod: ,,, | Performed by: INTERNAL MEDICINE

## 2019-12-10 PROCEDURE — 93010 ELECTROCARDIOGRAM REPORT: CPT | Mod: ,,, | Performed by: INTERNAL MEDICINE

## 2019-12-10 PROCEDURE — 80307 DRUG TEST PRSMV CHEM ANLYZR: CPT

## 2019-12-10 PROCEDURE — 84484 ASSAY OF TROPONIN QUANT: CPT

## 2019-12-10 PROCEDURE — 83735 ASSAY OF MAGNESIUM: CPT

## 2019-12-10 PROCEDURE — 93005 ELECTROCARDIOGRAM TRACING: CPT

## 2019-12-10 PROCEDURE — 82553 CREATINE MB FRACTION: CPT

## 2019-12-10 PROCEDURE — 84100 ASSAY OF PHOSPHORUS: CPT

## 2019-12-10 PROCEDURE — 82550 ASSAY OF CK (CPK): CPT

## 2019-12-10 PROCEDURE — 36415 COLL VENOUS BLD VENIPUNCTURE: CPT

## 2019-12-10 PROCEDURE — 99285 EMERGENCY DEPT VISIT HI MDM: CPT | Mod: 25

## 2019-12-10 PROCEDURE — 84443 ASSAY THYROID STIM HORMONE: CPT

## 2019-12-10 PROCEDURE — 80053 COMPREHEN METABOLIC PANEL: CPT

## 2019-12-10 PROCEDURE — 81000 URINALYSIS NONAUTO W/SCOPE: CPT | Mod: 59

## 2019-12-10 PROCEDURE — 83880 ASSAY OF NATRIURETIC PEPTIDE: CPT

## 2019-12-10 PROCEDURE — 85025 COMPLETE CBC W/AUTO DIFF WBC: CPT

## 2019-12-10 RX ORDER — DIVALPROEX SODIUM 250 MG/1
250 TABLET, DELAYED RELEASE ORAL EVERY 12 HOURS
Qty: 60 TABLET | Refills: 5 | Status: SHIPPED | OUTPATIENT
Start: 2019-12-10 | End: 2020-05-11 | Stop reason: SDUPTHER

## 2019-12-10 NOTE — TELEPHONE ENCOUNTER
Pt notified of dr note of BW results, voiced understanding   Also she says she takes Depakote for seizures and assumed she was taking the vraylar as well  please advise  Thanks!

## 2019-12-10 NOTE — TELEPHONE ENCOUNTER
Pt notified and needs a refill on Depakote she is unsure of dosage but last Rx was 250 mg twice a day  Please advise  Thanks!

## 2019-12-10 NOTE — TELEPHONE ENCOUNTER
All her labs are off. She needs to make sure she is taking her diabetes meds. Also her chol panel is high as well. In regards to the depokote I am confused because I started her on vraylar instead. So find out about that mix up thanks

## 2019-12-11 NOTE — ED PROVIDER NOTES
[facility]                                                 Chief Complaint  43 y.o. female with possible seizure     History of Present Illness  Eva Lane presents to the emergency room with possible seizure  Patient has longstanding issues with pseudoseizures and pseudo symptoms  Patient states that she has had a TIA before, this is not well documented today  Patient has had previous MRIs, which were normal, suspicion of conversion DO  Patient has never followed up regularly, severely noncompliant with Psychiatry  Patient had possible pseudo-seizure today, she has been under lot of stress    The history is provided by the patient   device was not used during this ER visit    Past Medical History   -- ADHD        -- Anxiety        -- Arthritis        -- Asthma        -- Behavioral problem        -- Bipolar 1 disorder        -- CHF (congestive heart failure)        -- COPD        -- Depression        -- Diabetes mellitus type II        -- Hx of psychiatric care        -- Hyperlipidemia        -- Hypertension        -- Lumbar radiculopathy        -- Neuralgia        -- Neuritis        -- Psychiatric problem        -- PTSD (post-traumatic stress disorder)        -- Seizures        -- Self-harming behavior        -- Sleep apnea        -- Stroke        -- Stroke        -- Therapy            Past Surgical History   -- Oophorectomy           -- Tubal ligation           -- Dilation and curettage of uterus           -- Hysterectomy           -- Total abdominal hysterectomy           -- Cyst removal           -- Colonoscopy               Allergies:  Penicillin, Neosporin, shellfish and shrimp     I have reviewed all of this patient's past medical, surgical, family, and social   histories as well as active allergies and medications documented in the  electronic medical record    Review of Systems and Physical Exam      Review of Systems  -- Constitution - no fever, denies  fatigue, no weakness, no chills  -- Eyes - no tearing or redness, no visual disturbance  -- Ear, Nose - no tinnitus or earache, no nasal congestion or discharge  -- Mouth,Throat - no sore throat, no toothache, normal voice, normal swallowing  -- Respiratory - denies cough and congestion, no shortness of breath, no PERRY  -- Cardiovascular - denies chest pain, no palpitations, denies claudication  -- Gastrointestinal - denies abdominal pain, nausea, vomiting, or diarrhea  -- Genitourinary - no dysuria, denies flank pain, no hematuria, no STD risk  -- Musculoskeletal - denies back pain, negative for trauma or injury  -- Neurological - pseudo-seizure with left-sided weakness  -- Skin - denies pallor, rash, or changes in skin. no hives or welts noted  -- Psychiatric - anxiety, denies SI or HI, no psychosis or fractured thought noted     Vital Signs  Her oral temperature is 98.6 °F (37 °C).   Her blood pressure is 115/70 and her pulse is 87.   Her respiration is 16 and oxygen saturation is 95%.     Physical Exam  -- Nursing note and vitals reviewed  -- Constitutional: Appears well-developed and well-nourished  -- Head: Atraumatic. Normocephalic. No obvious abnormality  -- Eyes: Pupils are equal and reactive to light. Normal conjunctiva and lids  -- Cardiac: Normal rate, regular rhythm and normal heart sounds  -- Pulmonary: Normal respiratory effort, breath sounds clear to auscultation  -- Abdominal: Soft, no tenderness. Normal bowel sounds. Normal liver edge  -- Musculoskeletal: Normal range of motion, no effusions. Joints stable   -- Neurological: No focal deficits. Showed good interaction with staff  -- Vascular: Posterior tibial, dorsalis pedis and radial pulses 2+ bilaterally      Emergency Room Course      Lab Results        K 3.4 (L)   K 3.4 (L)         CO2 28   CO2 28   BUN 7   BUN 7   CREATININE 0.9   CREATININE 0.9    (H)    (H)   ALKPHOS 100   ALKPHOS 100   AST 9 (L)   AST 9  (L)   ALT 10   ALT 10   BILITOT 0.2   BILITOT 0.2   ALBUMIN 3.5   ALBUMIN 3.5   PROT 7.1   PROT 7.1   WBC 10.51   HGB 12.9   HCT 38.7      CPK 33   CPK 33   CPKMB 1.0   TROPONINI <0.006   BNP 34   MG 1.4 (L)   TSH 0.652     Urinalysis  -- Urinalysis performed during this ER visit showed no signs of infection      EKG   -- The EKG findings today were without concerning findings from baseline     Radiology  -- The CT of the head performed in the ER today was negative for acute pathology     ED Physician Management  -- Diagnosis management comments: 43 y.o. female with pseudo-seizure tonight  -- long history of pseudoseizures with normal EEG in the past per Neurology note  -- patient also states she had a history of TIA stroke, this is not well document  -- patient has had MRIs in the past which were normal, suspect conversion disorder  -- last Neurology note visit in July 2019 reviewed, same presentation previous  -- patient has had left-sided weakness and pseudoseizures for several years now  -- patient has no obvious weakness on evaluation, alert appropriate on interview  -- negative metabolic workup today, suspect recurrent pseudo-seizure/anxiety  -- patient has had longstanding issues with anxiety and stress, worse this week  -- stable with a normal neuro exam on discharge, return with any issues after DC    Diagnosis  -- The primary encounter diagnosis was Conversion disorder.   -- Diagnoses of Fatigue and Pseudoseizure were also pertinent to this visit.    Disposition and Plan  -- Disposition: home  -- Condition: stable  -- Follow-up: Patient to follow up with Cintia Gustafson NP in 1-2 days.  -- I advised the patient that we have found no life threatening condition today  -- At this time, I believe the patient is clinically stable for discharge.   -- The patient acknowledges that close follow up with a MD is required   -- Patient agrees to comply with all instruction and direction given in the  ER    This note is dictated on M*Modal word recognition program.  There are word recognition mistakes that are occasionally missed on review.         Nadeem Cosme MD  12/10/19 2003

## 2019-12-11 NOTE — ED TRIAGE NOTES
"43 y.o. female presents to ER ED 06/ED 06   Chief Complaint   Patient presents with    possible seizure     per pt son, pt "woke up on the floor after having a seizure". pt reports pain/numbness to right side. pt    . No acute distress noted.  "

## 2020-01-08 ENCOUNTER — TELEPHONE (OUTPATIENT)
Dept: INTERNAL MEDICINE | Facility: CLINIC | Age: 45
End: 2020-01-08

## 2020-01-08 NOTE — TELEPHONE ENCOUNTER
Pt called with yeast infection x 3 days with itching seeing if meds can be sent in for this  Please advise  Thanks!

## 2020-01-09 RX ORDER — FLUCONAZOLE 150 MG/1
150 TABLET ORAL ONCE
Qty: 2 TABLET | Refills: 0 | Status: SHIPPED | OUTPATIENT
Start: 2020-01-09 | End: 2020-01-09

## 2020-01-16 RX ORDER — ALPRAZOLAM 1 MG/1
TABLET ORAL
Qty: 90 TABLET | Refills: 2 | Status: SHIPPED | OUTPATIENT
Start: 2020-01-16 | End: 2020-04-13 | Stop reason: SDUPTHER

## 2020-02-02 ENCOUNTER — HOSPITAL ENCOUNTER (EMERGENCY)
Facility: HOSPITAL | Age: 45
Discharge: HOME | End: 2020-02-02
Attending: SURGERY
Payer: COMMERCIAL

## 2020-02-02 VITALS
SYSTOLIC BLOOD PRESSURE: 104 MMHG | TEMPERATURE: 98 F | DIASTOLIC BLOOD PRESSURE: 59 MMHG | OXYGEN SATURATION: 97 % | BODY MASS INDEX: 25.88 KG/M2 | WEIGHT: 150.81 LBS | HEART RATE: 138 BPM | RESPIRATION RATE: 18 BRPM

## 2020-02-02 DIAGNOSIS — T30.0 SCALD BURN: Primary | ICD-10-CM

## 2020-02-02 PROCEDURE — 25000003 PHARM REV CODE 250: Performed by: SURGERY

## 2020-02-02 PROCEDURE — 99283 EMERGENCY DEPT VISIT LOW MDM: CPT

## 2020-02-02 RX ORDER — SILVER SULFADIAZINE 10 G/1000G
CREAM TOPICAL 2 TIMES DAILY
Qty: 1 BOTTLE | Refills: 0 | Status: SHIPPED | OUTPATIENT
Start: 2020-02-02 | End: 2021-04-13

## 2020-02-02 RX ORDER — MUPIROCIN 20 MG/G
1 OINTMENT TOPICAL
Status: COMPLETED | OUTPATIENT
Start: 2020-02-02 | End: 2020-02-02

## 2020-02-02 RX ADMIN — MUPIROCIN 22 G: 20 OINTMENT TOPICAL at 09:02
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
## 2020-02-04 ENCOUNTER — OFFICE VISIT (OUTPATIENT)
Dept: INTERNAL MEDICINE | Facility: CLINIC | Age: 45
End: 2020-02-04
Payer: COMMERCIAL

## 2020-02-04 ENCOUNTER — PATIENT OUTREACH (OUTPATIENT)
Dept: ADMINISTRATIVE | Facility: OTHER | Age: 45
End: 2020-02-04

## 2020-02-04 VITALS
BODY MASS INDEX: 25.95 KG/M2 | HEIGHT: 64 IN | RESPIRATION RATE: 18 BRPM | DIASTOLIC BLOOD PRESSURE: 84 MMHG | WEIGHT: 152 LBS | SYSTOLIC BLOOD PRESSURE: 124 MMHG | HEART RATE: 100 BPM | OXYGEN SATURATION: 97 %

## 2020-02-04 DIAGNOSIS — F33.1 MDD (MAJOR DEPRESSIVE DISORDER), RECURRENT EPISODE, MODERATE: ICD-10-CM

## 2020-02-04 DIAGNOSIS — T30.0 BURN: ICD-10-CM

## 2020-02-04 DIAGNOSIS — I70.0 AORTIC ATHEROSCLEROSIS: ICD-10-CM

## 2020-02-04 DIAGNOSIS — E11.65 UNCONTROLLED TYPE 2 DIABETES MELLITUS WITH HYPERGLYCEMIA: ICD-10-CM

## 2020-02-04 DIAGNOSIS — B86 SCABIES: Primary | ICD-10-CM

## 2020-02-04 PROCEDURE — 3008F BODY MASS INDEX DOCD: CPT | Mod: CPTII,S$GLB,, | Performed by: NURSE PRACTITIONER

## 2020-02-04 PROCEDURE — 99999 PR PBB SHADOW E&M-EST. PATIENT-LVL III: ICD-10-PCS | Mod: PBBFAC,,, | Performed by: NURSE PRACTITIONER

## 2020-02-04 PROCEDURE — 3046F HEMOGLOBIN A1C LEVEL >9.0%: CPT | Mod: CPTII,S$GLB,, | Performed by: NURSE PRACTITIONER

## 2020-02-04 PROCEDURE — 3074F PR MOST RECENT SYSTOLIC BLOOD PRESSURE < 130 MM HG: ICD-10-PCS | Mod: CPTII,S$GLB,, | Performed by: NURSE PRACTITIONER

## 2020-02-04 PROCEDURE — 3008F PR BODY MASS INDEX (BMI) DOCUMENTED: ICD-10-PCS | Mod: CPTII,S$GLB,, | Performed by: NURSE PRACTITIONER

## 2020-02-04 PROCEDURE — 3074F SYST BP LT 130 MM HG: CPT | Mod: CPTII,S$GLB,, | Performed by: NURSE PRACTITIONER

## 2020-02-04 PROCEDURE — 3046F PR MOST RECENT HEMOGLOBIN A1C LEVEL > 9.0%: ICD-10-PCS | Mod: CPTII,S$GLB,, | Performed by: NURSE PRACTITIONER

## 2020-02-04 PROCEDURE — 3079F DIAST BP 80-89 MM HG: CPT | Mod: CPTII,S$GLB,, | Performed by: NURSE PRACTITIONER

## 2020-02-04 PROCEDURE — 3079F PR MOST RECENT DIASTOLIC BLOOD PRESSURE 80-89 MM HG: ICD-10-PCS | Mod: CPTII,S$GLB,, | Performed by: NURSE PRACTITIONER

## 2020-02-04 PROCEDURE — 99214 OFFICE O/P EST MOD 30 MIN: CPT | Mod: S$GLB,,, | Performed by: NURSE PRACTITIONER

## 2020-02-04 PROCEDURE — 99999 PR PBB SHADOW E&M-EST. PATIENT-LVL III: CPT | Mod: PBBFAC,,, | Performed by: NURSE PRACTITIONER

## 2020-02-04 PROCEDURE — 99214 PR OFFICE/OUTPT VISIT, EST, LEVL IV, 30-39 MIN: ICD-10-PCS | Mod: S$GLB,,, | Performed by: NURSE PRACTITIONER

## 2020-02-04 RX ORDER — PERMETHRIN 50 MG/G
CREAM TOPICAL ONCE
Qty: 60 G | Refills: 0 | Status: SHIPPED | OUTPATIENT
Start: 2020-02-04 | End: 2020-02-04

## 2020-02-04 NOTE — PATIENT INSTRUCTIONS
Scabies     To prevent spread of infection, wash clothing, linens, and toys in very hot water.     Scabies is an infection caused by very tiny mites that burrow into the skin. The mites are called Sarcoptes scabiei. They cause severe itching. Though children are most commonly infected, anyone can get scabies. Scabies mites can pass from person to person through close physical contact. They can also be passed through shared clothing, towels, and bedding. Scabies infection is not usually dangerous, but it is uncomfortable. Because it is so contagious, scabies should be treated immediately to keep the infection from spreading.  Symptoms  Symptoms of scabies appear about 2 to 6 weeks after infection in a child or adult who has never had scabies before. A child or adult who has been infected before will experience symptoms much sooner, in 1 to 4 days. Signs of scabies infection may include:  · Intense itching, especially at night or after a hot bath  · Skin irritations that look like hives, insect bites, pimples, or blisters, especially on warmer areas of the body (such as between the fingers, in the armpits, and in the creases of the wrists, elbows, and knees)  · Sores on the body caused by scratching (the sores may become infected)  · Comanche created by mites traveling under the skin, which look like lines on the skins surface  Treating scabies infection  Scabies infections are usually treated with a prescription lotion that kills the mites. The lotion must be applied to the entire body from the neck down. This includes the palms of the hands, soles of the feet, groin, and under the fingernails. The lotion must be left on for 8 to 14 hours. In some cases, a second application of lotion is needed a week after the first. Medicines work quickly, but most children and adults continue to have an itchy rash for several weeks after treatment. Marks on the skin from scabies usually go away in 1 to 2 weeks, but sometimes  take a few months to clear.  Preventing spread of the infection  To prevent reinfection and the spread of scabies to others, follow these instructions:  · Wash the infected persons clothing, towels, bed linens, cloth toys, and other personal items in very hot, soapy water. Dry them thoroughly. Do not share among family members.   · Seal items that cant be washed in plastic bags for 2 weeks.  · Vacuum floors and furniture. Throw the vacuum bag away afterward.  · Notify an infected childs school and caregivers so that other children can be checked and treated.  · Keep an infected child home from  or school until the morning after treatment for scabies.  · Warn children not to share items such as clothing and towels with other children.  · You may need to treat all household members who may have been exposed to scabies, whether they show symptoms or not. Talk with your healthcare provider.  · Do not spray your house with chemicals or pesticides. These can be dangerous to your familys health.  When to call the healthcare provider if:  · The infected person has a fever, red streaks, pain, or swelling of the skin.  · Sores get worse or do not heal.  · New rashes appear or itching continues for more than 2 weeks after treatment.   Date Last Reviewed: 6/1/2016 © 2000-2017 [employer]. [address] [place] [address], [place] [address]. All rights reserved. This information is not intended as a substitute for professional medical care. Always follow your healthcare professional's instructions.        Hot Water Burn  Hot water on the skin can cause a first- or second-degree burn. A first-degree burn causes only redness and heals in a few days. A second-degree burn is deeper. It causes a blister to form. The blister may break and leak clear fluid. It may become infected. Second-degree burns take 1 to 2 weeks to heal.  Home care  The following guidelines will help you care for your burn at home:  · On the first  day, put a small towel soaked in cool water on the area to ease severe pain.  · If no blister formed, you may use creams with benzocaine if the burn is painful. You may also use moisturizers with aloe vera.  · If a blister formed and broke and a bandage was applied, change it once a day, or as directed. If the bandage sticks, remove it by soaking it in warm water. Wash the burned area daily with soap and water. Pat dry with a clean towel. For the next 3 to 5 days, put an antibiotic cream or ointment on the area after washing. This will help to prevent an infection and to keep the bandage from sticking.  · If a blister formed, it will go down by itself. Or it will break on its own in the next few days. If the blister breaks, a clear fluid will leak from it for a day or two. The loose skin from the broken blister has no feeling.  You can carefully trim away this skin with clean, small, sharp scissors. Sterilize by soaking in alcohol first. Or wash with soap and water. Wash the raw surface under the blister daily with soap and water. For the next 3 to 5 days, put an antibiotic cream or ointment on the area after washing. This will help prevent an infection and keep the bandage from sticking.  · You may use over-the-counter medicine to control pain, unless another pain medicine was prescribed. If you have chronic liver or kidney disease, talk with your healthcare provider before using acetaminophen or ibuprofen. Also talk with your provider if you've had a stomach ulcer or GI bleeding. Don't give ibuprofen to children younger than 6 months of age.  · Don't pick or scratch at the affected areas. Use over-the-counter medicine for itching.  · Wear a hat, sunscreen, and long sleeves while in the sun.  · Don't wear tight-fitting clothes.  · Add more calories and protein to your diet until the wound has healed. Drink plenty of water.  Follow-up care  Follow up with your healthcare provider, or as advised. Most hot water burns  heal without becoming infected. Sometimes an infection happens even with proper treatment. You should watch for the signs of infection listed below.  When to seek medical advice  Call your healthcare provider right away if any of these occur:  · Pain that gets worse  · Redness or swelling that gets worse  · Pus coming from the wound  · Fever of 100.4º F (38º C) or higher, or as directed by your healthcare provider  · The wound doesn't seem to be healing  · Nausea or vomiting   Date Last Reviewed: 12/1/2016  © 2158-3008 [employer]. [address] [place] [id number]. All rights reserved. This information is not intended as a substitute for professional medical care. Always follow your healthcare professional's instructions.
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
## 2020-02-05 PROBLEM — F44.5 PSEUDOSEIZURES: Status: ACTIVE | Noted: 2020-02-05

## 2020-02-17 ENCOUNTER — TELEPHONE (OUTPATIENT)
Dept: INTERNAL MEDICINE | Facility: CLINIC | Age: 45
End: 2020-02-17

## 2020-02-17 DIAGNOSIS — N39.0 URINARY TRACT INFECTION WITHOUT HEMATURIA, SITE UNSPECIFIED: Primary | ICD-10-CM

## 2020-02-17 RX ORDER — NITROFURANTOIN (MACROCRYSTALS) 100 MG/1
100 CAPSULE ORAL EVERY 12 HOURS
Qty: 14 CAPSULE | Refills: 0 | Status: SHIPPED | OUTPATIENT
Start: 2020-02-17 | End: 2020-02-24

## 2020-03-19 ENCOUNTER — TELEPHONE (OUTPATIENT)
Dept: INTERNAL MEDICINE | Facility: CLINIC | Age: 45
End: 2020-03-19

## 2020-03-19 NOTE — TELEPHONE ENCOUNTER
----- Message from Chika Rosales MA sent at 3/19/2020  2:21 PM CDT -----  Contact: Patient  Patient lost the handicap tag that Cintia had written for her.     She is requesting another one.     Please Advise:  [phone]

## 2020-03-23 DIAGNOSIS — E11.9 TYPE 2 DIABETES MELLITUS WITHOUT COMPLICATION: ICD-10-CM

## 2020-03-26 ENCOUNTER — TELEPHONE (OUTPATIENT)
Dept: INTERNAL MEDICINE | Facility: CLINIC | Age: 45
End: 2020-03-26

## 2020-03-26 DIAGNOSIS — B96.89 ACUTE BACTERIAL SINUSITIS: Primary | ICD-10-CM

## 2020-03-26 DIAGNOSIS — J01.90 ACUTE BACTERIAL SINUSITIS: Primary | ICD-10-CM

## 2020-03-26 RX ORDER — AZITHROMYCIN 250 MG/1
TABLET, FILM COATED ORAL
Qty: 6 TABLET | Refills: 0 | Status: SHIPPED | OUTPATIENT
Start: 2020-03-26 | End: 2020-03-31

## 2020-03-26 NOTE — TELEPHONE ENCOUNTER
----- Message from Chika Rosales MA sent at 3/26/2020  1:38 PM CDT -----  Contact: Patient  Patient c/o Earache, sorethroat, congesion. Temp ? 101.2    Requesting meds.  Please Advise:  [phone]

## 2020-04-09 DIAGNOSIS — F60.3 BORDERLINE PERSONALITY DISORDER: ICD-10-CM

## 2020-04-09 DIAGNOSIS — F33.1 MDD (MAJOR DEPRESSIVE DISORDER), RECURRENT EPISODE, MODERATE: ICD-10-CM

## 2020-04-09 DIAGNOSIS — F41.1 GAD (GENERALIZED ANXIETY DISORDER): ICD-10-CM

## 2020-04-11 ENCOUNTER — NURSE TRIAGE (OUTPATIENT)
Dept: ADMINISTRATIVE | Facility: CLINIC | Age: 45
End: 2020-04-11

## 2020-04-11 DIAGNOSIS — J01.90 ACUTE BACTERIAL SINUSITIS: Primary | ICD-10-CM

## 2020-04-11 DIAGNOSIS — B96.89 ACUTE BACTERIAL SINUSITIS: Primary | ICD-10-CM

## 2020-04-11 NOTE — TELEPHONE ENCOUNTER
Called asking about where to get tested for COVID. Patient has cough, not sure if she has fever. Complains of congestion    Reason for Disposition   [1] Caller requesting NON-URGENT health information AND [2] PCP's office is the best resource    Additional Information   Negative: [1] Caller is not with the adult (patient) AND [2] reporting urgent symptoms   Negative: Lab result questions   Negative: Medication questions   Negative: Caller can't be reached by phone   Negative: Caller has already spoken to PCP or another triager   Negative: RN needs further essential information from caller in order to complete triage   Negative: Requesting regular office appointment    Protocols used: INFORMATION ONLY CALL-A-

## 2020-04-13 ENCOUNTER — PATIENT MESSAGE (OUTPATIENT)
Dept: INTERNAL MEDICINE | Facility: CLINIC | Age: 45
End: 2020-04-13

## 2020-04-13 DIAGNOSIS — F41.1 GAD (GENERALIZED ANXIETY DISORDER): ICD-10-CM

## 2020-04-13 DIAGNOSIS — F33.1 MDD (MAJOR DEPRESSIVE DISORDER), RECURRENT EPISODE, MODERATE: ICD-10-CM

## 2020-04-13 DIAGNOSIS — F60.3 BORDERLINE PERSONALITY DISORDER: ICD-10-CM

## 2020-04-13 RX ORDER — ALPRAZOLAM 1 MG/1
1 TABLET ORAL 3 TIMES DAILY PRN
Qty: 90 TABLET | Refills: 2 | Status: SHIPPED | OUTPATIENT
Start: 2020-04-13 | End: 2020-05-11 | Stop reason: SDUPTHER

## 2020-04-13 RX ORDER — AZITHROMYCIN 250 MG/1
TABLET, FILM COATED ORAL
Qty: 6 TABLET | Refills: 0 | Status: SHIPPED | OUTPATIENT
Start: 2020-04-13 | End: 2020-04-18

## 2020-04-13 RX ORDER — QUETIAPINE FUMARATE 400 MG/1
800 TABLET, FILM COATED ORAL DAILY
Qty: 60 TABLET | Refills: 2 | Status: SHIPPED | OUTPATIENT
Start: 2020-04-13 | End: 2020-05-11 | Stop reason: SDUPTHER

## 2020-04-13 RX ORDER — QUETIAPINE FUMARATE 400 MG/1
TABLET, FILM COATED ORAL
Qty: 60 TABLET | Refills: 2 | Status: SHIPPED | OUTPATIENT
Start: 2020-04-13 | End: 2020-04-13 | Stop reason: SDUPTHER

## 2020-04-13 RX ORDER — DICLOFENAC SODIUM 50 MG/1
50 TABLET, DELAYED RELEASE ORAL 2 TIMES DAILY PRN
Qty: 60 TABLET | Refills: 0 | Status: SHIPPED | OUTPATIENT
Start: 2020-04-13 | End: 2020-04-16

## 2020-04-13 NOTE — TELEPHONE ENCOUNTER
Pt doesn't have mychart to do virtual offered to set her up but she states she only has an earache x 2 days and sore throat no fever and no exposure but also with headache so seeing if some meds can be called in for this  please advise  thanks  WM

## 2020-04-14 RX ORDER — BUTALBITAL, ACETAMINOPHEN AND CAFFEINE 50; 325; 40 MG/1; MG/1; MG/1
1 CAPSULE ORAL 3 TIMES DAILY PRN
Qty: 30 CAPSULE | Refills: 0 | Status: SHIPPED | OUTPATIENT
Start: 2020-04-14 | End: 2020-04-16

## 2020-04-15 ENCOUNTER — PATIENT MESSAGE (OUTPATIENT)
Dept: INTERNAL MEDICINE | Facility: CLINIC | Age: 45
End: 2020-04-15

## 2020-04-16 RX ORDER — NAPROXEN 375 MG/1
375 TABLET ORAL 2 TIMES DAILY WITH MEALS
Qty: 30 TABLET | Refills: 2 | Status: SHIPPED | OUTPATIENT
Start: 2020-04-16 | End: 2020-05-11 | Stop reason: SDUPTHER

## 2020-05-11 ENCOUNTER — PATIENT MESSAGE (OUTPATIENT)
Dept: INTERNAL MEDICINE | Facility: CLINIC | Age: 45
End: 2020-05-11

## 2020-05-11 DIAGNOSIS — F33.1 MDD (MAJOR DEPRESSIVE DISORDER), RECURRENT EPISODE, MODERATE: ICD-10-CM

## 2020-05-11 DIAGNOSIS — F60.3 BORDERLINE PERSONALITY DISORDER: ICD-10-CM

## 2020-05-11 DIAGNOSIS — N30.00 ACUTE CYSTITIS WITHOUT HEMATURIA: Primary | ICD-10-CM

## 2020-05-11 DIAGNOSIS — F31.9 BIPOLAR 1 DISORDER: ICD-10-CM

## 2020-05-11 DIAGNOSIS — F41.1 GAD (GENERALIZED ANXIETY DISORDER): ICD-10-CM

## 2020-05-12 ENCOUNTER — PATIENT MESSAGE (OUTPATIENT)
Dept: INTERNAL MEDICINE | Facility: CLINIC | Age: 45
End: 2020-05-12

## 2020-05-12 RX ORDER — NAPROXEN 375 MG/1
375 TABLET ORAL 2 TIMES DAILY WITH MEALS
Qty: 30 TABLET | Refills: 2 | Status: SHIPPED | OUTPATIENT
Start: 2020-05-12 | End: 2021-07-06

## 2020-05-12 RX ORDER — DIVALPROEX SODIUM 250 MG/1
250 TABLET, DELAYED RELEASE ORAL EVERY 12 HOURS
Qty: 60 TABLET | Refills: 5 | Status: SHIPPED | OUTPATIENT
Start: 2020-05-12 | End: 2021-04-27

## 2020-05-12 RX ORDER — ALPRAZOLAM 1 MG/1
1 TABLET ORAL 3 TIMES DAILY PRN
Qty: 90 TABLET | Refills: 2 | Status: SHIPPED | OUTPATIENT
Start: 2020-05-12 | End: 2021-04-12

## 2020-05-12 RX ORDER — PEN NEEDLE, DIABETIC 30 GX3/16"
1 NEEDLE, DISPOSABLE MISCELLANEOUS DAILY
Qty: 50 EACH | Refills: 5 | Status: SHIPPED | OUTPATIENT
Start: 2020-05-12 | End: 2021-05-05 | Stop reason: SDUPTHER

## 2020-05-12 RX ORDER — QUETIAPINE FUMARATE 400 MG/1
800 TABLET, FILM COATED ORAL DAILY
Qty: 60 TABLET | Refills: 2 | Status: SHIPPED | OUTPATIENT
Start: 2020-05-12 | End: 2020-10-19 | Stop reason: SDUPTHER

## 2020-05-12 RX ORDER — GLIMEPIRIDE 1 MG/1
1 TABLET ORAL 2 TIMES DAILY WITH MEALS
Qty: 60 TABLET | Refills: 2 | Status: SHIPPED | OUTPATIENT
Start: 2020-05-12 | End: 2021-04-27

## 2020-05-12 RX ORDER — METFORMIN HYDROCHLORIDE 1000 MG/1
1000 TABLET ORAL 2 TIMES DAILY WITH MEALS
Qty: 60 TABLET | Refills: 2 | Status: SHIPPED | OUTPATIENT
Start: 2020-05-12 | End: 2021-04-12 | Stop reason: SDUPTHER

## 2020-05-12 RX ORDER — NITROFURANTOIN (MACROCRYSTALS) 100 MG/1
100 CAPSULE ORAL EVERY 12 HOURS
Qty: 14 CAPSULE | Refills: 0 | Status: SHIPPED | OUTPATIENT
Start: 2020-05-12 | End: 2020-05-19

## 2020-06-26 ENCOUNTER — PATIENT MESSAGE (OUTPATIENT)
Dept: INTERNAL MEDICINE | Facility: CLINIC | Age: 45
End: 2020-06-26

## 2020-06-26 DIAGNOSIS — F41.9 ANXIETY: Primary | ICD-10-CM

## 2020-06-29 RX ORDER — HYDROXYZINE PAMOATE 25 MG/1
25 CAPSULE ORAL EVERY 8 HOURS PRN
Qty: 90 CAPSULE | Refills: 2 | Status: SHIPPED | OUTPATIENT
Start: 2020-06-29 | End: 2021-02-15

## 2020-08-10 ENCOUNTER — NURSE TRIAGE (OUTPATIENT)
Dept: ADMINISTRATIVE | Facility: CLINIC | Age: 45
End: 2020-08-10

## 2020-08-11 NOTE — TELEPHONE ENCOUNTER
Had a seizure, and hit her head. Patient states she is an epileptic and does not know how long it lasted and hit her head. States she will just go to sleep now. She is there only with her children. 911 advised, patient verb understanding.     Reason for Disposition   [1] Epileptic seizure (in adult with known epilepsy) AND [2] continues > 5 minutes    Additional Information   Negative: First seizure ever    Protocols used: [id number]

## 2020-08-27 ENCOUNTER — PATIENT OUTREACH (OUTPATIENT)
Dept: ADMINISTRATIVE | Facility: HOSPITAL | Age: 45
End: 2020-08-27

## 2020-08-27 DIAGNOSIS — Z12.31 ENCOUNTER FOR SCREENING MAMMOGRAM FOR BREAST CANCER: ICD-10-CM

## 2020-08-27 DIAGNOSIS — Z11.59 NEED FOR HEPATITIS C SCREENING TEST: ICD-10-CM

## 2020-08-27 DIAGNOSIS — E11.9 DIABETIC EYE EXAM: Primary | ICD-10-CM

## 2020-08-27 DIAGNOSIS — Z01.00 DIABETIC EYE EXAM: Primary | ICD-10-CM

## 2020-10-05 ENCOUNTER — PATIENT MESSAGE (OUTPATIENT)
Dept: ADMINISTRATIVE | Facility: HOSPITAL | Age: 45
End: 2020-10-05

## 2020-10-23 ENCOUNTER — TELEPHONE (OUTPATIENT)
Dept: ADMINISTRATIVE | Facility: HOSPITAL | Age: 45
End: 2020-10-23

## 2020-10-26 ENCOUNTER — TELEPHONE (OUTPATIENT)
Dept: INTERNAL MEDICINE | Facility: CLINIC | Age: 45
End: 2020-10-26

## 2020-10-26 DIAGNOSIS — F33.1 MDD (MAJOR DEPRESSIVE DISORDER), RECURRENT EPISODE, MODERATE: ICD-10-CM

## 2020-10-26 DIAGNOSIS — F60.3 BORDERLINE PERSONALITY DISORDER: ICD-10-CM

## 2020-10-26 DIAGNOSIS — F41.1 GAD (GENERALIZED ANXIETY DISORDER): ICD-10-CM

## 2020-10-26 RX ORDER — QUETIAPINE FUMARATE 400 MG/1
TABLET, FILM COATED ORAL
Qty: 60 TABLET | Refills: 2 | Status: SHIPPED | OUTPATIENT
Start: 2020-10-26 | End: 2020-11-02 | Stop reason: SDUPTHER

## 2020-11-02 RX ORDER — QUETIAPINE FUMARATE 400 MG/1
TABLET, FILM COATED ORAL
Qty: 60 TABLET | Refills: 2 | Status: SHIPPED | OUTPATIENT
Start: 2020-11-02 | End: 2021-04-12 | Stop reason: SDUPTHER

## 2020-12-02 ENCOUNTER — TELEPHONE (OUTPATIENT)
Dept: ADMINISTRATIVE | Facility: HOSPITAL | Age: 45
End: 2020-12-02

## 2020-12-07 ENCOUNTER — TELEPHONE (OUTPATIENT)
Dept: ADMINISTRATIVE | Facility: HOSPITAL | Age: 45
End: 2020-12-07

## 2020-12-07 DIAGNOSIS — E11.65 UNCONTROLLED TYPE 2 DIABETES MELLITUS WITH HYPERGLYCEMIA: Primary | ICD-10-CM

## 2020-12-07 DIAGNOSIS — E78.5 HYPERLIPIDEMIA LDL GOAL <70: ICD-10-CM

## 2020-12-07 DIAGNOSIS — I10 ESSENTIAL HYPERTENSION: ICD-10-CM

## 2020-12-18 ENCOUNTER — LAB VISIT (OUTPATIENT)
Dept: LAB | Facility: HOSPITAL | Age: 45
End: 2020-12-18
Attending: NURSE PRACTITIONER
Payer: MEDICAID

## 2020-12-18 DIAGNOSIS — I10 ESSENTIAL HYPERTENSION: ICD-10-CM

## 2020-12-18 DIAGNOSIS — E78.5 HYPERLIPIDEMIA LDL GOAL <70: ICD-10-CM

## 2020-12-18 DIAGNOSIS — E11.65 UNCONTROLLED TYPE 2 DIABETES MELLITUS WITH HYPERGLYCEMIA: ICD-10-CM

## 2020-12-18 LAB
ALBUMIN SERPL BCP-MCNC: 3.8 G/DL (ref 3.5–5.2)
ALBUMIN/CREAT UR: 15.2 UG/MG (ref 0–30)
ALP SERPL-CCNC: 106 U/L (ref 55–135)
ALT SERPL W/O P-5'-P-CCNC: 8 U/L (ref 10–44)
ANION GAP SERPL CALC-SCNC: 12 MMOL/L (ref 8–16)
AST SERPL-CCNC: 7 U/L (ref 10–40)
BASOPHILS # BLD AUTO: 0.06 K/UL (ref 0–0.2)
BASOPHILS NFR BLD: 0.5 % (ref 0–1.9)
BILIRUB SERPL-MCNC: 0.3 MG/DL (ref 0.1–1)
BUN SERPL-MCNC: 17 MG/DL (ref 6–20)
CALCIUM SERPL-MCNC: 9.5 MG/DL (ref 8.7–10.5)
CHLORIDE SERPL-SCNC: 100 MMOL/L (ref 95–110)
CHOLEST SERPL-MCNC: 257 MG/DL (ref 120–199)
CHOLEST/HDLC SERPL: 8.3 {RATIO} (ref 2–5)
CO2 SERPL-SCNC: 25 MMOL/L (ref 23–29)
CREAT SERPL-MCNC: 0.9 MG/DL (ref 0.5–1.4)
CREAT UR-MCNC: 79.2 MG/DL (ref 15–325)
DIFFERENTIAL METHOD: ABNORMAL
EOSINOPHIL # BLD AUTO: 0.4 K/UL (ref 0–0.5)
EOSINOPHIL NFR BLD: 3.2 % (ref 0–8)
ERYTHROCYTE [DISTWIDTH] IN BLOOD BY AUTOMATED COUNT: 12.3 % (ref 11.5–14.5)
EST. GFR  (AFRICAN AMERICAN): >60 ML/MIN/1.73 M^2
EST. GFR  (NON AFRICAN AMERICAN): >60 ML/MIN/1.73 M^2
ESTIMATED AVG GLUCOSE: 275 MG/DL (ref 68–131)
GLUCOSE SERPL-MCNC: 331 MG/DL (ref 70–110)
HBA1C MFR BLD HPLC: 11.2 % (ref 4–5.6)
HCT VFR BLD AUTO: 43.3 % (ref 37–48.5)
HDLC SERPL-MCNC: 31 MG/DL (ref 40–75)
HDLC SERPL: 12.1 % (ref 20–50)
HGB BLD-MCNC: 14.6 G/DL (ref 12–16)
IMM GRANULOCYTES # BLD AUTO: 0.04 K/UL (ref 0–0.04)
IMM GRANULOCYTES NFR BLD AUTO: 0.3 % (ref 0–0.5)
LDLC SERPL CALC-MCNC: ABNORMAL MG/DL (ref 63–159)
LYMPHOCYTES # BLD AUTO: 4.4 K/UL (ref 1–4.8)
LYMPHOCYTES NFR BLD: 36.9 % (ref 18–48)
MCH RBC QN AUTO: 30 PG (ref 27–31)
MCHC RBC AUTO-ENTMCNC: 33.7 G/DL (ref 32–36)
MCV RBC AUTO: 89 FL (ref 82–98)
MICROALBUMIN UR DL<=1MG/L-MCNC: 12 UG/ML
MONOCYTES # BLD AUTO: 0.8 K/UL (ref 0.3–1)
MONOCYTES NFR BLD: 6.7 % (ref 4–15)
NEUTROPHILS # BLD AUTO: 6.3 K/UL (ref 1.8–7.7)
NEUTROPHILS NFR BLD: 52.4 % (ref 38–73)
NONHDLC SERPL-MCNC: 226 MG/DL
NRBC BLD-RTO: 0 /100 WBC
PLATELET # BLD AUTO: 364 K/UL (ref 150–350)
PMV BLD AUTO: 10.6 FL (ref 9.2–12.9)
POTASSIUM SERPL-SCNC: 4.8 MMOL/L (ref 3.5–5.1)
PROT SERPL-MCNC: 7.7 G/DL (ref 6–8.4)
RBC # BLD AUTO: 4.86 M/UL (ref 4–5.4)
SODIUM SERPL-SCNC: 137 MMOL/L (ref 136–145)
TRIGL SERPL-MCNC: 429 MG/DL (ref 30–150)
TSH SERPL DL<=0.005 MIU/L-ACNC: 2.37 UIU/ML (ref 0.4–4)
WBC # BLD AUTO: 11.96 K/UL (ref 3.9–12.7)

## 2020-12-18 PROCEDURE — 83036 HEMOGLOBIN GLYCOSYLATED A1C: CPT

## 2020-12-18 PROCEDURE — 80053 COMPREHEN METABOLIC PANEL: CPT

## 2020-12-18 PROCEDURE — 85025 COMPLETE CBC W/AUTO DIFF WBC: CPT

## 2020-12-18 PROCEDURE — 80061 LIPID PANEL: CPT

## 2020-12-18 PROCEDURE — 82043 UR ALBUMIN QUANTITATIVE: CPT

## 2020-12-18 PROCEDURE — 36415 COLL VENOUS BLD VENIPUNCTURE: CPT

## 2020-12-18 PROCEDURE — 84443 ASSAY THYROID STIM HORMONE: CPT

## 2021-01-04 ENCOUNTER — TELEPHONE (OUTPATIENT)
Dept: INTERNAL MEDICINE | Facility: CLINIC | Age: 46
End: 2021-01-04

## 2021-01-04 ENCOUNTER — PATIENT MESSAGE (OUTPATIENT)
Dept: ADMINISTRATIVE | Facility: HOSPITAL | Age: 46
End: 2021-01-04

## 2021-01-04 DIAGNOSIS — E11.65 UNCONTROLLED TYPE 2 DIABETES MELLITUS WITH HYPERGLYCEMIA: Primary | ICD-10-CM

## 2021-02-12 DIAGNOSIS — F41.9 ANXIETY: ICD-10-CM

## 2021-02-15 RX ORDER — HYDROXYZINE PAMOATE 25 MG/1
CAPSULE ORAL
Qty: 90 CAPSULE | Refills: 0 | Status: SHIPPED | OUTPATIENT
Start: 2021-02-15 | End: 2022-03-22

## 2021-04-05 ENCOUNTER — PATIENT MESSAGE (OUTPATIENT)
Dept: ADMINISTRATIVE | Facility: HOSPITAL | Age: 46
End: 2021-04-05

## 2021-05-04 ENCOUNTER — PATIENT MESSAGE (OUTPATIENT)
Dept: RESEARCH | Facility: HOSPITAL | Age: 46
End: 2021-05-04

## 2021-05-06 RX ORDER — PEN NEEDLE, DIABETIC 30 GX3/16"
1 NEEDLE, DISPOSABLE MISCELLANEOUS DAILY
Qty: 50 EACH | Refills: 5 | Status: SHIPPED | OUTPATIENT
Start: 2021-05-06

## 2021-06-11 ENCOUNTER — PATIENT OUTREACH (OUTPATIENT)
Dept: ADMINISTRATIVE | Facility: HOSPITAL | Age: 46
End: 2021-06-11

## 2021-07-02 ENCOUNTER — PATIENT OUTREACH (OUTPATIENT)
Dept: ADMINISTRATIVE | Facility: HOSPITAL | Age: 46
End: 2021-07-02

## 2021-07-06 ENCOUNTER — OFFICE VISIT (OUTPATIENT)
Dept: NEUROLOGY | Facility: CLINIC | Age: 46
End: 2021-07-06
Payer: MEDICAID

## 2021-07-06 VITALS
DIASTOLIC BLOOD PRESSURE: 78 MMHG | RESPIRATION RATE: 16 BRPM | BODY MASS INDEX: 25.36 KG/M2 | SYSTOLIC BLOOD PRESSURE: 108 MMHG | HEIGHT: 64 IN | WEIGHT: 148.56 LBS | HEART RATE: 72 BPM

## 2021-07-06 DIAGNOSIS — F39 MOOD DISORDER: ICD-10-CM

## 2021-07-06 DIAGNOSIS — F44.5 PSEUDOSEIZURES: ICD-10-CM

## 2021-07-06 PROBLEM — Z71.6 TOBACCO ABUSE COUNSELING: Status: RESOLVED | Noted: 2018-01-23 | Resolved: 2021-07-06

## 2021-07-06 PROCEDURE — 99999 PR PBB SHADOW E&M-EST. PATIENT-LVL V: ICD-10-PCS | Mod: PBBFAC,,, | Performed by: NURSE PRACTITIONER

## 2021-07-06 PROCEDURE — 99214 PR OFFICE/OUTPT VISIT, EST, LEVL IV, 30-39 MIN: ICD-10-PCS | Mod: S$PBB,,, | Performed by: NURSE PRACTITIONER

## 2021-07-06 PROCEDURE — 99999 PR PBB SHADOW E&M-EST. PATIENT-LVL V: CPT | Mod: PBBFAC,,, | Performed by: NURSE PRACTITIONER

## 2021-07-06 PROCEDURE — 99215 OFFICE O/P EST HI 40 MIN: CPT | Mod: PBBFAC | Performed by: NURSE PRACTITIONER

## 2021-07-06 PROCEDURE — 99214 OFFICE O/P EST MOD 30 MIN: CPT | Mod: S$PBB,,, | Performed by: NURSE PRACTITIONER

## 2021-07-06 RX ORDER — DIVALPROEX SODIUM 500 MG/1
500 TABLET, FILM COATED, EXTENDED RELEASE ORAL DAILY
COMMUNITY
End: 2021-07-06 | Stop reason: SDUPTHER

## 2021-07-06 RX ORDER — DIVALPROEX SODIUM 500 MG/1
500 TABLET, FILM COATED, EXTENDED RELEASE ORAL DAILY
Qty: 30 TABLET | Refills: 0 | Status: SHIPPED | OUTPATIENT
Start: 2021-07-06 | End: 2021-08-12 | Stop reason: SDUPTHER

## 2021-07-06 RX ORDER — ERENUMAB-AOOE 70 MG/ML
70 INJECTION SUBCUTANEOUS
Qty: 1 SYRINGE | Refills: 5 | Status: SHIPPED | OUTPATIENT
Start: 2021-07-06 | End: 2021-07-20

## 2021-07-06 RX ORDER — DIVALPROEX SODIUM 500 MG/1
500 TABLET, FILM COATED, EXTENDED RELEASE ORAL DAILY
Qty: 30 TABLET | Refills: 0 | Status: SHIPPED | OUTPATIENT
Start: 2021-07-06 | End: 2021-07-06 | Stop reason: SDUPTHER

## 2021-07-16 ENCOUNTER — TELEPHONE (OUTPATIENT)
Dept: NEUROLOGY | Facility: CLINIC | Age: 46
End: 2021-07-16

## 2021-07-20 RX ORDER — GALCANEZUMAB 120 MG/ML
120 INJECTION, SOLUTION SUBCUTANEOUS
Qty: 1 SYRINGE | Refills: 5 | Status: SHIPPED | OUTPATIENT
Start: 2021-07-20 | End: 2022-03-22

## 2021-07-20 RX ORDER — GALCANEZUMAB 120 MG/ML
240 INJECTION, SOLUTION SUBCUTANEOUS SEE ADMIN INSTRUCTIONS
Qty: 2 SYRINGE | Refills: 0 | Status: SHIPPED | OUTPATIENT
Start: 2021-07-20 | End: 2022-01-13 | Stop reason: SDUPTHER

## 2021-07-22 ENCOUNTER — TELEPHONE (OUTPATIENT)
Dept: NEUROLOGY | Facility: CLINIC | Age: 46
End: 2021-07-22

## 2021-09-22 DIAGNOSIS — F41.9 ANXIETY: ICD-10-CM

## 2021-09-22 DIAGNOSIS — I10 BENIGN ESSENTIAL HTN: ICD-10-CM

## 2021-09-23 RX ORDER — HYDROXYZINE PAMOATE 25 MG/1
25 CAPSULE ORAL EVERY 8 HOURS PRN
Qty: 90 CAPSULE | Refills: 0 | OUTPATIENT
Start: 2021-09-23

## 2021-09-23 RX ORDER — METOPROLOL SUCCINATE 50 MG/1
50 TABLET, EXTENDED RELEASE ORAL DAILY
Qty: 30 TABLET | Refills: 2 | OUTPATIENT
Start: 2021-09-23

## 2021-10-06 ENCOUNTER — OFFICE VISIT (OUTPATIENT)
Dept: NEUROLOGY | Facility: CLINIC | Age: 46
End: 2021-10-06
Payer: MEDICAID

## 2021-10-06 VITALS
RESPIRATION RATE: 20 BRPM | HEART RATE: 80 BPM | BODY MASS INDEX: 24.35 KG/M2 | SYSTOLIC BLOOD PRESSURE: 130 MMHG | DIASTOLIC BLOOD PRESSURE: 80 MMHG | WEIGHT: 142.63 LBS | HEIGHT: 64 IN

## 2021-10-06 DIAGNOSIS — G43.909 NONINTRACTABLE CHRONIC MIGRAINE: ICD-10-CM

## 2021-10-06 DIAGNOSIS — F44.5 PSEUDOSEIZURES: ICD-10-CM

## 2021-10-06 DIAGNOSIS — G62.9 NEUROPATHY: Primary | ICD-10-CM

## 2021-10-06 DIAGNOSIS — F44.9 CONVERSION DISORDER: ICD-10-CM

## 2021-10-06 PROCEDURE — 99214 PR OFFICE/OUTPT VISIT, EST, LEVL IV, 30-39 MIN: ICD-10-PCS | Mod: S$PBB,,, | Performed by: NURSE PRACTITIONER

## 2021-10-06 PROCEDURE — 99214 OFFICE O/P EST MOD 30 MIN: CPT | Mod: S$PBB,,, | Performed by: NURSE PRACTITIONER

## 2021-10-06 PROCEDURE — 99215 OFFICE O/P EST HI 40 MIN: CPT | Mod: PBBFAC | Performed by: NURSE PRACTITIONER

## 2021-10-06 PROCEDURE — 99999 PR PBB SHADOW E&M-EST. PATIENT-LVL V: CPT | Mod: PBBFAC,,, | Performed by: NURSE PRACTITIONER

## 2021-10-06 PROCEDURE — 99999 PR PBB SHADOW E&M-EST. PATIENT-LVL V: ICD-10-PCS | Mod: PBBFAC,,, | Performed by: NURSE PRACTITIONER

## 2021-10-06 RX ORDER — LANCETS 33 GAUGE
EACH MISCELLANEOUS
COMMUNITY
Start: 2021-09-26

## 2021-10-06 RX ORDER — GABAPENTIN 100 MG/1
100 CAPSULE ORAL NIGHTLY
Qty: 30 CAPSULE | Refills: 4 | Status: SHIPPED | OUTPATIENT
Start: 2021-10-06 | End: 2022-03-22

## 2022-01-01 ENCOUNTER — HOSPITAL ENCOUNTER (EMERGENCY)
Facility: HOSPITAL | Age: 47
Discharge: HOME | End: 2022-01-01
Attending: SURGERY
Payer: MEDICAID

## 2022-01-01 VITALS
RESPIRATION RATE: 20 BRPM | DIASTOLIC BLOOD PRESSURE: 92 MMHG | HEIGHT: 64 IN | HEART RATE: 95 BPM | WEIGHT: 122.5 LBS | OXYGEN SATURATION: 99 % | TEMPERATURE: 99 F | SYSTOLIC BLOOD PRESSURE: 132 MMHG | BODY MASS INDEX: 20.92 KG/M2

## 2022-01-01 DIAGNOSIS — R10.9 LEFT FLANK PAIN: Primary | ICD-10-CM

## 2022-01-01 LAB
ALBUMIN SERPL BCP-MCNC: 4.1 G/DL (ref 3.5–5.2)
ALP SERPL-CCNC: 79 U/L (ref 55–135)
ALT SERPL W/O P-5'-P-CCNC: 9 U/L (ref 10–44)
ANION GAP SERPL CALC-SCNC: 13 MMOL/L (ref 8–16)
AST SERPL-CCNC: 11 U/L (ref 10–40)
BASOPHILS # BLD AUTO: 0.03 K/UL (ref 0–0.2)
BASOPHILS NFR BLD: 0.5 % (ref 0–1.9)
BILIRUB SERPL-MCNC: 0.5 MG/DL (ref 0.1–1)
BILIRUB UR QL STRIP: NEGATIVE
BUN SERPL-MCNC: 10 MG/DL (ref 6–20)
CALCIUM SERPL-MCNC: 9.3 MG/DL (ref 8.7–10.5)
CHLORIDE SERPL-SCNC: 100 MMOL/L (ref 95–110)
CLARITY UR: CLEAR
CO2 SERPL-SCNC: 25 MMOL/L (ref 23–29)
COLOR UR: YELLOW
CREAT SERPL-MCNC: 0.9 MG/DL (ref 0.5–1.4)
DIFFERENTIAL METHOD: NORMAL
EOSINOPHIL # BLD AUTO: 0 K/UL (ref 0–0.5)
EOSINOPHIL NFR BLD: 0.3 % (ref 0–8)
ERYTHROCYTE [DISTWIDTH] IN BLOOD BY AUTOMATED COUNT: 12.4 % (ref 11.5–14.5)
EST. GFR  (AFRICAN AMERICAN): >60 ML/MIN/1.73 M^2
EST. GFR  (NON AFRICAN AMERICAN): >60 ML/MIN/1.73 M^2
GLUCOSE SERPL-MCNC: 172 MG/DL (ref 70–110)
GLUCOSE UR QL STRIP: NEGATIVE
HCT VFR BLD AUTO: 39.8 % (ref 37–48.5)
HGB BLD-MCNC: 13.8 G/DL (ref 12–16)
HGB UR QL STRIP: NEGATIVE
IMM GRANULOCYTES # BLD AUTO: 0.02 K/UL (ref 0–0.04)
IMM GRANULOCYTES NFR BLD AUTO: 0.3 % (ref 0–0.5)
KETONES UR QL STRIP: NEGATIVE
LEUKOCYTE ESTERASE UR QL STRIP: NEGATIVE
LIPASE SERPL-CCNC: 52 U/L (ref 4–60)
LYMPHOCYTES # BLD AUTO: 2.2 K/UL (ref 1–4.8)
LYMPHOCYTES NFR BLD: 36.8 % (ref 18–48)
MCH RBC QN AUTO: 30.8 PG (ref 27–31)
MCHC RBC AUTO-ENTMCNC: 34.7 G/DL (ref 32–36)
MCV RBC AUTO: 89 FL (ref 82–98)
MONOCYTES # BLD AUTO: 0.9 K/UL (ref 0.3–1)
MONOCYTES NFR BLD: 14.3 % (ref 4–15)
NEUTROPHILS # BLD AUTO: 2.8 K/UL (ref 1.8–7.7)
NEUTROPHILS NFR BLD: 47.8 % (ref 38–73)
NITRITE UR QL STRIP: NEGATIVE
NRBC BLD-RTO: 0 /100 WBC
PH UR STRIP: 6 [PH] (ref 5–8)
PLATELET # BLD AUTO: 234 K/UL (ref 150–450)
PMV BLD AUTO: 10.8 FL (ref 9.2–12.9)
POTASSIUM SERPL-SCNC: 3.9 MMOL/L (ref 3.5–5.1)
PROT SERPL-MCNC: 7.7 G/DL (ref 6–8.4)
PROT UR QL STRIP: NEGATIVE
RBC # BLD AUTO: 4.48 M/UL (ref 4–5.4)
SODIUM SERPL-SCNC: 138 MMOL/L (ref 136–145)
SP GR UR STRIP: <=1.005 (ref 1–1.03)
URN SPEC COLLECT METH UR: ABNORMAL
UROBILINOGEN UR STRIP-ACNC: NEGATIVE EU/DL
WBC # BLD AUTO: 5.93 K/UL (ref 3.9–12.7)

## 2022-01-01 PROCEDURE — 96375 TX/PRO/DX INJ NEW DRUG ADDON: CPT

## 2022-01-01 PROCEDURE — 80053 COMPREHEN METABOLIC PANEL: CPT | Performed by: SURGERY

## 2022-01-01 PROCEDURE — 99285 EMERGENCY DEPT VISIT HI MDM: CPT | Mod: 25

## 2022-01-01 PROCEDURE — 96374 THER/PROPH/DIAG INJ IV PUSH: CPT

## 2022-01-01 PROCEDURE — 85025 COMPLETE CBC W/AUTO DIFF WBC: CPT | Performed by: SURGERY

## 2022-01-01 PROCEDURE — 83690 ASSAY OF LIPASE: CPT | Performed by: SURGERY

## 2022-01-01 PROCEDURE — 25000003 PHARM REV CODE 250: Performed by: SURGERY

## 2022-01-01 PROCEDURE — 81003 URINALYSIS AUTO W/O SCOPE: CPT | Performed by: SURGERY

## 2022-01-01 PROCEDURE — 63600175 PHARM REV CODE 636 W HCPCS: Performed by: SURGERY

## 2022-01-01 PROCEDURE — 96361 HYDRATE IV INFUSION ADD-ON: CPT

## 2022-01-01 RX ORDER — CIPROFLOXACIN 500 MG/1
500 TABLET ORAL 2 TIMES DAILY
Qty: 14 TABLET | Refills: 0 | Status: SHIPPED | OUTPATIENT
Start: 2022-01-01 | End: 2022-01-08

## 2022-01-01 RX ORDER — KETOROLAC TROMETHAMINE 10 MG/1
10 TABLET, FILM COATED ORAL EVERY 6 HOURS PRN
Qty: 15 TABLET | Refills: 0 | Status: SHIPPED | OUTPATIENT
Start: 2022-01-01 | End: 2022-01-23 | Stop reason: ALTCHOICE

## 2022-01-01 RX ORDER — MORPHINE SULFATE 2 MG/ML
2 INJECTION, SOLUTION INTRAMUSCULAR; INTRAVENOUS
Status: COMPLETED | OUTPATIENT
Start: 2022-01-01 | End: 2022-01-01

## 2022-01-01 RX ORDER — ONDANSETRON 2 MG/ML
4 INJECTION INTRAMUSCULAR; INTRAVENOUS
Status: COMPLETED | OUTPATIENT
Start: 2022-01-01 | End: 2022-01-01

## 2022-01-01 RX ORDER — TAMSULOSIN HYDROCHLORIDE 0.4 MG/1
0.4 CAPSULE ORAL DAILY
Qty: 30 CAPSULE | Refills: 0 | Status: SHIPPED | OUTPATIENT
Start: 2022-01-01 | End: 2022-03-22

## 2022-01-01 RX ADMIN — MORPHINE SULFATE 2 MG: 2 INJECTION, SOLUTION INTRAMUSCULAR; INTRAVENOUS at 05:01

## 2022-01-01 RX ADMIN — ONDANSETRON HYDROCHLORIDE 4 MG: 2 SOLUTION INTRAMUSCULAR; INTRAVENOUS at 04:01

## 2022-01-01 RX ADMIN — SODIUM CHLORIDE 1000 ML: 0.9 INJECTION, SOLUTION INTRAVENOUS at 03:01

## 2022-01-12 DIAGNOSIS — F41.9 ANXIETY: ICD-10-CM

## 2022-01-12 DIAGNOSIS — I10 BENIGN ESSENTIAL HTN: ICD-10-CM

## 2022-01-13 RX ORDER — HYDROXYZINE PAMOATE 25 MG/1
25 CAPSULE ORAL EVERY 8 HOURS PRN
Qty: 90 CAPSULE | Refills: 0 | OUTPATIENT
Start: 2022-01-13

## 2022-01-13 RX ORDER — METOPROLOL SUCCINATE 50 MG/1
50 TABLET, EXTENDED RELEASE ORAL DAILY
Qty: 30 TABLET | Refills: 2 | OUTPATIENT
Start: 2022-01-13

## 2022-01-23 ENCOUNTER — HOSPITAL ENCOUNTER (EMERGENCY)
Facility: HOSPITAL | Age: 47
Discharge: HOME | End: 2022-01-23
Attending: STUDENT IN AN ORGANIZED HEALTH CARE EDUCATION/TRAINING PROGRAM
Payer: MEDICAID

## 2022-01-23 VITALS
HEART RATE: 116 BPM | DIASTOLIC BLOOD PRESSURE: 82 MMHG | BODY MASS INDEX: 21.57 KG/M2 | TEMPERATURE: 98 F | SYSTOLIC BLOOD PRESSURE: 137 MMHG | WEIGHT: 125.69 LBS | RESPIRATION RATE: 18 BRPM | OXYGEN SATURATION: 99 %

## 2022-01-23 DIAGNOSIS — M54.42 ACUTE LEFT-SIDED LOW BACK PAIN WITH LEFT-SIDED SCIATICA: ICD-10-CM

## 2022-01-23 DIAGNOSIS — M25.552 LEFT HIP PAIN: ICD-10-CM
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
## 2022-01-23 LAB
BILIRUB UR QL STRIP: NEGATIVE
CLARITY UR: CLEAR
COLOR UR: YELLOW
GLUCOSE UR QL STRIP: NEGATIVE
HGB UR QL STRIP: ABNORMAL
KETONES UR QL STRIP: NEGATIVE
LEUKOCYTE ESTERASE UR QL STRIP: NEGATIVE
NITRITE UR QL STRIP: NEGATIVE
PH UR STRIP: 6 [PH] (ref 5–8)
PROT UR QL STRIP: NEGATIVE
SP GR UR STRIP: 1.02 (ref 1–1.03)
URN SPEC COLLECT METH UR: ABNORMAL
UROBILINOGEN UR STRIP-ACNC: NEGATIVE EU/DL

## 2022-01-23 PROCEDURE — 63600175 PHARM REV CODE 636 W HCPCS: Performed by: NURSE PRACTITIONER

## 2022-01-23 PROCEDURE — 81003 URINALYSIS AUTO W/O SCOPE: CPT | Performed by: NURSE PRACTITIONER

## 2022-01-23 PROCEDURE — 96372 THER/PROPH/DIAG INJ SC/IM: CPT

## 2022-01-23 PROCEDURE — 25000003 PHARM REV CODE 250: Performed by: NURSE PRACTITIONER

## 2022-01-23 PROCEDURE — 99284 EMERGENCY DEPT VISIT MOD MDM: CPT | Mod: 25

## 2022-01-23 RX ORDER — CYCLOBENZAPRINE HCL 10 MG
10 TABLET ORAL 3 TIMES DAILY PRN
Qty: 15 TABLET | Refills: 0 | Status: SHIPPED | OUTPATIENT
Start: 2022-01-23 | End: 2022-01-28

## 2022-01-23 RX ORDER — CYCLOBENZAPRINE HCL 10 MG
10 TABLET ORAL
Status: COMPLETED | OUTPATIENT
Start: 2022-01-23 | End: 2022-01-23

## 2022-01-23 RX ORDER — KETOROLAC TROMETHAMINE 30 MG/ML
30 INJECTION, SOLUTION INTRAMUSCULAR; INTRAVENOUS
Status: COMPLETED | OUTPATIENT
Start: 2022-01-23 | End: 2022-01-23

## 2022-01-23 RX ORDER — IBUPROFEN 600 MG/1
600 TABLET ORAL EVERY 6 HOURS PRN
Qty: 20 TABLET | Refills: 0 | Status: SHIPPED | OUTPATIENT
Start: 2022-01-23 | End: 2022-01-28 | Stop reason: ALTCHOICE

## 2022-01-23 RX ADMIN — KETOROLAC TROMETHAMINE 30 MG: 30 INJECTION, SOLUTION INTRAMUSCULAR at 03:01

## 2022-01-23 RX ADMIN — CYCLOBENZAPRINE 10 MG: 10 TABLET, FILM COATED ORAL at 03:01

## 2022-01-23 NOTE — ED TRIAGE NOTES
46 y.o. female presents to ER Room/bed info not found   Chief Complaint   Patient presents with    Hip Pain   .   Pt reports pain from left hip radiating down to left knee after a fall two days

## 2022-01-23 NOTE — ED PROVIDER NOTES
Encounter Date: 1/23/2022       History     Chief Complaint   Patient presents with    Hip Pain     Eva Lane is a 46 y.o. female with PMH of ADHD, anxiety, arthritis, bipolar disorder, CHF, COPD, DM type 2, hyperlipidemia, hypertension, lumbar radiculopathy, neuralgia, PTSD, seizure disorder, history of CVA who presents the ED for evaluation of left hip pain.  Patient reports left lower back and hip pain after accidental slip and fall at home approximately 2 days ago.   She reports slipping, landing on her left side, injuring lower back and hip.   She presents with a constant, aching pain to left lower back with radiation in to left hip and anterior thigh to level of the knee. She currently rates pain 8/10 in severity; movement, ambulation increases pain.   She denies numbness or tingling to left lower extremity. Denies saddle anesthesia or bowel/bladder dysfunction; denies urinary symptoms.     The history is provided by the patient.     Review of patient's allergies indicates:   Allergen Reactions    Penicillins Swelling and Rash    Neosporin [neomycin-bacitracin-polymyxin] Rash    Shellfish containing products     Shrimp Hives     Past Medical History:   Diagnosis Date    ADHD (attention deficit hyperactivity disorder)     Anxiety     Arthritis     Asthma     Behavioral problem     Bipolar 1 disorder     CHF (congestive heart failure)     COPD (chronic obstructive pulmonary disease)     Depression     Diabetes mellitus type II     Hx of psychiatric care     Hyperlipidemia     Hypertension     Lumbar radiculopathy     Neuralgia     Neuritis     Psychiatric problem     PTSD (post-traumatic stress disorder)     Seizures     Seizure-last 8/2015-shake and fall-doesnt remember anything    Self-harming behavior     Sleep apnea     on CPAP    Stroke 9/22/2015    Stroke     Therapy      Past Surgical History:   Procedure Laterality Date    COLONOSCOPY N/A 7/1/2016    Procedure:  COLONOSCOPY;  Surgeon: Robert Hernandez MD;  Location: [facility] ([address] [place] [address]);  Service: Endoscopy;  Laterality: N/A;  Schedule for next available CRS physician - EGD @ 8:30 with Dr. Naylor    CYST REMOVAL      Fatty cyst on right face cheek and left upper arm     DILATION AND CURETTAGE OF UTERUS  2006    Miscarriage    HYSTERECTOMY      DUB - Total    OOPHORECTOMY  2008    TOTAL ABDOMINAL HYSTERECTOMY  2008    TUBAL LIGATION       Family History   Problem Relation Age of Onset    Heart disease Father     Hyperlipidemia Father     Hypertension Father     Diabetes Father     Dementia Father     Breast cancer Neg Hx     Colon cancer Neg Hx     Ovarian cancer Neg Hx      Social History     Tobacco Use    Smoking status: Former Smoker     Packs/day: 0.10     Years: 26.00     Pack years: 2.60     Types: Cigarettes     Start date: 1986     Quit date: 8/3/2021     Years since quittin.4    Smokeless tobacco: Current User    Tobacco comment: [facility]-given smoking clinic pamphlet   Substance Use Topics    Alcohol use: No    Drug use: No     Review of Systems   Constitutional: Negative for activity change, chills and fever.   HENT: Negative for congestion, ear discharge, ear pain, postnasal drip, sinus pressure, sinus pain and sore throat.    Respiratory: Negative for cough, chest tightness and shortness of breath.    Cardiovascular: Negative for chest pain.   Gastrointestinal: Negative for abdominal distention, abdominal pain and nausea.   Genitourinary: Negative for dysuria, frequency and urgency.   Musculoskeletal: Positive for arthralgias (left hip ) and back pain.   Skin: Negative for rash.   Neurological: Negative for dizziness, weakness, light-headedness and numbness.   Hematological: Does not bruise/bleed easily.       Physical Exam     Initial Vitals [22 1350]   BP Pulse Resp Temp SpO2   137/82 (!) 116 18 97.7 °F (36.5 °C) 99 %      MAP        --         Physical Exam    Nursing note and vitals reviewed.  Constitutional: She appears well-developed and well-nourished.   HENT:   Head: Normocephalic and atraumatic.   Right Ear: Tympanic membrane, external ear and ear canal normal. Tympanic membrane is not erythematous. No middle ear effusion.   Left Ear: Tympanic membrane, external ear and ear canal normal. Tympanic membrane is not erythematous.  No middle ear effusion.   Nose: Nose normal.   Mouth/Throat: Uvula is midline, oropharynx is clear and moist and mucous membranes are normal. Mucous membranes are not pale and not dry.   Eyes: Conjunctivae and EOM are normal. Pupils are equal, round, and reactive to light.   Neck: Neck supple.   Normal range of motion.  Cardiovascular: Normal rate, regular rhythm, normal heart sounds and intact distal pulses.   Pulmonary/Chest: Effort normal and breath sounds normal. She has no decreased breath sounds. She has no wheezes. She has no rhonchi. She has no rales.   Abdominal: Abdomen is soft. Bowel sounds are normal. There is no abdominal tenderness.   Musculoskeletal:      Cervical back: Normal range of motion and neck supple.      Lumbar back: Tenderness present. Decreased range of motion.      Left hip: Tenderness (point ttp ) present. Decreased range of motion.     Neurological: She is alert and oriented to person, place, and time. She has normal strength. She displays normal reflexes. No cranial nerve deficit or sensory deficit.   Skin: Skin is warm and dry. Capillary refill takes less than 2 seconds. No rash noted.   Psychiatric: She has a normal mood and affect. Her behavior is normal. Judgment and thought content normal.         ED Course   Procedures  Labs Reviewed   URINALYSIS, REFLEX TO URINE CULTURE - Abnormal; Notable for the following components:       Result Value    Occult Blood UA Trace (*)     All other components within normal limits    Narrative:     Specimen Source->Urine          Imaging Results           X-Ray Lumbar Spine Ap And Lateral (Final result)  Result time 01/23/22 15:28:42    Final result by Shoaib Vasquez MD (01/23/22 15:28:42)                 Narrative:    EXAMINATION:  XR LUMBAR SPINE AP AND LATERAL    TECHNIQUE:  AP, lateral and spot images were performed of the lumbar spine.    COMPARISON:  01/01/2022    FINDINGS:  No spondylolisthesis.  No displaced fracture with preserved vertebral body heights.  Minimal degenerative disc disease throughout the lumbar spine.  Relative preservation of facet joints.  Faint atherosclerosis.  Moderate degree of stool in the colon as can be seen with constipation.      Electronically signed by: Shoaib Vasquez  Date:    01/23/2022  Time:    15:28                             X-Ray Hip 2 or 3 views Left (with Pelvis when performed) (Final result)  Result time 01/23/22 15:27:03    Final result by Shoaib Vasquez MD (01/23/22 15:27:03)                 Narrative:    EXAMINATION:  XR HIP WITH PELVIS WHEN PERFORMED, 2 OR 3 VIEWS LEFT    CLINICAL HISTORY:  Pain in left hip    TECHNIQUE:  AP view of the pelvis and frog leg lateral view of the left hip were performed.    COMPARISON:  None    FINDINGS:  No acute fracture or malalignment.  Preserved joint spaces.  Pelvic phleboliths.      Electronically signed by: Shoaib Vasquez  Date:    01/23/2022  Time:    15:27                               Medications   ketorolac injection 30 mg (30 mg Intramuscular Given 1/23/22 1533)   cyclobenzaprine tablet 10 mg (10 mg Oral Given 1/23/22 1533)     Medical Decision Making:   Differential Diagnosis:   Lumbar strain, radiculopathy, fx, left hip contusion, fx   Clinical Tests:   Lab Tests: Ordered and Reviewed  Radiological Study: Ordered and Reviewed  ED Management:  Evaluation of a 46-year-old female with left hip and lower back pain after fall 2 days ago.  Patient reports that she has been suffering with left hip pain for the past several weeks with ED visit on 01/01/2022  where she had a CT scan that showed possible right-sided hydronephrosis; possible recent passage of kidney stone, although her pain is on the left side.  She reports that she accidentally slipped and fell onto or left side X 2 days ago and has had increased pain to left lower back and hip. She has point tenderness to left hip. No obvious deformities; no swelling or redness. She has not been febrile; she has no CVA ttp.   UA is negative.   Xrays are unremarkable.  She was given Toradol in addition to Flexeril in the ED with moderate relief of symptoms.  Close follow-up with PCP if symptoms continue, strict return precautions discussed with patient with voiced understanding.                      Clinical Impression:   Final diagnoses:  [M25.552] Left hip pain  [W19.XXXA] Fall, initial encounter (Primary)  [M54.42] Acute left-sided low back pain with left-sided sciatica          ED Disposition Condition    Discharge Stable        ED Prescriptions     Medication Sig Dispense Start Date End Date Auth. Provider    cyclobenzaprine (FLEXERIL) 10 MG tablet Take 1 tablet (10 mg total) by mouth 3 (three) times daily as needed for Muscle spasms. 15 tablet 1/23/2022 1/28/2022 Virgie Pinedo NP    ibuprofen (ADVIL,MOTRIN) 600 MG tablet Take 1 tablet (600 mg total) by mouth every 6 (six) hours as needed for Pain. 20 tablet 1/23/2022  Virgie Pinedo NP        Follow-up Information     Follow up With Specialties Details Why Contact Info    Kanchan Butler NP Internal Medicine, Family Medicine Schedule an appointment as soon as possible for a visit in 2 days  [address] [facility] [address]  [phone]             Virgie Pinedo NP  01/23/22 0988

## 2022-01-25 NOTE — TELEPHONE ENCOUNTER
Hospitalist Progress Note  ADMIT DATE: 1/22/2022     FACILITY: [facility]    PCP: Radha Oakley, [phone]    Assessment/Plan    Jarrell Nicole is a 65 year old female with a relevant history metastatic stage IV breast cancer, asthma, hypertension, diverticulitis, RASHAWN, opioid dependence, diverticulitis, and CAD who presents to the ED for evaluation of nausea and vomiting, for 6 days.     Intractable nausea and vomiting  -diarrhea  -for 6 days pta  -ddx include but not limited to chemotherapy induced, meds side effects, cyclic vomiting syndrome due to Marijuana, gastroenteritis or due to metastatic ca  -unremarkable abd ct  -iv fluid and supportive care  -improving, but still nausea, poor appetite, gagging; loose stool slowing down  -palliative care consultation appreciated:   --Scheduled Zofran ODT 4 mg TID before meals  -- Schedule scopolamine patch q72 hours  -- Continue compazine 5 mg IV/PO q6h prn     Hypokalemia due to above  -replace as per protocol  -resolved     Breast ca with bone mets, pain  -Right-sided breast cancer stage IV with bone metastasis and chronic pain  Stage IV (cTX, cM1, ER+, CO+, HER2-).  Diagnosed on 3/3/2021; incidental finding of the right breast mass during ED evaluation for chest pain.  Following Dr. Blackman.  -consult oncology  -Pain team consult:   suboxone as at pain clinic - 4 mg po twice daily  -home dilaudid 4 mg q4h as needed (per patient report. Receives this from [facility]).-Instructed patient to follow up with Pain clinic regarding as needed opiates.  - Continue Gabapentin 300 mg qam, 100 mg midday and 300 mg qpm  - Hydroxizine 25-50 mg po TID prn by pain team     Abnormal urine drug screen  Cannabis use  -pt denies illicit drug abuse, unreliable  -as per record, there is concern of opiate overuse     DVT Prophylaxis: Lovenox  Diet: regular     Central Lines: None     Richard Catheter: Not present     Barriers to Discharge:  Still some n/v/d,  Patient states she fell and started having vaginal bleeding. States she had a Total Hysterectomy a few years ago. States her PCP had referred her to see GYN for her vaginal bleeding. Patient scheduled.   pain     Anticipated discharge date/Disposition:  1-2 days when symptoms improved    Subjective  Still nausea, pain 6-8/10, on iv pain meds    Objective    Vital signs in last 24 hours  Temp:  [98.2  F (36.8  C)-99.3  F (37.4  C)] 99.2  F (37.3  C)  Pulse:  [54-66] 64  Resp:  [16-18] 16  BP: (112-171)/(58-89) 112/58  SpO2:  [96 %-100 %] 98 % [unfilled] O2 Device: None (Room air)    Weight:   [unfilled] Weight change: -0.998 kg (-2 lb 3.2 oz)    Intake/Output last 3 shifts  I/O last 3 completed shifts:  In: 720 [P.O.:720]  Out: 2100 [Urine:2100]  Body mass index is 23.53 kg/m .    Physical Exam    Physical Exam  General appearance: not in acute distress; c/o nausea and pain  HEENT: PERRL, EOMI  Lungs: Clear breath sounds in bilateral lung fields  Cardiovascular: Regular rate and rhythm, normal S1-S2  Abdomen: Soft, no distension, normal bowel sound  Musculoskeletal: No joint swelling  Skin: No rash and no edema  Neurology: AAO ×3.  Cranial nerves II - XII normal.  Normal muscle strength in all four extremities.      Pertinent Labs   Lab Results: personally reviewed.   Results for DANI HARRISON (MRN [id number]) as of 1/25/2022 09:51   Ref. Range 1/25/2022 05:04   Potassium Latest Ref Range: 3.5 - 5.0 mmol/L 4.6   Platelet Count Latest Ref Range: 150 - 450 10e3/uL 250       Medications  Scheduled Meds:    acetaminophen  975 mg Oral Once     buprenorphine-naloxone  4 tablet Sublingual BID     enoxaparin ANTICOAGULANT  40 mg Subcutaneous Q24H     fluticasone-vilanterol  1 puff Inhalation Daily     gabapentin  100 mg Oral Daily with lunch     gabapentin  300 mg Oral BID     lidocaine   Topical 4x Daily     magnesium gluconate  500 mg Oral BID     ondansetron  4 mg Oral TID AC     scopolamine  1 patch Transdermal Q72H    And     scopolamine   Transdermal Q8H     venlafaxine  37.5 mg Oral QAM     Continuous Infusions:  PRN Meds:.albuterol, buprenorphine-naloxone, HYDROmorphone, HYDROmorphone, hydrOXYzine, Lidocaine,  prochlorperazine, prochlorperazine, propranolol    Advanced Care Planning:  Discharge planning discussed with patient         [facility] Medicine Service  Bret Luque

## 2022-02-16 ENCOUNTER — PATIENT MESSAGE (OUTPATIENT)
Dept: ADMINISTRATIVE | Facility: HOSPITAL | Age: 47
End: 2022-02-16
Payer: MEDICAID

## 2022-03-09 DIAGNOSIS — E11.9 TYPE 2 DIABETES MELLITUS WITHOUT COMPLICATION: ICD-10-CM

## 2022-03-18 ENCOUNTER — HOSPITAL ENCOUNTER (OUTPATIENT)
Dept: RADIOLOGY | Facility: HOSPITAL | Age: 47
Discharge: HOME | End: 2022-03-18
Attending: INTERNAL MEDICINE
Payer: MEDICAID

## 2022-03-18 DIAGNOSIS — R07.9 CHEST PAIN, UNSPECIFIED: ICD-10-CM

## 2022-03-18 DIAGNOSIS — R06.02 SOB (SHORTNESS OF BREATH): ICD-10-CM

## 2022-03-18 DIAGNOSIS — R06.09 DOE (DYSPNEA ON EXERTION): Primary | ICD-10-CM

## 2022-03-18 PROCEDURE — 71046 X-RAY EXAM CHEST 2 VIEWS: CPT | Mod: 26,,, | Performed by: RADIOLOGY

## 2022-03-18 PROCEDURE — 71046 X-RAY EXAM CHEST 2 VIEWS: CPT | Mod: TC

## 2022-03-18 PROCEDURE — 71046 XR CHEST PA AND LATERAL: ICD-10-PCS | Mod: 26,,, | Performed by: RADIOLOGY

## 2022-03-22 ENCOUNTER — OFFICE VISIT (OUTPATIENT)
Dept: NEUROLOGY | Facility: CLINIC | Age: 47
End: 2022-03-22
Payer: MEDICAID

## 2022-03-22 ENCOUNTER — TELEPHONE (OUTPATIENT)
Dept: NEUROLOGY | Facility: CLINIC | Age: 47
End: 2022-03-22

## 2022-03-22 VITALS
HEIGHT: 64 IN | HEART RATE: 72 BPM | BODY MASS INDEX: 20.32 KG/M2 | RESPIRATION RATE: 18 BRPM | DIASTOLIC BLOOD PRESSURE: 60 MMHG | OXYGEN SATURATION: 97 % | WEIGHT: 119.06 LBS | SYSTOLIC BLOOD PRESSURE: 100 MMHG

## 2022-03-22 DIAGNOSIS — G89.29 CHRONIC RADICULAR LUMBAR PAIN: ICD-10-CM

## 2022-03-22 DIAGNOSIS — M79.605 BILATERAL LEG PAIN: ICD-10-CM

## 2022-03-22 DIAGNOSIS — M79.604 BILATERAL LEG PAIN: ICD-10-CM

## 2022-03-22 DIAGNOSIS — G43.709 CHRONIC MIGRAINE WITHOUT AURA WITHOUT STATUS MIGRAINOSUS, NOT INTRACTABLE: ICD-10-CM

## 2022-03-22 DIAGNOSIS — J44.9 CHRONIC OBSTRUCTIVE PULMONARY DISEASE, UNSPECIFIED COPD TYPE: Primary | ICD-10-CM

## 2022-03-22 DIAGNOSIS — M54.16 CHRONIC RADICULAR LUMBAR PAIN: ICD-10-CM

## 2022-03-22 DIAGNOSIS — F41.9 ANXIETY AND DEPRESSION: ICD-10-CM

## 2022-03-22 DIAGNOSIS — F32.A ANXIETY AND DEPRESSION: ICD-10-CM

## 2022-03-22 DIAGNOSIS — R29.898 BILATERAL LEG WEAKNESS: ICD-10-CM

## 2022-03-22 DIAGNOSIS — F31.9 BIPOLAR AFFECTIVE DISORDER, REMISSION STATUS UNSPECIFIED: ICD-10-CM

## 2022-03-22 PROCEDURE — 3078F DIAST BP <80 MM HG: CPT | Mod: CPTII,,, | Performed by: NURSE PRACTITIONER

## 2022-03-22 PROCEDURE — 3008F PR BODY MASS INDEX (BMI) DOCUMENTED: ICD-10-PCS | Mod: CPTII,,, | Performed by: NURSE PRACTITIONER

## 2022-03-22 PROCEDURE — 99214 OFFICE O/P EST MOD 30 MIN: CPT | Mod: S$PBB,,, | Performed by: NURSE PRACTITIONER

## 2022-03-22 PROCEDURE — 99999 PR PBB SHADOW E&M-EST. PATIENT-LVL V: CPT | Mod: PBBFAC,,, | Performed by: NURSE PRACTITIONER

## 2022-03-22 PROCEDURE — 1160F PR REVIEW ALL MEDS BY PRESCRIBER/CLIN PHARMACIST DOCUMENTED: ICD-10-PCS | Mod: CPTII,,, | Performed by: NURSE PRACTITIONER

## 2022-03-22 PROCEDURE — 1159F PR MEDICATION LIST DOCUMENTED IN MEDICAL RECORD: ICD-10-PCS | Mod: CPTII,,, | Performed by: NURSE PRACTITIONER

## 2022-03-22 PROCEDURE — 3074F SYST BP LT 130 MM HG: CPT | Mod: CPTII,,, | Performed by: NURSE PRACTITIONER

## 2022-03-22 PROCEDURE — 99215 OFFICE O/P EST HI 40 MIN: CPT | Mod: PBBFAC | Performed by: NURSE PRACTITIONER

## 2022-03-22 PROCEDURE — 99214 PR OFFICE/OUTPT VISIT, EST, LEVL IV, 30-39 MIN: ICD-10-PCS | Mod: S$PBB,,, | Performed by: NURSE PRACTITIONER

## 2022-03-22 PROCEDURE — 3074F PR MOST RECENT SYSTOLIC BLOOD PRESSURE < 130 MM HG: ICD-10-PCS | Mod: CPTII,,, | Performed by: NURSE PRACTITIONER

## 2022-03-22 PROCEDURE — 99999 PR PBB SHADOW E&M-EST. PATIENT-LVL V: ICD-10-PCS | Mod: PBBFAC,,, | Performed by: NURSE PRACTITIONER

## 2022-03-22 PROCEDURE — 1159F MED LIST DOCD IN RCRD: CPT | Mod: CPTII,,, | Performed by: NURSE PRACTITIONER

## 2022-03-22 PROCEDURE — 3078F PR MOST RECENT DIASTOLIC BLOOD PRESSURE < 80 MM HG: ICD-10-PCS | Mod: CPTII,,, | Performed by: NURSE PRACTITIONER

## 2022-03-22 PROCEDURE — 3008F BODY MASS INDEX DOCD: CPT | Mod: CPTII,,, | Performed by: NURSE PRACTITIONER

## 2022-03-22 PROCEDURE — 1160F RVW MEDS BY RX/DR IN RCRD: CPT | Mod: CPTII,,, | Performed by: NURSE PRACTITIONER

## 2022-03-22 NOTE — PATIENT INSTRUCTIONS
Try over the counter cream, such as Blue Emu with Lidocaine, for your foot pain.     You can also try a supplement over the counter called Alpha Lipoic Acid 600 mg each day.
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
## 2022-04-04 ENCOUNTER — PATIENT MESSAGE (OUTPATIENT)
Dept: ADMINISTRATIVE | Facility: HOSPITAL | Age: 47
End: 2022-04-04
Payer: MEDICAID

## 2022-04-05 ENCOUNTER — PROCEDURE VISIT (OUTPATIENT)
Dept: NEUROLOGY | Facility: CLINIC | Age: 47
End: 2022-04-05
Payer: MEDICAID

## 2022-04-05 DIAGNOSIS — M51.9 LUMBAR DISC DISEASE: ICD-10-CM

## 2022-04-05 DIAGNOSIS — M54.16 CHRONIC RADICULAR LUMBAR PAIN: Primary | ICD-10-CM

## 2022-04-05 DIAGNOSIS — G89.29 CHRONIC RADICULAR LUMBAR PAIN: Primary | ICD-10-CM

## 2022-04-05 DIAGNOSIS — G43.709 CHRONIC MIGRAINE WITHOUT AURA WITHOUT STATUS MIGRAINOSUS, NOT INTRACTABLE: Primary | ICD-10-CM

## 2022-04-05 DIAGNOSIS — R29.898 LEFT LEG WEAKNESS: ICD-10-CM

## 2022-04-05 DIAGNOSIS — R29.2: ICD-10-CM

## 2022-04-05 PROCEDURE — 64615 CHEMODENERV MUSC MIGRAINE: CPT | Mod: S$PBB,,, | Performed by: NURSE PRACTITIONER

## 2022-04-05 PROCEDURE — 64615 CHEMODENERV MUSC MIGRAINE: CPT | Mod: PBBFAC | Performed by: NURSE PRACTITIONER

## 2022-04-05 PROCEDURE — 64615 PR CHEMODENERVATION OF MUSCLE FOR CHRONIC MIGRAINE: ICD-10-PCS | Mod: S$PBB,,, | Performed by: NURSE PRACTITIONER

## 2022-04-05 RX ADMIN — ONABOTULINUMTOXINA 100 UNITS: 100 INJECTION, POWDER, LYOPHILIZED, FOR SOLUTION INTRADERMAL; INTRAMUSCULAR at 01:04

## 2022-04-05 NOTE — PROCEDURES
Procedures BOTOX PROCEDURE NOTE     Date of Procedure: 04/05/2022      Reason for Proceedure: Chronic Migraine       Informed consent was obtained prior to performing this study. Two patient identifiers were confirmed with the patient prior to performing this study. A time out to determine correct patient and and agreement on procedure performed was conducted prior to the injections.     Procedure Details: After informed consent obtained the patient's head and upper neck was cleansed with alcohol rub and 155 Units of Botox (diluted 1:1) was injected in the following bilateral muscles:   10 units in corregator* (over 2 sites), 5 units in Procerus, 20 units Frontalis* (over 4 sites), 40 units in Temporalis* (over 4 sites), 30 units in occipitalis* (over 6 sites), 20 units in the cervical paraspinal muscles* (over 4 sites), and 30 units in Trapezius* (over 4 sites). The remaining 45 units were wasted to follow recommended guidelines for treatment of chonic migraines with Botox   *= denotes bilateral injections    The patient tolerated the procedure well with no more than 1cc of blood loss. He was observed for several minutes post injection and given a handout from [facility] regarding when and where to seek help if side effects are experienced.

## 2022-04-06 ENCOUNTER — HOSPITAL ENCOUNTER (OUTPATIENT)
Dept: PULMONOLOGY | Facility: HOSPITAL | Age: 47
Discharge: HOME | End: 2022-04-06
Attending: INTERNAL MEDICINE
Payer: MEDICAID

## 2022-04-06 DIAGNOSIS — R06.09 DOE (DYSPNEA ON EXERTION): ICD-10-CM

## 2022-04-06 PROCEDURE — 94727 GAS DIL/WSHOT DETER LNG VOL: CPT

## 2022-04-06 PROCEDURE — 94060 EVALUATION OF WHEEZING: CPT

## 2022-04-06 PROCEDURE — 94799 PR NIF/PIF PULMONARY FUNCTION TEST: ICD-10-PCS | Mod: 26,,, | Performed by: INTERNAL MEDICINE

## 2022-04-06 PROCEDURE — 94727 GAS DIL/WSHOT DETER LNG VOL: CPT | Mod: 26,,, | Performed by: INTERNAL MEDICINE

## 2022-04-06 PROCEDURE — 94799 UNLISTED PULMONARY SVC/PX: CPT | Mod: 26,,, | Performed by: INTERNAL MEDICINE

## 2022-04-06 PROCEDURE — 94727 PR PULM FUNCTION TEST BY GAS: ICD-10-PCS | Mod: 26,,, | Performed by: INTERNAL MEDICINE

## 2022-04-06 PROCEDURE — 94060 EVALUATION OF WHEEZING: CPT | Mod: 26,,, | Performed by: INTERNAL MEDICINE

## 2022-04-06 PROCEDURE — 94060 PR EVAL OF BRONCHOSPASM: ICD-10-PCS | Mod: 26,,, | Performed by: INTERNAL MEDICINE

## 2022-04-06 PROCEDURE — 99900031 HC PATIENT EDUCATION (STAT)

## 2022-04-07 LAB
BRPFT: ABNORMAL
ERVN2 LLN: -16449.01
ERVN2 PRE REF: 46.9 %
ERVN2 PRE: 0.47 L (ref -16449.01–16450.99)
ERVN2 REF: 0.99
FEF 25 75 CHG: 45.2 %
FEF 25 75 LLN: 1.64
FEF 25 75 POST REF: 63.9 %
FEF 25 75 PRE REF: 44 %
FEF 25 75 REF: 2.86
FET100 CHG: -1.5 %
FEV1 CHG: 19 %
FEV1 FVC CHG: 15.9 %
FEV1 FVC LLN: 70
FEV1 FVC POST REF: 95.8 %
FEV1 FVC PRE REF: 82.7 %
FEV1 FVC REF: 81
FEV1 LLN: 2.23
FEV1 POST REF: 70.3 %
FEV1 PRE REF: 59.1 %
FEV1 REF: 2.83
FRCN2 LLN: 1.87
FRCN2 PRE REF: 150.1 %
FRCN2 REF: 2.69
FVC CHG: 2.6 %
FVC LLN: 2.77
FVC POST REF: 72.9 %
FVC PRE REF: 71.1 %
FVC REF: 3.52
MEP LLN: 63
MEP PRE REF: 28.7 %
MEP PRE: 22.95 CMH2O (ref 63.23–96.78)
MEP REF: 80
MIP LLN: 33
MIP PRE REF: 48.8 %
MIP PRE: 24.38 CMH2O (ref 33.23–66.78)
MIP REF: 50
MVV LLN: 90
MVV PRE REF: 35.2 %
MVV REF: 106
PEF CHG: 48.8 %
PEF LLN: 5.12
PEF POST REF: 45.4 %
PEF PRE REF: 30.5 %
PEF REF: 6.83
POST FEF 25 75: 1.82 L/S (ref 1.64–4.38)
POST FET 100: 3.83 SEC
POST FEV1 FVC: 77.57 % (ref 69.9–90.15)
POST FEV1: 1.99 L (ref 2.23–3.42)
POST FVC: 2.57 L (ref 2.77–4.3)
POST PEF: 3.1 L/S (ref 5.12–8.54)
PRE FEF 25 75: 1.26 L/S (ref 1.64–4.38)
PRE FET 100: 3.89 SEC
PRE FEV1 FVC: 66.9 % (ref 69.9–90.15)
PRE FEV1: 1.67 L (ref 2.23–3.42)
PRE FRC N2: 4.03 L (ref 1.87–3.51)
PRE FVC: 2.5 L (ref 2.77–4.3)
PRE MVV: 37.28 L/MIN (ref 89.97–121.72)
PRE PEF: 2.08 L/S (ref 5.12–8.54)
RVN2 LLN: 1.12
RVN2 PRE REF: 210.6 %
RVN2 PRE: 3.57 L (ref 1.12–2.27)
RVN2 REF: 1.69
RVN2TLCN2 LLN: 25.35
RVN2TLCN2 PRE REF: 168.2 %
RVN2TLCN2 PRE: 58.77 % (ref 25.35–44.53)
RVN2TLCN2 REF: 34.94
TLCN2 LLN: 3.95
TLCN2 PRE REF: 122.9 %
TLCN2 PRE: 6.07 L (ref 3.95–5.93)
TLCN2 REF: 4.94
VCMAXN2 LLN: 2.77
VCMAXN2 PRE REF: 71.1 %
VCMAXN2 PRE: 2.5 L (ref 2.77–4.3)
VCMAXN2 REF: 3.52

## 2022-04-12 ENCOUNTER — TELEPHONE (OUTPATIENT)
Dept: NEUROLOGY | Facility: CLINIC | Age: 47
End: 2022-04-12
Payer: MEDICAID

## 2022-04-12 DIAGNOSIS — M54.16 LUMBAR RADICULOPATHY: Primary | ICD-10-CM

## 2022-04-12 NOTE — TELEPHONE ENCOUNTER
Despite doing a peer to peer and resubmitting her paperwork, her MRI L-spine was denied twice. I am going to refer her to [facility] pain management, as they take her insurance, as discussed with her at her Botox visit. Please arrange, and fax copy of my last note.

## 2022-04-12 NOTE — TELEPHONE ENCOUNTER
Patient notified, able to verbalize understanding.     Faxed referral to [facility] fax # [phone]

## 2022-04-18 ENCOUNTER — TELEPHONE (OUTPATIENT)
Dept: NEUROLOGY | Facility: CLINIC | Age: 47
End: 2022-04-18
Payer: MEDICAID

## 2022-04-18 NOTE — TELEPHONE ENCOUNTER
----- Message from Mariah Vidal MA sent at 4/18/2022  2:00 PM CDT -----  Eva Lane  MRN: [id number]  Home Phone      [phone]  Work Phone      Not on file.  Mobile          [phone]    Patient Care Team:  Kanchan Butler NP as PCP - General (Internal Medicine)  Amaya Alex MD as Obstetrician (Obstetrics and Gynecology)  Deepa Underwood LPN as Care Coordinator  OB? No  What phone number can you be reached at? [phone]  Message:     Pt called regarding an approval letter for the MRI .

## 2022-04-26 ENCOUNTER — HOSPITAL ENCOUNTER (OUTPATIENT)
Dept: RADIOLOGY | Facility: HOSPITAL | Age: 47
Discharge: HOME | End: 2022-04-26
Attending: NURSE PRACTITIONER
Payer: MEDICAID

## 2022-04-26 DIAGNOSIS — M51.9 LUMBAR DISC DISEASE: ICD-10-CM

## 2022-04-26 DIAGNOSIS — G89.29 CHRONIC RADICULAR LUMBAR PAIN: ICD-10-CM

## 2022-04-26 DIAGNOSIS — R29.898 LEFT LEG WEAKNESS: ICD-10-CM

## 2022-04-26 DIAGNOSIS — M54.16 CHRONIC RADICULAR LUMBAR PAIN: ICD-10-CM

## 2022-04-26 DIAGNOSIS — R29.2: ICD-10-CM

## 2022-04-26 PROCEDURE — 72148 MRI LUMBAR SPINE W/O DYE: CPT | Mod: 26,,, | Performed by: RADIOLOGY

## 2022-04-26 PROCEDURE — 72148 MRI LUMBAR SPINE W/O DYE: CPT | Mod: TC

## 2022-04-26 PROCEDURE — 72148 MRI LUMBAR SPINE WITHOUT CONTRAST: ICD-10-PCS | Mod: 26,,, | Performed by: RADIOLOGY

## 2022-05-02 ENCOUNTER — PATIENT MESSAGE (OUTPATIENT)
Dept: SMOKING CESSATION | Facility: CLINIC | Age: 47
End: 2022-05-02
Payer: MEDICAID

## 2022-05-05 ENCOUNTER — HOSPITAL ENCOUNTER (OUTPATIENT)
Dept: SLEEP MEDICINE | Facility: HOSPITAL | Age: 47
Discharge: HOME | End: 2022-05-05
Attending: INTERNAL MEDICINE
Payer: MEDICAID

## 2022-05-05 DIAGNOSIS — G47.8 SLEEP AROUSAL DISORDER: ICD-10-CM

## 2022-05-05 DIAGNOSIS — G47.33 OBSTRUCTIVE SLEEP APNEA (ADULT) (PEDIATRIC): ICD-10-CM

## 2022-05-05 PROCEDURE — 95810 POLYSOM 6/> YRS 4/> PARAM: CPT | Mod: 26,,, | Performed by: INTERNAL MEDICINE

## 2022-05-05 PROCEDURE — 95810 PR POLYSOMNOGRAPHY, 4 OR MORE: ICD-10-PCS | Mod: 26,,, | Performed by: INTERNAL MEDICINE

## 2022-05-05 PROCEDURE — 95810 POLYSOM 6/> YRS 4/> PARAM: CPT

## 2022-05-12 NOTE — ED PROVIDER NOTES
[facility]                                                 I reviewed the ER triage nurse's note before evaluating the patient    Chief Complaint  46 y.o. female with Abdominal Pain      History of Present Illness  Eva Lane presents to the emergency room with left flank pain today  Patient with left flank pain radiating to left lower quadrant, no distress noted now  Patient states that sharp left back pain radiating to left lower quadrant at home  Patient denies any history of diverticulitis, no history of kidney stone noted today  Patient denies any risk for STD or PID, patient has had a past hysterectomy  Patient does states she has a history of arthritis and low back pain as well  Patient has no neurologic deficits, denies any new trauma, no weakness    The history is provided by the patient  Previous medical records were obtained from [facility]  Previous records are summarized from prior ER visits and hospitalizations    Past Medical History   -- ADHD        -- Anxiety        -- Arthritis        -- Asthma        -- Behavioral problem        -- Bipolar 1 disorder        -- CHF (congestive heart failure)        -- COPD        -- Depression        -- Diabetes mellitus type II        -- Hx of psychiatric care        -- Hyperlipidemia        -- Hypertension        -- Lumbar radiculopathy        -- Neuralgia        -- Neuritis        -- Psychiatric problem        -- PTSD (post-traumatic stress disorder)        -- Seizures        -- Self-harming behavior        -- Sleep apnea        -- Stroke        -- Stroke        -- Therapy            Past Surgical History   -- Oophorectomy           -- Tubal ligation           -- Dilation and curettage of uterus           -- Hysterectomy           -- Total abdominal hysterectomy           -- Cyst removal           -- Colonoscopy             Allergic to penicillin, Neosporin, shellfish & shrimp  No significant family history  No significant social  history, nonsmoker    I have reviewed all of this patient's past medical, surgical, family, and social   histories as well as active allergies and medications documented in the  electronic medical record    Review of Systems and Physical Exam      Review of Systems (all other ROS are otherwise negative)  -- Constitution - no fever, denies fatigue, no weakness, no chills, night sweats  -- Eyes - no tearing or redness, no visual disturbance  -- Ear, Nose - no tinnitus or earache, no nasal congestion or discharge  -- Mouth,Throat - no sore throat, no toothache, normal voice, normal swallowing  -- Respiratory - denies cough and congestion, no shortness of breath, no PERRY  -- Cardiovascular - denies chest pain, no palpitations, denies claudication  -- Gastrointestinal - denies abdominal pain, nausea, vomiting, or diarrhea  -- Genitourinary - left flank pain with no hematuria or dysuria today  -- Musculoskeletal - denies back pain, negative for trauma or injury  -- Neurological - no headache, no numbness, tingling, seizure, balance issues  -- Hematologic- no bruising, no bleeding, nose bleed, bleeding disorders    Vital Signs (reviewed by the physician)  Her oral temperature is 99.1 °F (37.3 °C).   Her blood pressure is 143/94 and her pulse is 97.   Her respiration is 18 and oxygen saturation is 99%.     Physical Exam  -- Nursing note and vitals reviewed  -- Constitutional: Appears well-developed and well-nourished  -- Head: Atraumatic. Normocephalic. No obvious abnormality  -- Eyes: Pupils are equal and reactive to light. Normal conjunctiva and lids  -- Cardiac: Normal rate, regular rhythm and normal heart sounds  -- Respiratory: Normal respiratory effort, breath sounds clear to auscultation  -- Gastrointestinal: Soft, no tenderness. Normal bowel sounds. Normal liver edge  -- Genitourinary: no flank pain on exam, no suprapubic pain by palpation   -- Musculoskeletal: Normal range of motion, no effusions. Joints stable   --  Neurological: No focal deficits. Showed good interaction with staff  -- Vascular: Posterior tibial, dorsalis pedis and radial pulses 2+ bilaterally      Emergency Room Course      Lab Results (reviewed by the physician)     K 3.9      CO2 25   BUN 10   CREATININE 0.9    (H)   ALKPHOS 79   AST 11   ALT 9 (L)   BILITOT 0.5   ALBUMIN 4.1   PROT 7.7   WBC 5.93   HGB 13.8   HCT 39.8        Urinalysis (reviewed by the physician)  -- Urinalysis performed during this ER visit showed no signs of infection      CT abdomen pelvis (images visualized & reports reviewed by the physician)  Mild right hydronephrosis the cause of which is not identified and may be due to small nonvisualized or noncalcified stone or recently passed stone.  Hydronephrosis can also be seen with pyelonephritis.  Recommend correlation clinically as well as follow-up.   A segment of the appendix appearing mildly dilated for what is typically normally seen but containing air with no surrounding fat stranding or fluid to suggest acute appendicitis and recommend correlation clinically.  If strong or continue clinical consideration for acute appendicitis follow-up study might aid in evaluation.     Medications Given  morphine injection 2 mg (has no administration in time range)   ondansetron injection 4 mg (has no administration in time range)   sodium chloride 0.9% bolus 1,000 mL (1,000 mLs Intravenous New Bag 1/1/22 2382)     Medical Decision Making     ED Course/ED Management  -- patient with left flank pain radiating to left lower quadrant on ER interview  -- benign abdominal exam with no obvious signs of peritonitis on evaluation  -- negative urinalysis, normal lab work, CT scan shows right-sided hydronephrosis  -- this does not correlate with patient's symptoms, will treat conservatively today  -- based on risk for possible pyelonephritis, will start Cipro and Flomax on discharge  -- hydrated home and follow-up with primary  care physician next 48 hours  -- pain-free on discharge; return with any concerns on discharge today    Assessment, Disposition, & Plan      Diagnosis  [R10.9] Left flank pain (Primary)    Disposition and Plan  -- Disposition: home  -- Condition: stable  -- Follow-up: Patient to follow up with Kanchan Butler NP in 1-2 days.  -- I advised the patient that we have found no life threatening condition today  -- At this time, I believe the patient is clinically stable for discharge.   -- Pt understands that the visit today is to address immediate concerns   -- Further workup and evaluation as an outpatient may be required  -- The patient acknowledges that close follow up with a MD is required   -- Patient agrees to comply with all instruction and direction given in the ER    This note is dictated on M*Modal word recognition program.  There are word recognition mistakes that are occasionally missed on review.         Nadeem Cosme MD  01/01/22 7353     Minocycline Pregnancy And Lactation Text: This medication is Pregnancy Category D and not consider safe during pregnancy. It is also excreted in breast milk.

## 2022-05-19 ENCOUNTER — OFFICE VISIT (OUTPATIENT)
Dept: INTERNAL MEDICINE | Facility: CLINIC | Age: 47
End: 2022-05-19
Payer: MEDICAID

## 2022-05-19 VITALS
SYSTOLIC BLOOD PRESSURE: 134 MMHG | WEIGHT: 115.81 LBS | RESPIRATION RATE: 18 BRPM | HEART RATE: 80 BPM | BODY MASS INDEX: 19.77 KG/M2 | DIASTOLIC BLOOD PRESSURE: 86 MMHG | OXYGEN SATURATION: 99 % | HEIGHT: 64 IN

## 2022-05-19 DIAGNOSIS — E78.2 MIXED HYPERLIPIDEMIA: ICD-10-CM

## 2022-05-19 DIAGNOSIS — G62.9 NEUROPATHY: ICD-10-CM

## 2022-05-19 DIAGNOSIS — F41.9 ANXIETY: ICD-10-CM

## 2022-05-19 DIAGNOSIS — F33.1 MDD (MAJOR DEPRESSIVE DISORDER), RECURRENT EPISODE, MODERATE: ICD-10-CM

## 2022-05-19 DIAGNOSIS — G47.00 INSOMNIA, UNSPECIFIED TYPE: ICD-10-CM

## 2022-05-19 DIAGNOSIS — I10 ESSENTIAL HYPERTENSION: ICD-10-CM

## 2022-05-19 LAB
ALBUMIN SERPL BCP-MCNC: 4 G/DL (ref 3.5–5.2)
ALP SERPL-CCNC: 76 U/L (ref 55–135)
ALT SERPL W/O P-5'-P-CCNC: 12 U/L (ref 10–44)
ANION GAP SERPL CALC-SCNC: 10 MMOL/L (ref 8–16)
AST SERPL-CCNC: 17 U/L (ref 10–40)
BASOPHILS # BLD AUTO: 0.07 K/UL (ref 0–0.2)
BASOPHILS NFR BLD: 0.6 % (ref 0–1.9)
BILIRUB SERPL-MCNC: 0.3 MG/DL (ref 0.1–1)
BUN SERPL-MCNC: 10 MG/DL (ref 6–20)
CALCIUM SERPL-MCNC: 9.8 MG/DL (ref 8.7–10.5)
CHLORIDE SERPL-SCNC: 106 MMOL/L (ref 95–110)
CHOLEST SERPL-MCNC: 137 MG/DL (ref 120–199)
CHOLEST/HDLC SERPL: 3.8 {RATIO} (ref 2–5)
CO2 SERPL-SCNC: 25 MMOL/L (ref 23–29)
CREAT SERPL-MCNC: 0.7 MG/DL (ref 0.5–1.4)
DIFFERENTIAL METHOD: NORMAL
EOSINOPHIL # BLD AUTO: 0.3 K/UL (ref 0–0.5)
EOSINOPHIL NFR BLD: 2.9 % (ref 0–8)
ERYTHROCYTE [DISTWIDTH] IN BLOOD BY AUTOMATED COUNT: 12.5 % (ref 11.5–14.5)
EST. GFR  (AFRICAN AMERICAN): >60 ML/MIN/1.73 M^2
EST. GFR  (NON AFRICAN AMERICAN): >60 ML/MIN/1.73 M^2
ESTIMATED AVG GLUCOSE: 140 MG/DL (ref 68–131)
GLUCOSE SERPL-MCNC: 129 MG/DL (ref 70–110)
HBA1C MFR BLD: 6.5 % (ref 4–5.6)
HCT VFR BLD AUTO: 38.8 % (ref 37–48.5)
HDLC SERPL-MCNC: 36 MG/DL (ref 40–75)
HDLC SERPL: 26.3 % (ref 20–50)
HGB BLD-MCNC: 12.7 G/DL (ref 12–16)
IMM GRANULOCYTES # BLD AUTO: 0.04 K/UL (ref 0–0.04)
IMM GRANULOCYTES NFR BLD AUTO: 0.4 % (ref 0–0.5)
LDLC SERPL CALC-MCNC: 81.4 MG/DL (ref 63–159)
LYMPHOCYTES # BLD AUTO: 3.4 K/UL (ref 1–4.8)
LYMPHOCYTES NFR BLD: 30 % (ref 18–48)
MCH RBC QN AUTO: 30.5 PG (ref 27–31)
MCHC RBC AUTO-ENTMCNC: 32.7 G/DL (ref 32–36)
MCV RBC AUTO: 93 FL (ref 82–98)
MONOCYTES # BLD AUTO: 0.7 K/UL (ref 0.3–1)
MONOCYTES NFR BLD: 6.6 % (ref 4–15)
NEUTROPHILS # BLD AUTO: 6.7 K/UL (ref 1.8–7.7)
NEUTROPHILS NFR BLD: 59.5 % (ref 38–73)
NONHDLC SERPL-MCNC: 101 MG/DL
NRBC BLD-RTO: 0 /100 WBC
PLATELET # BLD AUTO: 398 K/UL (ref 150–450)
PMV BLD AUTO: 10.7 FL (ref 9.2–12.9)
POTASSIUM SERPL-SCNC: 3.7 MMOL/L (ref 3.5–5.1)
PROT SERPL-MCNC: 7.5 G/DL (ref 6–8.4)
RBC # BLD AUTO: 4.16 M/UL (ref 4–5.4)
SODIUM SERPL-SCNC: 141 MMOL/L (ref 136–145)
TRIGL SERPL-MCNC: 98 MG/DL (ref 30–150)
TSH SERPL DL<=0.005 MIU/L-ACNC: 1.4 UIU/ML (ref 0.4–4)
WBC # BLD AUTO: 11.17 K/UL (ref 3.9–12.7)

## 2022-05-19 PROCEDURE — 36415 COLL VENOUS BLD VENIPUNCTURE: CPT | Performed by: NURSE PRACTITIONER

## 2022-05-19 PROCEDURE — 4010F ACE/ARB THERAPY RXD/TAKEN: CPT | Mod: CPTII,,, | Performed by: NURSE PRACTITIONER

## 2022-05-19 PROCEDURE — 4010F PR ACE/ARB THEARPY RXD/TAKEN: ICD-10-PCS | Mod: CPTII,,, | Performed by: NURSE PRACTITIONER

## 2022-05-19 PROCEDURE — 3008F PR BODY MASS INDEX (BMI) DOCUMENTED: ICD-10-PCS | Mod: CPTII,,, | Performed by: NURSE PRACTITIONER

## 2022-05-19 PROCEDURE — 99214 PR OFFICE/OUTPT VISIT, EST, LEVL IV, 30-39 MIN: ICD-10-PCS | Mod: S$PBB,,, | Performed by: NURSE PRACTITIONER

## 2022-05-19 PROCEDURE — 3079F PR MOST RECENT DIASTOLIC BLOOD PRESSURE 80-89 MM HG: ICD-10-PCS | Mod: CPTII,,, | Performed by: NURSE PRACTITIONER

## 2022-05-19 PROCEDURE — 3044F PR MOST RECENT HEMOGLOBIN A1C LEVEL <7.0%: ICD-10-PCS | Mod: CPTII,,, | Performed by: NURSE PRACTITIONER

## 2022-05-19 PROCEDURE — 80053 COMPREHEN METABOLIC PANEL: CPT | Performed by: NURSE PRACTITIONER

## 2022-05-19 PROCEDURE — 3008F BODY MASS INDEX DOCD: CPT | Mod: CPTII,,, | Performed by: NURSE PRACTITIONER

## 2022-05-19 PROCEDURE — 99999 PR PBB SHADOW E&M-EST. PATIENT-LVL III: ICD-10-PCS | Mod: PBBFAC,,, | Performed by: NURSE PRACTITIONER

## 2022-05-19 PROCEDURE — 80061 LIPID PANEL: CPT | Performed by: NURSE PRACTITIONER

## 2022-05-19 PROCEDURE — 3044F HG A1C LEVEL LT 7.0%: CPT | Mod: CPTII,,, | Performed by: NURSE PRACTITIONER

## 2022-05-19 PROCEDURE — 3075F SYST BP GE 130 - 139MM HG: CPT | Mod: CPTII,,, | Performed by: NURSE PRACTITIONER

## 2022-05-19 PROCEDURE — 84443 ASSAY THYROID STIM HORMONE: CPT | Performed by: NURSE PRACTITIONER

## 2022-05-19 PROCEDURE — 85025 COMPLETE CBC W/AUTO DIFF WBC: CPT | Performed by: NURSE PRACTITIONER

## 2022-05-19 PROCEDURE — 99214 OFFICE O/P EST MOD 30 MIN: CPT | Mod: S$PBB,,, | Performed by: NURSE PRACTITIONER

## 2022-05-19 PROCEDURE — 99999 PR PBB SHADOW E&M-EST. PATIENT-LVL III: CPT | Mod: PBBFAC,,, | Performed by: NURSE PRACTITIONER

## 2022-05-19 PROCEDURE — 3079F DIAST BP 80-89 MM HG: CPT | Mod: CPTII,,, | Performed by: NURSE PRACTITIONER

## 2022-05-19 PROCEDURE — 99213 OFFICE O/P EST LOW 20 MIN: CPT | Mod: PBBFAC,PN | Performed by: NURSE PRACTITIONER

## 2022-05-19 PROCEDURE — 3075F PR MOST RECENT SYSTOLIC BLOOD PRESS GE 130-139MM HG: ICD-10-PCS | Mod: CPTII,,, | Performed by: NURSE PRACTITIONER

## 2022-05-19 PROCEDURE — 83036 HEMOGLOBIN GLYCOSYLATED A1C: CPT | Performed by: NURSE PRACTITIONER

## 2022-05-19 RX ORDER — PREGABALIN 200 MG/1
200 CAPSULE ORAL 2 TIMES DAILY
Qty: 60 CAPSULE | Refills: 5 | Status: SHIPPED | OUTPATIENT
Start: 2022-05-19 | End: 2022-10-03 | Stop reason: SDUPTHER

## 2022-05-19 RX ORDER — TRAZODONE HYDROCHLORIDE 100 MG/1
100 TABLET ORAL NIGHTLY
Qty: 30 TABLET | Refills: 5 | Status: SHIPPED | OUTPATIENT
Start: 2022-05-19 | End: 2022-10-03 | Stop reason: SDUPTHER

## 2022-05-19 RX ORDER — CLONAZEPAM 0.5 MG/1
0.5 TABLET, ORALLY DISINTEGRATING ORAL 2 TIMES DAILY PRN
Qty: 60 TABLET | Refills: 2 | Status: SHIPPED | OUTPATIENT
Start: 2022-05-19 | End: 2022-06-12 | Stop reason: SDUPTHER

## 2022-05-19 RX ORDER — VALSARTAN 40 MG/1
40 TABLET ORAL DAILY
COMMUNITY
Start: 2022-04-13 | End: 2022-08-22

## 2022-05-19 RX ORDER — MUPIROCIN 20 MG/G
OINTMENT TOPICAL 3 TIMES DAILY
Qty: 30 G | Refills: 1 | Status: SHIPPED | OUTPATIENT
Start: 2022-05-19 | End: 2023-09-05

## 2022-05-20 ENCOUNTER — HOSPITAL ENCOUNTER (EMERGENCY)
Facility: HOSPITAL | Age: 47
Discharge: HOME | End: 2022-05-20
Attending: STUDENT IN AN ORGANIZED HEALTH CARE EDUCATION/TRAINING PROGRAM
Payer: MEDICAID

## 2022-05-20 VITALS
RESPIRATION RATE: 18 BRPM | TEMPERATURE: 98 F | SYSTOLIC BLOOD PRESSURE: 129 MMHG | OXYGEN SATURATION: 99 % | HEART RATE: 94 BPM | DIASTOLIC BLOOD PRESSURE: 61 MMHG

## 2022-05-20 DIAGNOSIS — G43.809 OTHER MIGRAINE WITHOUT STATUS MIGRAINOSUS, NOT INTRACTABLE: ICD-10-CM

## 2022-05-20 DIAGNOSIS — R55 SYNCOPE: ICD-10-CM

## 2022-05-20 DIAGNOSIS — N39.0 URINARY TRACT INFECTION WITHOUT HEMATURIA, SITE UNSPECIFIED: ICD-10-CM

## 2022-05-20 DIAGNOSIS — S01.81XA CHIN LACERATION, INITIAL ENCOUNTER: Primary | ICD-10-CM

## 2022-05-20 LAB
ALBUMIN SERPL BCP-MCNC: 4.1 G/DL (ref 3.5–5.2)
ALP SERPL-CCNC: 74 U/L (ref 55–135)
ALT SERPL W/O P-5'-P-CCNC: 13 U/L (ref 10–44)
ANION GAP SERPL CALC-SCNC: 14 MMOL/L (ref 8–16)
AST SERPL-CCNC: 12 U/L (ref 10–40)
BACTERIA #/AREA URNS HPF: ABNORMAL /HPF
BASOPHILS # BLD AUTO: 0.06 K/UL (ref 0–0.2)
BASOPHILS NFR BLD: 0.5 % (ref 0–1.9)
BILIRUB SERPL-MCNC: 0.5 MG/DL (ref 0.1–1)
BILIRUB UR QL STRIP: NEGATIVE
BUN SERPL-MCNC: 8 MG/DL (ref 6–20)
CALCIUM SERPL-MCNC: 9.6 MG/DL (ref 8.7–10.5)
CHLORIDE SERPL-SCNC: 103 MMOL/L (ref 95–110)
CLARITY UR: CLEAR
CO2 SERPL-SCNC: 25 MMOL/L (ref 23–29)
COLOR UR: YELLOW
CREAT SERPL-MCNC: 0.8 MG/DL (ref 0.5–1.4)
DIFFERENTIAL METHOD: ABNORMAL
EOSINOPHIL # BLD AUTO: 0.3 K/UL (ref 0–0.5)
EOSINOPHIL NFR BLD: 2.2 % (ref 0–8)
ERYTHROCYTE [DISTWIDTH] IN BLOOD BY AUTOMATED COUNT: 12.5 % (ref 11.5–14.5)
EST. GFR  (AFRICAN AMERICAN): >60 ML/MIN/1.73 M^2
EST. GFR  (NON AFRICAN AMERICAN): >60 ML/MIN/1.73 M^2
GLUCOSE SERPL-MCNC: 245 MG/DL (ref 70–110)
GLUCOSE UR QL STRIP: ABNORMAL
HCT VFR BLD AUTO: 41.3 % (ref 37–48.5)
HGB BLD-MCNC: 13.9 G/DL (ref 12–16)
HGB UR QL STRIP: NEGATIVE
HYALINE CASTS #/AREA URNS LPF: 0 /LPF
IMM GRANULOCYTES # BLD AUTO: 0.04 K/UL (ref 0–0.04)
IMM GRANULOCYTES NFR BLD AUTO: 0.3 % (ref 0–0.5)
KETONES UR QL STRIP: NEGATIVE
LEUKOCYTE ESTERASE UR QL STRIP: NEGATIVE
LYMPHOCYTES # BLD AUTO: 2.7 K/UL (ref 1–4.8)
LYMPHOCYTES NFR BLD: 21.7 % (ref 18–48)
MCH RBC QN AUTO: 31.4 PG (ref 27–31)
MCHC RBC AUTO-ENTMCNC: 33.7 G/DL (ref 32–36)
MCV RBC AUTO: 93 FL (ref 82–98)
MICROSCOPIC COMMENT: ABNORMAL
MONOCYTES # BLD AUTO: 0.6 K/UL (ref 0.3–1)
MONOCYTES NFR BLD: 4.9 % (ref 4–15)
NEUTROPHILS # BLD AUTO: 8.8 K/UL (ref 1.8–7.7)
NEUTROPHILS NFR BLD: 70.4 % (ref 38–73)
NITRITE UR QL STRIP: NEGATIVE
NRBC BLD-RTO: 0 /100 WBC
PH UR STRIP: 6 [PH] (ref 5–8)
PLATELET # BLD AUTO: 401 K/UL (ref 150–450)
PMV BLD AUTO: 9.8 FL (ref 9.2–12.9)
POTASSIUM SERPL-SCNC: 3.4 MMOL/L (ref 3.5–5.1)
PROT SERPL-MCNC: 7.7 G/DL (ref 6–8.4)
PROT UR QL STRIP: ABNORMAL
RBC # BLD AUTO: 4.42 M/UL (ref 4–5.4)
RBC #/AREA URNS HPF: 0 /HPF (ref 0–4)
SODIUM SERPL-SCNC: 142 MMOL/L (ref 136–145)
SP GR UR STRIP: >=1.03 (ref 1–1.03)
SQUAMOUS #/AREA URNS HPF: 10 /HPF
TROPONIN I SERPL DL<=0.01 NG/ML-MCNC: <0.006 NG/ML (ref 0–0.03)
URN SPEC COLLECT METH UR: ABNORMAL
UROBILINOGEN UR STRIP-ACNC: NEGATIVE EU/DL
WBC # BLD AUTO: 12.42 K/UL (ref 3.9–12.7)
WBC #/AREA URNS HPF: 20 /HPF (ref 0–5)
YEAST URNS QL MICRO: ABNORMAL

## 2022-05-20 PROCEDURE — 96374 THER/PROPH/DIAG INJ IV PUSH: CPT

## 2022-05-20 PROCEDURE — 63600175 PHARM REV CODE 636 W HCPCS: Performed by: STUDENT IN AN ORGANIZED HEALTH CARE EDUCATION/TRAINING PROGRAM

## 2022-05-20 PROCEDURE — 12013 RPR F/E/E/N/L/M 2.6-5.0 CM: CPT

## 2022-05-20 PROCEDURE — 25000003 PHARM REV CODE 250: Performed by: STUDENT IN AN ORGANIZED HEALTH CARE EDUCATION/TRAINING PROGRAM

## 2022-05-20 PROCEDURE — 85025 COMPLETE CBC W/AUTO DIFF WBC: CPT | Performed by: STUDENT IN AN ORGANIZED HEALTH CARE EDUCATION/TRAINING PROGRAM

## 2022-05-20 PROCEDURE — 93010 EKG 12-LEAD: ICD-10-PCS | Mod: ,,, | Performed by: INTERNAL MEDICINE

## 2022-05-20 PROCEDURE — 84484 ASSAY OF TROPONIN QUANT: CPT | Performed by: STUDENT IN AN ORGANIZED HEALTH CARE EDUCATION/TRAINING PROGRAM

## 2022-05-20 PROCEDURE — 93005 ELECTROCARDIOGRAM TRACING: CPT

## 2022-05-20 PROCEDURE — 99285 EMERGENCY DEPT VISIT HI MDM: CPT | Mod: 25

## 2022-05-20 PROCEDURE — 87086 URINE CULTURE/COLONY COUNT: CPT | Performed by: STUDENT IN AN ORGANIZED HEALTH CARE EDUCATION/TRAINING PROGRAM

## 2022-05-20 PROCEDURE — 93010 ELECTROCARDIOGRAM REPORT: CPT | Mod: ,,, | Performed by: INTERNAL MEDICINE

## 2022-05-20 PROCEDURE — 96361 HYDRATE IV INFUSION ADD-ON: CPT

## 2022-05-20 PROCEDURE — 81000 URINALYSIS NONAUTO W/SCOPE: CPT | Performed by: STUDENT IN AN ORGANIZED HEALTH CARE EDUCATION/TRAINING PROGRAM

## 2022-05-20 PROCEDURE — 80053 COMPREHEN METABOLIC PANEL: CPT | Performed by: STUDENT IN AN ORGANIZED HEALTH CARE EDUCATION/TRAINING PROGRAM

## 2022-05-20 RX ORDER — LIDOCAINE HYDROCHLORIDE 10 MG/ML
5 INJECTION, SOLUTION EPIDURAL; INFILTRATION; INTRACAUDAL; PERINEURAL
Status: COMPLETED | OUTPATIENT
Start: 2022-05-20 | End: 2022-05-20

## 2022-05-20 RX ORDER — NITROFURANTOIN 25; 75 MG/1; MG/1
100 CAPSULE ORAL 2 TIMES DAILY
Qty: 10 CAPSULE | Refills: 0 | Status: SHIPPED | OUTPATIENT
Start: 2022-05-20 | End: 2022-05-25

## 2022-05-20 RX ORDER — NITROFURANTOIN 25; 75 MG/1; MG/1
100 CAPSULE ORAL
Status: COMPLETED | OUTPATIENT
Start: 2022-05-20 | End: 2022-05-20

## 2022-05-20 RX ORDER — METOCLOPRAMIDE HYDROCHLORIDE 5 MG/ML
10 INJECTION INTRAMUSCULAR; INTRAVENOUS
Status: COMPLETED | OUTPATIENT
Start: 2022-05-20 | End: 2022-05-20

## 2022-05-20 RX ADMIN — SODIUM CHLORIDE 1000 ML: 0.9 INJECTION, SOLUTION INTRAVENOUS at 06:05

## 2022-05-20 RX ADMIN — NITROFURANTOIN (MONOHYDRATE/MACROCRYSTALS) 100 MG: 75; 25 CAPSULE ORAL at 07:05

## 2022-05-20 RX ADMIN — METOCLOPRAMIDE 10 MG: 5 INJECTION, SOLUTION INTRAMUSCULAR; INTRAVENOUS at 06:05

## 2022-05-20 RX ADMIN — LIDOCAINE HYDROCHLORIDE 50 MG: 10 SOLUTION INTRAVENOUS at 06:05

## 2022-05-20 NOTE — ED PROVIDER NOTES
Encounter Date: 5/20/2022       History     Chief Complaint   Patient presents with    Fall    Fatigue     Pt reports having a migraine, became weak and collapsed out.  Pt states she fell to the floor hitting her chin.  Approx 2.5 cm laceration noted under chin.  C/o jaw pain and headache.  A&Ox3.       46-year-old female with history of bipolar, CHF, COPD, diabetes, seizures, and history of migraines presenting with 3-4 days of a migraine headache, and an episode of syncope today.  Patient reports that this feels like her prior migraine, not sudden onset, not worst of life.  Patient denies fever or neck stiffness.  Reports that occasionally her headaches get bad enough that she loses consciousness, this has happened multiple times per previously.  Patient reports that she was using the bathroom, stood up and lost consciousness.  Reports that she hit her chin on the way down.  No other complaints of pain.  Patient has a 2 cm laceration to her chin, no other reports of trauma.  Denied any chest pain shortness of breath or lightheadedness prior to her syncope.        Review of patient's allergies indicates:   Allergen Reactions    Penicillins Swelling and Rash    Neosporin [neomycin-bacitracin-polymyxin] Rash    Shellfish containing products     Shrimp Hives     Past Medical History:   Diagnosis Date    ADHD (attention deficit hyperactivity disorder)     Anxiety     Arthritis     Asthma     Behavioral problem     Bipolar 1 disorder     CHF (congestive heart failure)     COPD (chronic obstructive pulmonary disease)     Depression     Diabetes mellitus type II     Hx of psychiatric care     Hyperlipidemia     Hypertension     Lumbar radiculopathy     Neuralgia     Neuritis     Psychiatric problem     PTSD (post-traumatic stress disorder)     Seizures     Seizure-last 8/2015-shake and fall-doesnt remember anything    Self-harming behavior     Sleep apnea     on CPAP    Stroke 9/22/2015     Stroke     Therapy      Past Surgical History:   Procedure Laterality Date    COLONOSCOPY N/A 7/1/2016    Procedure: COLONOSCOPY;  Surgeon: Robert Hernandez MD;  [facility] ([address] [place] [address]);  Service: Endoscopy;  Laterality: N/A;  Schedule for next available CRS physician - EGD @ 8:30 with Dr. Naylor    CYST REMOVAL  2000    Fatty cyst on right face cheek and left upper arm     DILATION AND CURETTAGE OF UTERUS  2006    Miscarriage    HYSTERECTOMY  7/08    DUB - Total    OOPHORECTOMY  7/2008    TOTAL ABDOMINAL HYSTERECTOMY  7/2008    TUBAL LIGATION  2007     Family History   Problem Relation Age of Onset    Heart disease Father     Hyperlipidemia Father     Hypertension Father     Diabetes Father     Dementia Father     Breast cancer Neg Hx     Colon cancer Neg Hx     Ovarian cancer Neg Hx      Social History     Tobacco Use    Smoking status: Current Every Day Smoker     Packs/day: 0.10     Years: 26.00     Pack years: 2.60     Types: Cigarettes     Start date: 7/17/1986    Smokeless tobacco: Current User    Tobacco comment: told about [facility] smoking cessation program-given smoking clinic pamphlet   Substance Use Topics    Alcohol use: No    Drug use: No     Review of Systems   Constitutional: Negative for fever.   HENT: Negative for congestion and sore throat.    Respiratory: Negative for cough and shortness of breath.    Cardiovascular: Negative for chest pain.   Gastrointestinal: Negative for abdominal pain, diarrhea, nausea and vomiting.   Genitourinary: Negative for dysuria and hematuria.   Musculoskeletal: Negative for back pain.   Skin: Negative for rash.   Neurological: Positive for syncope. Negative for seizures, weakness, light-headedness and numbness.   Hematological: Does not bruise/bleed easily.       Physical Exam     Initial Vitals [05/20/22 0606]   BP Pulse Resp Temp SpO2   123/68 109 16 97.6 °F (36.4 °C) 99 %      MAP       --         Physical Exam    Nursing note and vitals  reviewed.  Constitutional: She appears well-developed.   HENT:   Head: Normocephalic.   2 cm laceration to left side of chin, no active bleeding. No other signs of ecchymosis, abrasions, lacerations, or hematomas to head. No signs of trauma to nose, no septal hematoma. No blood in nose. No blood in oropharynx. No dental instability. No signs of skull fracture, no racoon eyes or hardin's sign. No neck TTP. No pain when ranging neck. No hemotympanum bilaterally. No opthalmoplegia.     Eyes: Pupils are equal, round, and reactive to light.   Neck:   Normal range of motion.  Cardiovascular:   No murmur heard.  Pulmonary/Chest: No respiratory distress.   Abdominal: Abdomen is soft.   Musculoskeletal:         General: No edema.      Cervical back: Normal range of motion.      Comments: Moving all extremities. No pain with palpation to bilateral shoulders, elbows, wrists, hips, knees, ankles. No midline tenderness.        Neurological: She is alert and oriented to person, place, and time.   Skin: Skin is warm.   Psychiatric: She has a normal mood and affect.         ED Course   Procedures  Labs Reviewed   CBC W/ AUTO DIFFERENTIAL - Abnormal; Notable for the following components:       Result Value    MCH 31.4 (*)     Gran # (ANC) 8.8 (*)     All other components within normal limits   COMPREHENSIVE METABOLIC PANEL - Abnormal; Notable for the following components:    Potassium 3.4 (*)     Glucose 245 (*)     All other components within normal limits   URINALYSIS, REFLEX TO URINE CULTURE - Abnormal; Notable for the following components:    Specific Gravity, UA >=1.030 (*)     Protein, UA 2+ (*)     Glucose, UA Trace (*)     All other components within normal limits    Narrative:     Specimen Source->Urine   URINALYSIS MICROSCOPIC - Abnormal; Notable for the following components:    WBC, UA 20 (*)     Bacteria Moderate (*)     Yeast, UA Moderate (*)     All other components within normal limits    Narrative:     Specimen  Source->Urine   CULTURE, URINE   TROPONIN I     EKG Readings: (Independently Interpreted)   Initial Reading: No STEMI. Previous EKG: Compared with most recent EKG Rhythm: Normal Sinus Rhythm. Heart Rate: 83. Ectopy: No Ectopy. Conduction: Normal.       Imaging Results          CT Maxillofacial Without Contrast (Final result)  Result time 05/20/22 07:51:45    Final result by Chandu Webb MD (05/20/22 07:51:45)                 Impression:      No CT evidence of acute facial fracture.      Electronically signed by: Chandu Webb MD  Date:    05/20/2022  Time:    07:51             Narrative:    EXAMINATION:  CT MAXILLOFACIAL WITHOUT CONTRAST    CLINICAL HISTORY:  Facial trauma, blunt;    TECHNIQUE:  Axial images were obtained through the maxillofacial bones.  Coronal and sagittal reformations were also performed.  Contrast was not administered.    COMPARISON:  No previous study for comparison.    FINDINGS:  CT examination of the maxillofacial bones dated May 20, 2022.    Soft tissue injury adjacent to the anterior mandible.    There is no CT evidence of acute fracture.  The orbits are intact.  The globes appear normal.  Minimal maxillary sinus mucosal thickening.  No air fluid levels within the paranasal sinuses.    Poor dentition with dental caries.                               CT Head Without Contrast (Final result)  Result time 05/20/22 07:43:09    Final result by Chandu Webb MD (05/20/22 07:43:09)                 Impression:      No CT evidence of an acute intracranial abnormality.      Electronically signed by: Chandu Webb MD  Date:    05/20/2022  Time:    07:43             Narrative:    EXAMINATION:  CT HEAD WITHOUT CONTRAST    CLINICAL HISTORY:  Head trauma, moderate-severe;    TECHNIQUE:  Axial images were obtained through the head.  Coronal and sagittal reformations were also performed.  Contrast was not administered.    COMPARISON:  December 10, 2019.    FINDINGS:  CT head without  "contrast dated May 20, 2022.    There is no CT evidence of an acute intracranial abnormality.  No evidence of acute infarct, hemorrhage, mass or midline shift.  Stable bilateral basal ganglia calcifications.    No displaced skull fracture.  No visualized significant paranasal sinus disease.    Facial CT will be reported separately.                                 Medications   LIDOcaine (PF) 10 mg/ml (1%) injection 50 mg (50 mg Infiltration Given 5/20/22 0630)   sodium chloride 0.9% bolus 1,000 mL (0 mLs Intravenous Stopped 5/20/22 0743)   metoclopramide HCl injection 10 mg (10 mg Intravenous Given 5/20/22 0628)   nitrofurantoin (macrocrystal-monohydrate) 100 MG capsule 100 mg (100 mg Oral Given 5/20/22 0740)     Medical Decision Making:   Differential Diagnosis:   DDX: Headache - Unlikely ICH/stroke as no risk factors, no trauma prior to headache, and nonfocal neuro exam. Unlikely meningitis given no neck stiffness, meningeal signs, fever, or risk factors. More likely migraine headache given similar to prior headaches, no focal deficits, no alarm symptoms.  Given syncope, will screen for arrhythmia, ACS, electrolyte abnormality, hyperglycemia with lab work.  Will close wound on chin, and image to rule out fracture or hemorrhage.  DX:  CBC, CMP, troponin, EKG, chest x-ray, UA, CT head, CT max facial.    TX: Analgesia PRN. Antiemetic PRN. IVF.   DISPO: Pending reassessment. If studies WNL, symptoms controlled, exam unchanged, follow up with PMD +/- neurology within 2 days with precautions for RTED, supportive care recommendations.                 ED Course as of 05/20/22 0903   Fri May 20, 2022   0651 3 cm wound to chin. Informed that permanent scarring will result. Scarring is unpredictable and may be extremely visible as well as permanent. In addition, because it is a "dirty" and irregular wound, infection and further corrective procedures are also possible. Also, there is a high risk of dehiscence and wound " failure that will require secondary repair.  Patient informed to return to ED ASAP if any of these eventualities occur. Told not travel or engage in contact sports or swim in an ocean, lake or pool while sutures are in. They understand and agree. All questions answered to their satisfaction, no guarantees given.   The wound was cleaned with water and draped in a sterile fashion.  6-0 nylon  was placed on the skin, followed by a dressing.   Patient told to leave the dressing in place as is. Patient will f/u in approximately 7-10 days unless discussed otherwise.      [NB]      ED Course User Index  [NB] Tj Jeff MD             Clinical Impression:   Final diagnoses:  [R55] Syncope  [S01.81XA] Chin laceration, initial encounter (Primary)  [G43.809] Other migraine without status migrainosus, not intractable  [N39.0] Urinary tract infection without hematuria, site unspecified          ED Disposition Condition    Discharge Stable        ED Prescriptions     Medication Sig Dispense Start Date End Date Auth. Provider    nitrofurantoin, macrocrystal-monohydrate, (MACROBID) 100 MG capsule Take 1 capsule (100 mg total) by mouth 2 (two) times daily. for 5 days 10 capsule 5/20/2022 5/25/2022 Tj Jeff MD        Follow-up Information    None          Tj Jeff MD  05/20/22 0621       Tj Jeff MD  05/20/22 0602       Tj Jeff MD  05/20/22 0905

## 2022-05-20 NOTE — PROVIDER PROGRESS NOTES - EMERGENCY DEPT.
Encounter Date: 5/20/2022    ED Physician Progress Notes               Lac Repair    Date/Time: 5/20/2022 6:50 AM  Performed by: Tj Jeff MD  Authorized by: Tj Jeff MD     Consent:     Consent obtained:  Verbal    Consent given by:  Patient    Risks discussed:  Infection, pain and poor cosmetic result  Universal protocol:     Procedure explained and questions answered to patient or proxy's satisfaction: yes      Patient identity confirmed:  Verbally with patient  Anesthesia:     Anesthesia method:  Local infiltration    Local anesthetic:  Lidocaine 1% w/o epi  Laceration details:     Location:  Face    Face location:  Chin    Length (cm):  3  Treatment:     Amount of cleaning:  Standard    Irrigation solution:  Sterile water    Irrigation method:  Syringe  Skin repair:     Repair method:  Sutures    Suture size:  6-0    Suture material:  Nylon    Suture technique:  Simple interrupted    Number of sutures:  6  Repair type:     Repair type:  Simple  Post-procedure details:     Dressing:  Non-adherent dressing    Procedure completion:  Tolerated well, no immediate complications

## 2022-05-20 NOTE — DISCHARGE INSTRUCTIONS
Please follow up with your primary care physician within 2 days. Ensure that you review all lab work results and/or imaging results. If you have any questions about your discharge paperwork please call the Emergency Department.     Return to the ED for any headache, vision changes, dizziness, lightheadedness, nausea, vomiting, speech changes, numbness or tingling in extremitites, or any new or worsening symptoms.    Return to an Urgent Care, Doctor's office, or Emergency Department for removal of the stitches in 7 days.  Return to the Emergency Department if any fever, increasing pain, redness, or discharge appear.     Thank you for visiting [facility], Department of Emergency Medicine. Please see the entirety of the educational materials provided. Please note that a visit to the emergency department does not substitute ongoing care from a primary medical provider or specialist. Please ensure to follow up as recommended. However, please return to the emergency department immediately if symptoms do not improve as discussed, symptoms worsen, new symptoms develop, difficulty in following up or for any of your concerns or issues. Please note on discharge you are acknowledging understanding and agreement on medical evaluation, management recommendations and follow up recommendations.

## 2022-05-22 LAB
BACTERIA UR CULT: NORMAL
BACTERIA UR CULT: NORMAL

## 2022-05-23 ENCOUNTER — PATIENT OUTREACH (OUTPATIENT)
Dept: EMERGENCY MEDICINE | Facility: HOSPITAL | Age: 47
End: 2022-05-23
Payer: MEDICAID

## 2022-05-23 NOTE — PROGRESS NOTES
Subjective:       Patient ID: Eva Lane is a 46 y.o. female.    Chief Complaint: Follow-up (Check up ), Anxiety, Insomnia, and Arthritis (Issues in back )    Patient is known, to me and presents with   Chief Complaint   Patient presents with    Follow-up     Check up     Anxiety    Insomnia    Arthritis     Issues in back    .  Denies chest pain and shortness of breath.  Patient presents with above complaints. [facility] but it is too difficult to go to [facility] so wants to come back here for care.       HPI  Review of Systems   Constitutional: Positive for unexpected weight change. Negative for activity change, appetite change, fatigue and fever.   HENT: Negative for congestion, ear discharge, ear pain, hearing loss, postnasal drip, rhinorrhea, tinnitus and trouble swallowing.    Eyes: Negative for photophobia, pain, discharge and visual disturbance.   Respiratory: Negative for cough, chest tightness, shortness of breath, wheezing and stridor.    Cardiovascular: Negative for chest pain, palpitations and leg swelling.   Gastrointestinal: Negative for abdominal distention, blood in stool, constipation, diarrhea and vomiting.   Endocrine: Positive for polydipsia and polyuria.   Genitourinary: Negative for difficulty urinating, dysuria, frequency, hematuria, menstrual problem and urgency.   Musculoskeletal: Positive for arthralgias. Negative for back pain, gait problem, joint swelling and neck pain.   Skin: Negative.    Neurological: Positive for headaches. Negative for dizziness, seizures, syncope, weakness, light-headedness and numbness.   Hematological: Negative for adenopathy. Does not bruise/bleed easily.   Psychiatric/Behavioral: Positive for sleep disturbance. Negative for behavioral problems, confusion, dysphoric mood, hallucinations and suicidal ideas. The patient is nervous/anxious.        Objective:      Physical Exam  Constitutional:       General: She is not in acute distress.      Appearance: She is well-developed.   HENT:      Head: Normocephalic and atraumatic.      Right Ear: External ear normal.      Left Ear: External ear normal.      Mouth/Throat:      Pharynx: No oropharyngeal exudate.   Eyes:      General:         Right eye: No discharge.         Left eye: No discharge.      Conjunctiva/sclera: Conjunctivae normal.      Pupils: Pupils are equal, round, and reactive to light.   Neck:      Thyroid: No thyromegaly.      Vascular: No JVD.   Cardiovascular:      Rate and Rhythm: Normal rate and regular rhythm.      Heart sounds: Normal heart sounds. No murmur heard.    No friction rub. No gallop.   Pulmonary:      Effort: Pulmonary effort is normal. No respiratory distress.      Breath sounds: Normal breath sounds. No stridor. No wheezing or rales.   Chest:      Chest wall: No tenderness.   Abdominal:      General: Bowel sounds are normal. There is no distension.      Palpations: Abdomen is soft. There is no mass.      Tenderness: There is no abdominal tenderness. There is no rebound.   Musculoskeletal:         General: No tenderness. Normal range of motion.      Cervical back: Normal range of motion and neck supple.   Lymphadenopathy:      Cervical: No cervical adenopathy.   Skin:     General: Skin is warm and dry.      Capillary Refill: Capillary refill takes less than 2 seconds.      Coloration: Skin is not pale.      Findings: No erythema or rash.   Neurological:      General: No focal deficit present.      Mental Status: She is alert and oriented to person, place, and time.      Cranial Nerves: No cranial nerve deficit.      Sensory: No sensory deficit.      Motor: No weakness or abnormal muscle tone.      Coordination: Coordination normal.      Gait: Gait normal.      Deep Tendon Reflexes: Reflexes are normal and symmetric. Reflexes normal.   Psychiatric:         Mood and Affect: Mood normal.         Behavior: Behavior normal.         Thought Content: Thought content normal.          Judgment: Judgment normal.      Comments: Negative sihi noted         Assessment:       1. Uncontrolled type 2 diabetes mellitus with complication, with long-term current use of insulin    2. Mixed hyperlipidemia    3. Essential hypertension    4. MDD (major depressive disorder), recurrent episode, moderate    5. Neuropathy    6. Anxiety    7. Insomnia, unspecified type        Plan:   Eva was seen today for follow-up, anxiety, insomnia and arthritis.    Diagnoses and all orders for this visit:    Uncontrolled type 2 diabetes mellitus with complication, with long-term current use of insulin  -     CBC Auto Differential; Future  -     Comprehensive Metabolic Panel; Future  -     Hemoglobin A1C; Future  -     Microalbumin/Creatinine Ratio, Urine; Future  -     Hemoglobin A1C  -     Comprehensive Metabolic Panel  -     CBC Auto Differential    Mixed hyperlipidemia  -     CBC Auto Differential; Future  -     Comprehensive Metabolic Panel; Future  -     TSH; Future  -     Lipid Panel; Future  -     Lipid Panel  -     TSH  -     Comprehensive Metabolic Panel  -     CBC Auto Differential    Essential hypertension  -     CBC Auto Differential; Future  -     Comprehensive Metabolic Panel; Future  -     Microalbumin/Creatinine Ratio, Urine; Future  -     Comprehensive Metabolic Panel  -     CBC Auto Differential    MDD (major depressive disorder), recurrent episode, moderate  -     CBC Auto Differential; Future  -     Comprehensive Metabolic Panel; Future  -     Comprehensive Metabolic Panel  -     CBC Auto Differential    Neuropathy  -     pregabalin (LYRICA) 200 MG Cap; Take 1 capsule (200 mg total) by mouth 2 (two) times daily.    Anxiety  -     clonazePAM (KLONOPIN) 0.5 MG disintegrating tablet; Take 1 tablet (0.5 mg total) by mouth 2 (two) times daily as needed (anxiety).    Insomnia, unspecified type  -     traZODone (DESYREL) 100 MG tablet; Take 1 tablet (100 mg total) by mouth every evening.    Other orders  -      "mupirocin (BACTROBAN) 2 % ointment; Apply topically 3 (three) times daily.    "This note will not be shared with the patient."  See changes in above medications  Check CBC, CMP, TSH, Fasting lipid profile, HgA1c, Microalbuminuria in 3 months.  Medication compliance was discussed with the patient.  The patient was instructed to monitor glucoses closely using glucometer at home and to record the values in a log.  The patient was instructed to obtain an Optometry exam and microalbumin q year.  The patient was instructed to obtain a hemoglobin A1C q 3-4 months.  The patient was instructed to check the feet visually daily and to monitor for infection.  The patient was instructed to exercise at least 3 times a week.  The patient was instructed to follow a 1800 ADA diet.  The patient was instructed to monitor weight daily and try to keep it as close to ideal body weight as possible.  The patient was instructed on using exercise frequently to reduce BP.  The patient was instructed on using a low salt diet.  Monitor BP at home and to record the values in a log.  RTC in 3 months.  "

## 2022-05-23 NOTE — PROGRESS NOTES
"  Patient was able to verify spelling of name and . Patient answered 2 questions, then stated "go to hell," and hung-up the call on ED navigator. ED navigator to close the encounter at this time.    Mona Still  ED Navigator- [facility]  "

## 2022-05-25 DIAGNOSIS — Z12.31 OTHER SCREENING MAMMOGRAM: ICD-10-CM

## 2022-05-31 ENCOUNTER — PATIENT MESSAGE (OUTPATIENT)
Dept: INTERNAL MEDICINE | Facility: CLINIC | Age: 47
End: 2022-05-31
Payer: MEDICAID

## 2022-05-31 RX ORDER — BUTALBITAL, ACETAMINOPHEN AND CAFFEINE 50; 325; 40 MG/1; MG/1; MG/1
1 CAPSULE ORAL 3 TIMES DAILY PRN
Qty: 30 CAPSULE | Refills: 0 | Status: SHIPPED | OUTPATIENT
Start: 2022-05-31 | End: 2022-08-01

## 2022-06-07 ENCOUNTER — OFFICE VISIT (OUTPATIENT)
Dept: INTERNAL MEDICINE | Facility: CLINIC | Age: 47
End: 2022-06-07
Payer: MEDICAID

## 2022-06-07 VITALS
HEIGHT: 64 IN | RESPIRATION RATE: 20 BRPM | DIASTOLIC BLOOD PRESSURE: 80 MMHG | OXYGEN SATURATION: 97 % | SYSTOLIC BLOOD PRESSURE: 116 MMHG | HEART RATE: 86 BPM | BODY MASS INDEX: 19.94 KG/M2 | WEIGHT: 116.81 LBS

## 2022-06-07 DIAGNOSIS — R11.2 NAUSEA AND VOMITING, INTRACTABILITY OF VOMITING NOT SPECIFIED, UNSPECIFIED VOMITING TYPE: ICD-10-CM

## 2022-06-07 DIAGNOSIS — R09.81 SINUS CONGESTION: Primary | ICD-10-CM

## 2022-06-07 DIAGNOSIS — S01.81XD CHIN LACERATION, SUBSEQUENT ENCOUNTER: ICD-10-CM

## 2022-06-07 PROBLEM — S01.81XA CHIN LACERATION: Status: ACTIVE | Noted: 2022-06-07

## 2022-06-07 PROBLEM — R11.0 NAUSEA: Status: ACTIVE | Noted: 2022-06-07

## 2022-06-07 PROCEDURE — 99999 PR PBB SHADOW E&M-EST. PATIENT-LVL V: ICD-10-PCS | Mod: PBBFAC,,, | Performed by: NURSE PRACTITIONER

## 2022-06-07 PROCEDURE — 99215 OFFICE O/P EST HI 40 MIN: CPT | Mod: PBBFAC,PN | Performed by: NURSE PRACTITIONER

## 2022-06-07 PROCEDURE — 1159F MED LIST DOCD IN RCRD: CPT | Mod: CPTII,,, | Performed by: NURSE PRACTITIONER

## 2022-06-07 PROCEDURE — 4010F PR ACE/ARB THEARPY RXD/TAKEN: ICD-10-PCS | Mod: CPTII,,, | Performed by: NURSE PRACTITIONER

## 2022-06-07 PROCEDURE — 3008F PR BODY MASS INDEX (BMI) DOCUMENTED: ICD-10-PCS | Mod: CPTII,,, | Performed by: NURSE PRACTITIONER

## 2022-06-07 PROCEDURE — 99999 PR PBB SHADOW E&M-EST. PATIENT-LVL V: CPT | Mod: PBBFAC,,, | Performed by: NURSE PRACTITIONER

## 2022-06-07 PROCEDURE — 3074F PR MOST RECENT SYSTOLIC BLOOD PRESSURE < 130 MM HG: ICD-10-PCS | Mod: CPTII,,, | Performed by: NURSE PRACTITIONER

## 2022-06-07 PROCEDURE — 3008F BODY MASS INDEX DOCD: CPT | Mod: CPTII,,, | Performed by: NURSE PRACTITIONER

## 2022-06-07 PROCEDURE — 3044F HG A1C LEVEL LT 7.0%: CPT | Mod: CPTII,,, | Performed by: NURSE PRACTITIONER

## 2022-06-07 PROCEDURE — 99213 PR OFFICE/OUTPT VISIT, EST, LEVL III, 20-29 MIN: ICD-10-PCS | Mod: S$PBB,,, | Performed by: NURSE PRACTITIONER

## 2022-06-07 PROCEDURE — 3079F DIAST BP 80-89 MM HG: CPT | Mod: CPTII,,, | Performed by: NURSE PRACTITIONER

## 2022-06-07 PROCEDURE — 3079F PR MOST RECENT DIASTOLIC BLOOD PRESSURE 80-89 MM HG: ICD-10-PCS | Mod: CPTII,,, | Performed by: NURSE PRACTITIONER

## 2022-06-07 PROCEDURE — 3074F SYST BP LT 130 MM HG: CPT | Mod: CPTII,,, | Performed by: NURSE PRACTITIONER

## 2022-06-07 PROCEDURE — 4010F ACE/ARB THERAPY RXD/TAKEN: CPT | Mod: CPTII,,, | Performed by: NURSE PRACTITIONER

## 2022-06-07 PROCEDURE — 3044F PR MOST RECENT HEMOGLOBIN A1C LEVEL <7.0%: ICD-10-PCS | Mod: CPTII,,, | Performed by: NURSE PRACTITIONER

## 2022-06-07 PROCEDURE — 1159F PR MEDICATION LIST DOCUMENTED IN MEDICAL RECORD: ICD-10-PCS | Mod: CPTII,,, | Performed by: NURSE PRACTITIONER

## 2022-06-07 PROCEDURE — 99213 OFFICE O/P EST LOW 20 MIN: CPT | Mod: S$PBB,,, | Performed by: NURSE PRACTITIONER

## 2022-06-07 NOTE — PROGRESS NOTES
Subjective:       Patient ID: Eva Lane is a 46 y.o. female.    Chief Complaint: Abdominal Pain (X last night- with vomiting PS:6)    Patient is known, to me and presents with   Chief Complaint   Patient presents with    Abdominal Pain     X last night- with vomiting PS:6   .  Denies chest pain and shortness of breath.  Patient presents with some nausea and vomiting due to sinus congestion and post nasal drip. Also needs a laceration checked to chin. She had to have it sutured and then glued. Would like to have a few days off since she is feeling like this.   HPI  Review of Systems   Constitutional: Negative.    HENT: Positive for congestion and postnasal drip.    Respiratory: Negative.    Cardiovascular: Negative.    Gastrointestinal: Positive for nausea and vomiting. Negative for abdominal pain.   Skin: Negative.    Hematological: Negative.        Objective:      Physical Exam  Constitutional:       General: She is not in acute distress.     Appearance: Normal appearance. She is not ill-appearing or toxic-appearing.   HENT:      Head: Normocephalic and atraumatic.      Right Ear: Tympanic membrane normal.      Left Ear: Tympanic membrane normal.      Nose: Congestion present.      Mouth/Throat:      Mouth: Mucous membranes are moist.      Pharynx: Oropharynx is clear. No posterior oropharyngeal erythema.   Eyes:      General: No scleral icterus.        Right eye: No discharge.         Left eye: No discharge.      Conjunctiva/sclera: Conjunctivae normal.   Neck:      Vascular: No carotid bruit.   Cardiovascular:      Rate and Rhythm: Regular rhythm.      Heart sounds: Normal heart sounds. No murmur heard.  Pulmonary:      Effort: Pulmonary effort is normal.      Breath sounds: Normal breath sounds. No wheezing or rhonchi.   Musculoskeletal:      Cervical back: Normal range of motion and neck supple. No rigidity or tenderness.   Lymphadenopathy:      Cervical: No cervical adenopathy.   Skin:     General:  "Skin is warm and dry.      Capillary Refill: Capillary refill takes less than 2 seconds.      Coloration: Skin is not jaundiced or pale.      Findings: No bruising, erythema, lesion or rash.      Comments: Scar to chin region healing well.    Neurological:      Mental Status: She is alert.         Assessment:       1. Sinus congestion    2. Nausea and vomiting, intractability of vomiting not specified, unspecified vomiting type    3. Chin laceration, subsequent encounter        Plan:   Eva was seen today for abdominal pain.    Diagnoses and all orders for this visit:    Sinus congestion    Nausea and vomiting, intractability of vomiting not specified, unspecified vomiting type    Chin laceration, subsequent encounter    "This note will not be shared with the patient."  Can return to work on Saturday  rtc as scheduled    "

## 2022-06-12 DIAGNOSIS — F41.9 ANXIETY: ICD-10-CM

## 2022-06-14 RX ORDER — CLONAZEPAM 0.5 MG/1
0.5 TABLET, ORALLY DISINTEGRATING ORAL 2 TIMES DAILY PRN
Qty: 60 TABLET | Refills: 2 | Status: SHIPPED | OUTPATIENT
Start: 2022-06-14 | End: 2022-06-17

## 2022-06-17 ENCOUNTER — TELEPHONE (OUTPATIENT)
Dept: INTERNAL MEDICINE | Facility: CLINIC | Age: 47
End: 2022-06-17
Payer: MEDICAID

## 2022-06-17 DIAGNOSIS — F41.9 ANXIETY: ICD-10-CM

## 2022-06-17 RX ORDER — CLONAZEPAM 1 MG/1
1 TABLET, ORALLY DISINTEGRATING ORAL 2 TIMES DAILY PRN
Qty: 60 TABLET | Refills: 2 | Status: SHIPPED | OUTPATIENT
Start: 2022-06-17 | End: 2022-06-24

## 2022-06-17 RX ORDER — CLONAZEPAM 0.5 MG/1
0.5 TABLET, ORALLY DISINTEGRATING ORAL 2 TIMES DAILY PRN
Qty: 60 TABLET | Refills: 2 | Status: CANCELLED | OUTPATIENT
Start: 2022-06-17 | End: 2023-06-17

## 2022-06-17 NOTE — TELEPHONE ENCOUNTER
----- Message from Ania Caban sent at 2022 10:12 AM CDT -----  Contact: self  Eva Lane  MRN: [id number]  : 1975  PCP: Cintia Gustafson  Home Phone      [phone]  Work Phone      Not on file.  Mobile          [phone]      MESSAGE:   Pt requesting regular Klonopin and not chewables as it upsets her stomach. Pt also requesting increase in her dose

## 2022-06-24 ENCOUNTER — OFFICE VISIT (OUTPATIENT)
Dept: INTERNAL MEDICINE | Facility: CLINIC | Age: 47
End: 2022-06-24
Payer: MEDICAID

## 2022-06-24 ENCOUNTER — LAB VISIT (OUTPATIENT)
Dept: LAB | Facility: HOSPITAL | Age: 47
End: 2022-06-24
Attending: NURSE PRACTITIONER
Payer: MEDICAID

## 2022-06-24 VITALS
HEART RATE: 88 BPM | OXYGEN SATURATION: 97 % | BODY MASS INDEX: 20.47 KG/M2 | RESPIRATION RATE: 18 BRPM | HEIGHT: 64 IN | DIASTOLIC BLOOD PRESSURE: 84 MMHG | SYSTOLIC BLOOD PRESSURE: 122 MMHG | WEIGHT: 119.88 LBS

## 2022-06-24 DIAGNOSIS — N30.01 ACUTE CYSTITIS WITH HEMATURIA: ICD-10-CM

## 2022-06-24 DIAGNOSIS — F31.81 BIPOLAR 2 DISORDER: ICD-10-CM

## 2022-06-24 DIAGNOSIS — F41.9 ANXIETY: ICD-10-CM

## 2022-06-24 DIAGNOSIS — N30.01 ACUTE CYSTITIS WITH HEMATURIA: Primary | ICD-10-CM

## 2022-06-24 LAB
BILIRUB SERPL-MCNC: ABNORMAL MG/DL
BLOOD URINE, POC: ABNORMAL
CLARITY, POC UA: CLEAR
COLOR, POC UA: YELLOW
GLUCOSE UR QL STRIP: NORMAL
KETONES UR QL STRIP: ABNORMAL
LEUKOCYTE ESTERASE URINE, POC: ABNORMAL
NITRITE, POC UA: ABNORMAL
PH, POC UA: 6
PROTEIN, POC: ABNORMAL
SPECIFIC GRAVITY, POC UA: 1
UROBILINOGEN, POC UA: NORMAL

## 2022-06-24 PROCEDURE — 3074F SYST BP LT 130 MM HG: CPT | Mod: CPTII,,, | Performed by: NURSE PRACTITIONER

## 2022-06-24 PROCEDURE — 81002 URINALYSIS NONAUTO W/O SCOPE: CPT | Mod: PBBFAC,PN | Performed by: NURSE PRACTITIONER

## 2022-06-24 PROCEDURE — 1159F MED LIST DOCD IN RCRD: CPT | Mod: CPTII,,, | Performed by: NURSE PRACTITIONER

## 2022-06-24 PROCEDURE — 4010F PR ACE/ARB THEARPY RXD/TAKEN: ICD-10-PCS | Mod: CPTII,,, | Performed by: NURSE PRACTITIONER

## 2022-06-24 PROCEDURE — 99999 PR PBB SHADOW E&M-EST. PATIENT-LVL V: ICD-10-PCS | Mod: PBBFAC,,, | Performed by: NURSE PRACTITIONER

## 2022-06-24 PROCEDURE — 99215 OFFICE O/P EST HI 40 MIN: CPT | Mod: PBBFAC,PN | Performed by: NURSE PRACTITIONER

## 2022-06-24 PROCEDURE — 4010F ACE/ARB THERAPY RXD/TAKEN: CPT | Mod: CPTII,,, | Performed by: NURSE PRACTITIONER

## 2022-06-24 PROCEDURE — 99214 OFFICE O/P EST MOD 30 MIN: CPT | Mod: S$PBB,,, | Performed by: NURSE PRACTITIONER

## 2022-06-24 PROCEDURE — 3074F PR MOST RECENT SYSTOLIC BLOOD PRESSURE < 130 MM HG: ICD-10-PCS | Mod: CPTII,,, | Performed by: NURSE PRACTITIONER

## 2022-06-24 PROCEDURE — 3044F PR MOST RECENT HEMOGLOBIN A1C LEVEL <7.0%: ICD-10-PCS | Mod: CPTII,,, | Performed by: NURSE PRACTITIONER

## 2022-06-24 PROCEDURE — 1159F PR MEDICATION LIST DOCUMENTED IN MEDICAL RECORD: ICD-10-PCS | Mod: CPTII,,, | Performed by: NURSE PRACTITIONER

## 2022-06-24 PROCEDURE — 99999 PR PBB SHADOW E&M-EST. PATIENT-LVL V: CPT | Mod: PBBFAC,,, | Performed by: NURSE PRACTITIONER

## 2022-06-24 PROCEDURE — 3044F HG A1C LEVEL LT 7.0%: CPT | Mod: CPTII,,, | Performed by: NURSE PRACTITIONER

## 2022-06-24 PROCEDURE — 99214 PR OFFICE/OUTPT VISIT, EST, LEVL IV, 30-39 MIN: ICD-10-PCS | Mod: S$PBB,,, | Performed by: NURSE PRACTITIONER

## 2022-06-24 PROCEDURE — 3008F BODY MASS INDEX DOCD: CPT | Mod: CPTII,,, | Performed by: NURSE PRACTITIONER

## 2022-06-24 PROCEDURE — 3008F PR BODY MASS INDEX (BMI) DOCUMENTED: ICD-10-PCS | Mod: CPTII,,, | Performed by: NURSE PRACTITIONER

## 2022-06-24 PROCEDURE — 3079F PR MOST RECENT DIASTOLIC BLOOD PRESSURE 80-89 MM HG: ICD-10-PCS | Mod: CPTII,,, | Performed by: NURSE PRACTITIONER

## 2022-06-24 PROCEDURE — 3079F DIAST BP 80-89 MM HG: CPT | Mod: CPTII,,, | Performed by: NURSE PRACTITIONER

## 2022-06-24 PROCEDURE — 87086 URINE CULTURE/COLONY COUNT: CPT | Performed by: NURSE PRACTITIONER

## 2022-06-24 RX ORDER — NITROFURANTOIN (MACROCRYSTALS) 100 MG/1
100 CAPSULE ORAL EVERY 12 HOURS
Qty: 14 CAPSULE | Refills: 0 | Status: SHIPPED | OUTPATIENT
Start: 2022-06-24 | End: 2022-07-01

## 2022-06-24 RX ORDER — QUETIAPINE FUMARATE 200 MG/1
200 TABLET, FILM COATED ORAL NIGHTLY
Qty: 30 TABLET | Refills: 2 | Status: SHIPPED | OUTPATIENT
Start: 2022-06-24 | End: 2022-08-04

## 2022-06-24 RX ORDER — CARIPRAZINE 1.5 MG/1
1.5 CAPSULE, GELATIN COATED ORAL DAILY
Qty: 30 CAPSULE | Refills: 2 | Status: SHIPPED | OUTPATIENT
Start: 2022-06-24 | End: 2022-10-03 | Stop reason: SDUPTHER

## 2022-06-24 RX ORDER — CLONAZEPAM 1 MG/1
1 TABLET ORAL 2 TIMES DAILY PRN
Qty: 60 TABLET | Refills: 2 | Status: ON HOLD | OUTPATIENT
Start: 2022-06-24 | End: 2022-12-05

## 2022-06-26 LAB
BACTERIA UR CULT: NORMAL
BACTERIA UR CULT: NORMAL

## 2022-07-05 NOTE — PROGRESS NOTES
Subjective:       Patient ID: Eva Lane is a 46 y.o. female.    Chief Complaint: Urinary Tract Infection (With burning- x 2 days )    Patient is known, to me and presents with   Chief Complaint   Patient presents with    Urinary Tract Infection     With burning- x 2 days    .  Denies chest pain and shortness of breath.  Patient presents with uti symptoms and changes in her mental health meds. No sihi noted.   HPI  Review of Systems   Constitutional: Negative for activity change, appetite change, fatigue, fever and unexpected weight change.   HENT: Negative for congestion, ear discharge, ear pain, hearing loss, postnasal drip and tinnitus.    Eyes: Negative for photophobia, pain and visual disturbance.   Respiratory: Negative for cough, shortness of breath, wheezing and stridor.    Cardiovascular: Negative for chest pain, palpitations and leg swelling.   Gastrointestinal: Negative for abdominal distention.   Genitourinary: Positive for dysuria and frequency. Negative for difficulty urinating, hematuria and urgency.   Musculoskeletal: Negative for arthralgias, back pain, gait problem, joint swelling and neck pain.   Skin: Negative.    Neurological: Negative for dizziness, seizures, syncope, weakness, light-headedness, numbness and headaches.   Hematological: Negative for adenopathy. Does not bruise/bleed easily.   Psychiatric/Behavioral: Negative for agitation, behavioral problems, confusion, decreased concentration, dysphoric mood, hallucinations, self-injury, sleep disturbance and suicidal ideas. The patient is nervous/anxious. The patient is not hyperactive.        Objective:      Physical Exam  Constitutional:       General: She is not in acute distress.     Appearance: She is well-developed.   HENT:      Head: Normocephalic and atraumatic.      Right Ear: External ear normal.      Left Ear: External ear normal.      Mouth/Throat:      Pharynx: No oropharyngeal exudate.   Eyes:      General:          Right eye: No discharge.         Left eye: No discharge.      Conjunctiva/sclera: Conjunctivae normal.      Pupils: Pupils are equal, round, and reactive to light.   Neck:      Thyroid: No thyromegaly.      Vascular: No JVD.   Cardiovascular:      Rate and Rhythm: Normal rate and regular rhythm.      Heart sounds: Normal heart sounds. No murmur heard.    No friction rub. No gallop.   Pulmonary:      Effort: Pulmonary effort is normal. No respiratory distress.      Breath sounds: Normal breath sounds. No stridor. No wheezing or rales.   Chest:      Chest wall: No tenderness.   Abdominal:      General: Bowel sounds are normal. There is no distension.      Palpations: Abdomen is soft. There is no mass.      Tenderness: There is no abdominal tenderness. There is no rebound.   Genitourinary:     Comments: See dip  Musculoskeletal:         General: No tenderness. Normal range of motion.      Cervical back: Normal range of motion and neck supple.   Lymphadenopathy:      Cervical: No cervical adenopathy.   Skin:     General: Skin is warm and dry.      Coloration: Skin is not pale.      Findings: No erythema or rash.   Neurological:      Mental Status: She is alert and oriented to person, place, and time.      Cranial Nerves: No cranial nerve deficit.      Motor: No abnormal muscle tone.      Coordination: Coordination normal.      Deep Tendon Reflexes: Reflexes are normal and symmetric. Reflexes normal.   Psychiatric:         Mood and Affect: Mood normal.         Behavior: Behavior normal.         Thought Content: Thought content normal.         Judgment: Judgment normal.      Comments: Negative sihi noted         Assessment:       1. Acute cystitis with hematuria    2. Bipolar 2 disorder    3. Anxiety        Plan:   Eva was seen today for urinary tract infection.    Diagnoses and all orders for this visit:    Acute cystitis with hematuria  -     POCT URINE DIPSTICK WITHOUT MICROSCOPE  -     Urine culture; Future  -      "nitrofurantoin (MACRODANTIN) 100 MG capsule; Take 1 capsule (100 mg total) by mouth every 12 (twelve) hours. for 7 days    Bipolar 2 disorder  -     cariprazine (VRAYLAR) 1.5 mg Cap; Take 1 capsule (1.5 mg total) by mouth once daily at 6am.  -     QUEtiapine (SEROQUEL) 200 MG Tab; Take 1 tablet (200 mg total) by mouth every evening.    Anxiety  -     clonazePAM (KLONOPIN) 1 MG tablet; Take 1 tablet (1 mg total) by mouth 2 (two) times daily as needed for Anxiety.    "This note will not be shared with the patient."  Continue with plan of care  rtc a scheduled  "

## 2022-07-07 ENCOUNTER — OFFICE VISIT (OUTPATIENT)
Dept: INTERNAL MEDICINE | Facility: CLINIC | Age: 47
End: 2022-07-07
Payer: MEDICAID

## 2022-07-07 VITALS
HEART RATE: 110 BPM | DIASTOLIC BLOOD PRESSURE: 76 MMHG | HEIGHT: 64 IN | OXYGEN SATURATION: 98 % | RESPIRATION RATE: 18 BRPM | TEMPERATURE: 98 F | BODY MASS INDEX: 20.04 KG/M2 | WEIGHT: 117.38 LBS | SYSTOLIC BLOOD PRESSURE: 114 MMHG

## 2022-07-07 DIAGNOSIS — F41.9 ANXIETY: ICD-10-CM

## 2022-07-07 DIAGNOSIS — Z87.440 HISTORY OF UTI: ICD-10-CM

## 2022-07-07 DIAGNOSIS — J01.90 ACUTE BACTERIAL SINUSITIS: ICD-10-CM

## 2022-07-07 DIAGNOSIS — B96.89 ACUTE BACTERIAL SINUSITIS: ICD-10-CM

## 2022-07-07 DIAGNOSIS — F31.81 BIPOLAR 2 DISORDER: Primary | ICD-10-CM

## 2022-07-07 PROCEDURE — 99214 OFFICE O/P EST MOD 30 MIN: CPT | Mod: S$PBB,,, | Performed by: NURSE PRACTITIONER

## 2022-07-07 PROCEDURE — 99999 PR PBB SHADOW E&M-EST. PATIENT-LVL V: ICD-10-PCS | Mod: PBBFAC,,, | Performed by: NURSE PRACTITIONER

## 2022-07-07 PROCEDURE — 1159F PR MEDICATION LIST DOCUMENTED IN MEDICAL RECORD: ICD-10-PCS | Mod: CPTII,,, | Performed by: NURSE PRACTITIONER

## 2022-07-07 PROCEDURE — 3044F PR MOST RECENT HEMOGLOBIN A1C LEVEL <7.0%: ICD-10-PCS | Mod: CPTII,,, | Performed by: NURSE PRACTITIONER

## 2022-07-07 PROCEDURE — 99999 PR PBB SHADOW E&M-EST. PATIENT-LVL V: CPT | Mod: PBBFAC,,, | Performed by: NURSE PRACTITIONER

## 2022-07-07 PROCEDURE — 99214 PR OFFICE/OUTPT VISIT, EST, LEVL IV, 30-39 MIN: ICD-10-PCS | Mod: S$PBB,,, | Performed by: NURSE PRACTITIONER

## 2022-07-07 PROCEDURE — 1159F MED LIST DOCD IN RCRD: CPT | Mod: CPTII,,, | Performed by: NURSE PRACTITIONER

## 2022-07-07 PROCEDURE — 3008F PR BODY MASS INDEX (BMI) DOCUMENTED: ICD-10-PCS | Mod: CPTII,,, | Performed by: NURSE PRACTITIONER

## 2022-07-07 PROCEDURE — 3044F HG A1C LEVEL LT 7.0%: CPT | Mod: CPTII,,, | Performed by: NURSE PRACTITIONER

## 2022-07-07 PROCEDURE — 3078F PR MOST RECENT DIASTOLIC BLOOD PRESSURE < 80 MM HG: ICD-10-PCS | Mod: CPTII,,, | Performed by: NURSE PRACTITIONER

## 2022-07-07 PROCEDURE — 3078F DIAST BP <80 MM HG: CPT | Mod: CPTII,,, | Performed by: NURSE PRACTITIONER

## 2022-07-07 PROCEDURE — 99215 OFFICE O/P EST HI 40 MIN: CPT | Mod: PBBFAC,PN | Performed by: NURSE PRACTITIONER

## 2022-07-07 PROCEDURE — 3074F PR MOST RECENT SYSTOLIC BLOOD PRESSURE < 130 MM HG: ICD-10-PCS | Mod: CPTII,,, | Performed by: NURSE PRACTITIONER

## 2022-07-07 PROCEDURE — 3074F SYST BP LT 130 MM HG: CPT | Mod: CPTII,,, | Performed by: NURSE PRACTITIONER

## 2022-07-07 PROCEDURE — 4010F ACE/ARB THERAPY RXD/TAKEN: CPT | Mod: CPTII,,, | Performed by: NURSE PRACTITIONER

## 2022-07-07 PROCEDURE — 4010F PR ACE/ARB THEARPY RXD/TAKEN: ICD-10-PCS | Mod: CPTII,,, | Performed by: NURSE PRACTITIONER

## 2022-07-07 PROCEDURE — 3008F BODY MASS INDEX DOCD: CPT | Mod: CPTII,,, | Performed by: NURSE PRACTITIONER

## 2022-07-07 RX ORDER — AZITHROMYCIN 250 MG/1
TABLET, FILM COATED ORAL
Qty: 6 TABLET | Refills: 0 | Status: SHIPPED | OUTPATIENT
Start: 2022-07-07 | End: 2022-07-12

## 2022-07-07 NOTE — PROGRESS NOTES
Subjective:       Patient ID: Eva Lane is a 46 y.o. female.    Chief Complaint: Follow-up (X 2 wks) and Sinus Problem (X mon. With nasal drip )    Patient is known, to me and presents with   Chief Complaint   Patient presents with    Follow-up     X 2 wks    Sinus Problem     X mon. With nasal drip    .  Denies chest pain and shortness of breath.  Patient presents with two week check up and also has sinus infection. Complains of sinus pressure and pain. No fever or chills. No body aches. Also doing well on her psy meds. Needs urine rechecked and she states that she does feel better witht hat   HPI  Review of Systems   Constitutional: Negative for activity change, appetite change, fatigue, fever and unexpected weight change.   HENT: Positive for congestion, ear pain, sinus pressure and sinus pain. Negative for ear discharge, hearing loss, postnasal drip and tinnitus.    Eyes: Negative for photophobia, pain and visual disturbance.   Respiratory: Negative for cough, shortness of breath, wheezing and stridor.    Cardiovascular: Negative for chest pain, palpitations and leg swelling.   Gastrointestinal: Negative for abdominal distention.   Genitourinary: Negative for difficulty urinating, dysuria, frequency, hematuria and urgency.   Musculoskeletal: Negative for arthralgias, back pain, gait problem, joint swelling and neck pain.   Skin: Negative.    Neurological: Negative for dizziness, seizures, syncope, weakness, light-headedness, numbness and headaches.   Hematological: Negative for adenopathy. Does not bruise/bleed easily.   Psychiatric/Behavioral: Negative for behavioral problems, confusion, dysphoric mood, hallucinations, sleep disturbance and suicidal ideas. The patient is not nervous/anxious.        Objective:      Physical Exam  Constitutional:       General: She is not in acute distress.     Appearance: She is well-developed.   HENT:      Head: Normocephalic and atraumatic.      Right Ear:  External ear normal. Tympanic membrane has decreased mobility.      Left Ear: External ear normal. Tympanic membrane has decreased mobility.      Nose: Congestion present. No mucosal edema.      Right Sinus: Frontal sinus tenderness present.      Left Sinus: Frontal sinus tenderness present.      Mouth/Throat:      Dentition: Dental caries present.      Pharynx: Oropharynx is clear. No oropharyngeal exudate or posterior oropharyngeal erythema.   Eyes:      General:         Right eye: No discharge.         Left eye: No discharge.      Conjunctiva/sclera: Conjunctivae normal.      Pupils: Pupils are equal, round, and reactive to light.   Neck:      Thyroid: No thyromegaly.      Vascular: No JVD.   Cardiovascular:      Rate and Rhythm: Regular rhythm. Tachycardia present.      Heart sounds: Normal heart sounds. No murmur heard.    No friction rub. No gallop.   Pulmonary:      Effort: Pulmonary effort is normal. No respiratory distress.      Breath sounds: Normal breath sounds. No stridor. No wheezing or rales.   Chest:      Chest wall: No tenderness.   Abdominal:      General: Bowel sounds are normal. There is no distension.      Palpations: Abdomen is soft. There is no mass.      Tenderness: There is no abdominal tenderness. There is no right CVA tenderness, left CVA tenderness, guarding or rebound.      Hernia: No hernia is present.   Musculoskeletal:         General: No tenderness. Normal range of motion.      Cervical back: Normal range of motion and neck supple.   Lymphadenopathy:      Cervical: No cervical adenopathy.   Skin:     General: Skin is warm and dry.      Capillary Refill: Capillary refill takes less than 2 seconds.      Coloration: Skin is not jaundiced or pale.      Findings: No bruising, erythema, lesion or rash.   Neurological:      General: No focal deficit present.      Mental Status: She is alert and oriented to person, place, and time.      Cranial Nerves: No cranial nerve deficit.      Motor:  "No weakness or abnormal muscle tone.      Coordination: Coordination normal.      Gait: Gait normal.      Deep Tendon Reflexes: Reflexes are normal and symmetric. Reflexes normal.   Psychiatric:         Mood and Affect: Mood normal.         Behavior: Behavior normal.         Thought Content: Thought content normal.         Judgment: Judgment normal.      Comments: Negative sihi noted         Assessment:       1. Bipolar 2 disorder    2. Anxiety    3. Acute bacterial sinusitis    4. History of UTI        Plan:   Eva was seen today for follow-up and sinus problem.    Diagnoses and all orders for this visit:    Bipolar 2 disorder    Anxiety    Acute bacterial sinusitis  -     azithromycin (Z-LARS) 250 MG tablet; Take 2 tablets by mouth on day 1; Take 1 tablet by mouth on days 2-5    History of UTI  -     POCT URINE DIPSTICK WITHOUT MICROSCOPE    "This note will not be shared with the patient."  Take claritin and flonase  rtc as scheduled  "

## 2022-07-08 ENCOUNTER — PATIENT MESSAGE (OUTPATIENT)
Dept: INTERNAL MEDICINE | Facility: CLINIC | Age: 47
End: 2022-07-08
Payer: MEDICAID

## 2022-07-08 RX ORDER — METFORMIN HYDROCHLORIDE 1000 MG/1
1000 TABLET ORAL 2 TIMES DAILY WITH MEALS
Qty: 180 TABLET | Refills: 0 | Status: SHIPPED | OUTPATIENT
Start: 2022-07-08 | End: 2022-10-13

## 2022-07-11 ENCOUNTER — PATIENT MESSAGE (OUTPATIENT)
Dept: ADMINISTRATIVE | Facility: HOSPITAL | Age: 47
End: 2022-07-11
Payer: MEDICAID

## 2022-08-01 RX ORDER — BUTALBITAL, ACETAMINOPHEN AND CAFFEINE 50; 325; 40 MG/1; MG/1; MG/1
CAPSULE ORAL
Qty: 30 CAPSULE | Refills: 0 | Status: ON HOLD | OUTPATIENT
Start: 2022-08-01 | End: 2022-08-22 | Stop reason: HOSPADM

## 2022-08-04 ENCOUNTER — TELEPHONE (OUTPATIENT)
Dept: INTERNAL MEDICINE | Facility: CLINIC | Age: 47
End: 2022-08-04
Payer: MEDICAID

## 2022-08-04 RX ORDER — QUETIAPINE FUMARATE 400 MG/1
400 TABLET, FILM COATED ORAL NIGHTLY
Qty: 30 TABLET | Refills: 11 | Status: SHIPPED | OUTPATIENT
Start: 2022-08-04 | End: 2022-10-13

## 2022-08-04 NOTE — TELEPHONE ENCOUNTER
----- Message from Chika Rosales MA sent at 2022  9:38 AM CDT -----  Eva Lane  MRN: [id number]  : 1975  PCP: Cintia Gustafson  Home Phone      [phone]  Work Phone      Not on file.  Mobile          [phone]      MESSAGE:     Please send in new script for Seroquel,  Patient states she is on 400mg. She takes 1 at bedtime.     Send to [facility] ([address])

## 2022-08-21 ENCOUNTER — HOSPITAL ENCOUNTER (EMERGENCY)
Facility: HOSPITAL | Age: 47
Discharge: HOME | End: 2022-08-21
Attending: EMERGENCY MEDICINE
Payer: MEDICAID

## 2022-08-21 ENCOUNTER — HOSPITAL ENCOUNTER (OUTPATIENT)
Facility: HOSPITAL | Age: 47
Discharge: HOME | End: 2022-08-22
Attending: EMERGENCY MEDICINE | Admitting: INTERNAL MEDICINE
Payer: MEDICAID

## 2022-08-21 VITALS
DIASTOLIC BLOOD PRESSURE: 73 MMHG | RESPIRATION RATE: 15 BRPM | WEIGHT: 115 LBS | HEART RATE: 86 BPM | TEMPERATURE: 98 F | SYSTOLIC BLOOD PRESSURE: 123 MMHG | OXYGEN SATURATION: 99 % | BODY MASS INDEX: 19.74 KG/M2

## 2022-08-21 DIAGNOSIS — I63.9 STROKE: ICD-10-CM

## 2022-08-21 DIAGNOSIS — F41.1 GAD (GENERALIZED ANXIETY DISORDER): ICD-10-CM

## 2022-08-21 DIAGNOSIS — R07.9 CHEST PAIN: ICD-10-CM

## 2022-08-21 DIAGNOSIS — F33.1 MDD (MAJOR DEPRESSIVE DISORDER), RECURRENT EPISODE, MODERATE: ICD-10-CM

## 2022-08-21 DIAGNOSIS — H54.3 VISION LOSS, BILATERAL: Primary | ICD-10-CM

## 2022-08-21 DIAGNOSIS — H53.9 VISION CHANGES: ICD-10-CM

## 2022-08-21 DIAGNOSIS — G43.909 NONINTRACTABLE CHRONIC MIGRAINE: ICD-10-CM

## 2022-08-21 DIAGNOSIS — F41.9 ANXIETY: ICD-10-CM

## 2022-08-21 DIAGNOSIS — I95.9 HYPOTENSION, UNSPECIFIED HYPOTENSION TYPE: ICD-10-CM

## 2022-08-21 DIAGNOSIS — H53.8 BLURRED VISION, BILATERAL: Primary | ICD-10-CM

## 2022-08-21 PROBLEM — E11.49 TYPE 2 DIABETES MELLITUS WITH NEUROLOGIC COMPLICATION, WITHOUT LONG-TERM CURRENT USE OF INSULIN: Status: ACTIVE | Noted: 2021-05-05

## 2022-08-21 PROBLEM — I50.9 CHF (CONGESTIVE HEART FAILURE): Status: ACTIVE | Noted: 2022-08-21

## 2022-08-21 LAB
ALBUMIN SERPL BCP-MCNC: 3.8 G/DL (ref 3.5–5.2)
ALLENS TEST: ABNORMAL
ALP SERPL-CCNC: 71 U/L (ref 55–135)
ALT SERPL W/O P-5'-P-CCNC: 22 U/L (ref 10–44)
ANION GAP SERPL CALC-SCNC: 11 MMOL/L (ref 8–16)
APTT BLDCRRT: 24.2 SEC (ref 21–32)
AST SERPL-CCNC: 16 U/L (ref 10–40)
BASOPHILS # BLD AUTO: 0.06 K/UL (ref 0–0.2)
BASOPHILS NFR BLD: 0.6 % (ref 0–1.9)
BILIRUB SERPL-MCNC: 0.7 MG/DL (ref 0.1–1)
BUN SERPL-MCNC: 20 MG/DL (ref 6–20)
CALCIUM SERPL-MCNC: 9.5 MG/DL (ref 8.7–10.5)
CHLORIDE SERPL-SCNC: 105 MMOL/L (ref 95–110)
CHOLEST SERPL-MCNC: 142 MG/DL (ref 120–199)
CHOLEST/HDLC SERPL: 4.3 {RATIO} (ref 2–5)
CO2 SERPL-SCNC: 25 MMOL/L (ref 23–29)
CREAT SERPL-MCNC: 0.9 MG/DL (ref 0.5–1.4)
CREAT SERPL-MCNC: 1.8 MG/DL (ref 0.5–1.4)
DELSYS: ABNORMAL
DIFFERENTIAL METHOD: ABNORMAL
EOSINOPHIL # BLD AUTO: 0.2 K/UL (ref 0–0.5)
EOSINOPHIL NFR BLD: 2.3 % (ref 0–8)
ERYTHROCYTE [DISTWIDTH] IN BLOOD BY AUTOMATED COUNT: 12.8 % (ref 11.5–14.5)
EST. GFR  (NO RACE VARIABLE): >60 ML/MIN/1.73 M^2
GLUCOSE SERPL-MCNC: 111 MG/DL (ref 70–110)
GLUCOSE SERPL-MCNC: 164 MG/DL (ref 70–110)
HCO3 UR-SCNC: 31.6 MMOL/L (ref 24–28)
HCT VFR BLD AUTO: 41.1 % (ref 37–48.5)
HDLC SERPL-MCNC: 33 MG/DL (ref 40–75)
HDLC SERPL: 23.2 % (ref 20–50)
HGB BLD-MCNC: 13.9 G/DL (ref 12–16)
IMM GRANULOCYTES # BLD AUTO: 0.03 K/UL (ref 0–0.04)
IMM GRANULOCYTES NFR BLD AUTO: 0.3 % (ref 0–0.5)
INR PPP: 1.2 (ref 0.8–1.2)
LACTATE SERPL-SCNC: 1.2 MMOL/L (ref 0.5–2.2)
LDLC SERPL CALC-MCNC: 78.4 MG/DL (ref 63–159)
LYMPHOCYTES # BLD AUTO: 3.1 K/UL (ref 1–4.8)
LYMPHOCYTES NFR BLD: 29 % (ref 18–48)
MCH RBC QN AUTO: 31.6 PG (ref 27–31)
MCHC RBC AUTO-ENTMCNC: 33.8 G/DL (ref 32–36)
MCV RBC AUTO: 93 FL (ref 82–98)
MODE: ABNORMAL
MONOCYTES # BLD AUTO: 0.7 K/UL (ref 0.3–1)
MONOCYTES NFR BLD: 6.5 % (ref 4–15)
NEUTROPHILS # BLD AUTO: 6.5 K/UL (ref 1.8–7.7)
NEUTROPHILS NFR BLD: 61.3 % (ref 38–73)
NONHDLC SERPL-MCNC: 109 MG/DL
NRBC BLD-RTO: 0 /100 WBC
PCO2 BLDA: 62.1 MMHG (ref 35–45)
PH SMN: 7.32 [PH] (ref 7.35–7.45)
PLATELET # BLD AUTO: 205 K/UL (ref 150–450)
PMV BLD AUTO: 11.9 FL (ref 9.2–12.9)
PO2 BLDA: 29 MMHG (ref 40–60)
POC BE: 5 MMOL/L
POC PTINR: 1.1 (ref 0.9–1.2)
POC PTWBT: 13.3 SEC (ref 9.7–14.3)
POC SATURATED O2: 46 % (ref 95–100)
POC TCO2: 33 MMOL/L (ref 24–29)
POCT GLUCOSE: 111 MG/DL (ref 70–110)
POCT GLUCOSE: 164 MG/DL (ref 70–110)
POCT GLUCOSE: 56 MG/DL (ref 70–110)
POTASSIUM SERPL-SCNC: 3.7 MMOL/L (ref 3.5–5.1)
PROT SERPL-MCNC: 6.8 G/DL (ref 6–8.4)
PROTHROMBIN TIME: 12.2 SEC (ref 9–12.5)
RBC # BLD AUTO: 4.4 M/UL (ref 4–5.4)
SAMPLE: ABNORMAL
SAMPLE: ABNORMAL
SAMPLE: NORMAL
SARS-COV-2 RDRP RESP QL NAA+PROBE: NEGATIVE
SITE: ABNORMAL
SODIUM SERPL-SCNC: 141 MMOL/L (ref 136–145)
TRIGL SERPL-MCNC: 153 MG/DL (ref 30–150)
TSH SERPL DL<=0.005 MIU/L-ACNC: 0.49 UIU/ML (ref 0.4–4)
WBC # BLD AUTO: 10.64 K/UL (ref 3.9–12.7)

## 2022-08-21 PROCEDURE — 85610 PROTHROMBIN TIME: CPT

## 2022-08-21 PROCEDURE — 99220 PR INITIAL OBSERVATION CARE,LEVL III: ICD-10-PCS | Mod: ,,, | Performed by: NURSE PRACTITIONER

## 2022-08-21 PROCEDURE — 82803 BLOOD GASES ANY COMBINATION: CPT

## 2022-08-21 PROCEDURE — G0378 HOSPITAL OBSERVATION PER HR: HCPCS

## 2022-08-21 PROCEDURE — 80053 COMPREHEN METABOLIC PANEL: CPT | Performed by: EMERGENCY MEDICINE

## 2022-08-21 PROCEDURE — 82565 ASSAY OF CREATININE: CPT

## 2022-08-21 PROCEDURE — 96365 THER/PROPH/DIAG IV INF INIT: CPT

## 2022-08-21 PROCEDURE — 63600175 PHARM REV CODE 636 W HCPCS

## 2022-08-21 PROCEDURE — 84443 ASSAY THYROID STIM HORMONE: CPT | Performed by: STUDENT IN AN ORGANIZED HEALTH CARE EDUCATION/TRAINING PROGRAM

## 2022-08-21 PROCEDURE — 82962 GLUCOSE BLOOD TEST: CPT

## 2022-08-21 PROCEDURE — 85025 COMPLETE CBC W/AUTO DIFF WBC: CPT | Performed by: EMERGENCY MEDICINE

## 2022-08-21 PROCEDURE — 80061 LIPID PANEL: CPT | Performed by: STUDENT IN AN ORGANIZED HEALTH CARE EDUCATION/TRAINING PROGRAM

## 2022-08-21 PROCEDURE — 93010 ELECTROCARDIOGRAM REPORT: CPT | Mod: 59,,, | Performed by: INTERNAL MEDICINE

## 2022-08-21 PROCEDURE — 85730 THROMBOPLASTIN TIME PARTIAL: CPT | Performed by: EMERGENCY MEDICINE

## 2022-08-21 PROCEDURE — 87040 BLOOD CULTURE FOR BACTERIA: CPT | Performed by: EMERGENCY MEDICINE

## 2022-08-21 PROCEDURE — 96361 HYDRATE IV INFUSION ADD-ON: CPT

## 2022-08-21 PROCEDURE — 85610 PROTHROMBIN TIME: CPT | Performed by: EMERGENCY MEDICINE

## 2022-08-21 PROCEDURE — 83605 ASSAY OF LACTIC ACID: CPT | Performed by: EMERGENCY MEDICINE

## 2022-08-21 PROCEDURE — 93005 ELECTROCARDIOGRAM TRACING: CPT

## 2022-08-21 PROCEDURE — 93010 EKG 12-LEAD: ICD-10-PCS | Mod: ,,, | Performed by: INTERNAL MEDICINE

## 2022-08-21 PROCEDURE — 93010 EKG 12-LEAD: ICD-10-PCS | Mod: 59,,, | Performed by: INTERNAL MEDICINE

## 2022-08-21 PROCEDURE — 25000003 PHARM REV CODE 250: Performed by: STUDENT IN AN ORGANIZED HEALTH CARE EDUCATION/TRAINING PROGRAM

## 2022-08-21 PROCEDURE — 93010 ELECTROCARDIOGRAM REPORT: CPT | Mod: ,,, | Performed by: INTERNAL MEDICINE

## 2022-08-21 PROCEDURE — 96360 HYDRATION IV INFUSION INIT: CPT

## 2022-08-21 PROCEDURE — U0002 COVID-19 LAB TEST NON-CDC: HCPCS | Performed by: EMERGENCY MEDICINE

## 2022-08-21 PROCEDURE — 99900035 HC TECH TIME PER 15 MIN (STAT)

## 2022-08-21 PROCEDURE — 99285 EMERGENCY DEPT VISIT HI MDM: CPT | Mod: ,,, | Performed by: EMERGENCY MEDICINE

## 2022-08-21 PROCEDURE — 25000003 PHARM REV CODE 250: Performed by: EMERGENCY MEDICINE

## 2022-08-21 PROCEDURE — 99285 EMERGENCY DEPT VISIT HI MDM: CPT | Mod: 25

## 2022-08-21 PROCEDURE — 99285 EMERGENCY DEPT VISIT HI MDM: CPT | Mod: 25,27

## 2022-08-21 PROCEDURE — 99220 PR INITIAL OBSERVATION CARE,LEVL III: CPT | Mod: ,,, | Performed by: NURSE PRACTITIONER

## 2022-08-21 PROCEDURE — 99285 PR EMERGENCY DEPT VISIT,LEVEL V: ICD-10-PCS | Mod: ,,, | Performed by: EMERGENCY MEDICINE

## 2022-08-21 PROCEDURE — 99213 PR OFFICE/OUTPT VISIT, EST, LEVL III, 20-29 MIN: ICD-10-PCS | Mod: 95,,, | Performed by: PSYCHIATRY & NEUROLOGY

## 2022-08-21 PROCEDURE — 99213 OFFICE O/P EST LOW 20 MIN: CPT | Mod: 95,,, | Performed by: PSYCHIATRY & NEUROLOGY

## 2022-08-21 RX ORDER — ATORVASTATIN CALCIUM 20 MG/1
20 TABLET, FILM COATED ORAL NIGHTLY
Status: DISCONTINUED | OUTPATIENT
Start: 2022-08-21 | End: 2022-08-22 | Stop reason: HOSPADM

## 2022-08-21 RX ORDER — SODIUM CHLORIDE 0.9 % (FLUSH) 0.9 %
10 SYRINGE (ML) INJECTION EVERY 12 HOURS PRN
Status: DISCONTINUED | OUTPATIENT
Start: 2022-08-21 | End: 2022-08-22 | Stop reason: HOSPADM

## 2022-08-21 RX ORDER — IBUPROFEN 200 MG
24 TABLET ORAL
Status: DISCONTINUED | OUTPATIENT
Start: 2022-08-21 | End: 2022-08-22 | Stop reason: HOSPADM

## 2022-08-21 RX ORDER — TRAZODONE HYDROCHLORIDE 100 MG/1
100 TABLET ORAL NIGHTLY
Status: DISCONTINUED | OUTPATIENT
Start: 2022-08-21 | End: 2022-08-22 | Stop reason: HOSPADM

## 2022-08-21 RX ORDER — GLUCAGON 1 MG
1 KIT INJECTION
Status: DISCONTINUED | OUTPATIENT
Start: 2022-08-21 | End: 2022-08-22 | Stop reason: HOSPADM

## 2022-08-21 RX ORDER — KETOROLAC TROMETHAMINE 30 MG/ML
15 INJECTION, SOLUTION INTRAMUSCULAR; INTRAVENOUS ONCE
Status: COMPLETED | OUTPATIENT
Start: 2022-08-21 | End: 2022-08-22

## 2022-08-21 RX ORDER — CLONAZEPAM 1 MG/1
1 TABLET ORAL 2 TIMES DAILY PRN
Status: DISCONTINUED | OUTPATIENT
Start: 2022-08-21 | End: 2022-08-21

## 2022-08-21 RX ORDER — BUTALBITAL, ACETAMINOPHEN AND CAFFEINE 50; 325; 40 MG/1; MG/1; MG/1
1 TABLET ORAL EVERY 6 HOURS PRN
Refills: 0 | Status: DISCONTINUED | OUTPATIENT
Start: 2022-08-21 | End: 2022-08-22 | Stop reason: HOSPADM

## 2022-08-21 RX ORDER — VALSARTAN 40 MG/1
40 TABLET ORAL DAILY
Status: DISCONTINUED | OUTPATIENT
Start: 2022-08-22 | End: 2022-08-21

## 2022-08-21 RX ORDER — LOPERAMIDE HYDROCHLORIDE 2 MG/1
2 CAPSULE ORAL
Status: COMPLETED | OUTPATIENT
Start: 2022-08-21 | End: 2022-08-21

## 2022-08-21 RX ORDER — MAGNESIUM SULFATE 1 G/100ML
1 INJECTION INTRAVENOUS ONCE
Status: COMPLETED | OUTPATIENT
Start: 2022-08-21 | End: 2022-08-21

## 2022-08-21 RX ORDER — INSULIN ASPART 100 [IU]/ML
0-5 INJECTION, SOLUTION INTRAVENOUS; SUBCUTANEOUS
Status: DISCONTINUED | OUTPATIENT
Start: 2022-08-21 | End: 2022-08-22 | Stop reason: HOSPADM

## 2022-08-21 RX ORDER — MAGNESIUM SULFATE 1 G/100ML
1 INJECTION INTRAVENOUS ONCE
Status: COMPLETED | OUTPATIENT
Start: 2022-08-22 | End: 2022-08-22

## 2022-08-21 RX ORDER — PREGABALIN 100 MG/1
200 CAPSULE ORAL 2 TIMES DAILY
Status: DISCONTINUED | OUTPATIENT
Start: 2022-08-21 | End: 2022-08-22 | Stop reason: HOSPADM

## 2022-08-21 RX ORDER — QUETIAPINE FUMARATE 200 MG/1
400 TABLET, FILM COATED ORAL NIGHTLY
Status: DISCONTINUED | OUTPATIENT
Start: 2022-08-21 | End: 2022-08-22 | Stop reason: HOSPADM

## 2022-08-21 RX ORDER — BUTALBITAL, ACETAMINOPHEN AND CAFFEINE 50; 325; 40 MG/1; MG/1; MG/1
2 TABLET ORAL
Status: COMPLETED | OUTPATIENT
Start: 2022-08-21 | End: 2022-08-21

## 2022-08-21 RX ORDER — DIPHENHYDRAMINE HYDROCHLORIDE 50 MG/ML
25 INJECTION INTRAMUSCULAR; INTRAVENOUS ONCE
Status: COMPLETED | OUTPATIENT
Start: 2022-08-21 | End: 2022-08-22

## 2022-08-21 RX ORDER — IBUPROFEN 200 MG
16 TABLET ORAL
Status: DISCONTINUED | OUTPATIENT
Start: 2022-08-21 | End: 2022-08-22 | Stop reason: HOSPADM

## 2022-08-21 RX ORDER — ACETAMINOPHEN 325 MG/1
650 TABLET ORAL EVERY 6 HOURS PRN
Status: DISCONTINUED | OUTPATIENT
Start: 2022-08-21 | End: 2022-08-22 | Stop reason: HOSPADM

## 2022-08-21 RX ORDER — IPRATROPIUM BROMIDE AND ALBUTEROL SULFATE 2.5; .5 MG/3ML; MG/3ML
3 SOLUTION RESPIRATORY (INHALATION) EVERY 4 HOURS PRN
Status: DISCONTINUED | OUTPATIENT
Start: 2022-08-21 | End: 2022-08-22 | Stop reason: HOSPADM

## 2022-08-21 RX ADMIN — LOPERAMIDE HYDROCHLORIDE 2 MG: 2 CAPSULE ORAL at 09:08

## 2022-08-21 RX ADMIN — SODIUM CHLORIDE 1000 ML: 0.9 INJECTION, SOLUTION INTRAVENOUS at 02:08

## 2022-08-21 RX ADMIN — BUTALBITAL, ACETAMINOPHEN, AND CAFFEINE 2 TABLET: 50; 325; 40 TABLET ORAL at 05:08

## 2022-08-21 RX ADMIN — MAGNESIUM SULFATE HEPTAHYDRATE 1 G: 500 INJECTION, SOLUTION INTRAMUSCULAR; INTRAVENOUS at 09:08

## 2022-08-21 RX ADMIN — SODIUM CHLORIDE 1000 ML: 0.9 INJECTION, SOLUTION INTRAVENOUS at 01:08

## 2022-08-21 NOTE — ED PROVIDER NOTES
[facility]                                                  Chief Complaint  46 y.o. female with Loss of Vision (Patient to ER CC of loss of vision in both eyes started about 20 minutes ago, Patient also complains of a HA)    History of Present Illness  Eva Lane presents to the emergency room with acute onset bilateral vision loss which occurred at approximately 12:05 p.m. while she was riding in the passenger seat of the vehicle.  Patient states that she normally has good vision .  She felt her normal self when she woke up this morning.  She states that she has associated headache and dizziness and does not feel right.  Patient states that her vision has slightly improved from what it was at 12:05 p.m. but she feels that it is significantly different from baseline.  Patient has extensive cardiovascular, neurologic as well as psychiatric medical history.  Fingerstick blood glucose on arrival was 164.  Systolic blood pressure very low.    Past Medical History:   Diagnosis Date    ADHD (attention deficit hyperactivity disorder)     Anxiety     Arthritis     Asthma     Behavioral problem     Bipolar 1 disorder     CHF (congestive heart failure)     COPD (chronic obstructive pulmonary disease)     Depression     Diabetes mellitus type II     Hx of psychiatric care     Hyperlipidemia     Hypertension     Lumbar radiculopathy     Neuralgia     Neuritis     Psychiatric problem     PTSD (post-traumatic stress disorder)     Seizures     Seizure-last 8/2015-shake and fall-doesnt remember anything    Self-harming behavior     Sleep apnea     on CPAP    Stroke 9/22/2015    Stroke     Therapy      Past Surgical History:   Procedure Laterality Date    COLONOSCOPY N/A 7/1/2016    Procedure: COLONOSCOPY;  Surgeon: Robert Hernandez MD;  Location: [address]);  Service: Endoscopy;  Laterality: N/A;  Schedule for next available CRS physician - EGD @ 8:30 with Dr. Naylor     CYST REMOVAL  2000    Fatty cyst on right face cheek and left upper arm     DILATION AND CURETTAGE OF UTERUS  2006    Miscarriage    HYSTERECTOMY  7/08    DUB - Total    OOPHORECTOMY  7/2008    TOTAL ABDOMINAL HYSTERECTOMY  7/2008    TUBAL LIGATION  2007      Review of patient's allergies indicates:   Allergen Reactions    Penicillins Swelling and Rash    Neosporin [neomycin-bacitracin-polymyxin] Rash    Shellfish containing products     Shrimp Hives        Review of Systems and Physical Exam     Review of Systems  -- Constitution - no fever, no weight loss, no loss of consciousness  -- Eyes - reports changes in vision, no redness, no swelling  -- Ear, Nose - no  earache, denies congestion  -- Mouth,Throat - no sore throat, no toothache, normal voice, normal swallowing  -- Respiratory - denies cough and congestion, no shortness of breath, no wheezing  -- Cardiovascular - denies chest pain, no palpitations,   -- Gastrointestinal - denies abdominal pain, denies nausea, vomiting, and diarrhea  -- Genitourinary - no dysuria, no denies flank pain, no hematuria or frequency   -- Musculoskeletal - denies back pain, negative for myalgias and arthralgias   -- Neurological - no headache, no neurologic changes, no loss of bladder or bowel function no seizure like activity, reports acute vision change, headache,   -- Skin - denies skin changes, no rash, no hives, no suspected skin infection    Vital Signs   weight is 52.2 kg (115 lb). Her temperature is 98 °F (36.7 °C). Her pulse is 69. Her respiration is 18 and oxygen saturation is 100%.      Physical Exam  -- Nursing note and vitals reviewed  -- Constitutional:  Awake alert and oriented, GCS 15, no acute distress.  Appears well.  -- Head: Atraumatic. Normocephalic. No obvious abnormality  -- Eyes: Pupils are equal and reactive to light. Extraocular movements intact. No nystagmus.  No periorbital swelling. Normal conjunctiva.  -- Nose: Nose grossly normal in  appearance, nares grossly normal. No rhinorrhea.  -- Throat: Mucous membranes moist, pharynx normal, normal tonsils.  Airway patent.  -- Ears: External ears and TM normal bilaterally. Normal hearing.   -- Neck: Normal range of motion. Neck supple. No meningismus. No adenopathy  -- Cardiac: Normal rate, regular rhythm and normal heart sounds. No carotid bruit. No lower extremity edema.  -- Pulmonary: Normal respiratory effort, breath sounds equal bilaterally. Adequate flow.  No wheezing.  No crackles.  -- Abdominal: Soft, no tenderness, no guarding, no rebound. Normal bowel sounds.   -- Musculoskeletal: Normal range of motion, all 4 extremities 5/5 strength.  Neurovascularly intact. Atraumatic. No deformities.  -- Neurological:  Complete vision loss, no other deficits appreciated  -- Vascular: Posterior tibial, dorsalis pedis and radial pulses 2+ bilaterally    -- Lymphatics: No cervical or peripheral lymphadenopathy.   -- Skin: Warm and dry. No evidence of rash or cellulitis  -- Psychiatric: Normal mood and affect. Bedside behavior is appropriate.  Patient is cooperative.  Denies suicidal homicidal ideation.    Emergency Room Course     Treatment Course, Evaluation, and Medical Decision Making  1. PE unremarkable, patient reports complete vision loss. EOM in tact on arrival but patient not tracking visually. GLucose wnl, Hypotensive  2. CT head neg  3. Telestroke recs no TPA, transfer for MRI, atypical bilateral vision loss with pain  4. EKG NSR no evidence of ischmia or arrythmia  5. Glucose wnl on arrival  6. Orthostatic hypotension assumed given low BP in sitting position and no evidence of sepsis or cardiogenic shock  7. NS 2 L IV  8. Patient with improvement in symptoms and vision s/p fluids  9. Will transfer for MRI per neuro recommendations      Abnormal lab values  Labs Reviewed   POCT GLUCOSE - Abnormal; Notable for the following components:       Result Value    POCT Glucose 164 (*)     All other  components within normal limits   CBC W/ AUTO DIFFERENTIAL   COMPREHENSIVE METABOLIC PANEL   APTT   PROTIME-INR   SARS-COV-2 RNA AMPLIFICATION, QUAL       Medications Given  Medications - No data to display      Diagnosis  -- Bilateral blindness with pain  --Hypotension    Disposition and Plan  -- Disposition:transfer  -- Condition: stable         Marlena Jaeger MD  08/31/22 0731

## 2022-08-21 NOTE — SUBJECTIVE & OBJECTIVE
Woke up with symptoms?: no    Recent bleeding noted: no  Does the patient take any Blood Thinners? no  Medications: Antiplatelets:  no      Past Medical History: hypertension, hyperlipidemia, CHF and JEANIE, bipolar disorder, PTSD, migraine    Past Surgical History: no major surgeries within the last 2 weeks    Family History: no relevant history    Social History: unable to obtain    Allergies: Penicillins  Neosporin [Neomycin-Bacitracin-Polymyxin]  Shellfish Containing Products  Shrimp     Review of Systems   Constitutional: Negative for chills, diaphoresis and fever.   HENT: Negative for hearing loss, tinnitus and trouble swallowing.    Eyes: Positive for pain and visual disturbance.   Respiratory: Negative for shortness of breath.    Cardiovascular: Negative for chest pain and palpitations.   Gastrointestinal: Negative for blood in stool and vomiting.   Endocrine: Negative for cold intolerance.   Genitourinary: Negative for hematuria.   Musculoskeletal: Negative for gait problem and neck pain.   Skin: Negative for pallor and rash.   Allergic/Immunologic: Negative for immunocompromised state.   Neurological: Positive for headaches. Negative for dizziness, facial asymmetry, speech difficulty, weakness and numbness.   Hematological: Does not bruise/bleed easily.   Psychiatric/Behavioral: Negative for agitation, behavioral problems and confusion.     Objective:   Vitals: Blood pressure (!) 83/50, pulse 69, temperature 98 °F (36.7 °C), resp. rate 18, weight 52.2 kg (115 lb), last menstrual period 12/11/2006, SpO2 100 %.     CT READ: Yes  No hemmorhage. No mass effect. No early infarct signs.     Physical Exam  Vitals and nursing note reviewed.   Constitutional:       General: She is not in acute distress.     Appearance: Normal appearance.   HENT:      Head: Normocephalic and atraumatic.      Nose: Nose normal.   Eyes:      Extraocular Movements: Extraocular movements intact.   Cardiovascular:      Rate and Rhythm:  Normal rate and regular rhythm.   Pulmonary:      Effort: No respiratory distress.   Abdominal:      General: There is no distension.   Genitourinary:     Comments: na  Musculoskeletal:      Cervical back: Normal range of motion.      Right lower leg: No edema.      Left lower leg: No edema.   Skin:     Findings: No erythema or rash.   Neurological:      Mental Status: She is oriented to person, place, and time.      Cranial Nerves: No cranial nerve deficit.      Sensory: No sensory deficit.      Motor: No weakness.      Coordination: Coordination normal.   Psychiatric:         Mood and Affect: Mood normal.         Behavior: Behavior normal.

## 2022-08-21 NOTE — CONSULTS
[facility]  Vascular Neurology  Comprehensive Stroke Center  TeleVascular Neurology Acute Consultation Note      Consults    Consulting Provider: WINNIE PAYNE  Current Providers  No providers found    Patient Location:  [facility] EMERGENCY DEPARTMENT Emergency Department  Spoke hospital nurse at bedside with patient assisting consultant.     Patient information was obtained from patient, past medical records and primary team.         Assessment/Plan:   45 y/o with HTN, JHLD, JEANIE, CHF, episodic migraine without aura, bipolar disorder, PTSD, presents with acute onset HA and painful bilateral visual loss that began approximately 20-30 minutes prior to arrival.  NIHSS 2 for complete bilateral visual loss, although the neuro exam is at times inconsistent.  CT head without acute abnormality.  She has several risk factors for stroke and can not entirely exclude the possibility of an acute neurovascular insult. In addition, complicated migraine is also a diagnostic consideration. In any case, her neuroexam on camera does not seem to suggest bilateral hemianopia but rather complete bilateral visual loss that in conjunction with associated bilateral eye pain is not quite typical for CRAO either. Therefore, I will not offer treatment with iv alteplase and instead recommended transferring to nearest appropriate facilty in order to pursue further neuroimaging with MRI/MRA brain to better elucidate her acute visual symptoms.  Neurology and ophthalmology consults pending MRI results.        Diagnoses: painful bilateral visual loss. HA.   No new Assessment & Plan notes have been filed under this hospital service since the last note was generated.  Service: Vascular Neurology      STROKE DOCUMENTATION     Acute Stroke Times:   Acute Stroke Times   Last Known Normal Date: 08/21/22  Last Known Normal Time: 1200  Symptom Onset Date: 08/21/22  Symptom Onset Time: 1200  Stroke Team Called Date:  08/21/22  Stroke Team Called Time: 1235  Stroke Team Arrival Date: 08/21/22  Stroke Team Arrival Time: 1240  CT Interpretation Time: 1240  Alteplase Recommended: No  Thrombectomy Recommended: No    NIH Scale:  Interval: baseline  1a. Level of Consciousness: 0-->Alert, keenly responsive  1b. LOC Questions: 0-->Answers both questions correctly  1c. LOC Commands: 0-->Performs both tasks correctly  2. Best Gaze: 0-->Normal  3. Visual: 3-->Bilateral hemianopia (blind including cortical blindness)  4. Facial Palsy: 0-->Normal symmetrical movements  5a. Motor Arm, Left: 0-->No drift, limb holds 90 (or 45) degrees for full 10 secs  5b. Motor Arm, Right: 0-->No drift, limb holds 90 (or 45) degrees for full 10 secs  6a. Motor Leg, Left: 0-->No drift, leg holds 30 degree position for full 5 secs  6b. Motor Leg, Right: 0-->No drift, leg holds 30 degree position for full 5 secs  7. Limb Ataxia: 0-->Absent  8. Sensory: 0-->Normal, no sensory loss  9. Best Language: 0-->No aphasia, normal  10. Dysarthria: 0-->Normal  11. Extinction and Inattention (formerly Neglect): 0-->No abnormality  Total (NIH Stroke Scale): 3     Modified [place]    Thida Coma Scale:15   ABCD2 Score:    [id number] Score:   HAS -BLED Score:   ICH Score:   Hunt & Crabtree Classification:       Blood pressure (!) 83/50, pulse 69, temperature 98 °F (36.7 °C), resp. rate 18, weight 52.2 kg (115 lb), last menstrual period 12/11/2006, SpO2 100 %.  Alteplase Eligible?: No  Alteplase Recommendation: Alteplase not recommended due to Suspected stroke mimic   Possible Interventional Revascularization Candidate? No; at this time symptoms not suggestive of large vessel occlusion    Disposition Recommendation: transfer to nearest appropriate facility     Subjective:     History of Present Illness: 47 y/o with HTN, JHLD, JEANIE, CHF, episodic migraine without aura, bipolar disorder, PTSD, presents with acute onset HA and painful bilateral visual loss that began approximately  20-30 minutes prior to arrival. Never had similar symptoms before.  Said that the aforementioned symptoms developed while sitting in the passenger seat of the vehicle.  No improving.      No notes on file      Woke up with symptoms?: no    Recent bleeding noted: no  Does the patient take any Blood Thinners? no  Medications: Antiplatelets:  no      Past Medical History: hypertension, hyperlipidemia, CHF and JEANIE, bipolar disorder, PTSD, migraine    Past Surgical History: no major surgeries within the last 2 weeks    Family History: no relevant history    Social History: unable to obtain    Allergies: Penicillins  Neosporin [Neomycin-Bacitracin-Polymyxin]  Shellfish Containing Products  Shrimp     Review of Systems   Constitutional: Negative for chills, diaphoresis and fever.   HENT: Negative for hearing loss, tinnitus and trouble swallowing.    Eyes: Positive for pain and visual disturbance.   Respiratory: Negative for shortness of breath.    Cardiovascular: Negative for chest pain and palpitations.   Gastrointestinal: Negative for blood in stool and vomiting.   Endocrine: Negative for cold intolerance.   Genitourinary: Negative for hematuria.   Musculoskeletal: Negative for gait problem and neck pain.   Skin: Negative for pallor and rash.   Allergic/Immunologic: Negative for immunocompromised state.   Neurological: Positive for headaches. Negative for dizziness, facial asymmetry, speech difficulty, weakness and numbness.   Hematological: Does not bruise/bleed easily.   Psychiatric/Behavioral: Negative for agitation, behavioral problems and confusion.     Objective:   Vitals: Blood pressure (!) 83/50, pulse 69, temperature 98 °F (36.7 °C), resp. rate 18, weight 52.2 kg (115 lb), last menstrual period 12/11/2006, SpO2 100 %.     CT READ: Yes  No hemmorhage. No mass effect. No early infarct signs.     Physical Exam  Vitals and nursing note reviewed.   Constitutional:       General: She is not in acute distress.      Appearance: Normal appearance.   HENT:      Head: Normocephalic and atraumatic.      Nose: Nose normal.   Eyes:      Extraocular Movements: Extraocular movements intact.   Cardiovascular:      Rate and Rhythm: Normal rate and regular rhythm.   Pulmonary:      Effort: No respiratory distress.   Abdominal:      General: There is no distension.   Genitourinary:     Comments: na  Musculoskeletal:      Cervical back: Normal range of motion.      Right lower leg: No edema.      Left lower leg: No edema.   Skin:     Findings: No erythema or rash.   Neurological:      Mental Status: She is oriented to person, place, and time.      Cranial Nerves: No cranial nerve deficit.      Sensory: No sensory deficit.      Motor: No weakness.      Coordination: Coordination normal.   Psychiatric:         Mood and Affect: Mood normal.         Behavior: Behavior normal.               Recommended the emergency room physician to have a brief discussion with the patient and/or family if available regarding the  risks and benefits of treatment, and to briefly document the occurrence of that discussion in his clinical encounter note.     The attending portion of this evaluation, treatment, and documentation was performed per Zach Zapien MD via audiovisual.    Billing code:  (non-intervention mild to moderate stroke, TIA, some mimics)    · This patient has a critical neurological condition/illness, with some potential for high morbidity and mortality.  · There is a moderate probability for acute neurological change leading to clinical and possibly life-threatening deterioration requiring highest level of physician preparedness for urgent intervention.  · Care was coordinated with other physicians involved in the patient's care.  · Radiologic studies and laboratory data were reviewed and interpreted, and plan of care was re-assessed based on the results.  · Diagnosis, treatment options and prognosis may have been discussed with the  patient and/or family members or caregiver.      In your opinion, this was a: Tier 1 N/A    Consult End Time: 1245 pm    Zach Zapien MD  Comprehensive Stroke Center  Vascular Neurology   [facility]

## 2022-08-22 VITALS
TEMPERATURE: 98 F | SYSTOLIC BLOOD PRESSURE: 109 MMHG | WEIGHT: 130.5 LBS | DIASTOLIC BLOOD PRESSURE: 57 MMHG | OXYGEN SATURATION: 100 % | HEART RATE: 91 BPM | HEIGHT: 64 IN | RESPIRATION RATE: 17 BRPM | BODY MASS INDEX: 22.28 KG/M2

## 2022-08-22 LAB
AMPHET+METHAMPHET UR QL: NEGATIVE
ANION GAP SERPL CALC-SCNC: 5 MMOL/L (ref 8–16)
ANION GAP SERPL CALC-SCNC: 8 MMOL/L (ref 8–16)
BARBITURATES UR QL SCN>200 NG/ML: ABNORMAL
BASOPHILS # BLD AUTO: 0.03 K/UL (ref 0–0.2)
BASOPHILS NFR BLD: 0.3 % (ref 0–1.9)
BENZODIAZ UR QL SCN>200 NG/ML: NEGATIVE
BILIRUB UR QL STRIP: NEGATIVE
BUN SERPL-MCNC: 29 MG/DL (ref 6–20)
BUN SERPL-MCNC: 32 MG/DL (ref 6–20)
BZE UR QL SCN: NEGATIVE
CALCIUM SERPL-MCNC: 8.8 MG/DL (ref 8.7–10.5)
CALCIUM SERPL-MCNC: 8.9 MG/DL (ref 8.7–10.5)
CANNABINOIDS UR QL SCN: NEGATIVE
CHLORIDE SERPL-SCNC: 105 MMOL/L (ref 95–110)
CHLORIDE SERPL-SCNC: 107 MMOL/L (ref 95–110)
CLARITY UR REFRACT.AUTO: CLEAR
CO2 SERPL-SCNC: 24 MMOL/L (ref 23–29)
CO2 SERPL-SCNC: 24 MMOL/L (ref 23–29)
COLOR UR AUTO: YELLOW
CREAT SERPL-MCNC: 0.8 MG/DL (ref 0.5–1.4)
CREAT SERPL-MCNC: 1.3 MG/DL (ref 0.5–1.4)
CREAT UR-MCNC: 148 MG/DL (ref 15–325)
DIFFERENTIAL METHOD: ABNORMAL
EOSINOPHIL # BLD AUTO: 0.2 K/UL (ref 0–0.5)
EOSINOPHIL NFR BLD: 2.7 % (ref 0–8)
ERYTHROCYTE [DISTWIDTH] IN BLOOD BY AUTOMATED COUNT: 12.4 % (ref 11.5–14.5)
EST. GFR  (NO RACE VARIABLE): 51 ML/MIN/1.73 M^2
EST. GFR  (NO RACE VARIABLE): >60 ML/MIN/1.73 M^2
ESTIMATED AVG GLUCOSE: 117 MG/DL (ref 68–131)
ETHANOL UR-MCNC: <10 MG/DL
GLUCOSE SERPL-MCNC: 134 MG/DL (ref 70–110)
GLUCOSE SERPL-MCNC: 178 MG/DL (ref 70–110)
GLUCOSE UR QL STRIP: NEGATIVE
HBA1C MFR BLD: 5.7 % (ref 4–5.6)
HCT VFR BLD AUTO: 35.6 % (ref 37–48.5)
HGB BLD-MCNC: 11.6 G/DL (ref 12–16)
HGB UR QL STRIP: NEGATIVE
IMM GRANULOCYTES # BLD AUTO: 0.02 K/UL (ref 0–0.04)
IMM GRANULOCYTES NFR BLD AUTO: 0.2 % (ref 0–0.5)
KETONES UR QL STRIP: NEGATIVE
LEUKOCYTE ESTERASE UR QL STRIP: NEGATIVE
LYMPHOCYTES # BLD AUTO: 3.3 K/UL (ref 1–4.8)
LYMPHOCYTES NFR BLD: 37.3 % (ref 18–48)
MAGNESIUM SERPL-MCNC: 2.1 MG/DL (ref 1.6–2.6)
MCH RBC QN AUTO: 30.9 PG (ref 27–31)
MCHC RBC AUTO-ENTMCNC: 32.6 G/DL (ref 32–36)
MCV RBC AUTO: 95 FL (ref 82–98)
METHADONE UR QL SCN>300 NG/ML: NEGATIVE
MONOCYTES # BLD AUTO: 0.7 K/UL (ref 0.3–1)
MONOCYTES NFR BLD: 7.4 % (ref 4–15)
NEUTROPHILS # BLD AUTO: 4.6 K/UL (ref 1.8–7.7)
NEUTROPHILS NFR BLD: 52.1 % (ref 38–73)
NITRITE UR QL STRIP: NEGATIVE
NRBC BLD-RTO: 0 /100 WBC
OPIATES UR QL SCN: NEGATIVE
PCP UR QL SCN>25 NG/ML: NEGATIVE
PH UR STRIP: 7 [PH] (ref 5–8)
PLATELET # BLD AUTO: 183 K/UL (ref 150–450)
PMV BLD AUTO: 12.8 FL (ref 9.2–12.9)
POCT GLUCOSE: 135 MG/DL (ref 70–110)
POCT GLUCOSE: 145 MG/DL (ref 70–110)
POTASSIUM SERPL-SCNC: 3.8 MMOL/L (ref 3.5–5.1)
POTASSIUM SERPL-SCNC: 3.9 MMOL/L (ref 3.5–5.1)
PROT UR QL STRIP: NEGATIVE
RBC # BLD AUTO: 3.76 M/UL (ref 4–5.4)
SODIUM SERPL-SCNC: 136 MMOL/L (ref 136–145)
SODIUM SERPL-SCNC: 137 MMOL/L (ref 136–145)
SP GR UR STRIP: 1.02 (ref 1–1.03)
TOXICOLOGY INFORMATION: ABNORMAL
URN SPEC COLLECT METH UR: NORMAL
WBC # BLD AUTO: 8.79 K/UL (ref 3.9–12.7)

## 2022-08-22 PROCEDURE — 83036 HEMOGLOBIN GLYCOSYLATED A1C: CPT | Performed by: INTERNAL MEDICINE

## 2022-08-22 PROCEDURE — 81003 URINALYSIS AUTO W/O SCOPE: CPT | Performed by: NURSE PRACTITIONER

## 2022-08-22 PROCEDURE — 83735 ASSAY OF MAGNESIUM: CPT | Performed by: NURSE PRACTITIONER

## 2022-08-22 PROCEDURE — 99225 PR SUBSEQUENT OBSERVATION CARE,LEVEL II: CPT | Mod: ,,, | Performed by: PHYSICIAN ASSISTANT

## 2022-08-22 PROCEDURE — 96361 HYDRATE IV INFUSION ADD-ON: CPT | Performed by: EMERGENCY MEDICINE

## 2022-08-22 PROCEDURE — 80048 BASIC METABOLIC PNL TOTAL CA: CPT | Mod: 91

## 2022-08-22 PROCEDURE — 85025 COMPLETE CBC W/AUTO DIFF WBC: CPT | Performed by: NURSE PRACTITIONER

## 2022-08-22 PROCEDURE — 63600175 PHARM REV CODE 636 W HCPCS

## 2022-08-22 PROCEDURE — 99225 PR SUBSEQUENT OBSERVATION CARE,LEVEL II: ICD-10-PCS | Mod: ,,, | Performed by: PHYSICIAN ASSISTANT

## 2022-08-22 PROCEDURE — 99222 1ST HOSP IP/OBS MODERATE 55: CPT | Mod: ,,, | Performed by: PSYCHIATRY & NEUROLOGY

## 2022-08-22 PROCEDURE — 99222 PR INITIAL HOSPITAL CARE,LEVL II: ICD-10-PCS | Mod: ,,, | Performed by: PSYCHIATRY & NEUROLOGY

## 2022-08-22 PROCEDURE — 96375 TX/PRO/DX INJ NEW DRUG ADDON: CPT | Performed by: EMERGENCY MEDICINE

## 2022-08-22 PROCEDURE — 25000003 PHARM REV CODE 250: Performed by: NURSE PRACTITIONER

## 2022-08-22 PROCEDURE — G0378 HOSPITAL OBSERVATION PER HR: HCPCS

## 2022-08-22 PROCEDURE — 80307 DRUG TEST PRSMV CHEM ANLYZR: CPT | Performed by: NURSE PRACTITIONER

## 2022-08-22 PROCEDURE — 36415 COLL VENOUS BLD VENIPUNCTURE: CPT

## 2022-08-22 PROCEDURE — 63600175 PHARM REV CODE 636 W HCPCS: Performed by: NURSE PRACTITIONER

## 2022-08-22 PROCEDURE — 36415 COLL VENOUS BLD VENIPUNCTURE: CPT | Performed by: NURSE PRACTITIONER

## 2022-08-22 PROCEDURE — 96366 THER/PROPH/DIAG IV INF ADDON: CPT | Performed by: EMERGENCY MEDICINE

## 2022-08-22 PROCEDURE — 80048 BASIC METABOLIC PNL TOTAL CA: CPT | Performed by: NURSE PRACTITIONER

## 2022-08-22 RX ORDER — BUTALBITAL, ACETAMINOPHEN AND CAFFEINE 50; 325; 40 MG/1; MG/1; MG/1
1 TABLET ORAL EVERY 6 HOURS PRN
Qty: 40 TABLET | Refills: 0 | Status: SHIPPED | OUTPATIENT
Start: 2022-08-22 | End: 2022-10-13 | Stop reason: SDUPTHER

## 2022-08-22 RX ADMIN — BUTALBITAL, ACETAMINOPHEN, AND CAFFEINE 1 TABLET: 50; 325; 40 TABLET ORAL at 10:08

## 2022-08-22 RX ADMIN — QUETIAPINE FUMARATE 400 MG: 200 TABLET ORAL at 12:08

## 2022-08-22 RX ADMIN — PREGABALIN 200 MG: 100 CAPSULE ORAL at 09:08

## 2022-08-22 RX ADMIN — SODIUM CHLORIDE, SODIUM LACTATE, POTASSIUM CHLORIDE, AND CALCIUM CHLORIDE 500 ML: .6; .31; .03; .02 INJECTION, SOLUTION INTRAVENOUS at 10:08

## 2022-08-22 RX ADMIN — PREGABALIN 200 MG: 100 CAPSULE ORAL at 12:08

## 2022-08-22 RX ADMIN — DIPHENHYDRAMINE HYDROCHLORIDE 25 MG: 50 INJECTION, SOLUTION INTRAMUSCULAR; INTRAVENOUS at 12:08

## 2022-08-22 RX ADMIN — TRAZODONE HYDROCHLORIDE 100 MG: 100 TABLET ORAL at 12:08

## 2022-08-22 RX ADMIN — KETOROLAC TROMETHAMINE 15 MG: 30 INJECTION, SOLUTION INTRAMUSCULAR at 12:08

## 2022-08-22 RX ADMIN — MAGNESIUM SULFATE HEPTAHYDRATE 1 G: 500 INJECTION, SOLUTION INTRAMUSCULAR; INTRAVENOUS at 12:08

## 2022-08-22 RX ADMIN — ATORVASTATIN CALCIUM 20 MG: 20 TABLET, FILM COATED ORAL at 12:08

## 2022-08-22 NOTE — SUBJECTIVE & OBJECTIVE
Interval History: Pt seen at bedside. SILVINO. AFVSS. Vision completley back to baseline. JAZMIN resolved with fluids. Seen by ophthalmology, psych, appreciate recs. Outpatient follow up with ophthalmology. Patient with migraine this morning. Will see if Fioricet improves symptoms.     Review of Systems   Constitutional:  Negative for chills and fever.   Eyes:  Positive for visual disturbance (resolved, regained vision to baseline). Negative for photophobia and pain.   Respiratory:  Negative for chest tightness and shortness of breath.    Cardiovascular:  Negative for chest pain and leg swelling.   Gastrointestinal:  Negative for abdominal pain, nausea and vomiting.   Genitourinary:  Negative for dysuria and urgency.   Musculoskeletal:  Negative for back pain, neck pain and neck stiffness.   Skin:  Negative for rash and wound.   Neurological:  Positive for headaches. Negative for dizziness, facial asymmetry, weakness, light-headedness and numbness.   Psychiatric/Behavioral:  Negative for confusion and dysphoric mood. The patient is not nervous/anxious.    Objective:     Vital Signs (Most Recent):  Temp: 98.2 °F (36.8 °C) (08/22/22 1531)  Pulse: 91 (08/22/22 1531)  Resp: 17 (08/22/22 1531)  BP: (!) 109/57 (08/22/22 1531)  SpO2: 100 % (08/22/22 1531)   Vital Signs (24h Range):  Temp:  [96.6 °F (35.9 °C)-98.7 °F (37.1 °C)] 98.2 °F (36.8 °C)  Pulse:  [] 91  Resp:  [15-23] 17  SpO2:  [96 %-100 %] 100 %  BP: ()/(52-90) 109/57     Weight: 59.2 kg (130 lb 8.2 oz)  Body mass index is 22.4 kg/m².    Intake/Output Summary (Last 24 hours) at 8/22/2022 1546  Last data filed at 8/22/2022 0454  Gross per 24 hour   Intake 680 ml   Output 700 ml   Net -20 ml      Physical Exam  Vitals and nursing note reviewed.   Constitutional:       Appearance: She is not toxic-appearing or diaphoretic.   HENT:      Head: Normocephalic and atraumatic.      Nose: Nose normal.      Mouth/Throat:      Mouth: Mucous membranes are moist.   Eyes:       Pupils: Pupils are equal, round, and reactive to light.   Cardiovascular:      Rate and Rhythm: Normal rate and regular rhythm.      Pulses: Normal pulses.   Pulmonary:      Effort: Pulmonary effort is normal. No respiratory distress.      Breath sounds: No wheezing, rhonchi or rales.   Abdominal:      General: Bowel sounds are normal. There is no distension.      Palpations: Abdomen is soft.      Tenderness: There is no abdominal tenderness. There is no guarding.   Musculoskeletal:         General: No swelling, tenderness or deformity. Normal range of motion.      Cervical back: Normal range of motion. No tenderness.   Skin:     General: Skin is warm and dry.      Capillary Refill: Capillary refill takes less than 2 seconds.   Neurological:      General: No focal deficit present.      Mental Status: She is alert and oriented to person, place, and time.      Cranial Nerves: No dysarthria or facial asymmetry.      Sensory: No sensory deficit.      Motor: No weakness, tremor or pronator drift.       Significant Labs: All pertinent labs within the past 24 hours have been reviewed.  BMP:   Recent Labs   Lab 08/22/22  0418 08/22/22  1237   * 134*    136   K 3.9 3.8    107   CO2 24 24   BUN 32* 29*   CREATININE 1.3 0.8   CALCIUM 8.9 8.8   MG 2.1  --      CBC:   Recent Labs   Lab 08/21/22  1257 08/22/22  0418   WBC 10.64 8.79   HGB 13.9 11.6*   HCT 41.1 35.6*    183     Urine Studies:   Recent Labs   Lab 08/22/22  0032   COLORU Yellow   APPEARANCEUA Clear   PHUR 7.0   SPECGRAV 1.025   PROTEINUA Negative   GLUCUA Negative   KETONESU Negative   BILIRUBINUA Negative   OCCULTUA Negative   NITRITE Negative   LEUKOCYTESUR Negative       Significant Imaging: I have reviewed all pertinent imaging results/findings within the past 24 hours.

## 2022-08-22 NOTE — ASSESSMENT & PLAN NOTE
Ms. Lane is a 46 year old female who [place] [facility] [place] [facility] for MRI/MRA to further evaluate her acute visual symptoms. She presented to OSH with acute onset HA and painful bilateral vision loss that began 20-30 minutes prior to arrival there. She was given NIHSS 2 at OSH, notable for complete bilateral visual loss, but inconsistent neuro exam per telestroke note. [facility] OSH without acute abnormality. Given her risk factors, acute neurovascular insult could not be excluded, however, complicated migraine is also a consideration. On arrival to [facility], patient taken to MRI for STAT MRI/MRA. Patient reports blurred vision in both eyes as well as some subjective left upper extremity tingling. She also complains of ongoing headache. Vascular neurology evaluated patient and given patient's negative imaging and persisting symptoms, it is less likely that etiology of current symptoms are due to acute cerebrovascular origin. Due to low suspicion for cerebrovascular etiology, it is recommended to explore other possible diagnoses. Recommend obtaining Ophthalmology consult and Psych consult considering past medical history. IV Mag for HA. Recommend admission overnight for observation with medicine team if possible to complete workup/consults.       -MRI without evidence of acute ischemic event. MRA without evidence of vessel occlusion.  -Labs largely unremarkable.  -UA clean  - UDS with presumed positive barbiturates otherwise negative   -Neuro checks  -Ophthalmology consult, appreciate recs.  -Psych consult, appreciate recs.  -Regained vision without a few hours without intervention, likely from migraine  -follow up with neurology for migraine management, ophthalmology for full eye exam

## 2022-08-22 NOTE — SUBJECTIVE & OBJECTIVE
Past Medical History:   Diagnosis Date    ADHD (attention deficit hyperactivity disorder)     Anxiety     Arthritis     Asthma     Behavioral problem     Bipolar 1 disorder     CHF (congestive heart failure)     COPD (chronic obstructive pulmonary disease)     Depression     Diabetes mellitus type II     Hx of psychiatric care     Hyperlipidemia     Hypertension     Lumbar radiculopathy     Neuralgia     Neuritis     Psychiatric problem     PTSD (post-traumatic stress disorder)     Seizures     Seizure-last 2015-shake and fall-doesnt remember anything    Self-harming behavior     Sleep apnea     on CPAP    Stroke 2015    Stroke     Therapy        Past Surgical History:   Procedure Laterality Date    COLONOSCOPY N/A 2016    Procedure: COLONOSCOPY;  Surgeon: Robert Hernandez MD;  Location: [facility] ([address] [place] [address]);  Service: Endoscopy;  Laterality: N/A;  Schedule for next available CRS physician - EGD @ 8:30 with Dr. Naylor    CYST REMOVAL      Fatty cyst on right face cheek and left upper arm     DILATION AND CURETTAGE OF UTERUS      Miscarriage    HYSTERECTOMY      DUB - Total    OOPHORECTOMY  2008    TOTAL ABDOMINAL HYSTERECTOMY  2008    TUBAL LIGATION         Review of patient's allergies indicates:   Allergen Reactions    Penicillins Swelling and Rash    Neosporin [neomycin-bacitracin-polymyxin] Rash    Shellfish containing products     Shrimp Hives       Current Facility-Administered Medications on File Prior to Encounter   Medication    [COMPLETED] butalbital-acetaminophen-caffeine -40 mg per tablet 2 tablet    [COMPLETED] sodium chloride 0.9% bolus 1,000 mL    [COMPLETED] sodium chloride 0.9% bolus 1,000 mL     Current Outpatient Medications on File Prior to Encounter   Medication Sig    albuterol (PROVENTIL) 2.5 mg /3 mL (0.083 %) nebulizer solution USE ONE VIAL IN NEBULIZER EVERY 6 HOURS AS NEEDED FOR  WHEEZING.    atorvastatin (LIPITOR) 20 MG tablet SMARTSI Tablet(s) By Mouth  "Every Evening    blood sugar diagnostic Strp Use 1  True Metrix test strip three times per day to check blood sugar ( single use only )    blood-glucose meter (TRUE METRIX GLUCOSE METER) Misc Use three times per day to check blood sugar    butalbital-acetaminophen-caff -40 mg -40 mg Cap TAKE 1 CAPSULE BY MOUTH THREE TIMES DAILY AS NEEDED FOR HEADACHE    cariprazine (VRAYLAR) 1.5 mg Cap Take 1 capsule (1.5 mg total) by mouth once daily at 6am.    clobetasoL (TEMOVATE) 0.05 % external solution     clonazePAM (KLONOPIN) 1 MG tablet Take 1 tablet (1 mg total) by mouth 2 (two) times daily as needed for Anxiety.    epinephrine (EPIPEN 2-LARS) 0.3 mg/0.3 mL (1:1,000) AtIn Inject 0.3 mLs (0.3 mg total) into the muscle once.    insulin detemir U-100 (LEVEMIR FLEXTOUCH U-100 INSULN) 100 unit/mL (3 mL) InPn pen Inject 53 Units into the skin every evening.    insulin lispro (HUMALOG KWIKPEN INSULIN) 100 unit/mL pen Inject 16- 22 units twice a day with lunch and supper    ipratropium (ATROVENT) 0.02 % nebulizer solution     linaGLIPtin (TRADJENTA) 5 mg Tab tablet Take 1 tablet (5 mg total) by mouth once daily.    magnesium oxide (MAG-OX) 400 mg (241.3 mg magnesium) tablet Take 1 tablet by mouth 2 (two) times daily.    metFORMIN (GLUCOPHAGE) 1000 MG tablet Take 1 tablet (1,000 mg total) by mouth 2 (two) times daily with meals.    mupirocin (BACTROBAN) 2 % ointment Apply topically 3 (three) times daily.    pen needle, diabetic 31 gauge x 5/16" Ndle Inject 1 Stick into the skin once daily.    polyethylene glycol (GLYCOLAX) 17 gram PwPk Take 17 g by mouth daily as needed (constipation).    pregabalin (LYRICA) 200 MG Cap Take 1 capsule (200 mg total) by mouth 2 (two) times daily.    QUEtiapine (SEROQUEL) 400 MG tablet Take 1 tablet (400 mg total) by mouth every evening.    traZODone (DESYREL) 100 MG tablet Take 1 tablet (100 mg total) by mouth every evening.    TRUEPLUS LANCETS 33 gauge [facility] USE 1 LANCET TO CHECK GLUCOSE " THREE TIMES DAILY SINGLE USE ONLY    UNKNOWN TO PATIENT Take 20 mg by mouth 2 (two) times a day. Potassium - given by Dr. Curtis    valsartan (DIOVAN) 40 MG tablet Take 40 mg by mouth once daily.     Family History       Problem Relation (Age of Onset)    Dementia Father    Diabetes Father    Heart disease Father    Hyperlipidemia Father    Hypertension Father          Tobacco Use    Smoking status: Current Every Day Smoker     Packs/day: 0.10     Years: 26.00     Pack years: 2.60     Types: Cigarettes     Start date: 7/17/1986    Smokeless tobacco: Current User    Tobacco comment: told about [facility] smoking cessation program-given smoking clinic pamphlet   Substance and Sexual Activity    Alcohol use: No    Drug use: No    Sexual activity: Yes     Partners: Male     Birth control/protection: Surgical     Comment: , CAYLA, BSO     Review of Systems   Constitutional:  Negative for appetite change, chills, diaphoresis, fatigue and fever.   HENT:  Negative for congestion, rhinorrhea and sore throat.    Eyes:  Positive for visual disturbance. Negative for photophobia.   Respiratory:  Negative for cough, shortness of breath and wheezing.    Cardiovascular:  Negative for chest pain, palpitations and leg swelling.   Gastrointestinal:  Positive for diarrhea (1 episode of loose stool in the ED). Negative for abdominal distention, abdominal pain, nausea and vomiting.   Genitourinary:  Negative for difficulty urinating, dysuria and frequency.   Musculoskeletal:  Negative for arthralgias, myalgias and neck pain.   Skin:  Negative for color change, pallor, rash and wound.   Neurological:  Positive for dizziness, numbness (tingling to L hand) and headaches. Negative for tremors, syncope, facial asymmetry, speech difficulty, weakness and light-headedness.   Psychiatric/Behavioral:  Negative for agitation, confusion and hallucinations.    Objective:     Vital Signs (Most Recent):  Temp: 98.5 °F (36.9 °C) (08/21/22 1908)  Pulse:  89 (08/21/22 2116)  Resp: 18 (08/21/22 2116)  BP: 134/69 (08/21/22 2116)  SpO2: 100 % (08/21/22 2116) Vital Signs (24h Range):  Temp:  [98 °F (36.7 °C)-98.5 °F (36.9 °C)] 98.5 °F (36.9 °C)  Pulse:  [69-90] 89  Resp:  [15-23] 18  SpO2:  [98 %-100 %] 100 %  BP: ()/(50-90) 134/69     Weight: 52.2 kg (115 lb)  Body mass index is 19.74 kg/m².    Physical Exam  Vitals and nursing note reviewed.   Constitutional:       Appearance: She is not toxic-appearing or diaphoretic.   HENT:      Head: Normocephalic and atraumatic.      Nose: Nose normal.      Mouth/Throat:      Mouth: Mucous membranes are moist.   Eyes:      General: Visual field deficit present.      Pupils: Pupils are equal, round, and reactive to light.   Cardiovascular:      Rate and Rhythm: Normal rate and regular rhythm.      Pulses: Normal pulses.   Pulmonary:      Effort: Pulmonary effort is normal. No respiratory distress.      Breath sounds: No wheezing, rhonchi or rales.   Abdominal:      General: Bowel sounds are normal. There is no distension.      Palpations: Abdomen is soft.      Tenderness: There is no abdominal tenderness. There is no guarding.   Musculoskeletal:         General: No swelling, tenderness or deformity. Normal range of motion.      Cervical back: Normal range of motion. No tenderness.   Skin:     General: Skin is warm and dry.      Capillary Refill: Capillary refill takes less than 2 seconds.   Neurological:      General: No focal deficit present.      Mental Status: She is alert and oriented to person, place, and time.      Cranial Nerves: No dysarthria or facial asymmetry.      Sensory: No sensory deficit.      Motor: No weakness, tremor or pronator drift.         CRANIAL NERVES     CN III, IV, VI   Pupils are equal, round, and reactive to light.     Significant Labs: All pertinent labs within the past 24 hours have been reviewed.  CBC:   Recent Labs   Lab 08/21/22  1257   WBC 10.64   HGB 13.9   HCT 41.1        CMP:    Recent Labs   Lab 08/21/22  1257      K 3.7      CO2 25   *   BUN 20   CREATININE 0.9   CALCIUM 9.5   PROT 6.8   ALBUMIN 3.8   BILITOT 0.7   ALKPHOS 71   AST 16   ALT 22   ANIONGAP 11     Coagulation:   Recent Labs   Lab 08/21/22  1257   INR 1.2   APTT 24.2     Lactic Acid:   Recent Labs   Lab 08/21/22  1257   LACTATE 1.2     Lipid Panel:   Recent Labs   Lab 08/21/22 1929   CHOL 142   HDL 33*   LDLCALC 78.4   TRIG 153*   CHOLHDL 23.2     TSH:   Recent Labs   Lab 08/21/22 1929   TSH 0.490       Significant Imaging: I have reviewed all pertinent imaging results/findings within the past 24 hours.    Imaging Results              MRA Neck without contrast (Final result)  Result time 08/21/22 20:50:54      Final result by Chandu Sharam MD (08/21/22 20:50:54)                   Impression:      No acute intracranial abnormality.    No significant arterial abnormalities.      Electronically signed by: Chandu Sharma  Date:    08/21/2022  Time:    20:50               Narrative:    EXAMINATION:  MRI BRAIN WITHOUT CONTRAST; MRA BRAIN WITHOUT CONTRAST; MRA NECK WITHOUT CONTRAST    CLINICAL HISTORY:  Headache, new or worsening, neuro deficit (Age 19-49y);; Neuro deficit, acute, stroke suspected;    TECHNIQUE:  Routine MRI brain without contrast, noncontrast MRA of the brain, and noncontrast MRA of the neck. Multiplanar multisequence MR imaging of the brain was performed without contrast. By separate acquisition, noncontrast MR angiography was performed of the intracranial vasculature with 3D time-of-flight technique through the [place] of [place], with MIP reformatting. Non-contrast 2D and/or 3D time-of-flight MR angiography of the neck was performed, with MIP reformatting.    COMPARISON:  None    FINDINGS:  Intracranial Compartment:    Ventricles and sulci are normal in size for age without evidence of hydrocephalus. No extra-axial blood or fluid collections.    The brain parenchyma appears normal. No  mass lesion, acute hemorrhage, edema, or acute infarct.    Aorta: Normal 3 vessel arch.    Extracranial carotid circulation: No hemodynamically significant stenosis, aneurysmal dilatation, or dissection.    Extracranial vertebral circulation: No hemodynamically significant stenosis, aneurysmal dilatation, or dissection.    Intracranial Arteries: No focal high-grade stenosis, occlusion, or aneurysm.    Skull/Extracranial Contents (limited evaluation): Bone marrow signal intensity is normal.                                       MRA Brain without contrast (Final result)  Result time 08/21/22 20:50:54      Final result by Chandu Sharma MD (08/21/22 20:50:54)                   Impression:      No acute intracranial abnormality.    No significant arterial abnormalities.      Electronically signed by: Chandu Sharma  Date:    08/21/2022  Time:    20:50               Narrative:    EXAMINATION:  MRI BRAIN WITHOUT CONTRAST; MRA BRAIN WITHOUT CONTRAST; MRA NECK WITHOUT CONTRAST    CLINICAL HISTORY:  Headache, new or worsening, neuro deficit (Age 19-49y);; Neuro deficit, acute, stroke suspected;    TECHNIQUE:  Routine MRI brain without contrast, noncontrast MRA of the brain, and noncontrast MRA of the neck. Multiplanar multisequence MR imaging of the brain was performed without contrast. By separate acquisition, noncontrast MR angiography was performed of the intracranial vasculature with 3D time-of-flight technique through the [place] of [place], with MIP reformatting. Non-contrast 2D and/or 3D time-of-flight MR angiography of the neck was performed, with MIP reformatting.    COMPARISON:  None    FINDINGS:  Intracranial Compartment:    Ventricles and sulci are normal in size for age without evidence of hydrocephalus. No extra-axial blood or fluid collections.    The brain parenchyma appears normal. No mass lesion, acute hemorrhage, edema, or acute infarct.    Aorta: Normal 3 vessel arch.    Extracranial carotid  circulation: No hemodynamically significant stenosis, aneurysmal dilatation, or dissection.    Extracranial vertebral circulation: No hemodynamically significant stenosis, aneurysmal dilatation, or dissection.    Intracranial Arteries: No focal high-grade stenosis, occlusion, or aneurysm.    Skull/Extracranial Contents (limited evaluation): Bone marrow signal intensity is normal.                                       MRI Brain Without Contrast (Final result)  Result time 08/21/22 20:50:54      Final result by Chandu Sharma MD (08/21/22 20:50:54)                   Impression:      No acute intracranial abnormality.    No significant arterial abnormalities.      Electronically signed by: Chandu Sharma  Date:    08/21/2022  Time:    20:50               Narrative:    EXAMINATION:  MRI BRAIN WITHOUT CONTRAST; MRA BRAIN WITHOUT CONTRAST; MRA NECK WITHOUT CONTRAST    CLINICAL HISTORY:  Headache, new or worsening, neuro deficit (Age 19-49y);; Neuro deficit, acute, stroke suspected;    TECHNIQUE:  Routine MRI brain without contrast, noncontrast MRA of the brain, and noncontrast MRA of the neck. Multiplanar multisequence MR imaging of the brain was performed without contrast. By separate acquisition, noncontrast MR angiography was performed of the intracranial vasculature with 3D time-of-flight technique through the [place] of [place], with MIP reformatting. Non-contrast 2D and/or 3D time-of-flight MR angiography of the neck was performed, with MIP reformatting.    COMPARISON:  None    FINDINGS:  Intracranial Compartment:    Ventricles and sulci are normal in size for age without evidence of hydrocephalus. No extra-axial blood or fluid collections.    The brain parenchyma appears normal. No mass lesion, acute hemorrhage, edema, or acute infarct.    Aorta: Normal 3 vessel arch.    Extracranial carotid circulation: No hemodynamically significant stenosis, aneurysmal dilatation, or dissection.    Extracranial vertebral  circulation: No hemodynamically significant stenosis, aneurysmal dilatation, or dissection.    Intracranial Arteries: No focal high-grade stenosis, occlusion, or aneurysm.    Skull/Extracranial Contents (limited evaluation): Bone marrow signal intensity is normal.

## 2022-08-22 NOTE — ASSESSMENT & PLAN NOTE
HILARIA (generalized anxiety disorder)  PTSD (post-traumatic stress disorder)  MDD (major depressive disorder), recurrent episode, moderate  -Chronic, controlled.  -Patient on vraylar, seroquel, and trazadone.  -Vraylar not on formularly, so notified patient she could take home medication.  -Continue seroquel and trazadone.

## 2022-08-22 NOTE — PLAN OF CARE
Pt admitted and care plan initiated.Telemetry maintained,NSR.pt continues with blurry vision.Migraine headache relieved with Cocktail of iv benadryl, iv Toradol and iv Mag.safety precautions implemented.allergy and fall bracelets applied.bed in low position.rails up x2.call bell in reach.continue plan of care.

## 2022-08-22 NOTE — CARE UPDATE
Patient's chart was reviewed by a stroke team provider and discussed with staff.    Patient is 46yoF with PMH HTN, HLD, JEANIE, CHF, migraine w/o aura, bipolar disorder, PTSD who was transferred from [place] for higher level of care. She presented to OSH with acute onset HA and painful bilateral vision loss 30min prior to arrival. Telestroke with Dr. Zapien, NIH 2 but inconsistent exam. Decision made to defer tPA due to inconsistent symptoms and symptoms not suggestive of CRAO.   On arrival to [facility], patient taken for stat MRI/MRA brain which was negative for acute ischemic infarct or evidence of vessel occlusion. Given negative imaging despite persistent symptoms, low suspicion for cerebrovascular etiology.    Ophthalmology consulted for further evaluation of vision disturbances. Transient vision loss felt most likely related to hypotension and/or migraine, no evidence of ischemia on DFE.      Recommendations:  - There are no new images to review or new recommendations at this time  - For other recommendations, please see our previous note completed on 8/21  - Recommend outpatient follow up with ophthalmology for full eye exam  - VN will sign off at this time  - Please contact stroke team for any questions or concerns        Kristina Vanegas PA-C  Department of Vascular Neurology  [facility]  [facility]  [phone]

## 2022-08-22 NOTE — ASSESSMENT & PLAN NOTE
ASSESSMENT     Eva Lane is a 46 y.o. female with a past psychiatric history of conversion disorder, bipolar disorder, PTSD, HILARIA, MDD, and borderline personality disorder, currently presenting with Blurred vision, bilateral. Psychiatry was originally consulted to address the potential psychiatric cause of painful, resolving vision loss. Denies current or recent depression, anxiety and associated symptoms as listed below. Endorses compliance with psychiatric medication. Denies any significant life stressors currently. Objectively, she is not displaying any signs/symptoms of ongoing psychosis or yves (no flight of ideas, disorganization, tangentiality, distractibility, psychomotor agitation, responses to internal stimuli). Patient could possibly be experiencing symptoms of functional neurological disorder. Her symptoms could also possibly be a component of her migraines. No acute psychiatric intervention warranted at this time. Patient is clear from a psychiatric standpoint.       IMPRESSION  R/o functional neurological symptom disorder   Hx of HILARIA  Hx of PTSD  Hx of MDD  Hx of Bipolar disorder   Hx of Borderline personality disorder     RECOMMENDATION(S)      -Continue Seroquel 400 mg qhs and Trazodone 100 mg qhs   -Recommend outpatient psychiatry referral   -Recommend follow up with headache specialist

## 2022-08-22 NOTE — ASSESSMENT & PLAN NOTE
Patient reported. Patient is not on BB or diuretic. Last ECHO in 2015 with EF of 55-60% and normal diastolic function.  -Appears euvolemic on exam.  -Cardiac diet.  -Daily weight.

## 2022-08-22 NOTE — SUBJECTIVE & OBJECTIVE
Past Medical History:   Diagnosis Date    ADHD (attention deficit hyperactivity disorder)     Anxiety     Arthritis     Asthma     Behavioral problem     Bipolar 1 disorder     CHF (congestive heart failure)     COPD (chronic obstructive pulmonary disease)     Depression     Diabetes mellitus type II     Hx of psychiatric care     Hyperlipidemia     Hypertension     Lumbar radiculopathy     Neuralgia     Neuritis     Psychiatric problem     PTSD (post-traumatic stress disorder)     Seizures     Seizure-last 8/2015-shake and fall-doesnt remember anything    Self-harming behavior     Sleep apnea     on CPAP    Stroke 9/22/2015    Stroke     Therapy      Past Surgical History:   Procedure Laterality Date    COLONOSCOPY N/A 7/1/2016    Procedure: COLONOSCOPY;  Surgeon: Robert Hernandez MD;  Location: [facility] ([address] [place] [address]);  Service: Endoscopy;  Laterality: N/A;  Schedule for next available CRS physician - EGD @ 8:30 with Dr. Naylor    CYST REMOVAL  2000    Fatty cyst on right face cheek and left upper arm     DILATION AND CURETTAGE OF UTERUS  2006    Miscarriage    HYSTERECTOMY  7/08    DUB - Total    OOPHORECTOMY  7/2008    TOTAL ABDOMINAL HYSTERECTOMY  7/2008    TUBAL LIGATION  2007     Family History   Problem Relation Age of Onset    Heart disease Father     Hyperlipidemia Father     Hypertension Father     Diabetes Father     Dementia Father     Breast cancer Neg Hx     Colon cancer Neg Hx     Ovarian cancer Neg Hx      Social History     Tobacco Use    Smoking status: Current Every Day Smoker     Packs/day: 0.10     Years: 26.00     Pack years: 2.60     Types: Cigarettes     Start date: 7/17/1986    Smokeless tobacco: Current User    Tobacco comment: told about [facility] smoking cessation program-given smoking clinic pamphlet   Substance Use Topics    Alcohol use: No    Drug use: No     Review of patient's allergies indicates:   Allergen Reactions    Penicillins Swelling and Rash    Neosporin  [neomycin-bacitracin-polymyxin] Rash    Shellfish containing products     Shrimp Hives       Medications: I have reviewed the current medication administration record.    (Not in a hospital admission)      Review of Systems   Constitutional:  Negative for fever.   HENT:  Negative for drooling and trouble swallowing.    Eyes:  Positive for visual disturbance (Blurred vision bilateral). Negative for redness.   Respiratory:  Negative for cough.    Cardiovascular:  Negative for chest pain and leg swelling.   Gastrointestinal:  Negative for nausea and vomiting.   Genitourinary:  Negative for dysuria.   Musculoskeletal:  Negative for arthralgias and myalgias.   Skin:  Negative for rash.   Neurological:  Positive for weakness (Subjective, left side) and headaches. Negative for facial asymmetry, speech difficulty and numbness.   Psychiatric/Behavioral:  Negative for agitation.    Objective:     Vital Signs (Most Recent):  Temp: 98.5 °F (36.9 °C) (08/21/22 1908)  Pulse: 85 (08/21/22 1908)  Resp: 18 (08/21/22 1908)  BP: (!) 142/90 (08/21/22 1908)  SpO2: 98 % (08/21/22 1908)    Vital Signs Range (Last 24H):  Temp:  [98 °F (36.7 °C)-98.5 °F (36.9 °C)]   Pulse:  [69-86]   Resp:  [15-18]   BP: ()/(50-90)   SpO2:  [98 %-100 %]     Physical Exam  Vitals reviewed.   Constitutional:       General: She is not in acute distress.     Appearance: Normal appearance.   HENT:      Head: Normocephalic and atraumatic.      Right Ear: External ear normal.      Left Ear: External ear normal.      Nose: Nose normal.   Eyes:      General: Visual field deficit present. No scleral icterus.     Extraocular Movements: Extraocular movements intact.      Pupils: Pupils are equal, round, and reactive to light.      Comments: Blurred vision left eye   Cardiovascular:      Rate and Rhythm: Normal rate.   Pulmonary:      Effort: Pulmonary effort is normal. No respiratory distress.   Abdominal:      General: There is no distension.   Musculoskeletal:       Cervical back: Normal range of motion.      Right lower leg: No edema.      Left lower leg: No edema.   Skin:     General: Skin is warm and dry.   Neurological:      Mental Status: She is alert and oriented to person, place, and time.      GCS: GCS eye subscore is 4. GCS verbal subscore is 5. GCS motor subscore is 6.      Cranial Nerves: No dysarthria or facial asymmetry.      Sensory: No sensory deficit.      Motor: No weakness or pronator drift.   Psychiatric:         Mood and Affect: Mood normal.         Behavior: Behavior normal.       Neurological Exam:   LOC: alert  Attention Span: Good   Language: No aphasia  Articulation: No dysarthria  Orientation: Person, Place, Time   Visual Fields: Blurred vision in both eyes  EOM (CN III, IV, VI): Full/intact  Facial Movement (CN VII): Symmetric facial expression    Motor: Arm left  Paresis: 5/5  Leg left  Paresis: 5/5  Arm right  Normal 5/5  Leg right Normal 5/5  Sensation: Intact to light touch       Laboratory:  CMP:   Recent Labs   Lab 08/21/22  1257   CALCIUM 9.5   ALBUMIN 3.8   PROT 6.8      K 3.7   CO2 25      BUN 20   CREATININE 0.9   ALKPHOS 71   ALT 22   AST 16   BILITOT 0.7     CBC:   Recent Labs   Lab 08/21/22  1257   WBC 10.64   RBC 4.40   HGB 13.9   HCT 41.1      MCV 93   MCH 31.6*   MCHC 33.8     Lipid Panel: No results for input(s): CHOL, LDLCALC, HDL, TRIG in the last 168 hours.  Coagulation:   Recent Labs   Lab 08/21/22  1257   INR 1.2   APTT 24.2     Hgb A1C: No results for input(s): HGBA1C in the last 168 hours.  TSH: No results for input(s): TSH in the last 168 hours.    Diagnostic Results:      Brain/Vessel Imaging:  MRI/MRA Brain and Neck In Process    [facility] ([facility]) 8/21/2022  No acute intracranial findings

## 2022-08-22 NOTE — CONSULTS
"Consultation Report  Ophthalmology Service    Date: 08/21/2022    Chief complaint/Reason for Consult: bilateral sudden complete painful vision loss for 3-4 hours, now recovering gradually     History of Present Illness: Eva Lane is a 46 y.o. female with past medical history of refractive error since she was very young, type II diabetes mellitus, pseudoseizures, bipolar disorder, migraines, who presents with an episode of bilateral complete, sudden vision loss that lasted 3-4 hours. Episode occurred earlier today, around noon. At the time, she was feeling dizzy and thought she may pass out (though she did not lose consciousness at any point). She reports that she has been gradually recovering her vision as her blood pressure went up, but she reports that it is still blurry. She also reports an associated headache and pain behind both eyes, rated as 6/10. Denies previous episodes of hypotension or transient vision loss. Patient denies flashes, floaters, or curtain-veil in visual field, and ocular discomfort OU.    POcularHx: Wearing "thick" glasses since she was very young. Otherwise denies history of ocular problems or past ocular surgeries.    Current eye gtts: Denies     Family Hx: Denies family history of glaucoma, macular degeneration, or blindness. family history includes Dementia in her father; Diabetes in her father; Heart disease in her father; Hyperlipidemia in her father; Hypertension in her father.     PMHx:  has a past medical history of ADHD (attention deficit hyperactivity disorder), Anxiety, Arthritis, Asthma, Behavioral problem, Bipolar 1 disorder, CHF (congestive heart failure), COPD (chronic obstructive pulmonary disease), Depression, Diabetes mellitus type II, psychiatric care, Hyperlipidemia, Hypertension, Lumbar radiculopathy, Neuralgia, Neuritis, Psychiatric problem, PTSD (post-traumatic stress disorder), Seizures, Self-harming behavior, Sleep apnea, Stroke (9/22/2015), Stroke, and " Therapy. -- though per neurology, no stroke could be visualized on imaging.     PSurgHx:  has a past surgical history that includes Oophorectomy (2008); Tubal ligation (); Dilation and curettage of uterus (); Hysterectomy (); Total abdominal hysterectomy (2008); Cyst Removal (); and Colonoscopy (N/A, 2016).     Home Medications:   Prior to Admission medications    Medication Sig Start Date End Date Taking? Authorizing Provider   albuterol (PROVENTIL) 2.5 mg /3 mL (0.083 %) nebulizer solution USE ONE VIAL IN NEBULIZER EVERY 6 HOURS AS NEEDED FOR  WHEEZING. 10/5/21   Cintia Gustafson NP   atorvastatin (LIPITOR) 20 MG tablet SMARTSI Tablet(s) By Mouth Every Evening 22   Historical Provider   blood sugar diagnostic Strp Use 1  True Metrix test strip three times per day to check blood sugar ( single use only ) 22   Kanchan Butler NP   blood-glucose meter (TRUE METRIX GLUCOSE METER) Misc Use three times per day to check blood sugar 21   Kanchan Butler NP   butalbital-acetaminophen-caff -40 mg -40 mg Cap TAKE 1 CAPSULE BY MOUTH THREE TIMES DAILY AS NEEDED FOR HEADACHE 22   Cintia Gustafson NP   cariprazine (VRAYLAR) 1.5 mg Cap Take 1 capsule (1.5 mg total) by mouth once daily at 6am. 22   Cintia Gustafson NP   clobetasoL (TEMOVATE) 0.05 % external solution  10/29/21   Historical Provider   clonazePAM (KLONOPIN) 1 MG tablet Take 1 tablet (1 mg total) by mouth 2 (two) times daily as needed for Anxiety. 22  Cintia Gustafson NP   epinephrine (EPIPEN 2-LARS) 0.3 mg/0.3 mL (1:1,000) AtIn Inject 0.3 mLs (0.3 mg total) into the muscle once. 9/1/15 3/22/22  Cintia Gustafson NP   insulin detemir U-100 (LEVEMIR FLEXTOUCH U-100 INSULN) 100 unit/mL (3 mL) InPn pen Inject 53 Units into the skin every evening. 21   Kanchan Butler NP   insulin lispro (HUMALOG KWIKPEN INSULIN) 100 unit/mL pen Inject 16- 22 units twice a day with lunch and  "supper 9/20/21   Kanchan Butler NP   ipratropium (ATROVENT) 0.02 % nebulizer solution  12/22/15   Historical Provider   linaGLIPtin (TRADJENTA) 5 mg Tab tablet Take 1 tablet (5 mg total) by mouth once daily. 4/27/21 4/27/22  Kanchan Butler NP   magnesium oxide (MAG-OX) 400 mg (241.3 mg magnesium) tablet Take 1 tablet by mouth 2 (two) times daily. 4/1/22   Historical Provider   metFORMIN (GLUCOPHAGE) 1000 MG tablet Take 1 tablet (1,000 mg total) by mouth 2 (two) times daily with meals. 7/8/22   Cintia Gustafson NP   mupirocin (BACTROBAN) 2 % ointment Apply topically 3 (three) times daily. 5/19/22   Cintia Gustafson NP   pen needle, diabetic 31 gauge x 5/16" Ndle Inject 1 Stick into the skin once daily. 5/6/21   Cintia Gustafson NP   polyethylene glycol (GLYCOLAX) 17 gram PwPk Take 17 g by mouth daily as needed (constipation). 4/20/22   Kanchan Butler NP   pregabalin (LYRICA) 200 MG Cap Take 1 capsule (200 mg total) by mouth 2 (two) times daily. 5/19/22 11/17/22  Cintia Gustafson NP   QUEtiapine (SEROQUEL) 400 MG tablet Take 1 tablet (400 mg total) by mouth every evening. 8/4/22 8/4/23  Cintia Gustafson NP   traZODone (DESYREL) 100 MG tablet Take 1 tablet (100 mg total) by mouth every evening. 5/19/22 5/19/23  Cintia Gustafson NP   TRUEPLUS LANCETS 33 gauge Misc USE 1 LANCET TO CHECK GLUCOSE THREE TIMES DAILY SINGLE USE ONLY 9/26/21   Historical Provider   UNKNOWN TO PATIENT Take 20 mg by mouth 2 (two) times a day. Potassium - given by Dr. Curtis    Historical Provider   valsartan (DIOVAN) 40 MG tablet Take 40 mg by mouth once daily. 4/13/22   Historical Provider        Medications this encounter:    atorvastatin  20 mg Oral QHS    diphenhydrAMINE  25 mg Intravenous Once    ketorolac  15 mg Intravenous Once    [START ON 8/22/2022] magnesium sulfate IVPB  1 g Intravenous Once    pregabalin  200 mg Oral BID    QUEtiapine  400 mg Oral QHS    traZODone  100 mg Oral QHS       Allergies: is " allergic to penicillins, neosporin [neomycin-bacitracin-polymyxin], shellfish containing products, and shrimp.     Social:  reports that she quit smoking about 2 weeks ago. Her smoking use included cigarettes. She started smoking about 36 years ago. She has a 2.60 pack-year smoking history. She uses smokeless tobacco. She reports that she does not drink alcohol and does not use drugs.     ROS: As per HPI    Ocular examination/Dilated fundus examination:  Base Eye Exam     Visual Acuity (Snellen - Linear)       Right Left    Dist cc 20/40 20/40    Dist ph cc NI 20/30    Near cc 20/20 20/20          Tonometry (Tonopen, 11:30 PM)       Right Left    Pressure 16 17          Pupils       Dark Light Shape React APD    Right 4 2 Round Brisk None    Left 4 2 Round Brisk None          Visual Fields       Right Left    Restrictions Partial outer superior temporal, inferior temporal, superior nasal, inferior nasal deficiencies Partial outer superior temporal, inferior temporal, superior nasal, inferior nasal deficiencies          Extraocular Movement       Right Left     Full Full          Neuro/Psych     Oriented x3: Yes          Dilation     Both eyes: 1% Tropicamide, 2.5% Phenylephrine, 2% Cyclopentolate @ 11:30 PM            Slit Lamp and Fundus Exam     External Exam       Right Left    External Eyes look smaller with glasses on Eyes look smaller with glasses on          Slit Lamp Exam       Right Left    Lids/Lashes Normal Normal    Conjunctiva/Sclera White and quiet White and quiet    Cornea Clear Clear    Anterior Chamber Deep and well-formed Deep and well-formed    Iris Round and reactive Round and reactive    Lens Clear Clear    Anterior Vitreous Normal Normal          Fundus Exam       Right Left    Disc Tilted, PPA, temporal pallor Tilted, PPA, temporal pallor    C/D Ratio 0.2 0.2    Macula Flat Flat    Vessels Normal Normal    Periphery No retinal tears/holes/detachments, no hemorrhage No retinal  "tears/holes/detachments, no hemorrhage              MRI BRAIN WITHOUT CONTRAST; MRA BRAIN WITHOUT CONTRAST; MRA NECK WITHOUT CONTRAST: No acute intracranial abnormality. No significant arterial abnormalities.    Assessment/Plan:     45 yo woman with PMH of myopia, type II diabetes mellitus, pseudo-seizures, presenting for evaluation of transient bilateral vision loss.    1. Transient vision loss, bilateral   - A 3-4 hour episode of sudden complete painful bilateral vision loss; patient has been gradually "recovering her vision" since then.   - VA 20/40 with glasses, per patient, not at baseline.   - Good IOP, pupils round equal reactive, no RAPD.   - No disc edema. No evidence of ischemia on DFE or head and neck imaging including vascular studies.   - Per patient, she was feeling dizzy and was found to be hypotensive when she first had vision loss. She denies history of hypertension or taking anti-hypertensives. She had improvement in blurry vision with fluids administration.   - Given the duration of symptoms, laterality, quick improvement (though gradual), and grossly normal examination findings, symptoms are likely related to episode of hypotension or migraine.   - Bedside confrontational visual field testing showed constriction in visual fields in both eyes. Dilated fundus exam shows possible signs of high myopia; patient is also wearing glasses with thick lenses. Patient can benefit from full eye examination including refraction, visual field testing, and repeat dilated fundus exam in clinic.     Lama Jh MD PGY-2  [employer] Ophthalmology Resident  08/21/2022  11:37 PM  "

## 2022-08-22 NOTE — HPI
Eva Lane is a 46 y.o. female with a PMHx of HTN, HLD, tobacco abuse, CHF (EF:55-60%), episodic migraine with aura, bipolar, and PTSD who presents to [facility] as transfer from [facility] for MRI/MRA to further evaluate her acute visual symptoms. She presented to OSH with acute onset HA, dizziness, and painful bilateral vision loss that began 20-30 minutes prior to arrival there. She was given NIHSS 2 at OSH, notable for complete bilateral visual loss, but inconsistent neuro exam per telestroke note. [facility] OSH without acute abnormality. On arrival to [facility], patient taken to MRI for STAT MRI/MRA. The patient endorses continued headache that she describes as sharp pain across the front of her head and behind both eyes, rates pain 9/10. Denies aggravating or alleviating factors. Patient reports blurred vision in both eyes although improved from earlier, as well as some as tingling to her left hand. She denies any weakness. The patient also reports an episode of diarrhea in the ED about 2 hours ago. The patient denies nausea, vomiting, abdominal pain, speech difficulty, dysphagia, SOB, cough, CP, fever, chill, or dysuria.     In the ED, VSSAF. CBC and CMP unremarakble. PT/INT and PTT WNL. Lactate WNL. Triglycerides 153, HDL 33. TSH WNL. MRI without evidence of acute ischemic event. MRA without evidence of vessel occlusion. The patient received loperamide and 1g IV magnesium. Vascular neurology and given patient's negative imaging and persisting symptoms, it is less likely that etiology of current symptoms are due to acute cerebrovascular origin. Due to low suspicion for cerebrovascular etiology, it is recommended to explore other possible diagnoses. Recommend obtaining Ophthalmology consult and Psych consult considering past medical history. IV Mag for HA. Recommend admission overnight for observation with medicine team if possible to complete workup/consults.

## 2022-08-22 NOTE — HOSPITAL COURSE
Eva Lane was placed in  observation for transient complete loss of vision and migraine. MRI brain without evidence of acute ischemic stroke, MRA without evidence of vessel occlusion. Vascular neurology consulted, visual symptoms unlikely of acute cerebrovascular origin. Ophthalmology consulted. No disc edema or evidence on DFE or ischemia. Recommend full eye exam in clinic. Psych consulted given patient history. The patient fully regained vision after a few hours. Her migraine resolved s/p migraine cocktail, and she was able to tolerate PO intake. Given IVF for JAZMIN with full resolution. Follow up with neurology outpatient for migraine management. Given ambulatory referrals to psych, neurology and ophthalmology at discharge. Medically ready for discharge home w/ fioricet for migraines. Pt educated on hospital course and plan, verbally agrees and understands. All questions answered.

## 2022-08-22 NOTE — ASSESSMENT & PLAN NOTE
Patient's FSGs are controlled on current medication regimen.  Last A1c reviewed-   Lab Results   Component Value Date    HGBA1C 6.5 (H) 05/19/2022     Most recent fingerstick glucose reviewed-   Recent Labs   Lab 08/21/22  1220 08/21/22  1926 08/21/22 2119   POCTGLUCOSE 164* 56* 111*     Current correctional scale  Low  Maintain anti-hyperglycemic dose as follows-   Antihyperglycemics (From admission, onward)            Start     Stop Route Frequency Ordered    08/21/22 2326  insulin aspart U-100 pen 0-5 Units         -- SubQ Before meals & nightly PRN 08/21/22 2226        Hold Oral hypoglycemics while patient is in the hospital.  -Accuchecks AC/HS  -Diabetic/cardiac diet.

## 2022-08-22 NOTE — PLAN OF CARE
[facility] - Telemetry Stepdown ([place])  Initial Discharge Assessment       Primary Care Provider: Cintia Gustafson NP    Admission Diagnosis: Stroke [I63.9]  Vision changes [H53.9]  Chest pain [R07.9]    Admission Date: 8/21/2022  Expected Discharge Date: 8/22/2022         Payor: MEDICAID / Plan: [place] [employer] / Product Type: Managed Medicaid /     Extended Emergency Contact Information  Primary Emergency Contact: Sunil Lanemond  Address: [address] [place] [id number] [place]  Mobile Phone: [phone]  Relation: Spouse    Discharge Plan A: (P) Home with family  Discharge Plan B: (P) Home with family      [facility] [address] [place] [address] [place] - [id number]  [facility]  [facility] [address]  Phone: [phone] Fax: [phone]               SW completed Discharge Planning Assessment with patient via bedside. Discharge planning booklet given to patient/family and whiteboard updated with BARTOLOME and phone #. All questions answered.    Patient reported that her  will provide transportation upon discharge.     Patient reported that she lives at home with her  and children. Patient reported that prior to hospitalization she was independent with her ADL's. Patient reported that she has a cane at home; however, she does not use it. Patient reported that she is not on dialysis and does not go to a Coumadin clinic.      Patient lives in a [facility] with two steps to enter.       Ely Salas LMSW  [facility]  Ext. [phone]

## 2022-08-22 NOTE — NURSING
Discharge papers and instructions given to patient,  at the bedside. No questions asked. PIV removed. Telebox removed. Patient said, she doesn't need a wheelchair.

## 2022-08-22 NOTE — MEDICAL/APP STUDENT
History of Present Illness:   Eva Lane is a 46 y.o. female with a past psychiatric history of conversion disorder, bipolar 2, PTSD, HILARIA, MDD, borderline personality disorder, currently presenting with Blurred vision, bilateral.  Psychiatry was originally consulted to address the potential psychiatric cause of painful, resolving vision loss.      Per [facility] Psych:  Eva Lane is a 47 yo female with a PMH of chronic recurrent migraines, bipolar 2, conversion disorder, stroke/TIA, DM and other chronic health conditions who presented to the ED with headache and acute vision loss. Patient reports having similar headaches in the past with occasional blurred vision, but never vision loss. Onset was around 1200 yesterday (8/21), lasted for 4 hours and has completely resolved as of this morning. The headache has persisted, did not respond to the mag sulfate, benadryl, toradol migraine cocktail last night, but has responded to Fioricet this morning. Patient's subjective feeling of left sided weakness has also completely resolved. Opthal and Vascular Neuro have seen her and cleared from acute pathologies. No evidence of acute events on imaging.     Patient reports being hospitalized for a bipolar event years ago, but reports she has been compliant with medication since after seeing the psychiatrist. Though she could not remember which medication, and is not currently being followed by an outpatient psychiatrist.    Denies any changes to her sleep, appetite, mood, not endorsing SI/HI, AVH. Denies any recently stressful or traumatic events.    Psychiatric Review Of Systems - Is patient experiencing or having changes in:  sleep: no  appetite: no  weight: no  energy/anergy: no  interest/pleasure/anhedonia: no  somatic symptoms: no  guilty/hopelessness: no  concentration: no  S.I.B.s/risky behavior: no  SI/SA:  no    Irritability: no  Racing thoughts: no  Impulsive behaviors: no  Pressured speech:   no    Paranoia:no  Delusions: no  AVH:no    Psychiatric History:  Diagnose(s): Yes - bipolar 2, HILARIA, PTSD, MDD, conversion disorder  Previous Medication Trials: Yes - unclear  Previous Psychiatric Hospitalizations: Yes - unclear when  Previous Suicide Attempts: No  History of Violence: No  Outpatient Psychiatrist: No     ASSESSMENT     Eva Lane is a 46 y.o. female with a past psychiatric history of migraines, bipolar, conversion disorder, pseudo-seizures, and conversion disorder, currently presenting with Blurred vision, bilateral.  Psychiatry was originally consulted to neuro-psychiatric causes for headache and acute vision loss    IMPRESSION  Migraine with cocomittant, resolved functional neurological vision loss    RECOMMENDATION(S)      Discharge home with outpatient psychiatry followup    Peewee Still MS3

## 2022-08-22 NOTE — SUBJECTIVE & OBJECTIVE
Patient History               Medical as of 8/22/2022       Past Medical History       Diagnosis Date Comments Source    ADHD (attention deficit hyperactivity disorder) -- -- Provider    Anxiety -- -- Provider    Arthritis -- -- Provider    Asthma -- -- Provider    Behavioral problem -- -- Provider    Bipolar 1 disorder -- -- Provider    CHF (congestive heart failure) -- -- Provider    COPD (chronic obstructive pulmonary disease) -- -- Provider    Depression -- -- Provider    Diabetes mellitus type II -- -- Provider    Hx of psychiatric care -- -- Provider    Hyperlipidemia -- -- Provider    Hypertension -- -- Provider    Lumbar radiculopathy -- -- Provider    Neuralgia -- -- Provider    Neuritis -- -- Provider    Psychiatric problem -- -- Provider    PTSD (post-traumatic stress disorder) -- -- Provider    Seizures -- Seizure-last 8/2015-shake and fall-doesnt remember anything Provider    Self-harming behavior -- -- Provider    Sleep apnea -- on CPAP Provider    Stroke 9/22/2015 -- Provider    Stroke -- -- Provider    Therapy -- -- Provider              Pertinent Negatives       Diagnosis Date Noted Comments Source    Abnormal Pap smear of vagina 02/03/2016 -- Provider    Anticoagulant long-term use 09/09/2019 -- Provider    Cancer 09/09/2019 -- Provider    Coronary artery disease 09/09/2019 -- Provider    Difficult intubation 09/09/2019 -- Provider    Encounter for blood transfusion 09/09/2019 -- Provider    General anesthetics causing adverse effect in therapeutic use 09/09/2019 -- Provider    Hypotension, iatrogenic 09/09/2019 -- Provider    Malignant hyperthermia 09/09/2019 -- Provider    PONV (postoperative nausea and vomiting) 09/09/2019 -- Provider    Pseudocholinesterase deficiency 09/09/2019 -- Provider    Respiratory distress 09/09/2019 -- Provider    Thyroid disease 09/09/2019 -- Provider    Transfusion reaction 09/09/2019 -- Provider                          Surgical as of 8/22/2022       Past  Surgical History       Procedure Laterality Date Comments Source    OOPHORECTOMY -- 7/2008 -- Provider    TUBAL LIGATION -- 2007 -- Provider    DILATION AND CURETTAGE OF UTERUS -- 2006 Miscarriage Provider    HYSTERECTOMY -- 7/08 DUB - Total Provider    TOTAL ABDOMINAL HYSTERECTOMY -- 7/2008 -- Provider    CYST REMOVAL -- 2000 Fatty cyst on right face cheek and left upper arm  Provider    COLONOSCOPY N/A 7/1/2016 Procedure: COLONOSCOPY;  Surgeon: Robert Hernandez MD;  Location: [address];  Service: Endoscopy;  Laterality: N/A;  Schedule for next available CRS physician - EGD @ 8:30 with Dr. Naylor Provider                          Family as of 8/22/2022       Problem Relation Name Age of Onset Comments Source    Heart disease Father -- -- -- Provider    Hyperlipidemia Father -- -- -- Provider    Hypertension Father -- -- -- Provider    Diabetes Father -- -- -- Provider    Dementia Father -- -- -- Provider    Breast cancer Neg Hx -- -- -- Provider    Colon cancer Neg Hx -- -- -- Provider    Ovarian cancer Neg Hx -- -- -- Provider                  Tobacco Use as of 8/22/2022       Smoking Status Smoking Start Date Smoking Quit Date Packs/Day Years Used    Former Smoker 7/17/1986 8/1/2022 0.10 26.00      Types Comments Smokeless Tobacco Status Smokeless Tobacco Quit Date Source     Cigarettes told about [facility] smoking cessation program-given smoking clinic pamphlet Current User -- Provider                  Alcohol Use as of 8/22/2022       Alcohol Use Drinks/Week Alcohol/Week Comments Source    No   -- -- Provider                  Drug Use as of 8/22/2022       Drug Use Types Frequency Comments Source    No -- -- -- Provider                  Sexual Activity as of 8/22/2022       Sexually Active Birth Control Partners Comments Source    Yes Surgical Male , CAYLA, BSO Provider                  Activities of Daily Living as of 8/22/2022       Activities of Daily Living Question Response Comments Source     Patient feels they ought to cut down on drinking/drug use No -- Provider    Patient annoyed by others criticizing their drinking/drug use No -- Provider    Patient has felt bad or guilty about drinking/drug use No -- Provider    Patient has had a drink/used drugs as an eye opener in the AM No -- Provider                  Social Documentation as of 8/22/2022    None               Occupational as of 8/22/2022    None               Socioeconomic as of 8/22/2022       Marital Status Spouse Name Number of Children Years Education Education Level Preferred Language Ethnicity Race Source     -- 6 -- -- English Not  or /a White Provider                  Pertinent History       Question Response Comments    Lives with family --    Place in Birth Order 2nd --    Lives in home --    Number of Siblings 3 --    Raised by biological parents --    Legal Involvement none --    Childhood Trauma early trauma --    Criminal History of none --    Financial Status homemaker --    Highest Level of Education unfinished highschool quitin 9th grade; trouble with reading- in special education    Does patient have access to a firearm? No  has guns n the home- locked and secured by her      Service No --    Primary Leisure Activity other drawing    Spirituality spiritual without formal affiliation --          Past Medical History:   Diagnosis Date    ADHD (attention deficit hyperactivity disorder)     Anxiety     Arthritis     Asthma     Behavioral problem     Bipolar 1 disorder     CHF (congestive heart failure)     COPD (chronic obstructive pulmonary disease)     Depression     Diabetes mellitus type II     Hx of psychiatric care     Hyperlipidemia     Hypertension     Lumbar radiculopathy     Neuralgia     Neuritis     Psychiatric problem     PTSD (post-traumatic stress disorder)     Seizures     Seizure-last 8/2015-shake and fall-doesnt remember anything    Self-harming behavior     Sleep apnea      on CPAP    Stroke 2015    Stroke     Therapy      Past Surgical History:   Procedure Laterality Date    COLONOSCOPY N/A 2016    Procedure: COLONOSCOPY;  Surgeon: Robert Hernandez MD;  Location: [facility] ([address] [place] [address]);  Service: Endoscopy;  Laterality: N/A;  Schedule for next available CRS physician - EGD @ 8:30 with Dr. Naylor    CYST REMOVAL      Fatty cyst on right face cheek and left upper arm     DILATION AND CURETTAGE OF UTERUS      Miscarriage    HYSTERECTOMY      DUB - Total    OOPHORECTOMY  2008    TOTAL ABDOMINAL HYSTERECTOMY  2008    TUBAL LIGATION       Family History       Problem Relation (Age of Onset)    Dementia Father    Diabetes Father    Heart disease Father    Hyperlipidemia Father    Hypertension Father          Tobacco Use    Smoking status: Former Smoker     Packs/day: 0.10     Years: 26.00     Pack years: 2.60     Types: Cigarettes     Start date: 1986     Quit date: 2022     Years since quittin.0    Smokeless tobacco: Current User    Tobacco comment: told about [facility] smoking cessation program-given smoking clinic pamphlet   Substance and Sexual Activity    Alcohol use: No    Drug use: No    Sexual activity: Yes     Partners: Male     Birth control/protection: Surgical     Comment: , CAYLA, BSO     Review of patient's allergies indicates:   Allergen Reactions    Penicillins Swelling and Rash    Neosporin [neomycin-bacitracin-polymyxin] Rash    Shellfish containing products     Shrimp Hives       Current Facility-Administered Medications on File Prior to Encounter   Medication    [COMPLETED] butalbital-acetaminophen-caffeine -40 mg per tablet 2 tablet     Current Outpatient Medications on File Prior to Encounter   Medication Sig    albuterol (PROVENTIL) 2.5 mg /3 mL (0.083 %) nebulizer solution USE ONE VIAL IN NEBULIZER EVERY 6 HOURS AS NEEDED FOR  WHEEZING.    atorvastatin (LIPITOR) 20 MG tablet SMARTSI Tablet(s) By Mouth Every Evening     "blood sugar diagnostic Strp Use 1  True Metrix test strip three times per day to check blood sugar ( single use only )    blood-glucose meter (TRUE METRIX GLUCOSE METER) Misc Use three times per day to check blood sugar    butalbital-acetaminophen-caff -40 mg -40 mg Cap TAKE 1 CAPSULE BY MOUTH THREE TIMES DAILY AS NEEDED FOR HEADACHE    cariprazine (VRAYLAR) 1.5 mg Cap Take 1 capsule (1.5 mg total) by mouth once daily at 6am.    clobetasoL (TEMOVATE) 0.05 % external solution     clonazePAM (KLONOPIN) 1 MG tablet Take 1 tablet (1 mg total) by mouth 2 (two) times daily as needed for Anxiety.    epinephrine (EPIPEN 2-LARS) 0.3 mg/0.3 mL (1:1,000) AtIn Inject 0.3 mLs (0.3 mg total) into the muscle once.    insulin detemir U-100 (LEVEMIR FLEXTOUCH U-100 INSULN) 100 unit/mL (3 mL) InPn pen Inject 53 Units into the skin every evening.    insulin lispro (HUMALOG KWIKPEN INSULIN) 100 unit/mL pen Inject 16- 22 units twice a day with lunch and supper    ipratropium (ATROVENT) 0.02 % nebulizer solution     linaGLIPtin (TRADJENTA) 5 mg Tab tablet Take 1 tablet (5 mg total) by mouth once daily.    magnesium oxide (MAG-OX) 400 mg (241.3 mg magnesium) tablet Take 1 tablet by mouth 2 (two) times daily.    metFORMIN (GLUCOPHAGE) 1000 MG tablet Take 1 tablet (1,000 mg total) by mouth 2 (two) times daily with meals.    mupirocin (BACTROBAN) 2 % ointment Apply topically 3 (three) times daily.    pen needle, diabetic 31 gauge x 5/16" Ndle Inject 1 Stick into the skin once daily.    polyethylene glycol (GLYCOLAX) 17 gram PwPk Take 17 g by mouth daily as needed (constipation).    pregabalin (LYRICA) 200 MG Cap Take 1 capsule (200 mg total) by mouth 2 (two) times daily.    QUEtiapine (SEROQUEL) 400 MG tablet Take 1 tablet (400 mg total) by mouth every evening.    traZODone (DESYREL) 100 MG tablet Take 1 tablet (100 mg total) by mouth every evening.    TRUEPLUS LANCETS 33 gauge [facility] USE 1 LANCET TO CHECK GLUCOSE THREE TIMES DAILY " "SINGLE USE ONLY    UNKNOWN TO PATIENT Take 20 mg by mouth 2 (two) times a day. Potassium - given by Dr. Ever ulrichrtan (DIOVAN) 40 MG tablet Take 40 mg by mouth once daily.     Psychotherapeutics (From admission, onward)                Start     Stop Route Frequency Ordered    08/21/22 2330  QUEtiapine tablet 400 mg         -- Oral Nightly 08/21/22 2224 08/21/22 2330  traZODone tablet 100 mg         -- Oral Nightly 08/21/22 2224          Review of Systems  Strengths and Liabilities: Strength: Patient has positive support network., Strength: Patient has reasonable judgment.    Objective:     Vital Signs (Most Recent):  Temp: 98.2 °F (36.8 °C) (08/22/22 1531)  Pulse: 91 (08/22/22 1531)  Resp: 17 (08/22/22 1531)  BP: (!) 109/57 (08/22/22 1531)  SpO2: 100 % (08/22/22 1531)   Vital Signs (24h Range):  Temp:  [96.6 °F (35.9 °C)-98.7 °F (37.1 °C)] 98.2 °F (36.8 °C)  Pulse:  [] 91  Resp:  [15-23] 17  SpO2:  [96 %-100 %] 100 %  BP: ()/(52-90) 109/57     Height: 5' 4" (162.6 cm)  Weight: 59.2 kg (130 lb 8.2 oz)  Body mass index is 22.4 kg/m².      Intake/Output Summary (Last 24 hours) at 8/22/2022 1607  Last data filed at 8/22/2022 0454  Gross per 24 hour   Intake 680 ml   Output 700 ml   Net -20 ml       Mental Status Exam:  Appearance: unremarkable, age appropriate, lying in bed  Behavior/Cooperation: normal, cooperative, eye contact normal  Speech: normal tone, normal rate, normal pitch, normal volume  Language: fluent English   Mood: "good"  Affect: full, appropriate   Thought Process: normal and logical, goal-directed  Thought Content: normal, no suicidality, no homicidality, delusions, or paranoia   Associations: intact   Orientation: grossly intact  Memory: Grossly intact  Attention Span/Concentration: Grossly intact  Insight: fair  Judgment: fair     Significant Labs: All pertinent labs within the past 24 hours have been reviewed.    Significant Imaging: I have reviewed all pertinent imaging " results/findings within the past 24 hours.

## 2022-08-22 NOTE — ASSESSMENT & PLAN NOTE
Ms. Lane is a 46 year old female who presents to [facility] as transfer from [facility] for MRI/MRA to further evaluate her acute visual symptoms. Patient with PMS of HTN, HLD, JEANIE, CHF, episodic migraine without aura, bipolar disorder, PTSD. She presented to OSH with acute onset HA and painful bilateral vision loss that began 20-30 minutes prior to arrival there. She was given NIHSS 2 at OSH, notable for complete bilateral visual loss, but inconsistent neuro exam per telestroke note. [facility]TH OSH without acute abnormality. Given her risk factors, acute neurovascular insult could not be excluded, however, complicated migraine is also a consideration. Decision to defer tPA on telestroke was made on basis of symptoms inconsistent with bilateral hemanopia and with the eye pain the symptoms are also not suggestive of CRA. On arrival to C, patient taken to MRI for STAT MRI/MRA. Patient reports blurred vision in both eyes as well as some subjective left upper and left lower extremity weakness, though this is not fully appreciated on exam. She also complains of ongoing headache. Pending MRI results.     MRI without evidence of acute ischemic event. MRA without evidence of vessel occlusion. Given patients negative imaging and persisting symptoms, it is less likely that etiology of current symptoms are due to acute cerebrovascular origin. Due to low suspicion for cerebrovascular etiology, it is recommended to explore other possible diagnoses. Recommend obtaining Ophthalmology consult and Psych consult considering past medical history. IV Mag for HA. Recommend admission overnight for observation with medicine team if possible to complete workup/consults. For any acute changes in neuro status, STAT CTH. Vascular Neurology will continue to follow patient on consulting service.

## 2022-08-22 NOTE — HPI
Eva Lane is a 46 y.o. female with a past psychiatric history of conversion disorder, bipolar disorder, PTSD, HILARIA, MDD, and borderline personality disorder, currently presenting with Blurred vision, bilateral. Psychiatry was originally consulted to address the potential psychiatric cause of painful, resolving vision loss.      Per Primary Team:  Eva Lane is a 46 y.o. female with a PMHx of HTN, HLD, tobacco abuse, CHF (EF:55-60%), episodic migraine with aura, bipolar, and PTSD who [place] [facility] [place] [facility] for MRI/MRA to further evaluate her acute visual symptoms. She presented to OSH with acute onset HA, dizziness, and painful bilateral vision loss that began 20-30 minutes prior to arrival there. She was given NIHSS 2 at OSH, notable for complete bilateral visual loss, but inconsistent neuro exam per telestroke note. [facility] OSH without acute abnormality. On arrival to [facility], patient taken to MRI for STAT MRI/MRA. The patient endorses continued headache that she describes as sharp pain across the front of her head and behind both eyes, rates pain 9/10. Denies aggravating or alleviating factors. Patient reports blurred vision in both eyes although improved from earlier, as well as some as tingling to her left hand. She denies any weakness. The patient also reports an episode of diarrhea in the ED about 2 hours ago. The patient denies nausea, vomiting, abdominal pain, speech difficulty, dysphagia, SOB, cough, CP, fever, chill, or dysuria.      In the ED, VSSAF. CBC and CMP unremarakble. PT/INT and PTT WNL. Lactate WNL. Triglycerides 153, HDL 33. TSH WNL. MRI without evidence of acute ischemic event. MRA without evidence of vessel occlusion. The patient received loperamide and 1g IV magnesium. Vascular neurology and given patient's negative imaging and persisting symptoms, it is less likely that etiology of current symptoms are due to acute cerebrovascular origin. Due to low suspicion  for cerebrovascular etiology, it is recommended to explore other possible diagnoses. Recommend obtaining Ophthalmology consult and Psych consult considering past medical history. IV Mag for HA. Recommend admission overnight for observation with medicine team if possible to complete workup/consults.      Per Psychiatry:  Upon interview patient is alert, oriented, calm and cooperative. Reports that she came to the hospital after experiencing vision loss in both eyes. States that this has not happened before. Endorses previous psychiatric history of PTSD, depression, anxiety, and bipolar disorder diagnosed many years ago. Endorses taking Seroquel and Trazodone for sleep. Reports Vraylar for bipolar disorder. States that she has been taking these medications for the past 2-3 months. Denies current or recent depression, anxiety and associated symptoms as listed below. She is not currently being followed by an outpatient psychiatrist. Denies SI/HI/AVH. Denies any recently stressful or traumatic events.    Collateral:   none    Medical Review of Systems:    Complete review of systems performed covering Constitutional, Eyes, ENT/Mouth, Cardiovascular, Respiratory, Gastrointestinal, Genitourinary, Musculoskeletal, Skin, Neurologic, Endocrine, Heme/Lymph, and Allergy/Immune.     Complete review of systems was negative with the exception of the following positive symptoms:  headache     Psychiatric Review of Systems-is patient experiencing or having changes in  sleep: no  appetite: no  weight: yes  energy/anergy: no  interest/pleasure/anhedonia: no  somatic symptoms: no  libido: no  anxiety/panic: no  guilty/hopelessness: no  concentration: no  S.I.B.s/risky behavior: no  any drugs: no  alcohol: no     Allergies:  Penicillins, Neosporin [neomycin-bacitracin-polymyxin], Shellfish containing products, and Shrimp    Past Medical/Surgical History:  Past Medical History:   Diagnosis Date    ADHD (attention deficit hyperactivity  disorder)     Anxiety     Arthritis     Asthma     Behavioral problem     Bipolar 1 disorder     CHF (congestive heart failure)     COPD (chronic obstructive pulmonary disease)     Depression     Diabetes mellitus type II     Hx of psychiatric care     Hyperlipidemia     Hypertension     Lumbar radiculopathy     Neuralgia     Neuritis     Psychiatric problem     PTSD (post-traumatic stress disorder)     Seizures     Seizure-last 8/2015-shake and fall-doesnt remember anything    Self-harming behavior     Sleep apnea     on CPAP    Stroke 9/22/2015    Stroke     Therapy      Past Surgical History:   Procedure Laterality Date    COLONOSCOPY N/A 7/1/2016    Procedure: COLONOSCOPY;  Surgeon: Robert Hernandez MD;  Location: [facility] ([address] [place] [address]);  Service: Endoscopy;  Laterality: N/A;  Schedule for next available CRS physician - EGD @ 8:30 with Dr. Naylor    CYST REMOVAL  2000    Fatty cyst on right face cheek and left upper arm     DILATION AND CURETTAGE OF UTERUS  2006    Miscarriage    HYSTERECTOMY  7/08    DUB - Total    OOPHORECTOMY  7/2008    TOTAL ABDOMINAL HYSTERECTOMY  7/2008    TUBAL LIGATION  2007       Past Psychiatric History:  Diagnose(s): Yes - bipolar 2, HILARIA, PTSD, MDD, conversion disorder  Previous Medication Trials: multiple including klonipin, effexor, and prazosin   Previous Psychiatric Hospitalizations: Yes - 2 prior. Most recently over a year ago for cutting  Previous Suicide Attempts: No  History of Violence: No  Outpatient Psychiatrist: No     Social History:  Marital Status:   Children: 3   Employment Status/Info: currently employed  Education:  did not assess  Special Ed:  did not assess  Housing Status: Lives with  and 3 adult children   History of phys/sexual abuse: unknown  Access to gun: no    Substance Abuse History:  Recreational Drugs:  denies  Use of Alcohol: denied  Rehab History: no   Tobacco Use: no  Use of OTC: unknown     Legal History:  Past Charges/Incarcerations: unknown    Pending charges: unknown     Family Psychiatric History:   none    Psychosocial Stressors: none   Functioning Relationships: good support system, good relationship with spouse or significant other, and good relationship with children

## 2022-08-22 NOTE — HPI
Ms. Lane is a 46 year old female who presents to [facility] as transfer from [facility] for MRI/MRA to further evaluate her acute visual symptoms. Patient with PMS of HTN, HLD, JEANIE, CHF, episodic migraine without aura, bipolar disorder, PTSD. She presented to OSH with acute onset HA and painful bilateral vision loss that began 20-30 minutes prior to arrival there. She was given NIHSS 2 at OSH, notable for complete bilateral visual loss, but inconsistent neuro exam per telestroke note. [facility] OSH without acute abnormality. Given her risk factors, acute neurovascular insult could not be excluded, however, complicated migraine is also a consideration. Decision to defer tPA on telestroke was made on basis of symptoms inconsistent with bilateral hemanopia and with the eye pain the symptoms are also not suggestive of CRA. On arrival to C, patient taken to MRI for STAT MRI/MRA. Patient reports blurred vision in  both eyes as well as some subjective left upper and left lower extremity weakness, though this is not fully appreciated on exam. Pending MRI results.

## 2022-08-22 NOTE — ED PROVIDER NOTES
Encounter Date: 8/21/2022       History     Chief Complaint   Patient presents with    transfer     Transfer from [place] for MRI for possible stroke. Pt presents with blurry vision and HA. No unilateral weakness, no facial droop, speech clear. LKN 12 pm.     Ms. Lane is a 45 y/o with HTN, JHLD, JEANIE, CHF, episodic migraine without aura, bipolar disorder, PTSD who presents as a transfer from [place] for an MRI as well as vascular neurology evaluation. Patient had presented there with acute onset HA and painful bilateral visual loss that began approximately 20-30 minutes prior to arrival.  Patient states that after about an hour she was able to see again but she has blurry vision and her vision has not returned to normal since.  Patient was concerned, given her prior history of strokes she came to the emergency department for evaluation.  Patient also states that she has been feeling very fatigued and has had diarrhea for a few days.  She denies fever, chills, chest pain, shortness of breath or abdominal pain.         Review of patient's allergies indicates:   Allergen Reactions    Penicillins Swelling and Rash    Neosporin [neomycin-bacitracin-polymyxin] Rash    Shellfish containing products     Shrimp Hives     Past Medical History:   Diagnosis Date    ADHD (attention deficit hyperactivity disorder)     Anxiety     Arthritis     Asthma     Behavioral problem     Bipolar 1 disorder     CHF (congestive heart failure)     COPD (chronic obstructive pulmonary disease)     Depression     Diabetes mellitus type II     Hx of psychiatric care     Hyperlipidemia     Hypertension     Lumbar radiculopathy     Neuralgia     Neuritis     Psychiatric problem     PTSD (post-traumatic stress disorder)     Seizures     Seizure-last 8/2015-shake and fall-doesnt remember anything    Self-harming behavior     Sleep apnea     on CPAP    Stroke 9/22/2015    Stroke     Therapy      Past Surgical History:    Procedure Laterality Date    COLONOSCOPY N/A 7/1/2016    Procedure: COLONOSCOPY;  Surgeon: Robert Hernandez MD;  Location: [facility] ([address] [place] [address]);  Service: Endoscopy;  Laterality: N/A;  Schedule for next available CRS physician - EGD @ 8:30 with Dr. Naylor    CYST REMOVAL  2000    Fatty cyst on right face cheek and left upper arm     DILATION AND CURETTAGE OF UTERUS  2006    Miscarriage    HYSTERECTOMY  7/08    DUB - Total    OOPHORECTOMY  7/2008    TOTAL ABDOMINAL HYSTERECTOMY  7/2008    TUBAL LIGATION  2007     Family History   Problem Relation Age of Onset    Heart disease Father     Hyperlipidemia Father     Hypertension Father     Diabetes Father     Dementia Father     Breast cancer Neg Hx     Colon cancer Neg Hx     Ovarian cancer Neg Hx      Social History     Tobacco Use    Smoking status: Current Every Day Smoker     Packs/day: 0.10     Years: 26.00     Pack years: 2.60     Types: Cigarettes     Start date: 7/17/1986    Smokeless tobacco: Current User    Tobacco comment: [facility]-given smoking clinic pamphlet   Substance Use Topics    Alcohol use: No    Drug use: No     Review of Systems   Constitutional: Negative for activity change, appetite change, chills, fatigue and fever.   HENT: Negative for congestion, drooling, hearing loss, postnasal drip, rhinorrhea, sinus pressure, sinus pain and trouble swallowing.    Eyes: Positive for visual disturbance.   Respiratory: Negative for cough, chest tightness, shortness of breath and wheezing.    Cardiovascular: Negative for chest pain, palpitations and leg swelling.   Gastrointestinal: Negative for abdominal pain, constipation, diarrhea, nausea and vomiting.   Genitourinary: Negative for decreased urine volume, difficulty urinating, dysuria, enuresis, flank pain, pelvic pain and urgency.   Musculoskeletal: Negative for arthralgias, back pain, joint swelling, myalgias and neck pain.   Skin: Negative for color  change and wound.   Neurological: Negative for dizziness, seizures, speech difficulty, weakness, light-headedness, numbness and headaches.   Psychiatric/Behavioral: Negative for agitation and confusion.       Physical Exam     Initial Vitals [08/21/22 1908]   BP Pulse Resp Temp SpO2   (!) 142/90 85 18 98.5 °F (36.9 °C) 98 %      MAP       --         Physical Exam    Nursing note and vitals reviewed.  Constitutional: She appears well-developed and well-nourished. She is not diaphoretic. No distress.   HENT:   Head: Normocephalic and atraumatic.   Mouth/Throat: Oropharynx is clear and moist. No oropharyngeal exudate.   Eyes: Pupils are equal, round, and reactive to light.   Neck: No thyromegaly present.   Normal range of motion.  Cardiovascular: Normal rate and intact distal pulses. Exam reveals no gallop.    No murmur heard.  Pulmonary/Chest: Breath sounds normal. No stridor. No respiratory distress. She has no wheezes. She has no rales. She exhibits no tenderness.   Abdominal: Abdomen is soft. Bowel sounds are normal. She exhibits no distension. There is no abdominal tenderness. There is no guarding.   Musculoskeletal:         General: No tenderness or edema. Normal range of motion.      Cervical back: Normal range of motion.     Lymphadenopathy:     She has no cervical adenopathy.   Neurological: She is alert and oriented to person, place, and time. She has normal strength. No cranial nerve deficit or sensory deficit.   Skin: Skin is warm. Capillary refill takes less than 2 seconds. No erythema.   Psychiatric: She has a normal mood and affect.         ED Course   Procedures  Labs Reviewed   LIPID PANEL - Abnormal; Notable for the following components:       Result Value    Triglycerides 153 (*)     HDL 33 (*)     All other components within normal limits   POCT GLUCOSE, HAND-HELD DEVICE - Abnormal; Notable for the following components:    POC Glucose 111 (*)     All other components within normal limits   POCT  GLUCOSE - Abnormal; Notable for the following components:    POCT Glucose 56 (*)     All other components within normal limits   ISTAT PROCEDURE - Abnormal; Notable for the following components:    POC PH 7.315 (*)     POC PCO2 62.1 (*)     POC PO2 29 (*)     POC HCO3 31.6 (*)     POC SATURATED O2 46 (*)     POC TCO2 33 (*)     All other components within normal limits   ISTAT CREATININE - Abnormal; Notable for the following components:    POC Creatinine 1.8 (*)     All other components within normal limits   POCT GLUCOSE - Abnormal; Notable for the following components:    POCT Glucose 111 (*)     All other components within normal limits   TSH   URINALYSIS, REFLEX TO URINE CULTURE   TOXICOLOGY SCREEN, URINE, RANDOM (COMPLIANCE)   ISTAT PROCEDURE        ECG Results          ECG 12 lead (Final result)  Result time 08/21/22 20:46:40    Final result by Interface, Lab In [facility] (08/21/22 20:46:40)                 Narrative:    Test Reason : I63.9,    Vent. Rate : 093 BPM     Atrial Rate : 093 BPM     P-R Int : 200 ms          QRS Dur : 076 ms      QT Int : 372 ms       P-R-T Axes : 053 023 061 degrees     QTc Int : 462 ms    Normal sinus rhythm  Normal ECG  When compared with ECG of 21-AUG-2022 12:39,  No significant change was found  Confirmed by Jann FLORES MD ([id number]) on 8/21/2022 8:46:27 PM    Referred By: WINNIE PAYNE           Confirmed By:Jann FLORES MD                            Imaging Results          MRA Neck without contrast (Final result)  Result time 08/21/22 20:50:54    Final result by Chandu Sharma MD (08/21/22 20:50:54)                 Impression:      No acute intracranial abnormality.    No significant arterial abnormalities.      Electronically signed by: Chandu Sharma  Date:    08/21/2022  Time:    20:50             Narrative:    EXAMINATION:  MRI BRAIN WITHOUT CONTRAST; MRA BRAIN WITHOUT CONTRAST; MRA NECK WITHOUT CONTRAST    CLINICAL HISTORY:  Headache, new or worsening, neuro deficit  (Age 19-49y);; Neuro deficit, acute, stroke suspected;    TECHNIQUE:  Routine MRI brain without contrast, noncontrast MRA of the brain, and noncontrast MRA of the neck. Multiplanar multisequence MR imaging of the brain was performed without contrast. By separate acquisition, noncontrast MR angiography was performed of the intracranial vasculature with 3D time-of-flight technique through the [place] of [place], with MIP reformatting. Non-contrast 2D and/or 3D time-of-flight MR angiography of the neck was performed, with MIP reformatting.    COMPARISON:  None    FINDINGS:  Intracranial Compartment:    Ventricles and sulci are normal in size for age without evidence of hydrocephalus. No extra-axial blood or fluid collections.    The brain parenchyma appears normal. No mass lesion, acute hemorrhage, edema, or acute infarct.    Aorta: Normal 3 vessel arch.    Extracranial carotid circulation: No hemodynamically significant stenosis, aneurysmal dilatation, or dissection.    Extracranial vertebral circulation: No hemodynamically significant stenosis, aneurysmal dilatation, or dissection.    Intracranial Arteries: No focal high-grade stenosis, occlusion, or aneurysm.    Skull/Extracranial Contents (limited evaluation): Bone marrow signal intensity is normal.                               MRA Brain without contrast (Final result)  Result time 08/21/22 20:50:54    Final result by Chandu Sharma MD (08/21/22 20:50:54)                 Impression:      No acute intracranial abnormality.    No significant arterial abnormalities.      Electronically signed by: Chandu Sharma  Date:    08/21/2022  Time:    20:50             Narrative:    EXAMINATION:  MRI BRAIN WITHOUT CONTRAST; MRA BRAIN WITHOUT CONTRAST; MRA NECK WITHOUT CONTRAST    CLINICAL HISTORY:  Headache, new or worsening, neuro deficit (Age 19-49y);; Neuro deficit, acute, stroke suspected;    TECHNIQUE:  Routine MRI brain without contrast, noncontrast MRA of the brain,  and noncontrast MRA of the neck. Multiplanar multisequence MR imaging of the brain was performed without contrast. By separate acquisition, noncontrast MR angiography was performed of the intracranial vasculature with 3D time-of-flight technique through the [place] of [place], with MIP reformatting. Non-contrast 2D and/or 3D time-of-flight MR angiography of the neck was performed, with MIP reformatting.    COMPARISON:  None    FINDINGS:  Intracranial Compartment:    Ventricles and sulci are normal in size for age without evidence of hydrocephalus. No extra-axial blood or fluid collections.    The brain parenchyma appears normal. No mass lesion, acute hemorrhage, edema, or acute infarct.    Aorta: Normal 3 vessel arch.    Extracranial carotid circulation: No hemodynamically significant stenosis, aneurysmal dilatation, or dissection.    Extracranial vertebral circulation: No hemodynamically significant stenosis, aneurysmal dilatation, or dissection.    Intracranial Arteries: No focal high-grade stenosis, occlusion, or aneurysm.    Skull/Extracranial Contents (limited evaluation): Bone marrow signal intensity is normal.                               MRI Brain Without Contrast (Final result)  Result time 08/21/22 20:50:54    Final result by Chandu Sharma MD (08/21/22 20:50:54)                 Impression:      No acute intracranial abnormality.    No significant arterial abnormalities.      Electronically signed by: Chandu Sharma  Date:    08/21/2022  Time:    20:50             Narrative:    EXAMINATION:  MRI BRAIN WITHOUT CONTRAST; MRA BRAIN WITHOUT CONTRAST; MRA NECK WITHOUT CONTRAST    CLINICAL HISTORY:  Headache, new or worsening, neuro deficit (Age 19-49y);; Neuro deficit, acute, stroke suspected;    TECHNIQUE:  Routine MRI brain without contrast, noncontrast MRA of the brain, and noncontrast MRA of the neck. Multiplanar multisequence MR imaging of the brain was performed without contrast. By separate  acquisition, noncontrast MR angiography was performed of the intracranial vasculature with 3D time-of-flight technique through the [place] of [place], with MIP reformatting. Non-contrast 2D and/or 3D time-of-flight MR angiography of the neck was performed, with MIP reformatting.    COMPARISON:  None    FINDINGS:  Intracranial Compartment:    Ventricles and sulci are normal in size for age without evidence of hydrocephalus. No extra-axial blood or fluid collections.    The brain parenchyma appears normal. No mass lesion, acute hemorrhage, edema, or acute infarct.    Aorta: Normal 3 vessel arch.    Extracranial carotid circulation: No hemodynamically significant stenosis, aneurysmal dilatation, or dissection.    Extracranial vertebral circulation: No hemodynamically significant stenosis, aneurysmal dilatation, or dissection.    Intracranial Arteries: No focal high-grade stenosis, occlusion, or aneurysm.    Skull/Extracranial Contents (limited evaluation): Bone marrow signal intensity is normal.                                 Medications   ketorolac injection 15 mg (has no administration in time range)   diphenhydrAMINE injection 25 mg (has no administration in time range)   QUEtiapine tablet 400 mg (has no administration in time range)   traZODone tablet 100 mg (has no administration in time range)   pregabalin capsule 200 mg (has no administration in time range)   butalbital-acetaminophen-caffeine -40 mg per tablet 1 tablet (has no administration in time range)   atorvastatin tablet 20 mg (has no administration in time range)   albuterol-ipratropium 2.5 mg-0.5 mg/3 mL nebulizer solution 3 mL (has no administration in time range)   clonazePAM tablet 1 mg (has no administration in time range)   valsartan tablet 40 mg (has no administration in time range)   glucose chewable tablet 16 g (has no administration in time range)   glucose chewable tablet 24 g (has no administration in time range)   glucagon (human  recombinant) injection 1 mg (has no administration in time range)   dextrose 10% bolus 125 mL (has no administration in time range)   dextrose 10% bolus 250 mL (has no administration in time range)   insulin aspart U-100 pen 0-5 Units (has no administration in time range)   acetaminophen tablet 650 mg (has no administration in time range)   sodium chloride 0.9% flush 10 mL (has no administration in time range)   magnesium sulfate in dextrose IVPB (premix) 1 g (0 g Intravenous Stopped 8/21/22 2229)   loperamide capsule 2 mg (2 mg Oral Given 8/21/22 2151)     Medical Decision Making:   History:   Old Medical Records: I decided to obtain old medical records.  Old Records Summarized: records from previous admission(s).  Initial Assessment:   Ms. Lane is a 47 y/o with HTN, JHLD, JEANIE, CHF, episodic migraine without aura, bipolar disorder, PTSD, presents as a transfer from [facility] for an MRI as well as vascular neurology evaluation.  Differential Diagnosis:   -TIA  -CVA  -hypoglycemia  -seizure  -electrolyte abnormalities  -polypharmacy  -Thyroid abnormalities      Clinical Tests:   Lab Tests: Ordered and Reviewed  Radiological Study: Ordered and Reviewed  ED Management:  Patient was examined,  MRI was obtained which did not show significant findings.  Discussion was had with vascular neurology who felt that patient's vision changes was likely due to a complex migraine and they felt that patient should come in for observation and further workup.  Discussion was had with ophthalmology who stated that they would evaluate the patient.  Discussion was had with hospital medicine and patient was admitted to the service.             ED Course as of 08/21/22 2304   Sun Aug 21, 2022   2133 Ophthalmology will come see patient [OO]      ED Course User Index  [OO] Vickie Saez MD             Clinical Impression:   Final diagnoses:  [I63.9] Stroke  [H53.9] Vision changes          ED Disposition Condition    Observation                Vickie Saez MD  Resident  08/21/22 3042

## 2022-08-22 NOTE — ASSESSMENT & PLAN NOTE
Ms. Lane is a 46 year old female who presents to [facility] as transfer from [facility] for MRI/MRA to further evaluate her acute visual symptoms. She presented to OSH with acute onset HA and painful bilateral vision loss that began 20-30 minutes prior to arrival there. She was given NIHSS 2 at OSH, notable for complete bilateral visual loss, but inconsistent neuro exam per telestroke note. [facility] OSH without acute abnormality. Given her risk factors, acute neurovascular insult could not be excluded, however, complicated migraine is also a consideration. On arrival to [facility], patient taken to MRI for STAT MRI/MRA. Patient reports blurred vision in both eyes as well as some subjective left upper extremity tingling. She also complains of ongoing headache. Vascular neurology evaluated patient and given patient's negative imaging and persisting symptoms, it is less likely that etiology of current symptoms are due to acute cerebrovascular origin. Due to low suspicion for cerebrovascular etiology, it is recommended to explore other possible diagnoses. Recommend obtaining Ophthalmology consult and Psych consult considering past medical history. IV Mag for HA. Recommend admission overnight for observation with medicine team if possible to complete workup/consults.       -MRI without evidence of acute ischemic event. MRA without evidence of vessel occlusion.  -Labs largely unremarkable.  -UA and UDS ordered.  -Neuro checks  -Ophthalmology consult, appreciate recs.  -Psych consult, appreciate recs.   WDL

## 2022-08-22 NOTE — NURSING TRANSFER
Nursing Transfer Note    Pt arrived from the er via wc accompanied by transporter and .Arrived on Telemetry,NSR.aaox4.resp even and non labored.pt reports blurry vision.very mild left sided weakness noted,pt states residual from a previous stroke.c/o a headache upon arrival.safety precautions implemented.allergy and fall bracelets applied.bed in low position.rails up x2.call bell in reach.

## 2022-08-22 NOTE — ED TRIAGE NOTES
"Eva Lane, a 46 y.o. female presents to the ED w/ complaint of blurry vision and headache. Pt reports PMH of stroke where she had the same presenting symptoms. No unilateral weakness/sensory changes. Pt reports that her left side of her body "feels heavy" but strength is equal. Pt ambulatory to restroom on arrival to ED.     Triage note:  Chief Complaint   Patient presents with    transfer     Transfer from [place] for MRI for possible stroke. Pt presents with blurry vision and HA. No unilateral weakness, no facial droop, speech clear. LKN 12 pm.     Review of patient's allergies indicates:   Allergen Reactions    Penicillins Swelling and Rash    Neosporin [neomycin-bacitracin-polymyxin] Rash    Shellfish containing products     Shrimp Hives     Past Medical History:   Diagnosis Date    ADHD (attention deficit hyperactivity disorder)     Anxiety     Arthritis     Asthma     Behavioral problem     Bipolar 1 disorder     CHF (congestive heart failure)     COPD (chronic obstructive pulmonary disease)     Depression     Diabetes mellitus type II     Hx of psychiatric care     Hyperlipidemia     Hypertension     Lumbar radiculopathy     Neuralgia     Neuritis     Psychiatric problem     PTSD (post-traumatic stress disorder)     Seizures     Seizure-last 8/2015-shake and fall-doesnt remember anything    Self-harming behavior     Sleep apnea     on CPAP    Stroke 9/22/2015    Stroke     Therapy      "

## 2022-08-22 NOTE — ASSESSMENT & PLAN NOTE
-Patient reports continued headache despite receiving 1g IV mag in the ED.  -Will order additional 1g of IV mag and migraine cocktail of toradol and benadryl.  -PRN fiorcet

## 2022-08-22 NOTE — CONSULTS
"[facility] ([place] [address])  Psychiatry  Consult Note    Patient Name: Eva Lane  MRN: [id number]   Code Status: Full Code  Admission Date: 8/21/2022  Hospital Length of Stay: 0 days  Attending Physician: Bon Mina MD  Primary Care Provider: Cintia Gustafson NP    Current Legal Status: Uncontested    Patient information was obtained from patient and past medical records.     Inpatient consult to Psychiatry  Consult performed by: Hailey Berman MD  Consult ordered by: Ludy Miller NP           Subjective:     Principal Problem:Blurred vision, bilateral    Chief Complaint:  "I couldn't see anything"     HPI:   Eva Lane is a 46 y.o. female with a past psychiatric history of conversion disorder, bipolar disorder, PTSD, HILARIA, MDD, and borderline personality disorder, currently presenting with Blurred vision, bilateral. Psychiatry was originally consulted to address the potential psychiatric cause of painful, resolving vision loss.      Per Primary Team:  Eva Lane is a 46 y.o. female with a PMHx of HTN, HLD, tobacco abuse, CHF (EF:55-60%), episodic migraine with aura, bipolar, and PTSD who [place] [facility] [facility] for MRI/MRA to further evaluate her acute visual symptoms. She presented to OSH with acute onset HA, dizziness, and painful bilateral vision loss that began 20-30 minutes prior to arrival there. She was given NIHSS 2 at OSH, notable for complete bilateral visual loss, but inconsistent neuro exam per telestroke note. [facility] OSH without acute abnormality. On arrival to C, patient taken to MRI for STAT MRI/MRA. The patient endorses continued headache that she describes as sharp pain across the front of her head and behind both eyes, rates pain 9/10. Denies aggravating or alleviating factors. Patient reports blurred vision in both eyes although improved from earlier, as well as some as tingling to her left hand. She denies any " weakness. The patient also reports an episode of diarrhea in the ED about 2 hours ago. The patient denies nausea, vomiting, abdominal pain, speech difficulty, dysphagia, SOB, cough, CP, fever, chill, or dysuria.      In the ED, VSSAF. CBC and CMP unremarakble. PT/INT and PTT WNL. Lactate WNL. Triglycerides 153, HDL 33. TSH WNL. MRI without evidence of acute ischemic event. MRA without evidence of vessel occlusion. The patient received loperamide and 1g IV magnesium. Vascular neurology and given patient's negative imaging and persisting symptoms, it is less likely that etiology of current symptoms are due to acute cerebrovascular origin. Due to low suspicion for cerebrovascular etiology, it is recommended to explore other possible diagnoses. Recommend obtaining Ophthalmology consult and Psych consult considering past medical history. IV Mag for HA. Recommend admission overnight for observation with medicine team if possible to complete workup/consults.      Per Psychiatry:  Upon interview patient is alert, oriented, calm and cooperative. Reports that she came to the hospital after experiencing vision loss in both eyes. States that this has not happened before. Endorses previous psychiatric history of PTSD, depression, anxiety, and bipolar disorder diagnosed many years ago. Endorses taking Seroquel and Trazodone for sleep. Reports Vraylar for bipolar disorder. States that she has been taking these medications for the past 2-3 months. Denies current or recent depression, anxiety and associated symptoms as listed below. She is not currently being followed by an outpatient psychiatrist. Denies SI/HI/AVH. Denies any recently stressful or traumatic events.    Collateral:   none    Medical Review of Systems:    Complete review of systems performed covering Constitutional, Eyes, ENT/Mouth, Cardiovascular, Respiratory, Gastrointestinal, Genitourinary, Musculoskeletal, Skin, Neurologic, Endocrine, Heme/Lymph, and  Allergy/Immune.     Complete review of systems was negative with the exception of the following positive symptoms:  headache     Psychiatric Review of Systems-is patient experiencing or having changes in  sleep: no  appetite: no  weight: yes  energy/anergy: no  interest/pleasure/anhedonia: no  somatic symptoms: no  libido: no  anxiety/panic: no  guilty/hopelessness: no  concentration: no  S.I.B.s/risky behavior: no  any drugs: no  alcohol: no     Allergies:  Penicillins, Neosporin [neomycin-bacitracin-polymyxin], Shellfish containing products, and Shrimp    Past Medical/Surgical History:  Past Medical History:   Diagnosis Date    ADHD (attention deficit hyperactivity disorder)     Anxiety     Arthritis     Asthma     Behavioral problem     Bipolar 1 disorder     CHF (congestive heart failure)     COPD (chronic obstructive pulmonary disease)     Depression     Diabetes mellitus type II     Hx of psychiatric care     Hyperlipidemia     Hypertension     Lumbar radiculopathy     Neuralgia     Neuritis     Psychiatric problem     PTSD (post-traumatic stress disorder)     Seizures     Seizure-last 8/2015-shake and fall-doesnt remember anything    Self-harming behavior     Sleep apnea     on CPAP    Stroke 9/22/2015    Stroke     Therapy      Past Surgical History:   Procedure Laterality Date    COLONOSCOPY N/A 7/1/2016    Procedure: COLONOSCOPY;  Surgeon: Robert Hernandez MD;  Location: [address]);  Service: Endoscopy;  Laterality: N/A;  Schedule for next available CRS physician - EGD @ 8:30 with Dr. Naylor    CYST REMOVAL  2000    Fatty cyst on right face cheek and left upper arm     DILATION AND CURETTAGE OF UTERUS  2006    Miscarriage    HYSTERECTOMY  7/08    DUB - Total    OOPHORECTOMY  7/2008    TOTAL ABDOMINAL HYSTERECTOMY  7/2008    TUBAL LIGATION  2007       Past Psychiatric History:  Diagnose(s): Yes - bipolar 2, HILARIA, PTSD, MDD, conversion disorder  Previous Medication Trials:  multiple including klonipin, effexor, and prazosin   Previous Psychiatric Hospitalizations: Yes - 2 prior. Most recently over a year ago for cutting  Previous Suicide Attempts: No  History of Violence: No  Outpatient Psychiatrist: No     Social History:  Marital Status:   Children: 3   Employment Status/Info: currently employed  Education:  did not assess  Special Ed:  did not assess  Housing Status: Lives with  and 3 adult children   History of phys/sexual abuse: unknown  Access to gun: no    Substance Abuse History:  Recreational Drugs:  denies  Use of Alcohol: denied  Rehab History: no   Tobacco Use: no  Use of OTC: unknown     Legal History:  Past Charges/Incarcerations: unknown   Pending charges: unknown     Family Psychiatric History:   none    Psychosocial Stressors: none   Functioning Relationships: good support system, good relationship with spouse or significant other, and good relationship with children       Hospital Course: No notes on file         Patient History               Medical as of 8/22/2022       Past Medical History       Diagnosis Date Comments Source    ADHD (attention deficit hyperactivity disorder) -- -- Provider    Anxiety -- -- Provider    Arthritis -- -- Provider    Asthma -- -- Provider    Behavioral problem -- -- Provider    Bipolar 1 disorder -- -- Provider    CHF (congestive heart failure) -- -- Provider    COPD (chronic obstructive pulmonary disease) -- -- Provider    Depression -- -- Provider    Diabetes mellitus type II -- -- Provider    Hx of psychiatric care -- -- Provider    Hyperlipidemia -- -- Provider    Hypertension -- -- Provider    Lumbar radiculopathy -- -- Provider    Neuralgia -- -- Provider    Neuritis -- -- Provider    Psychiatric problem -- -- Provider    PTSD (post-traumatic stress disorder) -- -- Provider    Seizures -- Seizure-last 8/2015-shake and fall-doesnt remember anything Provider    Self-harming behavior -- -- Provider    Sleep apnea -- on  CPAP Provider    Stroke 9/22/2015 -- Provider    Stroke -- -- Provider    Therapy -- -- Provider              Pertinent Negatives       Diagnosis Date Noted Comments Source    Abnormal Pap smear of vagina 02/03/2016 -- Provider    Anticoagulant long-term use 09/09/2019 -- Provider    Cancer 09/09/2019 -- Provider    Coronary artery disease 09/09/2019 -- Provider    Difficult intubation 09/09/2019 -- Provider    Encounter for blood transfusion 09/09/2019 -- Provider    General anesthetics causing adverse effect in therapeutic use 09/09/2019 -- Provider    Hypotension, iatrogenic 09/09/2019 -- Provider    Malignant hyperthermia 09/09/2019 -- Provider    PONV (postoperative nausea and vomiting) 09/09/2019 -- Provider    Pseudocholinesterase deficiency 09/09/2019 -- Provider    Respiratory distress 09/09/2019 -- Provider    Thyroid disease 09/09/2019 -- Provider    Transfusion reaction 09/09/2019 -- Provider                          Surgical as of 8/22/2022       Past Surgical History       Procedure Laterality Date Comments Source    OOPHORECTOMY -- 7/2008 -- Provider    TUBAL LIGATION -- 2007 -- Provider    DILATION AND CURETTAGE OF UTERUS -- 2006 Miscarriage Provider    HYSTERECTOMY -- 7/08 DUB - Total Provider    TOTAL ABDOMINAL HYSTERECTOMY -- 7/2008 -- Provider    CYST REMOVAL -- 2000 Fatty cyst on right face cheek and left upper arm  Provider    COLONOSCOPY N/A 7/1/2016 Procedure: COLONOSCOPY;  Surgeon: Robert Hernandez MD;  Location: [address];  Service: Endoscopy;  Laterality: N/A;  Schedule for next available CRS physician - EGD @ 8:30 with Dr. Naylor Provider                          Family as of 8/22/2022       Problem Relation Name Age of Onset Comments Source    Heart disease Father -- -- -- Provider    Hyperlipidemia Father -- -- -- Provider    Hypertension Father -- -- -- Provider    Diabetes Father -- -- -- Provider    Dementia Father -- -- -- Provider    Breast cancer Neg Hx -- -- -- Provider     Colon cancer Neg Hx -- -- -- Provider    Ovarian cancer Neg Hx -- -- -- Provider                  Tobacco Use as of 8/22/2022       Smoking Status Smoking Start Date Smoking Quit Date Packs/Day Years Used    Former Smoker 7/17/1986 8/1/2022 0.10 26.00      Types Comments Smokeless Tobacco Status Smokeless Tobacco Quit Date Source     Cigarettes told about [facility] smoking cessation program-given smoking clinic pamphlet Current User -- Provider                  Alcohol Use as of 8/22/2022       Alcohol Use Drinks/Week Alcohol/Week Comments Source    No   -- -- Provider                  Drug Use as of 8/22/2022       Drug Use Types Frequency Comments Source    No -- -- -- Provider                  Sexual Activity as of 8/22/2022       Sexually Active Birth Control Partners Comments Source    Yes Surgical Male , CAYLA, BSO Provider                  Activities of Daily Living as of 8/22/2022       Activities of Daily Living Question Response Comments Source    Patient feels they ought to cut down on drinking/drug use No -- Provider    Patient annoyed by others criticizing their drinking/drug use No -- Provider    Patient has felt bad or guilty about drinking/drug use No -- Provider    Patient has had a drink/used drugs as an eye opener in the AM No -- Provider                  Social Documentation as of 8/22/2022    None               Occupational as of 8/22/2022    None               Socioeconomic as of 8/22/2022       Marital Status Spouse Name Number of Children Years Education Education Level Preferred Language Ethnicity Race Source     -- 6 -- -- English Not  or /a White Provider                  Pertinent History       Question Response Comments    Lives with family --    Place in Birth Order 2nd --    Lives in home --    Number of Siblings 3 --    Raised by biological parents --    Legal Involvement none --    Childhood Trauma early trauma --    Criminal History of none --    Financial  Status homemaker --    Highest Level of Education unfinished highschool quitin 9th grade; trouble with reading- in special education    Does patient have access to a firearm? No  has guns n the home- locked and secured by her      Service No --    Primary Leisure Activity other drawing    Spirituality spiritual without formal affiliation --          Past Medical History:   Diagnosis Date    ADHD (attention deficit hyperactivity disorder)     Anxiety     Arthritis     Asthma     Behavioral problem     Bipolar 1 disorder     CHF (congestive heart failure)     COPD (chronic obstructive pulmonary disease)     Depression     Diabetes mellitus type II     Hx of psychiatric care     Hyperlipidemia     Hypertension     Lumbar radiculopathy     Neuralgia     Neuritis     Psychiatric problem     PTSD (post-traumatic stress disorder)     Seizures     Seizure-last 8/2015-shake and fall-doesnt remember anything    Self-harming behavior     Sleep apnea     on CPAP    Stroke 9/22/2015    Stroke     Therapy      Past Surgical History:   Procedure Laterality Date    COLONOSCOPY N/A 7/1/2016    Procedure: COLONOSCOPY;  Surgeon: Robert Hernandez MD;  Location: [address]);  Service: Endoscopy;  Laterality: N/A;  Schedule for next available CRS physician - EGD @ 8:30 with Dr. Naylor    CYST REMOVAL  2000    Fatty cyst on right face cheek and left upper arm     DILATION AND CURETTAGE OF UTERUS  2006    Miscarriage    HYSTERECTOMY  7/08    DUB - Total    OOPHORECTOMY  7/2008    TOTAL ABDOMINAL HYSTERECTOMY  7/2008    TUBAL LIGATION  2007     Family History       Problem Relation (Age of Onset)    Dementia Father    Diabetes Father    Heart disease Father    Hyperlipidemia Father    Hypertension Father          Tobacco Use    Smoking status: Former Smoker     Packs/day: 0.10     Years: 26.00     Pack years: 2.60     Types: Cigarettes     Start date: 7/17/1986     Quit date:  2022     Years since quittin.0    Smokeless tobacco: Current User    Tobacco comment: told about [facility] smoking cessation program-given smoking clinic pamphlet   Substance and Sexual Activity    Alcohol use: No    Drug use: No    Sexual activity: Yes     Partners: Male     Birth control/protection: Surgical     Comment: , CAYLA, BSO     Review of patient's allergies indicates:   Allergen Reactions    Penicillins Swelling and Rash    Neosporin [neomycin-bacitracin-polymyxin] Rash    Shellfish containing products     Shrimp Hives       Current Facility-Administered Medications on File Prior to Encounter   Medication    [COMPLETED] butalbital-acetaminophen-caffeine -40 mg per tablet 2 tablet     Current Outpatient Medications on File Prior to Encounter   Medication Sig    albuterol (PROVENTIL) 2.5 mg /3 mL (0.083 %) nebulizer solution USE ONE VIAL IN NEBULIZER EVERY 6 HOURS AS NEEDED FOR  WHEEZING.    atorvastatin (LIPITOR) 20 MG tablet SMARTSI Tablet(s) By Mouth Every Evening    blood sugar diagnostic Strp Use 1  True Metrix test strip three times per day to check blood sugar ( single use only )    blood-glucose meter (TRUE METRIX GLUCOSE METER) Misc Use three times per day to check blood sugar    butalbital-acetaminophen-caff -40 mg -40 mg Cap TAKE 1 CAPSULE BY MOUTH THREE TIMES DAILY AS NEEDED FOR HEADACHE    cariprazine (VRAYLAR) 1.5 mg Cap Take 1 capsule (1.5 mg total) by mouth once daily at 6am.    clobetasoL (TEMOVATE) 0.05 % external solution     clonazePAM (KLONOPIN) 1 MG tablet Take 1 tablet (1 mg total) by mouth 2 (two) times daily as needed for Anxiety.    epinephrine (EPIPEN 2-LARS) 0.3 mg/0.3 mL (1:1,000) AtIn Inject 0.3 mLs (0.3 mg total) into the muscle once.    insulin detemir U-100 (LEVEMIR FLEXTOUCH U-100 INSULN) 100 unit/mL (3 mL) InPn pen Inject 53 Units into the skin every evening.    insulin lispro (HUMALOG KWIKPEN INSULIN) 100 unit/mL pen  "Inject 16- 22 units twice a day with lunch and supper    ipratropium (ATROVENT) 0.02 % nebulizer solution     linaGLIPtin (TRADJENTA) 5 mg Tab tablet Take 1 tablet (5 mg total) by mouth once daily.    magnesium oxide (MAG-OX) 400 mg (241.3 mg magnesium) tablet Take 1 tablet by mouth 2 (two) times daily.    metFORMIN (GLUCOPHAGE) 1000 MG tablet Take 1 tablet (1,000 mg total) by mouth 2 (two) times daily with meals.    mupirocin (BACTROBAN) 2 % ointment Apply topically 3 (three) times daily.    pen needle, diabetic 31 gauge x 5/16" Ndle Inject 1 Stick into the skin once daily.    polyethylene glycol (GLYCOLAX) 17 gram PwPk Take 17 g by mouth daily as needed (constipation).    pregabalin (LYRICA) 200 MG Cap Take 1 capsule (200 mg total) by mouth 2 (two) times daily.    QUEtiapine (SEROQUEL) 400 MG tablet Take 1 tablet (400 mg total) by mouth every evening.    traZODone (DESYREL) 100 MG tablet Take 1 tablet (100 mg total) by mouth every evening.    TRUEPLUS LANCETS 33 gauge Misc USE 1 LANCET TO CHECK GLUCOSE THREE TIMES DAILY SINGLE USE ONLY    UNKNOWN TO PATIENT Take 20 mg by mouth 2 (two) times a day. Potassium - given by Dr. Ever hemphill (DIOVAN) 40 MG tablet Take 40 mg by mouth once daily.     Psychotherapeutics (From admission, onward)                Start     Stop Route Frequency Ordered    08/21/22 2330  QUEtiapine tablet 400 mg         -- Oral Nightly 08/21/22 2224 08/21/22 2330  traZODone tablet 100 mg         -- Oral Nightly 08/21/22 2224          Review of Systems  Strengths and Liabilities: Strength: Patient has positive support network., Strength: Patient has reasonable judgment.    Objective:     Vital Signs (Most Recent):  Temp: 98.2 °F (36.8 °C) (08/22/22 1531)  Pulse: 91 (08/22/22 1531)  Resp: 17 (08/22/22 1531)  BP: (!) 109/57 (08/22/22 1531)  SpO2: 100 % (08/22/22 1531)   Vital Signs (24h Range):  Temp:  [96.6 °F (35.9 °C)-98.7 °F (37.1 °C)] 98.2 °F (36.8 °C)  Pulse:  [] " "91  Resp:  [15-23] 17  SpO2:  [96 %-100 %] 100 %  BP: ()/(52-90) 109/57     Height: 5' 4" (162.6 cm)  Weight: 59.2 kg (130 lb 8.2 oz)  Body mass index is 22.4 kg/m².      Intake/Output Summary (Last 24 hours) at 8/22/2022 1607  Last data filed at 8/22/2022 0454  Gross per 24 hour   Intake 680 ml   Output 700 ml   Net -20 ml       Mental Status Exam:  Appearance: unremarkable, age appropriate, lying in bed  Behavior/Cooperation: normal, cooperative, eye contact normal  Speech: normal tone, normal rate, normal pitch, normal volume  Language: fluent English   Mood: "good"  Affect: full, appropriate   Thought Process: normal and logical, goal-directed  Thought Content: normal, no suicidality, no homicidality, delusions, or paranoia   Associations: intact   Orientation: grossly intact  Memory: Grossly intact  Attention Span/Concentration: Grossly intact  Insight: fair  Judgment: fair     Significant Labs: All pertinent labs within the past 24 hours have been reviewed.    Significant Imaging: I have reviewed all pertinent imaging results/findings within the past 24 hours.    Assessment/Plan:     * Blurred vision, bilateral  ASSESSMENT     Eva Lane is a 46 y.o. female with a past psychiatric history of conversion disorder, bipolar disorder, PTSD, HILARIA, MDD, and borderline personality disorder, currently presenting with Blurred vision, bilateral. Psychiatry was originally consulted to address the potential psychiatric cause of painful, resolving vision loss. Denies current or recent depression, anxiety and associated symptoms as listed below. Endorses compliance with psychiatric medication. Denies any significant life stressors currently. Objectively, she is not displaying any signs/symptoms of ongoing psychosis or yves (no flight of ideas, disorganization, tangentiality, distractibility, psychomotor agitation, responses to internal stimuli). Patient could possibly be experiencing symptoms of functional " neurological disorder. Her symptoms could also possibly be a component of her migraines. No acute psychiatric intervention warranted at this time. Patient is clear from a psychiatric standpoint.       IMPRESSION  R/o functional neurological symptom disorder   Hx of HILARIA  Hx of PTSD  Hx of MDD  Hx of Bipolar disorder   Hx of Borderline personality disorder     RECOMMENDATION(S)      -Continue Seroquel 400 mg qhs and Trazodone 100 mg qhs   -Recommend outpatient psychiatry referral   -Recommend follow up with headache specialist        Psychiatry will sign off at this time. Please contact us if any additional psychiatric concerns arise.     Total Time:  45 minutes     Hailey Berman MD   Psychiatry  [facility] - Tele[facility] ([place])

## 2022-08-22 NOTE — H&P
[facility] - Emergency Dept  [facility] Medicine  History & Physical    Patient Name: Eva Lane  MRN: [id number]  Patient Class: OP- Observation  Admission Date: 8/21/2022  Attending Physician: Bon Mina MD   Primary Care Provider: Cintia Gustafson NP         Patient information was obtained from patient, past medical records and ER records.     Subjective:     Principal Problem:Blurred vision, bilateral    Chief Complaint:   Chief Complaint   Patient presents with    transfer     Transfer from [place] for MRI for possible stroke. Pt presents with blurry vision and HA. No unilateral weakness, no facial droop, speech clear. LKN 12 pm.        HPI: Eva Lane is a 46 y.o. female with a PMHx of HTN, HLD, tobacco abuse, CHF (EF:55-60%), episodic migraine with aura, bipolar, and PTSD who presents to [facility] as transfer from [facility] for MRI/MRA to further evaluate her acute visual symptoms. She presented to OSH with acute onset HA, dizziness, and painful bilateral vision loss that began 20-30 minutes prior to arrival there. She was given NIHSS 2 at OSH, notable for complete bilateral visual loss, but inconsistent neuro exam per telestroke note. [facility]TH OSH without acute abnormality. On arrival to [facility], patient taken to MRI for STAT MRI/MRA. The patient endorses continued headache that she describes as sharp pain across the front of her head and behind both eyes, rates pain 9/10. Denies aggravating or alleviating factors. Patient reports blurred vision in both eyes although improved from earlier, as well as some as tingling to her left hand. She denies any weakness. The patient also reports an episode of diarrhea in the ED about 2 hours ago. The patient denies nausea, vomiting, abdominal pain, speech difficulty, dysphagia, SOB, cough, CP, fever, chill, or dysuria.     In the ED, VSSAF. CBC and CMP unremarakble. PT/INT and PTT WNL. Lactate WNL. Triglycerides 153, HDL 33. TSH WNL. MRI without evidence of  acute ischemic event. MRA without evidence of vessel occlusion. The patient received loperamide and 1g IV magnesium. Vascular neurology and given patient's negative imaging and persisting symptoms, it is less likely that etiology of current symptoms are due to acute cerebrovascular origin. Due to low suspicion for cerebrovascular etiology, it is recommended to explore other possible diagnoses. Recommend obtaining Ophthalmology consult and Psych consult considering past medical history. IV Mag for HA. Recommend admission overnight for observation with medicine team if possible to complete workup/consults.        Past Medical History:   Diagnosis Date    ADHD (attention deficit hyperactivity disorder)     Anxiety     Arthritis     Asthma     Behavioral problem     Bipolar 1 disorder     CHF (congestive heart failure)     COPD (chronic obstructive pulmonary disease)     Depression     Diabetes mellitus type II     Hx of psychiatric care     Hyperlipidemia     Hypertension     Lumbar radiculopathy     Neuralgia     Neuritis     Psychiatric problem     PTSD (post-traumatic stress disorder)     Seizures     Seizure-last 8/2015-shake and fall-doesnt remember anything    Self-harming behavior     Sleep apnea     on CPAP    Stroke 9/22/2015    Stroke     Therapy        Past Surgical History:   Procedure Laterality Date    COLONOSCOPY N/A 7/1/2016    Procedure: COLONOSCOPY;  Surgeon: Robert Hernandez MD;  Location: [facility] ([address] [place] [address]);  Service: Endoscopy;  Laterality: N/A;  Schedule for next available CRS physician - EGD @ 8:30 with Dr. Naylor    CYST REMOVAL  2000    Fatty cyst on right face cheek and left upper arm     DILATION AND CURETTAGE OF UTERUS  2006    Miscarriage    HYSTERECTOMY  7/08    DUB - Total    OOPHORECTOMY  7/2008    TOTAL ABDOMINAL HYSTERECTOMY  7/2008    TUBAL LIGATION  2007       Review of patient's allergies indicates:   Allergen Reactions    Penicillins Swelling and Rash     Neosporin [neomycin-bacitracin-polymyxin] Rash    Shellfish containing products     Shrimp Hives       Current Facility-Administered Medications on File Prior to Encounter   Medication    [COMPLETED] butalbital-acetaminophen-caffeine -40 mg per tablet 2 tablet    [COMPLETED] sodium chloride 0.9% bolus 1,000 mL    [COMPLETED] sodium chloride 0.9% bolus 1,000 mL     Current Outpatient Medications on File Prior to Encounter   Medication Sig    albuterol (PROVENTIL) 2.5 mg /3 mL (0.083 %) nebulizer solution USE ONE VIAL IN NEBULIZER EVERY 6 HOURS AS NEEDED FOR  WHEEZING.    atorvastatin (LIPITOR) 20 MG tablet SMARTSI Tablet(s) By Mouth Every Evening    blood sugar diagnostic Strp Use 1  True Metrix test strip three times per day to check blood sugar ( single use only )    blood-glucose meter (TRUE METRIX GLUCOSE METER) Misc Use three times per day to check blood sugar    butalbital-acetaminophen-caff -40 mg -40 mg Cap TAKE 1 CAPSULE BY MOUTH THREE TIMES DAILY AS NEEDED FOR HEADACHE    cariprazine (VRAYLAR) 1.5 mg Cap Take 1 capsule (1.5 mg total) by mouth once daily at 6am.    clobetasoL (TEMOVATE) 0.05 % external solution     clonazePAM (KLONOPIN) 1 MG tablet Take 1 tablet (1 mg total) by mouth 2 (two) times daily as needed for Anxiety.    epinephrine (EPIPEN 2-LARS) 0.3 mg/0.3 mL (1:1,000) AtIn Inject 0.3 mLs (0.3 mg total) into the muscle once.    insulin detemir U-100 (LEVEMIR FLEXTOUCH U-100 INSULN) 100 unit/mL (3 mL) InPn pen Inject 53 Units into the skin every evening.    insulin lispro (HUMALOG KWIKPEN INSULIN) 100 unit/mL pen Inject 16- 22 units twice a day with lunch and supper    ipratropium (ATROVENT) 0.02 % nebulizer solution     linaGLIPtin (TRADJENTA) 5 mg Tab tablet Take 1 tablet (5 mg total) by mouth once daily.    magnesium oxide (MAG-OX) 400 mg (241.3 mg magnesium) tablet Take 1 tablet by mouth 2 (two) times daily.    metFORMIN (GLUCOPHAGE) 1000 MG tablet  "Take 1 tablet (1,000 mg total) by mouth 2 (two) times daily with meals.    mupirocin (BACTROBAN) 2 % ointment Apply topically 3 (three) times daily.    pen needle, diabetic 31 gauge x 5/16" Ndle Inject 1 Stick into the skin once daily.    polyethylene glycol (GLYCOLAX) 17 gram PwPk Take 17 g by mouth daily as needed (constipation).    pregabalin (LYRICA) 200 MG Cap Take 1 capsule (200 mg total) by mouth 2 (two) times daily.    QUEtiapine (SEROQUEL) 400 MG tablet Take 1 tablet (400 mg total) by mouth every evening.    traZODone (DESYREL) 100 MG tablet Take 1 tablet (100 mg total) by mouth every evening.    TRUEPLUS LANCETS 33 gauge [facility] USE 1 LANCET TO CHECK GLUCOSE THREE TIMES DAILY SINGLE USE ONLY    UNKNOWN TO PATIENT Take 20 mg by mouth 2 (two) times a day. Potassium - given by Dr. Curtis    valsartan (DIOVAN) 40 MG tablet Take 40 mg by mouth once daily.     Family History       Problem Relation (Age of Onset)    Dementia Father    Diabetes Father    Heart disease Father    Hyperlipidemia Father    Hypertension Father          Tobacco Use    Smoking status: Current Every Day Smoker     Packs/day: 0.10     Years: 26.00     Pack years: 2.60     Types: Cigarettes     Start date: 7/17/1986    Smokeless tobacco: Current User    Tobacco comment: [facility]-given smoking clinic pamphlet   Substance and Sexual Activity    Alcohol use: No    Drug use: No    Sexual activity: Yes     Partners: Male     Birth control/protection: Surgical     Comment: , CAYLA, BSO     Review of Systems   Constitutional:  Negative for appetite change, chills, diaphoresis, fatigue and fever.   HENT:  Negative for congestion, rhinorrhea and sore throat.    Eyes:  Positive for visual disturbance. Negative for photophobia.   Respiratory:  Negative for cough, shortness of breath and wheezing.    Cardiovascular:  Negative for chest pain, palpitations and leg swelling.   Gastrointestinal:  Positive " for diarrhea (1 episode of loose stool in the ED). Negative for abdominal distention, abdominal pain, nausea and vomiting.   Genitourinary:  Negative for difficulty urinating, dysuria and frequency.   Musculoskeletal:  Negative for arthralgias, myalgias and neck pain.   Skin:  Negative for color change, pallor, rash and wound.   Neurological:  Positive for dizziness, numbness (tingling to L hand) and headaches. Negative for tremors, syncope, facial asymmetry, speech difficulty, weakness and light-headedness.   Psychiatric/Behavioral:  Negative for agitation, confusion and hallucinations.    Objective:     Vital Signs (Most Recent):  Temp: 98.5 °F (36.9 °C) (08/21/22 1908)  Pulse: 89 (08/21/22 2116)  Resp: 18 (08/21/22 2116)  BP: 134/69 (08/21/22 2116)  SpO2: 100 % (08/21/22 2116) Vital Signs (24h Range):  Temp:  [98 °F (36.7 °C)-98.5 °F (36.9 °C)] 98.5 °F (36.9 °C)  Pulse:  [69-90] 89  Resp:  [15-23] 18  SpO2:  [98 %-100 %] 100 %  BP: ()/(50-90) 134/69     Weight: 52.2 kg (115 lb)  Body mass index is 19.74 kg/m².    Physical Exam  Vitals and nursing note reviewed.   Constitutional:       Appearance: She is not toxic-appearing or diaphoretic.   HENT:      Head: Normocephalic and atraumatic.      Nose: Nose normal.      Mouth/Throat:      Mouth: Mucous membranes are moist.   Eyes:      General: Visual field deficit present.      Pupils: Pupils are equal, round, and reactive to light.   Cardiovascular:      Rate and Rhythm: Normal rate and regular rhythm.      Pulses: Normal pulses.   Pulmonary:      Effort: Pulmonary effort is normal. No respiratory distress.      Breath sounds: No wheezing, rhonchi or rales.   Abdominal:      General: Bowel sounds are normal. There is no distension.      Palpations: Abdomen is soft.      Tenderness: There is no abdominal tenderness. There is no guarding.   Musculoskeletal:         General: No swelling, tenderness or deformity. Normal range of motion.      Cervical back: Normal  range of motion. No tenderness.   Skin:     General: Skin is warm and dry.      Capillary Refill: Capillary refill takes less than 2 seconds.   Neurological:      General: No focal deficit present.      Mental Status: She is alert and oriented to person, place, and time.      Cranial Nerves: No dysarthria or facial asymmetry.      Sensory: No sensory deficit.      Motor: No weakness, tremor or pronator drift.         CRANIAL NERVES     CN III, IV, VI   Pupils are equal, round, and reactive to light.     Significant Labs: All pertinent labs within the past 24 hours have been reviewed.  CBC:   Recent Labs   Lab 08/21/22  1257   WBC 10.64   HGB 13.9   HCT 41.1        CMP:   Recent Labs   Lab 08/21/22  1257      K 3.7      CO2 25   *   BUN 20   CREATININE 0.9   CALCIUM 9.5   PROT 6.8   ALBUMIN 3.8   BILITOT 0.7   ALKPHOS 71   AST 16   ALT 22   ANIONGAP 11     Coagulation:   Recent Labs   Lab 08/21/22  1257   INR 1.2   APTT 24.2     Lactic Acid:   Recent Labs   Lab 08/21/22  1257   LACTATE 1.2     Lipid Panel:   Recent Labs   Lab 08/21/22 1929   CHOL 142   HDL 33*   LDLCALC 78.4   TRIG 153*   CHOLHDL 23.2     TSH:   Recent Labs   Lab 08/21/22 1929   TSH 0.490       Significant Imaging: I have reviewed all pertinent imaging results/findings within the past 24 hours.    Imaging Results              MRA Neck without contrast (Final result)  Result time 08/21/22 20:50:54      Final result by Chandu Sharma MD (08/21/22 20:50:54)                   Impression:      No acute intracranial abnormality.    No significant arterial abnormalities.      Electronically signed by: Chandu Sharma  Date:    08/21/2022  Time:    20:50               Narrative:    EXAMINATION:  MRI BRAIN WITHOUT CONTRAST; MRA BRAIN WITHOUT CONTRAST; MRA NECK WITHOUT CONTRAST    CLINICAL HISTORY:  Headache, new or worsening, neuro deficit (Age 19-49y);; Neuro deficit, acute, stroke suspected;    TECHNIQUE:  Routine MRI brain  without contrast, noncontrast MRA of the brain, and noncontrast MRA of the neck. Multiplanar multisequence MR imaging of the brain was performed without contrast. By separate acquisition, noncontrast MR angiography was performed of the intracranial vasculature with 3D time-of-flight technique through the [place] of [place], with MIP reformatting. Non-contrast 2D and/or 3D time-of-flight MR angiography of the neck was performed, with MIP reformatting.    COMPARISON:  None    FINDINGS:  Intracranial Compartment:    Ventricles and sulci are normal in size for age without evidence of hydrocephalus. No extra-axial blood or fluid collections.    The brain parenchyma appears normal. No mass lesion, acute hemorrhage, edema, or acute infarct.    Aorta: Normal 3 vessel arch.    Extracranial carotid circulation: No hemodynamically significant stenosis, aneurysmal dilatation, or dissection.    Extracranial vertebral circulation: No hemodynamically significant stenosis, aneurysmal dilatation, or dissection.    Intracranial Arteries: No focal high-grade stenosis, occlusion, or aneurysm.    Skull/Extracranial Contents (limited evaluation): Bone marrow signal intensity is normal.                                       MRA Brain without contrast (Final result)  Result time 08/21/22 20:50:54      Final result by Chandu Sharma MD (08/21/22 20:50:54)                   Impression:      No acute intracranial abnormality.    No significant arterial abnormalities.      Electronically signed by: Chandu Sharma  Date:    08/21/2022  Time:    20:50               Narrative:    EXAMINATION:  MRI BRAIN WITHOUT CONTRAST; MRA BRAIN WITHOUT CONTRAST; MRA NECK WITHOUT CONTRAST    CLINICAL HISTORY:  Headache, new or worsening, neuro deficit (Age 19-49y);; Neuro deficit, acute, stroke suspected;    TECHNIQUE:  Routine MRI brain without contrast, noncontrast MRA of the brain, and noncontrast MRA of the neck. Multiplanar multisequence MR imaging of  the brain was performed without contrast. By separate acquisition, noncontrast MR angiography was performed of the intracranial vasculature with 3D time-of-flight technique through the [place] of [place], with MIP reformatting. Non-contrast 2D and/or 3D time-of-flight MR angiography of the neck was performed, with MIP reformatting.    COMPARISON:  None    FINDINGS:  Intracranial Compartment:    Ventricles and sulci are normal in size for age without evidence of hydrocephalus. No extra-axial blood or fluid collections.    The brain parenchyma appears normal. No mass lesion, acute hemorrhage, edema, or acute infarct.    Aorta: Normal 3 vessel arch.    Extracranial carotid circulation: No hemodynamically significant stenosis, aneurysmal dilatation, or dissection.    Extracranial vertebral circulation: No hemodynamically significant stenosis, aneurysmal dilatation, or dissection.    Intracranial Arteries: No focal high-grade stenosis, occlusion, or aneurysm.    Skull/Extracranial Contents (limited evaluation): Bone marrow signal intensity is normal.                                       MRI Brain Without Contrast (Final result)  Result time 08/21/22 20:50:54      Final result by Chandu Sharma MD (08/21/22 20:50:54)                   Impression:      No acute intracranial abnormality.    No significant arterial abnormalities.      Electronically signed by: Chandu Sharma  Date:    08/21/2022  Time:    20:50               Narrative:    EXAMINATION:  MRI BRAIN WITHOUT CONTRAST; MRA BRAIN WITHOUT CONTRAST; MRA NECK WITHOUT CONTRAST    CLINICAL HISTORY:  Headache, new or worsening, neuro deficit (Age 19-49y);; Neuro deficit, acute, stroke suspected;    TECHNIQUE:  Routine MRI brain without contrast, noncontrast MRA of the brain, and noncontrast MRA of the neck. Multiplanar multisequence MR imaging of the brain was performed without contrast. By separate acquisition, noncontrast MR angiography was performed of the  intracranial vasculature with 3D time-of-flight technique through the [place] of [place], with MIP reformatting. Non-contrast 2D and/or 3D time-of-flight MR angiography of the neck was performed, with MIP reformatting.    COMPARISON:  None    FINDINGS:  Intracranial Compartment:    Ventricles and sulci are normal in size for age without evidence of hydrocephalus. No extra-axial blood or fluid collections.    The brain parenchyma appears normal. No mass lesion, acute hemorrhage, edema, or acute infarct.    Aorta: Normal 3 vessel arch.    Extracranial carotid circulation: No hemodynamically significant stenosis, aneurysmal dilatation, or dissection.    Extracranial vertebral circulation: No hemodynamically significant stenosis, aneurysmal dilatation, or dissection.    Intracranial Arteries: No focal high-grade stenosis, occlusion, or aneurysm.    Skull/Extracranial Contents (limited evaluation): Bone marrow signal intensity is normal.                                        Assessment/Plan:     * Blurred vision, bilateral  Ms. Lane is a 46 year old female who presents to [facility] as transfer from [facility] for MRI/MRA to further evaluate her acute visual symptoms. She presented to OSH with acute onset HA and painful bilateral vision loss that began 20-30 minutes prior to arrival there. She was given NIHSS 2 at OSH, notable for complete bilateral visual loss, but inconsistent neuro exam per telestroke note. [facility] OSH without acute abnormality. Given her risk factors, acute neurovascular insult could not be excluded, however, complicated migraine is also a consideration. On arrival to [facility], patient taken to MRI for STAT MRI/MRA. Patient reports blurred vision in both eyes as well as some subjective left upper extremity tingling. She also complains of ongoing headache. Vascular neurology evaluated patient and given patient's negative imaging and persisting symptoms, it is less likely that etiology of current symptoms are due to  acute cerebrovascular origin. Due to low suspicion for cerebrovascular etiology, it is recommended to explore other possible diagnoses. Recommend obtaining Ophthalmology consult and Psych consult considering past medical history. IV Mag for HA. Recommend admission overnight for observation with medicine team if possible to complete workup/consults.       -MRI without evidence of acute ischemic event. MRA without evidence of vessel occlusion.  -Labs largely unremarkable.  -UA and UDS ordered.  -Neuro checks  -Ophthalmology consult, appreciate recs.  -Psych consult, appreciate recs.    CHF (congestive heart failure)  Patient reported. Patient is not on BB or diuretic. Last ECHO in 2015 with EF of 55-60% and normal diastolic function.  -Appears euvolemic on exam.  -Cardiac diet.  -Daily weight.    Bipolar 2 disorder  HILARIA (generalized anxiety disorder)  PTSD (post-traumatic stress disorder)  MDD (major depressive disorder), recurrent episode, moderate  -Chronic, controlled.  -Patient on vraylar, seroquel, and trazadone.  -Vraylar not on formularly, so notified patient she could take home medication.  -Continue seroquel and trazadone.     Nonintractable chronic migraine  -Patient reports continued headache despite receiving 1g IV mag in the ED.  -Will order additional 1g of IV mag and migraine cocktail of toradol and benadryl.  -PRN fiorcet    Type 2 diabetes mellitus with neurologic complication, without long-term current use of insulin  Patient's FSGs are controlled on current medication regimen.  Last A1c reviewed-   Lab Results   Component Value Date    HGBA1C 6.5 (H) 05/19/2022     Most recent fingerstick glucose reviewed-   Recent Labs   Lab 08/21/22  1220 08/21/22  1926 08/21/22  2119   POCTGLUCOSE 164* 56* 111*     Current correctional scale  Low  Maintain anti-hyperglycemic dose as follows-   Antihyperglycemics (From admission, onward)            Start     Stop Route Frequency Ordered    08/21/22 8472  insulin  aspart U-100 pen 0-5 Units         -- SubQ Before meals & nightly PRN 08/21/22 2226        Hold Oral hypoglycemics while patient is in the hospital.  -Accuchecks AC/HS  -Diabetic/cardiac diet.    Essential hypertension  History noted. Patient reports she is no longer on valsartan.  -Monitor BP closely.    DDD (degenerative disc disease), cervical  -Chronic, stable.  -Continue lyrica 200mg BID.    Tobacco use  -Patient reports she quit smoking 1 month ago.  -Encouraged continued cessation.    Mixed hyperlipidemia  -Previously on atorvastatin. Triglycerides 153, HDL 33  -Restart atorvastatin 20mg.    VTE Risk Mitigation (From admission, onward)         Ordered     IP VTE LOW RISK PATIENT  Once         08/21/22 2229     Place sequential compression device  Until discontinued         08/21/22 2229                   Ludy Miller NP  Department of Hospital Medicine   [facility] - Emergency Dept

## 2022-08-22 NOTE — CONSULTS
[facility] - Emergency Dept  Vascular Neurology  [facility]  Consult Note    Inpatient consult to Vascular (Stroke) Neurology  Consult performed by: Amara Roberts PA-C  Consult ordered by: Vickie Saez MD        Assessment/Plan:     Patient is a 46 y.o. year old female with:    Blurred vision, bilateral  Ms. Lane is a 46 year old female who presents to [facility] as transfer from [facility] for MRI/MRA to further evaluate her acute visual symptoms. Patient with PMH of HTN, HLD, JEANIE, CHF, episodic migraine without aura, bipolar disorder, PTSD. She presented to OSH with acute onset HA and painful bilateral vision loss that began 20-30 minutes prior to arrival there. She was given NIHSS 2 at OSH, notable for complete bilateral visual loss, but inconsistent neuro exam per telestroke note. [facility] OSH without acute abnormality. Given her risk factors, acute neurovascular insult could not be excluded, however, complicated migraine is also a consideration. Decision to defer tPA on telestroke was made on basis of symptoms inconsistent with bilateral hemanopia and with the eye pain the symptoms are also not suggestive of CRA. On arrival to [facility], patient taken to MRI for STAT MRI/MRA. Patient reports blurred vision in both eyes as well as some subjective left upper and left lower extremity weakness, though this is not fully appreciated on exam. She also complains of ongoing headache. Pending MRI results.     MRI without evidence of acute ischemic event. MRA without evidence of vessel occlusion. Given patients negative imaging and persisting symptoms, it is less likely that etiology of current symptoms are due to acute cerebrovascular origin. Due to low suspicion for cerebrovascular etiology, it is recommended to explore other possible diagnoses. Recommend obtaining Ophthalmology consult and Psych consult considering past medical history. IV Mag for HA. Recommend admission overnight for observation with  medicine team if possible to complete workup/consults. For any acute changes in neuro status, STAT CTH. Vascular Neurology will continue to follow patient on consulting service.     Essential hypertension  Stroke Risk Factor    Tobacco use  Stroke Risk Factor   on cessation when appropriate    Mixed hyperlipidemia  Stroke Risk Factor        STROKE DOCUMENTATION     Acute Stroke Times   Last Known Normal Date: 08/21/22  Last Known Normal Time: 1200  Symptom Onset Date: 08/21/22  Symptom Onset Time: 1200  Stroke Team Called Date: 08/21/22  Stroke Team Called Time: 1235 (Telestroke)  Stroke Team Arrival Date: 08/21/22  Stroke Team Arrival Time: 1240 (Telestroke)  CT Interpretation Time: 1240  Alteplase Recommended: No (Telestroke)  Thrombectomy Recommended: No (Telestroke)    NIH Scale:  1a. Level of Consciousness: 0-->Alert, keenly responsive  1b. LOC Questions: 0-->Answers both questions correctly  1c. LOC Commands: 0-->Performs both tasks correctly  2. Best Gaze: 0-->Normal  3. Visual: 1-->Partial hemianopia  4. Facial Palsy: 0-->Normal symmetrical movements  5a. Motor Arm, Left: 0-->No drift, limb holds 90 (or 45) degrees for full 10 secs  5b. Motor Arm, Right: 0-->No drift, limb holds 90 (or 45) degrees for full 10 secs  6a. Motor Leg, Left: 0-->No drift, leg holds 30 degree position for full 5 secs  6b. Motor Leg, Right: 0-->No drift, leg holds 30 degree position for full 5 secs  7. Limb Ataxia: 0-->Absent  8. Sensory: 0-->Normal, no sensory loss  9. Best Language: 0-->No aphasia, normal  10. Dysarthria: 0-->Normal  11. Extinction and Inattention (formerly Neglect): 0-->No abnormality  Total (NIH Stroke Scale): 1    Modified [place] Score: 0  [place] Coma Scale:    ABCD2 Score:    [id number] Score:   HAS -BLED Score:   ICH Score:   Hunt & Crabtree Classification:       Thrombolysis Candidate? No, Non-disabling symptoms - Low NIHSS , Strong suspicion for stroke mimic or alternative diagnosis     Delays to  Thrombolysis?  Not Applicable    Interventional Revascularization Candidate?   Is the patient eligible for mechanical endovascular reperfusion (TASNEEM)?  No; Large core infarct on imaging , No; No large vessel occlusion identified on imaging  and No; no significant neurologic deficit (NIHSS <6)     Delays to Thrombectomy? Not Applicable    Hemorrhagic change of an Ischemic Stroke: Does this patient have an ischemic stroke with hemorrhagic changes? No     Subjective:     History of Present Illness:  Ms. Lane is a 46 year old female who presents to [facility] as transfer from [facility] for MRI/MRA to further evaluate her acute visual symptoms. Patient with PMS of HTN, HLD, JEANIE, CHF, episodic migraine without aura, bipolar disorder, PTSD. She presented to OSH with acute onset HA and painful bilateral vision loss that began 20-30 minutes prior to arrival there. She was given NIHSS 2 at OSH, notable for complete bilateral visual loss, but inconsistent neuro exam per telestroke note. C[facility] OSH without acute abnormality. Given her risk factors, acute neurovascular insult could not be excluded, however, complicated migraine is also a consideration. Decision to defer tPA on telestroke was made on basis of symptoms inconsistent with bilateral hemanopia and with the eye pain the symptoms are also not suggestive of CRA. On arrival to [facility], patient taken to MRI for STAT MRI/MRA. Patient reports blurred vision in  both eyes as well as some subjective left upper and left lower extremity weakness, though this is not fully appreciated on exam. Pending MRI results.           Past Medical History:   Diagnosis Date    ADHD (attention deficit hyperactivity disorder)     Anxiety     Arthritis     Asthma     Behavioral problem     Bipolar 1 disorder     CHF (congestive heart failure)     COPD (chronic obstructive pulmonary disease)     Depression     Diabetes mellitus type II     Hx of psychiatric care     Hyperlipidemia      Hypertension     Lumbar radiculopathy     Neuralgia     Neuritis     Psychiatric problem     PTSD (post-traumatic stress disorder)     Seizures     Seizure-last 8/2015-shake and fall-doesnt remember anything    Self-harming behavior     Sleep apnea     on CPAP    Stroke 9/22/2015    Stroke     Therapy      Past Surgical History:   Procedure Laterality Date    COLONOSCOPY N/A 7/1/2016    Procedure: COLONOSCOPY;  Surgeon: Robert Hernandez MD;  Location: [address]);  Service: Endoscopy;  Laterality: N/A;  Schedule for next available CRS physician - EGD @ 8:30 with Dr. Naylor    CYST REMOVAL  2000    Fatty cyst on right face cheek and left upper arm     DILATION AND CURETTAGE OF UTERUS  2006    Miscarriage    HYSTERECTOMY  7/08    DUB - Total    OOPHORECTOMY  7/2008    TOTAL ABDOMINAL HYSTERECTOMY  7/2008    TUBAL LIGATION  2007     Family History   Problem Relation Age of Onset    Heart disease Father     Hyperlipidemia Father     Hypertension Father     Diabetes Father     Dementia Father     Breast cancer Neg Hx     Colon cancer Neg Hx     Ovarian cancer Neg Hx      Social History     Tobacco Use    Smoking status: Current Every Day Smoker     Packs/day: 0.10     Years: 26.00     Pack years: 2.60     Types: Cigarettes     Start date: 7/17/1986    Smokeless tobacco: Current User    Tobacco comment: [facility]-given smoking clinic pamphlet   Substance Use Topics    Alcohol use: No    Drug use: No     Review of patient's allergies indicates:   Allergen Reactions    Penicillins Swelling and Rash    Neosporin [neomycin-bacitracin-polymyxin] Rash    Shellfish containing products     Shrimp Hives       Medications: I have reviewed the current medication administration record.    (Not in a hospital admission)      Review of Systems   Constitutional:  Negative for fever.   HENT:  Negative for drooling and trouble swallowing.    Eyes:  Positive for  visual disturbance (Blurred vision bilateral). Negative for redness.   Respiratory:  Negative for cough.    Cardiovascular:  Negative for chest pain and leg swelling.   Gastrointestinal:  Negative for nausea and vomiting.   Genitourinary:  Negative for dysuria.   Musculoskeletal:  Negative for arthralgias and myalgias.   Skin:  Negative for rash.   Neurological:  Positive for weakness (Subjective, left side) and headaches. Negative for facial asymmetry, speech difficulty and numbness.   Psychiatric/Behavioral:  Negative for agitation.    Objective:     Vital Signs (Most Recent):  Temp: 98.5 °F (36.9 °C) (08/21/22 1908)  Pulse: 85 (08/21/22 1908)  Resp: 18 (08/21/22 1908)  BP: (!) 142/90 (08/21/22 1908)  SpO2: 98 % (08/21/22 1908)    Vital Signs Range (Last 24H):  Temp:  [98 °F (36.7 °C)-98.5 °F (36.9 °C)]   Pulse:  [69-86]   Resp:  [15-18]   BP: ()/(50-90)   SpO2:  [98 %-100 %]     Physical Exam  Vitals reviewed.   Constitutional:       General: She is not in acute distress.     Appearance: Normal appearance.   HENT:      Head: Normocephalic and atraumatic.      Right Ear: External ear normal.      Left Ear: External ear normal.      Nose: Nose normal.   Eyes:      General: Visual field deficit present. No scleral icterus.     Extraocular Movements: Extraocular movements intact.      Pupils: Pupils are equal, round, and reactive to light.      Comments: Blurred vision left eye   Cardiovascular:      Rate and Rhythm: Normal rate.   Pulmonary:      Effort: Pulmonary effort is normal. No respiratory distress.   Abdominal:      General: There is no distension.   Musculoskeletal:      Cervical back: Normal range of motion.      Right lower leg: No edema.      Left lower leg: No edema.   Skin:     General: Skin is warm and dry.   Neurological:      Mental Status: She is alert and oriented to person, place, and time.      GCS: GCS eye subscore is 4. GCS verbal subscore is 5. GCS motor subscore is 6.      Cranial  Nerves: No dysarthria or facial asymmetry.      Sensory: No sensory deficit.      Motor: No weakness or pronator drift.   Psychiatric:         Mood and Affect: Mood normal.         Behavior: Behavior normal.       Neurological Exam:   LOC: alert  Attention Span: Good   Language: No aphasia  Articulation: No dysarthria  Orientation: Person, Place, Time   Visual Fields: Blurred vision in both eyes  EOM (CN III, IV, VI): Full/intact  Facial Movement (CN VII): Symmetric facial expression    Motor: Arm left  Paresis: 5/5  Leg left  Paresis: 5/5  Arm right  Normal 5/5  Leg right Normal 5/5  Sensation: Intact to light touch       Laboratory:  CMP:   Recent Labs   Lab 08/21/22  1257   CALCIUM 9.5   ALBUMIN 3.8   PROT 6.8      K 3.7   CO2 25      BUN 20   CREATININE 0.9   ALKPHOS 71   ALT 22   AST 16   BILITOT 0.7     CBC:   Recent Labs   Lab 08/21/22  1257   WBC 10.64   RBC 4.40   HGB 13.9   HCT 41.1      MCV 93   MCH 31.6*   MCHC 33.8     Lipid Panel: No results for input(s): CHOL, LDLCALC, HDL, TRIG in the last 168 hours.  Coagulation:   Recent Labs   Lab 08/21/22  1257   INR 1.2   APTT 24.2     Hgb A1C: No results for input(s): HGBA1C in the last 168 hours.  TSH: No results for input(s): TSH in the last 168 hours.    Diagnostic Results:      Brain/Vessel Imaging:  MRI/MRA Brain and Neck In Process    [facility] (OSH) 8/21/2022  No acute intracranial findings          Amara Roberts PA-C  [facility]  Department of Vascular Neurology   [facility] - Emergency Dept

## 2022-08-23 NOTE — PLAN OF CARE
[facility] - Telemetry Stepdown ([facility]  Discharge Final Note    Primary Care Provider: Cintia Gustafson NP    Expected Discharge Date: 8/22/2022    Patient discharged to home via personal transportation.     Patient's bedside nurse and patient notified of the above.      Final Discharge Note (most recent)       Final Note - 08/23/22 1636          Final Note    Assessment Type Final Discharge Note (P)      Anticipated Discharge Disposition Home or Self Care (P)         Post-Acute Status    Post-Acute Authorization Other (P)      Other Status No Post-Acute Service Needs (P)                      Important Message from Medicare                 Future Appointments   Date Time Provider Department Center   8/30/2022  1:00 PM Cintia Gustafson NP [facility] [place]   8/31/2022 10:00 AM Luciana Teran OD [facility]   10/7/2022  8:00 AM Cintia Gustafson NP [facility]        scheduled post-discharge follow-up appointment and information added to AVS.     Ely Salas LMSW  [facility]  Ext. [phone]

## 2022-08-24 NOTE — DISCHARGE SUMMARY
[facility] - Telemetry Stepdown ([place] [id number])  [facility] Medicine  Discharge Summary      Patient Name: Eva Lane  MRN: [id number]  Patient Class: OP- Observation  Admission Date: 8/21/2022  Hospital Length of Stay: 0 days  Discharge Date and Time: 8/22/2022  5:40 PM  Attending Physician: Bon Mina MD  Discharging Provider: Bertin Bowling PA-C  Primary Care Provider: Cintia Gustafson NP  Hospital Medicine Team: [facility] [place] HOSP MED F eBrtin Bowling PA-C    HPI:   Eva Lane is a 46 y.o. female with a PMHx of HTN, HLD, tobacco abuse, CHF (EF:55-60%), episodic migraine with aura, bipolar, and PTSD who presents to [facility] [place] as transfer from [facility] for MRI/MRA to further evaluate her acute visual symptoms. She presented to OSH with acute onset HA, dizziness, and painful bilateral vision loss that began 20-30 minutes prior to arrival there. She was given NIHSS 2 at OSH, notable for complete bilateral visual loss, but inconsistent neuro exam per telestroke note. [facility] OSH without acute abnormality. On arrival to [facility] [place], patient taken to MRI for STAT MRI/MRA. The patient endorses continued headache that she describes as sharp pain across the front of her head and behind both eyes, rates pain 9/10. Denies aggravating or alleviating factors. Patient reports blurred vision in both eyes although improved from earlier, as well as some as tingling to her left hand. She denies any weakness. The patient also reports an episode of diarrhea in the ED about 2 hours ago. The patient denies nausea, vomiting, abdominal pain, speech difficulty, dysphagia, SOB, cough, CP, fever, chill, or dysuria.     In the ED, VSSAF. CBC and CMP unremarakble. PT/INT and PTT WNL. Lactate WNL. Triglycerides 153, HDL 33. TSH WNL. MRI without evidence of acute ischemic event. MRA without evidence of vessel occlusion. The patient received loperamide and 1g IV magnesium. Vascular neurology and given patient's negative imaging and persisting  symptoms, it is less likely that etiology of current symptoms are due to acute cerebrovascular origin. Due to low suspicion for cerebrovascular etiology, it is recommended to explore other possible diagnoses. Recommend obtaining Ophthalmology consult and Psych consult considering past medical history. IV Mag for HA. Recommend admission overnight for observation with medicine team if possible to complete workup/consults.        * No surgery found *      Hospital Course:   Eva Lane was placed in  observation for transient complete loss of vision and migraine. MRI brain without evidence of acute ischemic stroke, MRA without evidence of vessel occlusion. Vascular neurology consulted, visual symptoms unlikely of acute cerebrovascular origin. Ophthalmology consulted. No disc edema or evidence on DFE or ischemia. Recommend full eye exam in clinic. Psych consulted given patient history. The patient fully regained vision after a few hours. Her migraine resolved s/p migraine cocktail, and she was able to tolerate PO intake. Given IVF for JAZMIN with full resolution. Follow up with neurology outpatient for migraine management. Given ambulatory referrals to psych, neurology and ophthalmology at discharge. Medically ready for discharge home w/ fioricet for migraines. Pt educated on hospital course and plan, verbally agrees and understands. All questions answered.        Goals of Care Treatment Preferences:  Code Status: Full Code      Consults:   Consults (From admission, onward)        Status Ordering Provider     Inpatient consult to Psychiatry  Once        Provider:  (Not yet assigned)    Completed EDIE TENORIO     Inpatient consult to Ophthalmology  Once        Provider:  (Not yet assigned)    Completed EDIE TENORIO     Inpatient consult to Vascular (Stroke) Neurology  Once        Provider:  (Not yet assigned)    Completed VENESSA SHETTY          * Blurred vision, bilateral  Ms. Lane is a 46  year old female who presents to [facility] [place] as transfer from [facility] for MRI/MRA to further evaluate her acute visual symptoms. She presented to OSH with acute onset HA and painful bilateral vision loss that began 20-30 minutes prior to arrival there. She was given NIHSS 2 at OSH, notable for complete bilateral visual loss, but inconsistent neuro exam per telestroke note. CTH OSH without acute abnormality. Given her risk factors, acute neurovascular insult could not be excluded, however, complicated migraine is also a consideration. On arrival to C, patient taken to MRI for STAT MRI/MRA. Patient reports blurred vision in both eyes as well as some subjective left upper extremity tingling. She also complains of ongoing headache. Vascular neurology evaluated patient and given patient's negative imaging and persisting symptoms, it is less likely that etiology of current symptoms are due to acute cerebrovascular origin. Due to low suspicion for cerebrovascular etiology, it is recommended to explore other possible diagnoses. Recommend obtaining Ophthalmology consult and Psych consult considering past medical history. IV Mag for HA. Recommend admission overnight for observation with medicine team if possible to complete workup/consults.       -MRI without evidence of acute ischemic event. MRA without evidence of vessel occlusion.  -Labs largely unremarkable.  -UA clean  - UDS with presumed positive barbiturates otherwise negative   -Neuro checks  -Ophthalmology consult, appreciate recs.  -Psych consult, appreciate recs.  -Regained vision without a few hours without intervention, likely from migraine  -follow up with neurology for migraine management, ophthalmology for full eye exam     Nonintractable chronic migraine  -Patient reports continued headache despite receiving 1g IV mag in the ED.  -Received 1g of IV mag and migraine cocktail of toradol and benadryl.  -PRN fiorcet   -Neurology follow up outpatient for migraine  management       Final Active Diagnoses:    Diagnosis Date Noted POA    PRINCIPAL PROBLEM:  Blurred vision, bilateral [H53.8] 08/21/2022 Yes    CHF (congestive heart failure) [I50.9] 08/21/2022 Yes    Bipolar 2 disorder [F31.81] 07/07/2022 Yes    Nonintractable chronic migraine [G43.909] 10/06/2021 Yes    Type 2 diabetes mellitus with neurologic complication, without long-term current use of insulin [E11.49] 05/05/2021 Yes    HILARIA (generalized anxiety disorder) [F41.1] 02/04/2016 Yes    MDD (major depressive disorder), recurrent episode, moderate [F33.1] 02/04/2016 Yes    PTSD (post-traumatic stress disorder) [F43.10] 02/04/2016 Yes    Essential hypertension [I10] 09/23/2015 Yes    DDD (degenerative disc disease), cervical [M50.30] 08/28/2015 Yes    Tobacco use [Z72.0] 12/20/2012 Yes    Mixed hyperlipidemia [E78.2] 12/20/2012 Yes      Problems Resolved During this Admission:       Discharged Condition: good    Disposition: Home or Self Care    Follow Up:    Patient Instructions:      Ambulatory referral/consult to Ophthalmology   Standing Status: Future   Referral Priority: Routine Referral Type: Consultation   Referral Reason: Specialty Services Required   Requested Specialty: Ophthalmology   Number of Visits Requested: 1     Ambulatory referral/consult to Neurology   Standing Status: Future   Referral Priority: Routine Referral Type: Consultation   Referral Reason: Specialty Services Required   Requested Specialty: Neurology   Number of Visits Requested: 1     Ambulatory referral/consult to Psychiatry   Standing Status: Future   Referral Priority: Routine Referral Type: Psychiatric   Referral Reason: Specialty Services Required   Requested Specialty: Psychiatry   Number of Visits Requested: 1     Notify your health care provider if you experience any of the following:  temperature >100.4     Notify your health care provider if you experience any of the following:  persistent nausea and vomiting or  diarrhea     Notify your health care provider if you experience any of the following:  severe uncontrolled pain     Notify your health care provider if you experience any of the following:  increased confusion or weakness     Notify your health care provider if you experience any of the following:  persistent dizziness, light-headedness, or visual disturbances     Notify your health care provider if you experience any of the following:  severe persistent headache     Activity as tolerated       Significant Diagnostic Studies: Labs: All labs within the past 24 hours have been reviewed    Pending Diagnostic Studies:     None         Medications:  Reconciled Home Medications:      Medication List      START taking these medications    butalbital-acetaminophen-caffeine -40 mg -40 mg per tablet  Commonly known as: FIORICET, ESGIC  Take 1 tablet by mouth every 6 (six) hours as needed for Headaches.  Replaces: butalbital-acetaminophen-caff -40 mg -40 mg Cap        CONTINUE taking these medications    albuterol 2.5 mg /3 mL (0.083 %) nebulizer solution  Commonly known as: PROVENTIL  USE ONE VIAL IN NEBULIZER EVERY 6 HOURS AS NEEDED FOR  WHEEZING.     atorvastatin 20 MG tablet  Commonly known as: LIPITOR  SMARTSI Tablet(s) By Mouth Every Evening     blood sugar diagnostic Strp  Use 1  True Metrix test strip three times per day to check blood sugar ( single use only )     blood-glucose meter Misc  Commonly known as: TRUE METRIX GLUCOSE METER  Use three times per day to check blood sugar     clobetasoL 0.05 % external solution  Commonly known as: TEMOVATE     clonazePAM 1 MG tablet  Commonly known as: KlonoPIN  Take 1 tablet (1 mg total) by mouth 2 (two) times daily as needed for Anxiety.     EPINEPHrine 0.3 mg/0.3 mL Atin  Commonly known as: EPIPEN 2-LARS  Inject 0.3 mLs (0.3 mg total) into the muscle once.     insulin lispro 100 unit/mL pen  Commonly known as: HumaLOG KwikPen Insulin  Inject 16-   "units twice a day with lunch and supper     ipratropium 0.02 % nebulizer solution  Commonly known as: ATROVENT     LEVEMIR FLEXTOUCH U-100 INSULN 100 unit/mL (3 mL) Inpn pen  Generic drug: insulin detemir U-100  Inject 53 Units into the skin every evening.     magnesium oxide 400 mg (241.3 mg magnesium) tablet  Commonly known as: MAG-OX  Take 1 tablet by mouth 2 (two) times daily.     metFORMIN 1000 MG tablet  Commonly known as: GLUCOPHAGE  Take 1 tablet (1,000 mg total) by mouth 2 (two) times daily with meals.     mupirocin 2 % ointment  Commonly known as: BACTROBAN  Apply topically 3 (three) times daily.     pen needle, diabetic 31 gauge x 5/16" Ndle  Inject 1 Stick into the skin once daily.     polyethylene glycol 17 gram Pwpk  Commonly known as: GLYCOLAX  Take 17 g by mouth daily as needed (constipation).     pregabalin 200 MG Cap  Commonly known as: LYRICA  Take 1 capsule (200 mg total) by mouth 2 (two) times daily.     QUEtiapine 400 MG tablet  Commonly known as: SEROQUEL  Take 1 tablet (400 mg total) by mouth every evening.     TRADJENTA 5 mg Tab tablet  Generic drug: linaGLIPtin  Take 1 tablet (5 mg total) by mouth once daily.     traZODone 100 MG tablet  Commonly known as: DESYREL  Take 1 tablet (100 mg total) by mouth every evening.     TRUEPLUS LANCETS 33 gauge Misc  Generic drug: lancets  USE 1 LANCET TO CHECK GLUCOSE THREE TIMES DAILY SINGLE USE ONLY     VRAYLAR 1.5 mg Cap  Generic drug: cariprazine  Take 1 capsule (1.5 mg total) by mouth once daily at 6am.        STOP taking these medications    butalbital-acetaminophen-caff -40 mg -40 mg Cap  Replaced by: butalbital-acetaminophen-caffeine -40 mg -40 mg per tablet     valsartan 40 MG tablet  Commonly known as: DIOVAN        ASK your doctor about these medications    UNKNOWN TO PATIENT  Take 20 mg by mouth 2 (two) times a day. Potassium - given by Dr. Curtis            Indwelling Lines/Drains at time of discharge: "   Lines/Drains/Airways     None                 Time spent on the discharge of patient: 36 minutes         Bertin Bowling PA-C  Department of Hospital Medicine  [facility] - Telemetry Stepdown ([place]-)

## 2022-08-25 LAB
BACTERIA BLD CULT: ABNORMAL

## 2022-08-27 LAB — BACTERIA BLD CULT: NORMAL

## 2022-08-30 ENCOUNTER — OFFICE VISIT (OUTPATIENT)
Dept: INTERNAL MEDICINE | Facility: CLINIC | Age: 47
End: 2022-08-30
Payer: MEDICAID

## 2022-08-30 VITALS
SYSTOLIC BLOOD PRESSURE: 112 MMHG | BODY MASS INDEX: 21.05 KG/M2 | WEIGHT: 123.31 LBS | HEART RATE: 66 BPM | HEIGHT: 64 IN | RESPIRATION RATE: 16 BRPM | DIASTOLIC BLOOD PRESSURE: 70 MMHG

## 2022-08-30 DIAGNOSIS — H54.7 VISUAL LOSS: Primary | ICD-10-CM

## 2022-08-30 DIAGNOSIS — E11.42 TYPE 2 DIABETES MELLITUS WITH DIABETIC POLYNEUROPATHY, WITHOUT LONG-TERM CURRENT USE OF INSULIN: ICD-10-CM

## 2022-08-30 DIAGNOSIS — Z12.39 ENCOUNTER FOR SCREENING FOR MALIGNANT NEOPLASM OF BREAST, UNSPECIFIED SCREENING MODALITY: ICD-10-CM

## 2022-08-30 PROCEDURE — 3044F HG A1C LEVEL LT 7.0%: CPT | Mod: CPTII,,, | Performed by: NURSE PRACTITIONER

## 2022-08-30 PROCEDURE — 3074F SYST BP LT 130 MM HG: CPT | Mod: CPTII,,, | Performed by: NURSE PRACTITIONER

## 2022-08-30 PROCEDURE — 3078F PR MOST RECENT DIASTOLIC BLOOD PRESSURE < 80 MM HG: ICD-10-PCS | Mod: CPTII,,, | Performed by: NURSE PRACTITIONER

## 2022-08-30 PROCEDURE — 99213 PR OFFICE/OUTPT VISIT, EST, LEVL III, 20-29 MIN: ICD-10-PCS | Mod: S$PBB,,, | Performed by: NURSE PRACTITIONER

## 2022-08-30 PROCEDURE — 4010F ACE/ARB THERAPY RXD/TAKEN: CPT | Mod: CPTII,,, | Performed by: NURSE PRACTITIONER

## 2022-08-30 PROCEDURE — 3008F PR BODY MASS INDEX (BMI) DOCUMENTED: ICD-10-PCS | Mod: CPTII,,, | Performed by: NURSE PRACTITIONER

## 2022-08-30 PROCEDURE — 3078F DIAST BP <80 MM HG: CPT | Mod: CPTII,,, | Performed by: NURSE PRACTITIONER

## 2022-08-30 PROCEDURE — 3074F PR MOST RECENT SYSTOLIC BLOOD PRESSURE < 130 MM HG: ICD-10-PCS | Mod: CPTII,,, | Performed by: NURSE PRACTITIONER

## 2022-08-30 PROCEDURE — 3008F BODY MASS INDEX DOCD: CPT | Mod: CPTII,,, | Performed by: NURSE PRACTITIONER

## 2022-08-30 PROCEDURE — 1159F MED LIST DOCD IN RCRD: CPT | Mod: CPTII,,, | Performed by: NURSE PRACTITIONER

## 2022-08-30 PROCEDURE — 99215 OFFICE O/P EST HI 40 MIN: CPT | Mod: PBBFAC,PN | Performed by: NURSE PRACTITIONER

## 2022-08-30 PROCEDURE — 99999 PR PBB SHADOW E&M-EST. PATIENT-LVL V: CPT | Mod: PBBFAC,,, | Performed by: NURSE PRACTITIONER

## 2022-08-30 PROCEDURE — 99999 PR PBB SHADOW E&M-EST. PATIENT-LVL V: ICD-10-PCS | Mod: PBBFAC,,, | Performed by: NURSE PRACTITIONER

## 2022-08-30 PROCEDURE — 4010F PR ACE/ARB THEARPY RXD/TAKEN: ICD-10-PCS | Mod: CPTII,,, | Performed by: NURSE PRACTITIONER

## 2022-08-30 PROCEDURE — 99213 OFFICE O/P EST LOW 20 MIN: CPT | Mod: S$PBB,,, | Performed by: NURSE PRACTITIONER

## 2022-08-30 PROCEDURE — 1159F PR MEDICATION LIST DOCUMENTED IN MEDICAL RECORD: ICD-10-PCS | Mod: CPTII,,, | Performed by: NURSE PRACTITIONER

## 2022-08-30 PROCEDURE — 3044F PR MOST RECENT HEMOGLOBIN A1C LEVEL <7.0%: ICD-10-PCS | Mod: CPTII,,, | Performed by: NURSE PRACTITIONER

## 2022-08-30 NOTE — PROGRESS NOTES
Subjective:       Patient ID: Eva Lane is a 46 y.o. female.    Chief Complaint: Hospital Follow Up (Pt states that she went to the hospital because she had a bad headache and states that she went blind could not see anything. )    Patient is known, to me and presents with   Chief Complaint   Patient presents with    Hospital Follow Up     Pt states that she went to the hospital because she had a bad headache and states that she went blind could not see anything.    .  Denies chest pain and shortness of breath.  Post hospital visit for loss of vision bilaterally. States that she just lost vision and went to ER but was admitted to [facility] for observation. She has regained her vision completely. She is scheduled to see eye specialist tomorrow at [facility].     HPI  Review of Systems   Constitutional:  Negative for activity change, appetite change, fatigue, fever and unexpected weight change.   HENT:  Negative for congestion, ear discharge, ear pain, hearing loss, postnasal drip and tinnitus.    Eyes:  Positive for visual disturbance. Negative for photophobia and pain.   Respiratory:  Negative for cough, shortness of breath, wheezing and stridor.    Cardiovascular:  Negative for chest pain, palpitations and leg swelling.   Gastrointestinal:  Negative for abdominal distention.   Genitourinary:  Negative for difficulty urinating, dysuria, frequency, hematuria and urgency.   Musculoskeletal:  Negative for arthralgias, back pain, gait problem, joint swelling and neck pain.   Skin: Negative.    Neurological:  Negative for dizziness, seizures, syncope, weakness, light-headedness, numbness and headaches.   Hematological:  Negative for adenopathy. Does not bruise/bleed easily.   Psychiatric/Behavioral:  Negative for behavioral problems, confusion, dysphoric mood, hallucinations, sleep disturbance and suicidal ideas. The patient is not nervous/anxious.      Objective:      Physical Exam  Constitutional:        General: She is not in acute distress.     Appearance: She is well-developed.   HENT:      Head: Normocephalic and atraumatic.      Right Ear: Tympanic membrane and external ear normal.      Left Ear: Tympanic membrane and external ear normal.      Nose: Nose normal.      Mouth/Throat:      Mouth: Mucous membranes are moist.      Pharynx: No oropharyngeal exudate.   Eyes:      General: No scleral icterus.        Right eye: No discharge.         Left eye: No discharge.      Extraocular Movements: Extraocular movements intact.      Conjunctiva/sclera: Conjunctivae normal.      Pupils: Pupils are equal, round, and reactive to light.   Neck:      Thyroid: No thyromegaly.      Vascular: No JVD.   Cardiovascular:      Rate and Rhythm: Normal rate and regular rhythm.      Heart sounds: Normal heart sounds. No murmur heard.    No friction rub. No gallop.   Pulmonary:      Effort: Pulmonary effort is normal. No respiratory distress.      Breath sounds: Normal breath sounds. No stridor. No wheezing or rales.   Chest:      Chest wall: No tenderness.   Abdominal:      General: Bowel sounds are normal. There is no distension.      Palpations: Abdomen is soft. There is no mass.      Tenderness: There is no abdominal tenderness. There is no right CVA tenderness, left CVA tenderness, guarding or rebound.      Hernia: No hernia is present.   Musculoskeletal:         General: No swelling, tenderness, deformity or signs of injury. Normal range of motion.      Cervical back: Normal range of motion and neck supple.      Right lower leg: No edema.      Left lower leg: No edema.   Lymphadenopathy:      Cervical: No cervical adenopathy.   Skin:     General: Skin is warm and dry.      Capillary Refill: Capillary refill takes less than 2 seconds.      Coloration: Skin is not jaundiced or pale.      Findings: No bruising, erythema, lesion or rash.   Neurological:      General: No focal deficit present.      Mental Status: She is alert and  "oriented to person, place, and time.      Cranial Nerves: No cranial nerve deficit.      Sensory: No sensory deficit.      Motor: No weakness or abnormal muscle tone.      Coordination: Coordination normal.      Gait: Gait normal.      Deep Tendon Reflexes: Reflexes are normal and symmetric. Reflexes normal.   Psychiatric:         Mood and Affect: Mood normal.         Behavior: Behavior normal.         Thought Content: Thought content normal.         Judgment: Judgment normal.      Comments: Mood stable no sihi noted       Assessment:       1. Visual loss    2. Type 2 diabetes mellitus with diabetic polyneuropathy, without long-term current use of insulin    3. Encounter for screening for malignant neoplasm of breast, unspecified screening modality          Plan:   Eva was seen today for hospital follow up.    Diagnoses and all orders for this visit:    Visual loss    Type 2 diabetes mellitus with diabetic polyneuropathy, without long-term current use of insulin    Encounter for screening for malignant neoplasm of breast, unspecified screening modality  -     Mammo Digital Screening Bilat w/ Jose Luis; Future    "This note will not be shared with the patient."  Will see specialists tomorrow   Rtc as scheduled  "

## 2022-10-03 ENCOUNTER — PATIENT MESSAGE (OUTPATIENT)
Dept: ADMINISTRATIVE | Facility: HOSPITAL | Age: 47
End: 2022-10-03
Payer: MEDICAID

## 2022-10-13 ENCOUNTER — OFFICE VISIT (OUTPATIENT)
Dept: INTERNAL MEDICINE | Facility: CLINIC | Age: 47
End: 2022-10-13
Payer: MEDICAID

## 2022-10-13 VITALS
DIASTOLIC BLOOD PRESSURE: 78 MMHG | HEIGHT: 64 IN | RESPIRATION RATE: 18 BRPM | BODY MASS INDEX: 22.68 KG/M2 | WEIGHT: 132.88 LBS | HEART RATE: 97 BPM | SYSTOLIC BLOOD PRESSURE: 124 MMHG | OXYGEN SATURATION: 100 %

## 2022-10-13 DIAGNOSIS — E11.42 TYPE 2 DIABETES MELLITUS WITH DIABETIC POLYNEUROPATHY, WITHOUT LONG-TERM CURRENT USE OF INSULIN: Primary | ICD-10-CM

## 2022-10-13 DIAGNOSIS — F51.01 PRIMARY INSOMNIA: ICD-10-CM

## 2022-10-13 DIAGNOSIS — Z23 NEEDS FLU SHOT: ICD-10-CM

## 2022-10-13 DIAGNOSIS — F31.81 BIPOLAR 2 DISORDER: ICD-10-CM

## 2022-10-13 DIAGNOSIS — G44.219 EPISODIC TENSION-TYPE HEADACHE, NOT INTRACTABLE: ICD-10-CM

## 2022-10-13 PROCEDURE — 99215 OFFICE O/P EST HI 40 MIN: CPT | Mod: PBBFAC,PN | Performed by: NURSE PRACTITIONER

## 2022-10-13 PROCEDURE — 3044F HG A1C LEVEL LT 7.0%: CPT | Mod: CPTII,,, | Performed by: NURSE PRACTITIONER

## 2022-10-13 PROCEDURE — 99999 PR PBB SHADOW E&M-EST. PATIENT-LVL V: CPT | Mod: PBBFAC,,, | Performed by: NURSE PRACTITIONER

## 2022-10-13 PROCEDURE — 3074F PR MOST RECENT SYSTOLIC BLOOD PRESSURE < 130 MM HG: ICD-10-PCS | Mod: CPTII,,, | Performed by: NURSE PRACTITIONER

## 2022-10-13 PROCEDURE — 3078F DIAST BP <80 MM HG: CPT | Mod: CPTII,,, | Performed by: NURSE PRACTITIONER

## 2022-10-13 PROCEDURE — 99999 PR PBB SHADOW E&M-EST. PATIENT-LVL V: ICD-10-PCS | Mod: PBBFAC,,, | Performed by: NURSE PRACTITIONER

## 2022-10-13 PROCEDURE — 99214 PR OFFICE/OUTPT VISIT, EST, LEVL IV, 30-39 MIN: ICD-10-PCS | Mod: S$PBB,,, | Performed by: NURSE PRACTITIONER

## 2022-10-13 PROCEDURE — 4010F ACE/ARB THERAPY RXD/TAKEN: CPT | Mod: CPTII,,, | Performed by: NURSE PRACTITIONER

## 2022-10-13 PROCEDURE — 1159F MED LIST DOCD IN RCRD: CPT | Mod: CPTII,,, | Performed by: NURSE PRACTITIONER

## 2022-10-13 PROCEDURE — 3044F PR MOST RECENT HEMOGLOBIN A1C LEVEL <7.0%: ICD-10-PCS | Mod: CPTII,,, | Performed by: NURSE PRACTITIONER

## 2022-10-13 PROCEDURE — 4010F PR ACE/ARB THEARPY RXD/TAKEN: ICD-10-PCS | Mod: CPTII,,, | Performed by: NURSE PRACTITIONER

## 2022-10-13 PROCEDURE — 1159F PR MEDICATION LIST DOCUMENTED IN MEDICAL RECORD: ICD-10-PCS | Mod: CPTII,,, | Performed by: NURSE PRACTITIONER

## 2022-10-13 PROCEDURE — 99214 OFFICE O/P EST MOD 30 MIN: CPT | Mod: S$PBB,,, | Performed by: NURSE PRACTITIONER

## 2022-10-13 PROCEDURE — 3078F PR MOST RECENT DIASTOLIC BLOOD PRESSURE < 80 MM HG: ICD-10-PCS | Mod: CPTII,,, | Performed by: NURSE PRACTITIONER

## 2022-10-13 PROCEDURE — 3074F SYST BP LT 130 MM HG: CPT | Mod: CPTII,,, | Performed by: NURSE PRACTITIONER

## 2022-10-13 PROCEDURE — 90471 IMMUNIZATION ADMIN: CPT | Mod: PBBFAC,PN

## 2022-10-13 RX ORDER — METFORMIN HYDROCHLORIDE 500 MG/1
500 TABLET ORAL
Qty: 90 TABLET | Refills: 3 | Status: SHIPPED | OUTPATIENT
Start: 2022-10-13 | End: 2024-03-13

## 2022-10-13 RX ORDER — BUTALBITAL, ACETAMINOPHEN AND CAFFEINE 50; 325; 40 MG/1; MG/1; MG/1
1 TABLET ORAL EVERY 6 HOURS PRN
Qty: 40 TABLET | Refills: 0 | Status: SHIPPED | OUTPATIENT
Start: 2022-10-13 | End: 2023-09-05 | Stop reason: SDUPTHER

## 2022-10-13 RX ORDER — TRAZODONE HYDROCHLORIDE 50 MG/1
50 TABLET ORAL NIGHTLY
Qty: 30 TABLET | Refills: 2 | Status: SHIPPED | OUTPATIENT
Start: 2022-10-13 | End: 2023-01-20

## 2022-10-13 NOTE — PROGRESS NOTES
Subjective:       Patient ID: Eva Lane is a 46 y.o. female.    Chief Complaint: Follow-up (3 month f/u)    Patient is known, to me and presents with   Chief Complaint   Patient presents with    Follow-up     3 month f/u   .  Denies chest pain and shortness of breath.  Patient presents with three month follow up and is doing well. Needs refills. Needs to decrease metformin due to low sugar levels. Does not need blood work at this time. Her mood is stable no sihi noted  Follow-up  Pertinent negatives include no arthralgias, chest pain, congestion, coughing, fatigue, fever, headaches, joint swelling, neck pain, numbness or weakness.   Review of Systems   Constitutional:  Negative for activity change, appetite change, fatigue, fever and unexpected weight change.   HENT:  Negative for congestion, ear discharge, ear pain, hearing loss, postnasal drip and tinnitus.    Eyes:  Negative for photophobia, pain and visual disturbance.   Respiratory:  Negative for cough, shortness of breath, wheezing and stridor.    Cardiovascular:  Negative for chest pain, palpitations and leg swelling.   Gastrointestinal:  Negative for abdominal distention.   Genitourinary:  Negative for difficulty urinating, dysuria, frequency, hematuria and urgency.   Musculoskeletal:  Negative for arthralgias, back pain, gait problem, joint swelling and neck pain.   Skin: Negative.    Neurological:  Negative for dizziness, seizures, syncope, weakness, light-headedness, numbness and headaches.   Hematological:  Negative for adenopathy. Does not bruise/bleed easily.   Psychiatric/Behavioral:  Negative for behavioral problems, confusion, dysphoric mood, hallucinations, sleep disturbance and suicidal ideas. The patient is not nervous/anxious.      Objective:      Physical Exam  Constitutional:       General: She is not in acute distress.     Appearance: She is well-developed.   HENT:      Head: Normocephalic and atraumatic.      Right Ear: Tympanic  membrane and external ear normal.      Left Ear: Tympanic membrane and external ear normal.      Nose: Nose normal.      Mouth/Throat:      Mouth: Mucous membranes are moist.      Pharynx: No oropharyngeal exudate.   Eyes:      General: No scleral icterus.        Right eye: No discharge.         Left eye: No discharge.      Conjunctiva/sclera: Conjunctivae normal.      Pupils: Pupils are equal, round, and reactive to light.   Neck:      Thyroid: No thyromegaly.      Vascular: No JVD.   Cardiovascular:      Rate and Rhythm: Normal rate and regular rhythm.      Heart sounds: Normal heart sounds. No murmur heard.    No friction rub. No gallop.   Pulmonary:      Effort: Pulmonary effort is normal. No respiratory distress.      Breath sounds: Normal breath sounds. No stridor. No wheezing or rales.   Chest:      Chest wall: No tenderness.   Abdominal:      General: Bowel sounds are normal. There is no distension.      Palpations: Abdomen is soft. There is no mass.      Tenderness: There is no abdominal tenderness. There is no right CVA tenderness, left CVA tenderness, guarding or rebound.      Hernia: No hernia is present.   Musculoskeletal:         General: No swelling, tenderness, deformity or signs of injury. Normal range of motion.      Cervical back: Normal range of motion and neck supple.      Right lower leg: No edema.      Left lower leg: No edema.   Lymphadenopathy:      Cervical: No cervical adenopathy.   Skin:     General: Skin is warm and dry.      Capillary Refill: Capillary refill takes less than 2 seconds.      Coloration: Skin is not jaundiced or pale.      Findings: No bruising, erythema, lesion or rash.   Neurological:      General: No focal deficit present.      Mental Status: She is alert and oriented to person, place, and time.      Cranial Nerves: No cranial nerve deficit.      Sensory: No sensory deficit.      Motor: No weakness or abnormal muscle tone.      Coordination: Coordination normal.       "Gait: Gait normal.      Deep Tendon Reflexes: Reflexes are normal and symmetric. Reflexes normal.   Psychiatric:         Mood and Affect: Mood normal.         Behavior: Behavior normal.         Thought Content: Thought content normal.         Judgment: Judgment normal.      Comments: Negative sihi noted       Assessment:       1. Type 2 diabetes mellitus with diabetic polyneuropathy, without long-term current use of insulin    2. Bipolar 2 disorder    3. Primary insomnia    4. Needs flu shot    5. Episodic tension-type headache, not intractable          Plan:   Eva was seen today for follow-up.    Diagnoses and all orders for this visit:    Type 2 diabetes mellitus with diabetic polyneuropathy, without long-term current use of insulin  -     Comprehensive Metabolic Panel; Future  -     Hemoglobin A1C; Future  -     metFORMIN (GLUCOPHAGE) 500 MG tablet; Take 1 tablet (500 mg total) by mouth daily with breakfast.    Bipolar 2 disorder    Episodic tension-type headache, not intractable  -     butalbital-acetaminophen-caffeine -40 mg (FIORICET, ESGIC) -40 mg per tablet; Take 1 tablet by mouth every 6 (six) hours as needed for Headaches.    Primary insomnia  -     traZODone (DESYREL) 50 MG tablet; Take 1 tablet (50 mg total) by mouth every evening.    Needs flu shot  -     Influenza - Quadrivalent *Preferred* (6 months+) (PF)  "This note will not be shared with the patient."  Check CBC, CMP, TSH, Fasting lipid profile, HgA1c, Microalbuminuria in three months.  Medication compliance was discussed with the patient.  The patient was instructed to monitor glucoses closely using glucometer at home and to record the values in a log.  The patient was instructed to obtain an Optometry exam and microalbumin q year.  The patient was instructed to obtain a hemoglobin A1C q 3-4 months.  The patient was instructed to check the feet visually daily and to monitor for infection.  The patient was instructed to exercise at " least 3 times a week.  The patient was instructed to follow a 1800 ADA diet.  The patient was instructed to monitor weight daily and try to keep it as close to ideal body weight as possible.  The patient was instructed on using exercise frequently to reduce BP.  The patient was instructed on using a low salt diet.  Monitor BP at home and to record the values in a log.  RTC in three months.

## 2022-10-15 ENCOUNTER — PATIENT MESSAGE (OUTPATIENT)
Dept: INTERNAL MEDICINE | Facility: CLINIC | Age: 47
End: 2022-10-15
Payer: MEDICAID

## 2022-10-15 DIAGNOSIS — F31.81 BIPOLAR 2 DISORDER: Primary | ICD-10-CM

## 2022-10-17 ENCOUNTER — PATIENT MESSAGE (OUTPATIENT)
Dept: INTERNAL MEDICINE | Facility: CLINIC | Age: 47
End: 2022-10-17
Payer: MEDICAID

## 2022-10-17 RX ORDER — CARIPRAZINE 3 MG/1
3 CAPSULE, GELATIN COATED ORAL DAILY
Qty: 30 CAPSULE | Refills: 2 | Status: SHIPPED | OUTPATIENT
Start: 2022-10-17 | End: 2023-03-08 | Stop reason: SDUPTHER

## 2022-11-11 ENCOUNTER — OFFICE VISIT (OUTPATIENT)
Dept: INTERNAL MEDICINE | Facility: CLINIC | Age: 47
End: 2022-11-11
Payer: MEDICAID

## 2022-11-11 VITALS
SYSTOLIC BLOOD PRESSURE: 100 MMHG | WEIGHT: 133.5 LBS | HEART RATE: 87 BPM | BODY MASS INDEX: 22.79 KG/M2 | RESPIRATION RATE: 16 BRPM | DIASTOLIC BLOOD PRESSURE: 62 MMHG | HEIGHT: 64 IN | OXYGEN SATURATION: 98 %

## 2022-11-11 DIAGNOSIS — F31.81 BIPOLAR 2 DISORDER: Primary | ICD-10-CM

## 2022-11-11 DIAGNOSIS — R05.9 COUGH, UNSPECIFIED TYPE: ICD-10-CM

## 2022-11-11 DIAGNOSIS — J10.1 INFLUENZA B: ICD-10-CM

## 2022-11-11 PROCEDURE — 99215 OFFICE O/P EST HI 40 MIN: CPT | Mod: PBBFAC,PN | Performed by: NURSE PRACTITIONER

## 2022-11-11 PROCEDURE — 4010F ACE/ARB THERAPY RXD/TAKEN: CPT | Mod: CPTII,,, | Performed by: NURSE PRACTITIONER

## 2022-11-11 PROCEDURE — 99214 PR OFFICE/OUTPT VISIT, EST, LEVL IV, 30-39 MIN: ICD-10-PCS | Mod: S$PBB,,, | Performed by: NURSE PRACTITIONER

## 2022-11-11 PROCEDURE — 1159F MED LIST DOCD IN RCRD: CPT | Mod: CPTII,,, | Performed by: NURSE PRACTITIONER

## 2022-11-11 PROCEDURE — 99214 OFFICE O/P EST MOD 30 MIN: CPT | Mod: S$PBB,,, | Performed by: NURSE PRACTITIONER

## 2022-11-11 PROCEDURE — 3078F PR MOST RECENT DIASTOLIC BLOOD PRESSURE < 80 MM HG: ICD-10-PCS | Mod: CPTII,,, | Performed by: NURSE PRACTITIONER

## 2022-11-11 PROCEDURE — 3044F PR MOST RECENT HEMOGLOBIN A1C LEVEL <7.0%: ICD-10-PCS | Mod: CPTII,,, | Performed by: NURSE PRACTITIONER

## 2022-11-11 PROCEDURE — 3044F HG A1C LEVEL LT 7.0%: CPT | Mod: CPTII,,, | Performed by: NURSE PRACTITIONER

## 2022-11-11 PROCEDURE — 3074F PR MOST RECENT SYSTOLIC BLOOD PRESSURE < 130 MM HG: ICD-10-PCS | Mod: CPTII,,, | Performed by: NURSE PRACTITIONER

## 2022-11-11 PROCEDURE — 99999 PR PBB SHADOW E&M-EST. PATIENT-LVL V: ICD-10-PCS | Mod: PBBFAC,,, | Performed by: NURSE PRACTITIONER

## 2022-11-11 PROCEDURE — 3008F PR BODY MASS INDEX (BMI) DOCUMENTED: ICD-10-PCS | Mod: CPTII,,, | Performed by: NURSE PRACTITIONER

## 2022-11-11 PROCEDURE — 3008F BODY MASS INDEX DOCD: CPT | Mod: CPTII,,, | Performed by: NURSE PRACTITIONER

## 2022-11-11 PROCEDURE — 99999 PR PBB SHADOW E&M-EST. PATIENT-LVL V: CPT | Mod: PBBFAC,,, | Performed by: NURSE PRACTITIONER

## 2022-11-11 PROCEDURE — 1159F PR MEDICATION LIST DOCUMENTED IN MEDICAL RECORD: ICD-10-PCS | Mod: CPTII,,, | Performed by: NURSE PRACTITIONER

## 2022-11-11 PROCEDURE — 3074F SYST BP LT 130 MM HG: CPT | Mod: CPTII,,, | Performed by: NURSE PRACTITIONER

## 2022-11-11 PROCEDURE — 4010F PR ACE/ARB THEARPY RXD/TAKEN: ICD-10-PCS | Mod: CPTII,,, | Performed by: NURSE PRACTITIONER

## 2022-11-11 PROCEDURE — 3078F DIAST BP <80 MM HG: CPT | Mod: CPTII,,, | Performed by: NURSE PRACTITIONER

## 2022-11-11 RX ORDER — ALBUTEROL SULFATE 90 UG/1
2 AEROSOL, METERED RESPIRATORY (INHALATION) EVERY 6 HOURS PRN
Qty: 18 G | Refills: 0 | Status: SHIPPED | OUTPATIENT
Start: 2022-11-11 | End: 2023-11-11

## 2022-11-11 RX ORDER — OSELTAMIVIR PHOSPHATE 75 MG/1
75 CAPSULE ORAL 2 TIMES DAILY
Qty: 10 CAPSULE | Refills: 0 | Status: SHIPPED | OUTPATIENT
Start: 2022-11-11 | End: 2022-11-16

## 2022-11-11 RX ORDER — CARIPRAZINE 1.5 MG/1
1.5 CAPSULE, GELATIN COATED ORAL DAILY
Qty: 30 CAPSULE | Refills: 2 | Status: ON HOLD | OUTPATIENT
Start: 2022-11-11 | End: 2022-12-05

## 2022-11-11 RX ORDER — PROMETHAZINE HYDROCHLORIDE AND DEXTROMETHORPHAN HYDROBROMIDE 6.25; 15 MG/5ML; MG/5ML
5 SYRUP ORAL EVERY 6 HOURS PRN
Qty: 120 ML | Refills: 0 | Status: SHIPPED | OUTPATIENT
Start: 2022-11-11 | End: 2022-11-18

## 2022-11-11 NOTE — PROGRESS NOTES
Subjective:       Patient ID: Eva Laen is a 46 y.o. female.    Chief Complaint: Cough, Sore Throat, Depression, and Dizziness    Patient is known, to me and presents with   Chief Complaint   Patient presents with    Cough    Sore Throat    Depression    Dizziness   .  Denies chest pain and shortness of breath.  Patient presents with flu like symptoms. She had to leave work due to symptoms. Also finds her depression has worsened no sihi noted. Taking her vraylar.   HPI  Review of Systems   Constitutional:  Positive for activity change, appetite change and fatigue. Negative for fever and unexpected weight change.   HENT:  Positive for congestion, postnasal drip and sore throat. Negative for ear discharge, ear pain, hearing loss and tinnitus.    Eyes: Negative.  Negative for photophobia, pain and visual disturbance.   Respiratory:  Positive for cough. Negative for shortness of breath, wheezing and stridor.    Cardiovascular:  Negative for chest pain, palpitations and leg swelling.   Gastrointestinal:  Negative for abdominal distention.   Genitourinary:  Negative for difficulty urinating, dysuria, frequency, hematuria and urgency.   Musculoskeletal:  Negative for arthralgias, back pain, gait problem, joint swelling and neck pain.   Skin: Negative.    Neurological:  Negative for dizziness, seizures, syncope, weakness, light-headedness, numbness and headaches.   Hematological:  Negative for adenopathy. Does not bruise/bleed easily.   Psychiatric/Behavioral:  Positive for dysphoric mood. Negative for behavioral problems, confusion, hallucinations, sleep disturbance and suicidal ideas. The patient is not nervous/anxious.      Objective:      Physical Exam  Constitutional:       General: She is not in acute distress.     Appearance: She is well-developed.   HENT:      Head: Normocephalic and atraumatic.      Right Ear: External ear normal. Tympanic membrane has decreased mobility.      Left Ear: External ear  normal. Tympanic membrane has decreased mobility.      Nose: Congestion present. No mucosal edema.      Mouth/Throat:      Mouth: Mucous membranes are moist.      Pharynx: Posterior oropharyngeal erythema present. No pharyngeal swelling or oropharyngeal exudate.   Eyes:      General: No scleral icterus.        Right eye: No discharge.         Left eye: No discharge.      Conjunctiva/sclera: Conjunctivae normal.      Pupils: Pupils are equal, round, and reactive to light.   Neck:      Thyroid: No thyromegaly.      Vascular: No JVD.   Cardiovascular:      Rate and Rhythm: Normal rate and regular rhythm.      Heart sounds: Normal heart sounds. No murmur heard.    No friction rub. No gallop.   Pulmonary:      Effort: Pulmonary effort is normal. No respiratory distress.      Breath sounds: Normal breath sounds. No stridor. No wheezing or rales.   Chest:      Chest wall: No tenderness.   Abdominal:      General: Bowel sounds are normal. There is no distension.      Palpations: Abdomen is soft. There is no mass.      Tenderness: There is no abdominal tenderness. There is no right CVA tenderness, left CVA tenderness, guarding or rebound.      Hernia: No hernia is present.   Musculoskeletal:         General: No swelling, tenderness, deformity or signs of injury. Normal range of motion.      Cervical back: Normal range of motion and neck supple.      Right lower leg: No edema.      Left lower leg: No edema.   Lymphadenopathy:      Cervical: No cervical adenopathy.   Skin:     General: Skin is warm and dry.      Capillary Refill: Capillary refill takes less than 2 seconds.      Coloration: Skin is not jaundiced or pale.      Findings: No bruising, erythema, lesion or rash.   Neurological:      General: No focal deficit present.      Mental Status: She is alert and oriented to person, place, and time.      Cranial Nerves: No cranial nerve deficit.      Sensory: No sensory deficit.      Motor: No weakness or abnormal muscle  "tone.      Coordination: Coordination normal.      Gait: Gait normal.      Deep Tendon Reflexes: Reflexes are normal and symmetric. Reflexes normal.   Psychiatric:         Behavior: Behavior normal.         Thought Content: Thought content normal.         Judgment: Judgment normal.      Comments: Depressed mood but no sihi noted       Assessment:       1. Bipolar 2 disorder    2. Influenza B    3. Cough, unspecified type        Plan:   Eva was seen today for cough, sore throat, depression and dizziness.    Diagnoses and all orders for this visit:    Bipolar 2 disorder  -     cariprazine (VRAYLAR) 1.5 mg Cap; Take 1 capsule (1.5 mg total) by mouth Daily.    Influenza B  -     oseltamivir (TAMIFLU) 75 MG capsule; Take 1 capsule (75 mg total) by mouth 2 (two) times daily. for 5 days  -     POCT Influenza A/B Molecular  -     POCT Strep A, Molecular  -     promethazine-dextromethorphan (PROMETHAZINE-DM) 6.25-15 mg/5 mL Syrp; Take 5 mLs by mouth every 6 (six) hours as needed.    Cough, unspecified type  -     promethazine-dextromethorphan (PROMETHAZINE-DM) 6.25-15 mg/5 mL Syrp; Take 5 mLs by mouth every 6 (six) hours as needed.  -     albuterol (PROVENTIL HFA) 90 mcg/actuation inhaler; Inhale 2 puffs into the lungs every 6 (six) hours as needed for Wheezing. Rescue  "This note will not be shared with the patient."  Increase fluids   Increase vraylar to 4.5 mg daily   If any worsening return to clinic   Rtc as scheduled two weeks.        "

## 2022-12-04 ENCOUNTER — HOSPITAL ENCOUNTER (EMERGENCY)
Facility: HOSPITAL | Age: 47
Discharge: ANOTHER HOSPITAL | End: 2022-12-04
Attending: EMERGENCY MEDICINE
Payer: MEDICAID

## 2022-12-04 VITALS
OXYGEN SATURATION: 98 % | RESPIRATION RATE: 18 BRPM | SYSTOLIC BLOOD PRESSURE: 149 MMHG | BODY MASS INDEX: 19.74 KG/M2 | TEMPERATURE: 97 F | HEART RATE: 96 BPM | WEIGHT: 115 LBS | DIASTOLIC BLOOD PRESSURE: 87 MMHG

## 2022-12-04 DIAGNOSIS — R29.90 NEUROLOGICAL SYMPTOMS: ICD-10-CM

## 2022-12-04 DIAGNOSIS — R20.2 PARESTHESIA OF RIGHT UPPER EXTREMITY: ICD-10-CM

## 2022-12-04 DIAGNOSIS — R56.9 SEIZURE-LIKE ACTIVITY: Primary | ICD-10-CM

## 2022-12-04 LAB
ALBUMIN SERPL BCP-MCNC: 3.7 G/DL (ref 3.5–5.2)
ALP SERPL-CCNC: 82 U/L (ref 55–135)
ALT SERPL W/O P-5'-P-CCNC: 11 U/L (ref 10–44)
ANION GAP SERPL CALC-SCNC: 12 MMOL/L (ref 8–16)
AST SERPL-CCNC: 9 U/L (ref 10–40)
BASOPHILS # BLD AUTO: 0.05 K/UL (ref 0–0.2)
BASOPHILS NFR BLD: 0.7 % (ref 0–1.9)
BILIRUB SERPL-MCNC: 0.3 MG/DL (ref 0.1–1)
BILIRUB UR QL STRIP: NEGATIVE
BNP SERPL-MCNC: 14 PG/ML (ref 0–99)
BUN SERPL-MCNC: 8 MG/DL (ref 6–20)
CALCIUM SERPL-MCNC: 9.2 MG/DL (ref 8.7–10.5)
CHLORIDE SERPL-SCNC: 106 MMOL/L (ref 95–110)
CK MB SERPL-MCNC: 0.7 NG/ML (ref 0.1–6.5)
CK MB SERPL-RTO: 1.9 % (ref 0–5)
CK SERPL-CCNC: 37 U/L (ref 20–180)
CK SERPL-CCNC: 37 U/L (ref 20–180)
CLARITY UR: CLEAR
CO2 SERPL-SCNC: 25 MMOL/L (ref 23–29)
COLOR UR: YELLOW
CREAT SERPL-MCNC: 0.7 MG/DL (ref 0.5–1.4)
DIFFERENTIAL METHOD: NORMAL
EOSINOPHIL # BLD AUTO: 0.2 K/UL (ref 0–0.5)
EOSINOPHIL NFR BLD: 3.1 % (ref 0–8)
ERYTHROCYTE [DISTWIDTH] IN BLOOD BY AUTOMATED COUNT: 12.8 % (ref 11.5–14.5)
EST. GFR  (NO RACE VARIABLE): >60 ML/MIN/1.73 M^2
GLUCOSE SERPL-MCNC: 87 MG/DL (ref 70–110)
GLUCOSE UR QL STRIP: NEGATIVE
HCT VFR BLD AUTO: 37.6 % (ref 37–48.5)
HGB BLD-MCNC: 12.9 G/DL (ref 12–16)
HGB UR QL STRIP: NEGATIVE
IMM GRANULOCYTES # BLD AUTO: 0.02 K/UL (ref 0–0.04)
IMM GRANULOCYTES NFR BLD AUTO: 0.3 % (ref 0–0.5)
INR PPP: 1.2 (ref 0.8–1.2)
INR PPP: 1.2 (ref 0.8–1.2)
KETONES UR QL STRIP: NEGATIVE
LEUKOCYTE ESTERASE UR QL STRIP: NEGATIVE
LYMPHOCYTES # BLD AUTO: 2.2 K/UL (ref 1–4.8)
LYMPHOCYTES NFR BLD: 29.2 % (ref 18–48)
MCH RBC QN AUTO: 30.8 PG (ref 27–31)
MCHC RBC AUTO-ENTMCNC: 34.3 G/DL (ref 32–36)
MCV RBC AUTO: 90 FL (ref 82–98)
MONOCYTES # BLD AUTO: 0.5 K/UL (ref 0.3–1)
MONOCYTES NFR BLD: 6.7 % (ref 4–15)
NEUTROPHILS # BLD AUTO: 4.6 K/UL (ref 1.8–7.7)
NEUTROPHILS NFR BLD: 60 % (ref 38–73)
NITRITE UR QL STRIP: NEGATIVE
NRBC BLD-RTO: 0 /100 WBC
PH UR STRIP: 6 [PH] (ref 5–8)
PLATELET # BLD AUTO: 300 K/UL (ref 150–450)
PMV BLD AUTO: 11.3 FL (ref 9.2–12.9)
POCT GLUCOSE: 129 MG/DL (ref 70–110)
POTASSIUM SERPL-SCNC: 3.3 MMOL/L (ref 3.5–5.1)
PROT SERPL-MCNC: 6.9 G/DL (ref 6–8.4)
PROT UR QL STRIP: NEGATIVE
PROTHROMBIN TIME: 11.9 SEC (ref 9–12.5)
PROTHROMBIN TIME: 11.9 SEC (ref 9–12.5)
RBC # BLD AUTO: 4.19 M/UL (ref 4–5.4)
SODIUM SERPL-SCNC: 143 MMOL/L (ref 136–145)
SP GR UR STRIP: 1.01 (ref 1–1.03)
TROPONIN I SERPL DL<=0.01 NG/ML-MCNC: <0.006 NG/ML (ref 0–0.03)
URN SPEC COLLECT METH UR: NORMAL
UROBILINOGEN UR STRIP-ACNC: NEGATIVE EU/DL
WBC # BLD AUTO: 7.64 K/UL (ref 3.9–12.7)

## 2022-12-04 PROCEDURE — 93010 EKG 12-LEAD: ICD-10-PCS | Mod: ,,, | Performed by: INTERNAL MEDICINE

## 2022-12-04 PROCEDURE — 25000003 PHARM REV CODE 250: Performed by: EMERGENCY MEDICINE

## 2022-12-04 PROCEDURE — 84484 ASSAY OF TROPONIN QUANT: CPT | Performed by: EMERGENCY MEDICINE

## 2022-12-04 PROCEDURE — 85610 PROTHROMBIN TIME: CPT | Performed by: EMERGENCY MEDICINE

## 2022-12-04 PROCEDURE — 85025 COMPLETE CBC W/AUTO DIFF WBC: CPT | Performed by: EMERGENCY MEDICINE

## 2022-12-04 PROCEDURE — 81003 URINALYSIS AUTO W/O SCOPE: CPT | Performed by: EMERGENCY MEDICINE

## 2022-12-04 PROCEDURE — 93010 ELECTROCARDIOGRAM REPORT: CPT | Mod: ,,, | Performed by: INTERNAL MEDICINE

## 2022-12-04 PROCEDURE — 83880 ASSAY OF NATRIURETIC PEPTIDE: CPT | Performed by: EMERGENCY MEDICINE

## 2022-12-04 PROCEDURE — 82553 CREATINE MB FRACTION: CPT | Performed by: EMERGENCY MEDICINE

## 2022-12-04 PROCEDURE — 99285 EMERGENCY DEPT VISIT HI MDM: CPT | Mod: 25

## 2022-12-04 PROCEDURE — 80053 COMPREHEN METABOLIC PANEL: CPT | Performed by: EMERGENCY MEDICINE

## 2022-12-04 PROCEDURE — 93005 ELECTROCARDIOGRAM TRACING: CPT

## 2022-12-04 RX ORDER — POTASSIUM CHLORIDE 20 MEQ/1
40 TABLET, EXTENDED RELEASE ORAL
Status: COMPLETED | OUTPATIENT
Start: 2022-12-04 | End: 2022-12-04

## 2022-12-04 RX ORDER — DIVALPROEX SODIUM 500 MG/1
500 TABLET, FILM COATED, EXTENDED RELEASE ORAL DAILY
COMMUNITY
End: 2022-12-06 | Stop reason: SDUPTHER

## 2022-12-04 RX ADMIN — POTASSIUM CHLORIDE 40 MEQ: 1500 TABLET, EXTENDED RELEASE ORAL at 05:12

## 2022-12-04 NOTE — CONSULTS
"  [facility]  Vascular Neurology  Comprehensive Stroke Center  TeleVascular Neurology Acute Consultation Note      Consults    Consulting Provider: WINNIE PAYNE  Current Providers  No providers found    Patient Location:  [facility] EMERGENCY DEPARTMENT Emergency Department  Spoke hospital nurse at bedside with patient assisting consultant.     Patient information was obtained from primary team.         Assessment/Plan:       Diagnoses:   Seizures  4 "seizures" with residual R sided numbness.  Chart lists conversion d/o so this is in DDx.  Not a stroke intervention candidate.  Rec transfer to [facility] for further evaluation/treatment.        STROKE DOCUMENTATION     Acute Stroke Times:   Acute Stroke Times   Symptom Onset Date: 12/04/22  Symptom Onset Time: 1300  Stroke Team Arrival Time: 1515  CT Interpretation Time: 1539    NIH Scale:        Modified [place]    R[place]ma Coma Scale:    ABCD2 Score:    [id number] Score:   HAS -BLED Score:   ICH Score:   Hunt & Crabtree Classification:       Blood pressure 123/69, pulse 101, temperature 97.4 °F (36.3 °C), temperature source Oral, resp. rate 18, weight 52.2 kg (115 lb), last menstrual period 12/11/2006, SpO2 99 %.  Eligible for thrombolytic therapy?: No  Thrombolytic therapy recomended: Thrombolytic therapy not recommended due to Suspected stroke mimic  and Mild Non-Disabling Symptoms  Possible Interventional Revascularization Candidate? No; no significant neurologic deficit (NIHSS <6)  and No; at this time symptoms not suggestive of large vessel occlusion    Disposition Recommendation: transfer to system [facility] by  ground  standard    Subjective:     History of Present Illness:  45 yo F with 4 seizures between 1 and 2p today.  There is residual R sided numbness          Woke up with symptoms?: no    Recent bleeding noted: unknown  Does the patient take any Blood Thinners? no  Medications: No relevant medications    No " current facility-administered medications on file prior to encounter.     Current Outpatient Medications on File Prior to Encounter   Medication Sig Dispense Refill    albuterol (PROVENTIL HFA) 90 mcg/actuation inhaler Inhale 2 puffs into the lungs every 6 (six) hours as needed for Wheezing. Rescue 18 g 0    albuterol (PROVENTIL) 2.5 mg /3 mL (0.083 %) nebulizer solution USE ONE VIAL IN NEBULIZER EVERY 6 HOURS AS NEEDED FOR  WHEEZING. (Patient not taking: Reported on 2022) 90 mL 3    atorvastatin (LIPITOR) 20 MG tablet SMARTSI Tablet(s) By Mouth Every Evening      blood sugar diagnostic Strp Use 1  True Metrix test strip three times per day to check blood sugar ( single use only ) 100 strip 5    blood-glucose meter (TRUE METRIX GLUCOSE METER) Misc Use three times per day to check blood sugar 1 each 0    butalbital-acetaminophen-caffeine -40 mg (FIORICET, ESGIC) -40 mg per tablet Take 1 tablet by mouth every 6 (six) hours as needed for Headaches. 40 tablet 0    cariprazine (VRAYLAR) 1.5 mg Cap Take 1 capsule (1.5 mg total) by mouth Daily. 30 capsule 2    cariprazine (VRAYLAR) 3 mg Cap Take 1 capsule (3 mg total) by mouth Daily. 30 capsule 2    clobetasoL (TEMOVATE) 0.05 % external solution       clonazePAM (KLONOPIN) 1 MG tablet Take 1 tablet (1 mg total) by mouth 2 (two) times daily as needed for Anxiety. (Patient not taking: Reported on 2022) 60 tablet 2    epinephrine (EPIPEN 2-LARS) 0.3 mg/0.3 mL (1:1,000) AtIn Inject 0.3 mLs (0.3 mg total) into the muscle once. (Patient not taking: Reported on 2022) 1 mL 1    insulin detemir U-100 (LEVEMIR FLEXTOUCH U-100 INSULN) 100 unit/mL (3 mL) InPn pen Inject 53 Units into the skin every evening. (Patient not taking: Reported on 2022) 2 Box 1    insulin lispro (HUMALOG KWIKPEN INSULIN) 100 unit/mL pen Inject 16- 22 units twice a day with lunch and supper (Patient not taking: Reported on 2022) 1 Box 2    ipratropium (ATROVENT) 0.02  "% nebulizer solution       linaGLIPtin (TRADJENTA) 5 mg Tab tablet Take 1 tablet (5 mg total) by mouth once daily. (Patient not taking: Reported on 8/30/2022) 90 tablet 3    magnesium oxide (MAG-OX) 400 mg (241.3 mg magnesium) tablet Take 1 tablet by mouth 2 (two) times daily.      metFORMIN (GLUCOPHAGE) 500 MG tablet Take 1 tablet (500 mg total) by mouth daily with breakfast. 90 tablet 3    mupirocin (BACTROBAN) 2 % ointment Apply topically 3 (three) times daily. 30 g 1    pen needle, diabetic 31 gauge x 5/16" Ndle Inject 1 Stick into the skin once daily. (Patient not taking: Reported on 11/11/2022) 50 each 5    polyethylene glycol (GLYCOLAX) 17 gram PwPk Take 17 g by mouth daily as needed (constipation). 30 packet 2    pregabalin (LYRICA) 200 MG Cap Take 1 capsule (200 mg total) by mouth 2 (two) times daily. 60 capsule 5    traZODone (DESYREL) 100 MG tablet Take 1 tablet (100 mg total) by mouth every evening. 30 tablet 5    traZODone (DESYREL) 50 MG tablet Take 1 tablet (50 mg total) by mouth every evening. 30 tablet 2    TRUEPLUS LANCETS 33 gauge Misc USE 1 LANCET TO CHECK GLUCOSE THREE TIMES DAILY SINGLE USE ONLY      UNKNOWN TO PATIENT Take 20 mg by mouth 2 (two) times a day. Potassium - given by Dr. Curtis      [DISCONTINUED] valsartan (DIOVAN) 40 MG tablet Take 40 mg by mouth once daily.       Past Medical History:   Diagnosis Date    ADHD (attention deficit hyperactivity disorder)     Anxiety     Arthritis     Asthma     Behavioral problem     Bipolar 1 disorder     CHF (congestive heart failure)     COPD (chronic obstructive pulmonary disease)     Depression     Diabetes mellitus type II     Hx of psychiatric care     Hyperlipidemia     Hypertension     Lumbar radiculopathy     Neuralgia     Neuritis     Psychiatric problem     PTSD (post-traumatic stress disorder)     Seizures     Seizure-last 8/2015-shake and fall-doesnt remember anything    Self-harming behavior     Sleep apnea     on CPAP    Stroke " 2015    Stroke     Therapy      Past Surgical History:   Procedure Laterality Date    COLONOSCOPY N/A 2016    Procedure: COLONOSCOPY;  Surgeon: Robert Hernandez MD;  Location: [facility] ([address] [place] [address]);  Service: Endoscopy;  Laterality: N/A;  Schedule for next available CRS physician - EGD @ 8:30 with Dr. Naylor    CYST REMOVAL      Fatty cyst on right face cheek and left upper arm     DILATION AND CURETTAGE OF UTERUS      Miscarriage    HYSTERECTOMY      DUB - Total    OOPHORECTOMY  2008    TOTAL ABDOMINAL HYSTERECTOMY  2008    TUBAL LIGATION       Social History     Tobacco Use    Smoking status: Former     Packs/day: 0.10     Years: 26.00     Pack years: 2.60     Types: Cigarettes     Start date: 1986     Quit date: 2022     Years since quittin.3    Smokeless tobacco: Current    Tobacco comments:     told about [employer] smoking cessation program-given smoking clinic pamphlet   Substance Use Topics    Alcohol use: No    Drug use: No     Family History   Problem Relation Age of Onset    Heart disease Father     Hyperlipidemia Father     Hypertension Father     Diabetes Father     Dementia Father     Breast cancer Neg Hx     Colon cancer Neg Hx     Ovarian cancer Neg Hx        Allergies: Penicillins  Neosporin [Neomycin-Bacitracin-Polymyxin]  Shellfish Containing Products  Shrimp     Review of Systems  Objective:   Vitals: Blood pressure 123/69, pulse 101, temperature 97.4 °F (36.3 °C), temperature source Oral, resp. rate 18, weight 52.2 kg (115 lb), last menstrual period 2006, SpO2 99 %.    CT READ: Yes  No hemmorhage. No mass effect. No early infarct signs.     Physical Exam  Vitals reviewed.             Recommended the emergency room physician to have a brief discussion with the patient and/or family if available regarding the  risks and benefits of treatment, and to briefly document the occurrence of that discussion in his clinical encounter note.     The attending portion of  this evaluation, treatment, and documentation was performed per Panchito Ho MD via audio only.    Billing code: No LOS (audio only consult)    In your opinion, this was a: n/a    Consult End Time: 3:42 PM     Panchito Ho MD  [facility] Stroke Center  Vascular Neurology   [facility]

## 2022-12-04 NOTE — ED PROVIDER NOTES
[facility]                                                  Chief Complaint  46 y.o. female with Seizures (Patient to ER CC of 4 seizures within a hour today, chest pain and numbness to her right arm and right leg, also states she feels like she is slurring her words )    History of Present Illness  Eva Lnae presents to the emergency room via private vehicle with a chief complaint of multiple seizures between 1 and 2:00 p.m. today.  Patient states that she does have a known seizure disorder but has not had a seizure in approximately 3 months.  She states that last time that she had back-to-back seizures she was diagnosed with a CVA.  Patient reports that after these events she felt that her left upper lower her right upper and lower extremity were numb as well as her right side of her face.  Patient reports that she has full motor control of her arm however she can not feel it.  Last seen normal 1:00 p.m. today.  Patient takes divalproax 500 mg daily. Per record review patient has a history of seizures and pseudoseizures as well as conversion disorder.    Past Medical History:   Diagnosis Date    ADHD (attention deficit hyperactivity disorder)     Anxiety     Arthritis     Asthma     Behavioral problem     Bipolar 1 disorder     CHF (congestive heart failure)     COPD (chronic obstructive pulmonary disease)     Depression     Diabetes mellitus type II     Hx of psychiatric care     Hyperlipidemia     Hypertension     Lumbar radiculopathy     Neuralgia     Neuritis     Psychiatric problem     PTSD (post-traumatic stress disorder)     Seizures     Seizure-last 8/2015-shake and fall-doesnt remember anything    Self-harming behavior     Sleep apnea     on CPAP    Stroke 9/22/2015    Stroke     Therapy      Past Surgical History:   Procedure Laterality Date    COLONOSCOPY N/A 7/1/2016    Procedure: COLONOSCOPY;  Surgeon: Robert Hernandez MD;  Location: [facility] ([address] [place] [address]);  Service:  Endoscopy;  Laterality: N/A;  Schedule for next available CRS physician - EGD @ 8:30 with Dr. Naylor    CYST REMOVAL  2000    Fatty cyst on right face cheek and left upper arm     DILATION AND CURETTAGE OF UTERUS  2006    Miscarriage    HYSTERECTOMY  7/08    DUB - Total    OOPHORECTOMY  7/2008    TOTAL ABDOMINAL HYSTERECTOMY  7/2008    TUBAL LIGATION  2007      Review of patient's allergies indicates:   Allergen Reactions    Penicillins Swelling and Rash    Neosporin [neomycin-bacitracin-polymyxin] Rash    Shellfish containing products     Shrimp Hives        Review of Systems and Physical Exam     Review of Systems  -- Constitution - no fever, no weight loss, no loss of consciousness reports multiple seizures today  -- Eyes - no changes in vision, no redness, no swelling  -- Ear, Nose - no  earache, denies congestion  -- Mouth,Throat - no sore throat, no toothache, normal voice, normal swallowing  -- Respiratory - denies cough and congestion, no shortness of breath, no wheezing  -- Cardiovascular - denies chest pain, no palpitations,   -- Gastrointestinal - denies abdominal pain, denies nausea, vomiting, and diarrhea  -- Genitourinary - no dysuria, no denies flank pain, no hematuria or frequency   -- Musculoskeletal - denies back pain, negative for myalgias and arthralgias   -- Neurological - no headache, no neurologic changes, no loss of bladder or bowel function no seizure like activity, no changes in hearing or vision reports right facial numbness, l right upper and lower extremity numbness  -- Skin - denies skin changes, no rash, no hives, no suspected skin infection    Vital Signs   weight is 52.2 kg (115 lb). Her oral temperature is 97.4 °F (36.3 °C). Her blood pressure is 123/69 and her pulse is 101. Her respiration is 18 and oxygen saturation is 99%.      Physical Exam  -- Nursing note and vitals reviewed  -- Constitutional:  Awake alert and oriented, GCS 15, no acute distress.  Appears well.  -- Head:  Atraumatic. Normocephalic. No obvious abnormality  -- Eyes: Pupils are equal and reactive to light. Extraocular movements intact. No nystagmus.  No periorbital swelling. Normal conjunctiva.  -- Nose: Nose grossly normal in appearance, nares grossly normal. No rhinorrhea.  -- Throat: Mucous membranes moist, pharynx normal, normal tonsils.  Airway patent.  -- Ears: External ears and TM normal bilaterally. Normal hearing.   -- Neck: Normal range of motion. Neck supple. No meningismus. No adenopathy  -- Cardiac: Normal rate, regular rhythm and normal heart sounds. No carotid bruit. No lower extremity edema.  -- Pulmonary: Normal respiratory effort, breath sounds equal bilaterally. Adequate flow.  No wheezing.  No crackles.  -- Abdominal: Soft, no tenderness, no guarding, no rebound. Normal bowel sounds.   -- Musculoskeletal: Normal range of motion, all 4 extremities 5/5 strength.  Neurovascularly intact. Atraumatic. No deformities.  -- Neurological:  Cranial nerves 2-12 grossly intact. No focal deficits.  Patient was decreased sensation on right upper extremity.  Motor skills intact.  No pronator drift.  -- Vascular: Posterior tibial, dorsalis pedis and radial pulses 2+ bilaterally    -- Lymphatics: No cervical or peripheral lymphadenopathy.   -- Skin: Warm and dry. No evidence of rash or cellulitis  -- Psychiatric: Normal mood and affect. Bedside behavior is appropriate.  Patient is cooperative.  Denies suicidal homicidal ideation.    Emergency Room Course     Treatment Course, Evaluation, and Medical Decision Making  1. Physical exam significant for decreased sensation to light touch and pressure of right upper extremity  2. CT head negative for acute process  3. Tele stroke recommends no intervention but would like patient transferred to [place] for further evaluation given multiple seizures  4. CBC/CMP within normal limits , mild hypokalemia replaced orally  5. Cardiac enzymes negative   6. EKG normal sinus rhythm no  evidence of ischemia or arrhythmia   7. Urine pregnancy test negative  8. Will transfer for neuro eval    Abnormal lab values  Labs Reviewed   POCT GLUCOSE - Abnormal; Notable for the following components:       Result Value    POCT Glucose 129 (*)     All other components within normal limits   CBC W/ AUTO DIFFERENTIAL   COMPREHENSIVE METABOLIC PANEL   PROTIME-INR   PROTIME-INR   URINALYSIS   PREGNANCY TEST, URINE RAPID   TROPONIN I   B-TYPE NATRIURETIC PEPTIDE   CK   CK-MB   POCT GLUCOSE MONITORING CONTINUOUS       Medications Given  Medications - No data to display      Diagnosis  -- right upper extremity paresthesias  --seizure    Disposition and Plan  -- Disposition: home  -- Condition: stable  -- Follow-up: Patient to follow up with Cintia Gustafson NP in 1-2 days, and any specialists noted on discharge paperwork  -- I advised the patient that we have found no life threatening condition today  -- At this time, I believe the patient is clinically stable for discharge.   -- The patient acknowledges that close follow up with a MD is required   -- Patient agrees to comply with all instruction and direction given in the ER  -- Patient counseled on strict return precautions as discussed       Marlena Jaeger MD  12/04/22 1959

## 2022-12-04 NOTE — ED NOTES
Pt now states her sensation to right arm and right leg is coming back, states weakness is improving. Pt able to lift right leg now, was able to use bedpan without much assistance. ER MD reports she spoke with stroke consult, no TPA needed, pt needs to be transferred for neuro/MRI. Pt and  explained process, verbalized understanding. They state they would like to stay in [facility] if possible. Notified ER MD.

## 2022-12-04 NOTE — SUBJECTIVE & OBJECTIVE
Woke up with symptoms?: no    Recent bleeding noted: unknown  Does the patient take any Blood Thinners? no  Medications: No relevant medications    No current facility-administered medications on file prior to encounter.     Current Outpatient Medications on File Prior to Encounter   Medication Sig Dispense Refill    albuterol (PROVENTIL HFA) 90 mcg/actuation inhaler Inhale 2 puffs into the lungs every 6 (six) hours as needed for Wheezing. Rescue 18 g 0    albuterol (PROVENTIL) 2.5 mg /3 mL (0.083 %) nebulizer solution USE ONE VIAL IN NEBULIZER EVERY 6 HOURS AS NEEDED FOR  WHEEZING. (Patient not taking: Reported on 2022) 90 mL 3    atorvastatin (LIPITOR) 20 MG tablet SMARTSI Tablet(s) By Mouth Every Evening      blood sugar diagnostic Strp Use 1  True Metrix test strip three times per day to check blood sugar ( single use only ) 100 strip 5    blood-glucose meter (TRUE METRIX GLUCOSE METER) Misc Use three times per day to check blood sugar 1 each 0    butalbital-acetaminophen-caffeine -40 mg (FIORICET, ESGIC) -40 mg per tablet Take 1 tablet by mouth every 6 (six) hours as needed for Headaches. 40 tablet 0    cariprazine (VRAYLAR) 1.5 mg Cap Take 1 capsule (1.5 mg total) by mouth Daily. 30 capsule 2    cariprazine (VRAYLAR) 3 mg Cap Take 1 capsule (3 mg total) by mouth Daily. 30 capsule 2    clobetasoL (TEMOVATE) 0.05 % external solution       clonazePAM (KLONOPIN) 1 MG tablet Take 1 tablet (1 mg total) by mouth 2 (two) times daily as needed for Anxiety. (Patient not taking: Reported on 2022) 60 tablet 2    epinephrine (EPIPEN 2-LARS) 0.3 mg/0.3 mL (1:1,000) AtIn Inject 0.3 mLs (0.3 mg total) into the muscle once. (Patient not taking: Reported on 2022) 1 mL 1    insulin detemir U-100 (LEVEMIR FLEXTOUCH U-100 INSULN) 100 unit/mL (3 mL) InPn pen Inject 53 Units into the skin every evening. (Patient not taking: Reported on 2022) 2 Box 1    insulin lispro (HUMALOG  "KWIKPEN INSULIN) 100 unit/mL pen Inject 16- 22 units twice a day with lunch and supper (Patient not taking: Reported on 11/11/2022) 1 Box 2    ipratropium (ATROVENT) 0.02 % nebulizer solution       linaGLIPtin (TRADJENTA) 5 mg Tab tablet Take 1 tablet (5 mg total) by mouth once daily. (Patient not taking: Reported on 8/30/2022) 90 tablet 3    magnesium oxide (MAG-OX) 400 mg (241.3 mg magnesium) tablet Take 1 tablet by mouth 2 (two) times daily.      metFORMIN (GLUCOPHAGE) 500 MG tablet Take 1 tablet (500 mg total) by mouth daily with breakfast. 90 tablet 3    mupirocin (BACTROBAN) 2 % ointment Apply topically 3 (three) times daily. 30 g 1    pen needle, diabetic 31 gauge x 5/16" Ndle Inject 1 Stick into the skin once daily. (Patient not taking: Reported on 11/11/2022) 50 each 5    polyethylene glycol (GLYCOLAX) 17 gram PwPk Take 17 g by mouth daily as needed (constipation). 30 packet 2    pregabalin (LYRICA) 200 MG Cap Take 1 capsule (200 mg total) by mouth 2 (two) times daily. 60 capsule 5    traZODone (DESYREL) 100 MG tablet Take 1 tablet (100 mg total) by mouth every evening. 30 tablet 5    traZODone (DESYREL) 50 MG tablet Take 1 tablet (50 mg total) by mouth every evening. 30 tablet 2    TRUEPLUS LANCETS 33 gauge Misc USE 1 LANCET TO CHECK GLUCOSE THREE TIMES DAILY SINGLE USE ONLY      UNKNOWN TO PATIENT Take 20 mg by mouth 2 (two) times a day. Potassium - given by Dr. Curtis      [DISCONTINUED] valsartan (DIOVAN) 40 MG tablet Take 40 mg by mouth once daily.       Past Medical History:   Diagnosis Date    ADHD (attention deficit hyperactivity disorder)     Anxiety     Arthritis     Asthma     Behavioral problem     Bipolar 1 disorder     CHF (congestive heart failure)     COPD (chronic obstructive pulmonary disease)     Depression     Diabetes mellitus type II     Hx of psychiatric care     Hyperlipidemia     Hypertension     Lumbar radiculopathy     Neuralgia     Neuritis     " Psychiatric problem     PTSD (post-traumatic stress disorder)     Seizures     Seizure-last 2015-shake and fall-doesnt remember anything    Self-harming behavior     Sleep apnea     on CPAP    Stroke 2015    Stroke     Therapy      Past Surgical History:   Procedure Laterality Date    COLONOSCOPY N/A 2016    Procedure: COLONOSCOPY;  Surgeon: Robert Hernandez MD;  Location: [facility] ([address] [place] [address]);  Service: Endoscopy;  Laterality: N/A;  Schedule for next available CRS physician - EGD @ 8:30 with Dr. Naylor    CYST REMOVAL      Fatty cyst on right face cheek and left upper arm     DILATION AND CURETTAGE OF UTERUS      Miscarriage    HYSTERECTOMY      DUB - Total    OOPHORECTOMY  2008    TOTAL ABDOMINAL HYSTERECTOMY  2008    TUBAL LIGATION       Social History     Tobacco Use    Smoking status: Former     Packs/day: 0.10     Years: 26.00     Pack years: 2.60     Types: Cigarettes     Start date: 1986     Quit date: 2022     Years since quittin.3    Smokeless tobacco: Current    Tobacco comments:     told about [facility] smoking cessation program-given smoking clinic pamphlet   Substance Use Topics    Alcohol use: No    Drug use: No     Family History   Problem Relation Age of Onset    Heart disease Father     Hyperlipidemia Father     Hypertension Father     Diabetes Father     Dementia Father     Breast cancer Neg Hx     Colon cancer Neg Hx     Ovarian cancer Neg Hx        Allergies: Penicillins  Neosporin [Neomycin-Bacitracin-Polymyxin]  Shellfish Containing Products  Shrimp     Review of Systems  Objective:   Vitals: Blood pressure 123/69, pulse 101, temperature 97.4 °F (36.3 °C), temperature source Oral, resp. rate 18, weight 52.2 kg (115 lb), last menstrual period 2006, SpO2 99 %.    CT READ: Yes  No hemmorhage. No mass effect. No early infarct signs.     Physical Exam  Vitals reviewed.

## 2022-12-04 NOTE — PROVIDER TRANSFER
Outside Transfer Acceptance Note / Regional Referral Center    Referring facility: [facility]   Referring provider: WINNIE PAYNE  Accepting facility: [facility] [place] [facility]  Accepting provider: DARSHANA CASTRO MD  Admitting provider:    Reason for transfer:  specialty services   Transfer diagnosis: seizure  Transfer specialty requested: Psychiatry  Neurology  Neurology  Transfer specialty notified: yes  Transfer level: NUMBER 1-5: 2  Bed type requested: med  Isolation status: No active isolations   Admission class or status: OBSERVATION      Narrative       47yo woman with T2DM, seizures, migraines, HTN, HLD, CHF with EF 55-60%, bipolar, PTSD, who presented to the ED with complaints of neurologic deficits - RUE sensory loss and lower extremity sensory loss as well- as well as 4 seizure episodes within an hour. She has a history of seizure disorder and reports she had previously been seizure free for 3 months. Takes valproic acid    Had intermittent complaints of motor loss but strength was intact on examination. CT head negative. No further seizure like activity since initial evaluation.Chart report of conversion disorder.     Labs including cardiac, CPK, LFTs, BMP, CBC within normal limits. Glucose 129. UA negative.     Evaluated by Dr. Smith in telestroke, who feels that this patient should be transferred to a facility with Neurology available. No stroke intervention at this time.     /69 (BP Location: Left arm, Patient Position: Sitting)   Pulse 85   Temp 97.4 °F (36.3 °C) (Oral)   Resp (!) 22   Wt 52.2 kg (115 lb)   LMP 12/11/2006   SpO2 100%   BMI 19.74 kg/m²       Objective     Vitals: Temp: 97.4 °F (36.3 °C) (12/04/22 1500)  Pulse: 85 (12/04/22 1655)  Resp: (!) 22 (12/04/22 1655)  BP: 123/69 (12/04/22 1500)  SpO2: 100 % (12/04/22 1655)  Recent Labs: All pertinent labs within the past 24 hours have been reviewed.  BMP:   Recent Labs   Lab  12/04/22  1530   GLU 87      K 3.3*      CO2 25   BUN 8   CREATININE 0.7   CALCIUM 9.2     CBC:   Recent Labs   Lab 12/04/22  1530   WBC 7.64   HGB 12.9   HCT 37.6        Cardiac Markers:   Recent Labs   Lab 12/04/22  1530   BNP 14     Lactic Acid: No results for input(s): LACTATE in the last 48 hours.  Recent imaging:   Imaging Results              CT Head Without Contrast (Final result)  Result time 12/04/22 15:34:22      Final result by Nadeem Villavicencio MD (12/04/22 15:34:22)                   Impression:      No evidence of an acute intracranial abnormality.  Further evaluation with MRI could be performed, as clinically warranted.      Electronically signed by: Nadeem Villavicencio  Date:    12/04/2022  Time:    15:34               Narrative:    EXAMINATION:  CT HEAD WITHOUT CONTRAST    CLINICAL HISTORY:  Neuro deficit, acute, stroke suspected;    TECHNIQUE:  Low dose axial images were obtained through the head.  Coronal and sagittal reformations were also performed. Contrast was not administered.    COMPARISON:  MRI brain 08/21/2022, CT head 08/21/2022    FINDINGS:  Ventricles and sulci are normal in size for age without evidence of hydrocephalus.    The brain parenchyma appears within normal limits.  No definite parenchymal mass, hemorrhage, edema or acute major vascular distribution infarct.    No extra-axial blood or fluid collections.    Skull/extracranial contents (limited evaluation):    No displaced calvarial fracture.    The mastoid air cells and visualized paranasal sinuses are essentially clear.                                       Airway:   n/a  Vent settings:   n/a  IV access:        Peripheral IV - Single Lumen 12/04/22 1533 20 G Anterior;Distal;Left Upper Arm (Active)   Site Assessment Clean;Dry;Intact 12/04/22 1603   Line Status Blood return noted;Flushed 12/04/22 1603     Infusions: none  Allergies:   Review of patient's allergies indicates:   Allergen Reactions    Penicillins Swelling  and Rash    Neosporin [neomycin-bacitracin-polymyxin] Rash    Shellfish containing products     Shrimp Hives      NPO: No    Anticoagulation:   Anticoagulants       None             Instructions        - place in observation  - Consider Neurology consultation    Yanelis Whatley MD, MPH, FACP  Senior Physician, Department of Hospital Medicine  [facility]  [address] [facility]  Admit to Hospital Medicine  Upon patient arrival to floor, please contact Hospital Medicine on call.

## 2022-12-04 NOTE — ED NOTES
ER MD at bedside and notified of pt's symptoms. Called CT for stat CT scan. Called transfer center for stroke consult.

## 2022-12-05 ENCOUNTER — HOSPITAL ENCOUNTER (OUTPATIENT)
Facility: HOSPITAL | Age: 47
Discharge: HOME | End: 2022-12-05
Attending: STUDENT IN AN ORGANIZED HEALTH CARE EDUCATION/TRAINING PROGRAM | Admitting: STUDENT IN AN ORGANIZED HEALTH CARE EDUCATION/TRAINING PROGRAM
Payer: MEDICAID

## 2022-12-05 VITALS
WEIGHT: 124.56 LBS | TEMPERATURE: 98 F | DIASTOLIC BLOOD PRESSURE: 74 MMHG | HEART RATE: 89 BPM | SYSTOLIC BLOOD PRESSURE: 121 MMHG | RESPIRATION RATE: 16 BRPM | OXYGEN SATURATION: 100 % | BODY MASS INDEX: 21.27 KG/M2 | HEIGHT: 64 IN

## 2022-12-05 DIAGNOSIS — F31.81 BIPOLAR 2 DISORDER: ICD-10-CM

## 2022-12-05 DIAGNOSIS — R07.9 CHEST PAIN: ICD-10-CM

## 2022-12-05 DIAGNOSIS — F60.3 BORDERLINE PERSONALITY DISORDER: ICD-10-CM

## 2022-12-05 DIAGNOSIS — R56.9 SEIZURE: ICD-10-CM

## 2022-12-05 DIAGNOSIS — R56.9 SEIZURE-LIKE ACTIVITY: Primary | ICD-10-CM

## 2022-12-05 PROBLEM — I50.32 CHRONIC DIASTOLIC CONGESTIVE HEART FAILURE: Status: ACTIVE | Noted: 2022-08-21

## 2022-12-05 PROCEDURE — 63600175 PHARM REV CODE 636 W HCPCS: Performed by: STUDENT IN AN ORGANIZED HEALTH CARE EDUCATION/TRAINING PROGRAM

## 2022-12-05 PROCEDURE — 96372 THER/PROPH/DIAG INJ SC/IM: CPT | Performed by: STUDENT IN AN ORGANIZED HEALTH CARE EDUCATION/TRAINING PROGRAM

## 2022-12-05 PROCEDURE — G0379 DIRECT REFER HOSPITAL OBSERV: HCPCS

## 2022-12-05 PROCEDURE — 99214 OFFICE O/P EST MOD 30 MIN: CPT | Mod: ,,, | Performed by: PSYCHIATRY & NEUROLOGY

## 2022-12-05 PROCEDURE — G0378 HOSPITAL OBSERVATION PER HR: HCPCS

## 2022-12-05 PROCEDURE — 25000003 PHARM REV CODE 250: Performed by: STUDENT IN AN ORGANIZED HEALTH CARE EDUCATION/TRAINING PROGRAM

## 2022-12-05 PROCEDURE — 99214 PR OFFICE/OUTPT VISIT, EST, LEVL IV, 30-39 MIN: ICD-10-PCS | Mod: ,,, | Performed by: PSYCHIATRY & NEUROLOGY

## 2022-12-05 RX ORDER — ACETAMINOPHEN 325 MG/1
650 TABLET ORAL EVERY 8 HOURS PRN
Status: DISCONTINUED | OUTPATIENT
Start: 2022-12-05 | End: 2022-12-05 | Stop reason: HOSPADM

## 2022-12-05 RX ORDER — HYDROCODONE BITARTRATE AND ACETAMINOPHEN 5; 325 MG/1; MG/1
1 TABLET ORAL EVERY 6 HOURS PRN
Status: DISCONTINUED | OUTPATIENT
Start: 2022-12-05 | End: 2022-12-05 | Stop reason: HOSPADM

## 2022-12-05 RX ORDER — PROCHLORPERAZINE EDISYLATE 5 MG/ML
5 INJECTION INTRAMUSCULAR; INTRAVENOUS EVERY 6 HOURS PRN
Status: DISCONTINUED | OUTPATIENT
Start: 2022-12-05 | End: 2022-12-05 | Stop reason: HOSPADM

## 2022-12-05 RX ORDER — ONDANSETRON 2 MG/ML
4 INJECTION INTRAMUSCULAR; INTRAVENOUS EVERY 8 HOURS PRN
Status: DISCONTINUED | OUTPATIENT
Start: 2022-12-05 | End: 2022-12-05 | Stop reason: HOSPADM

## 2022-12-05 RX ORDER — LANOLIN ALCOHOL/MO/W.PET/CERES
800 CREAM (GRAM) TOPICAL
Status: DISCONTINUED | OUTPATIENT
Start: 2022-12-05 | End: 2022-12-05 | Stop reason: HOSPADM

## 2022-12-05 RX ORDER — TRAZODONE HYDROCHLORIDE 50 MG/1
150 TABLET ORAL NIGHTLY
Status: DISCONTINUED | OUTPATIENT
Start: 2022-12-05 | End: 2022-12-05 | Stop reason: HOSPADM

## 2022-12-05 RX ORDER — BUTALBITAL, ACETAMINOPHEN AND CAFFEINE 50; 325; 40 MG/1; MG/1; MG/1
1 TABLET ORAL EVERY 6 HOURS PRN
Status: DISCONTINUED | OUTPATIENT
Start: 2022-12-05 | End: 2022-12-05 | Stop reason: HOSPADM

## 2022-12-05 RX ORDER — IBUPROFEN 200 MG
24 TABLET ORAL
Status: DISCONTINUED | OUTPATIENT
Start: 2022-12-05 | End: 2022-12-05 | Stop reason: HOSPADM

## 2022-12-05 RX ORDER — HEPARIN SODIUM 5000 [USP'U]/ML
5000 INJECTION, SOLUTION INTRAVENOUS; SUBCUTANEOUS EVERY 8 HOURS
Status: DISCONTINUED | OUTPATIENT
Start: 2022-12-05 | End: 2022-12-05 | Stop reason: HOSPADM

## 2022-12-05 RX ORDER — BISACODYL 10 MG
10 SUPPOSITORY, RECTAL RECTAL DAILY PRN
Status: DISCONTINUED | OUTPATIENT
Start: 2022-12-05 | End: 2022-12-05 | Stop reason: HOSPADM

## 2022-12-05 RX ORDER — IPRATROPIUM BROMIDE AND ALBUTEROL SULFATE 2.5; .5 MG/3ML; MG/3ML
3 SOLUTION RESPIRATORY (INHALATION) EVERY 4 HOURS PRN
Status: DISCONTINUED | OUTPATIENT
Start: 2022-12-05 | End: 2022-12-05 | Stop reason: HOSPADM

## 2022-12-05 RX ORDER — DIVALPROEX SODIUM 500 MG/1
500 TABLET, FILM COATED, EXTENDED RELEASE ORAL DAILY
Status: DISCONTINUED | OUTPATIENT
Start: 2022-12-05 | End: 2022-12-05 | Stop reason: HOSPADM

## 2022-12-05 RX ORDER — GLUCAGON 1 MG
1 KIT INJECTION
Status: DISCONTINUED | OUTPATIENT
Start: 2022-12-05 | End: 2022-12-05 | Stop reason: HOSPADM

## 2022-12-05 RX ORDER — POLYETHYLENE GLYCOL 3350 17 G/17G
17 POWDER, FOR SOLUTION ORAL DAILY
Status: DISCONTINUED | OUTPATIENT
Start: 2022-12-05 | End: 2022-12-05 | Stop reason: HOSPADM

## 2022-12-05 RX ORDER — PREGABALIN 50 MG/1
200 CAPSULE ORAL 2 TIMES DAILY
Status: DISCONTINUED | OUTPATIENT
Start: 2022-12-05 | End: 2022-12-05 | Stop reason: HOSPADM

## 2022-12-05 RX ORDER — SODIUM,POTASSIUM PHOSPHATES 280-250MG
2 POWDER IN PACKET (EA) ORAL
Status: DISCONTINUED | OUTPATIENT
Start: 2022-12-05 | End: 2022-12-05 | Stop reason: HOSPADM

## 2022-12-05 RX ORDER — ATORVASTATIN CALCIUM 10 MG/1
20 TABLET, FILM COATED ORAL EVERY EVENING
Status: DISCONTINUED | OUTPATIENT
Start: 2022-12-05 | End: 2022-12-05 | Stop reason: HOSPADM

## 2022-12-05 RX ORDER — IBUPROFEN 200 MG
16 TABLET ORAL
Status: DISCONTINUED | OUTPATIENT
Start: 2022-12-05 | End: 2022-12-05 | Stop reason: HOSPADM

## 2022-12-05 RX ORDER — NALOXONE HCL 0.4 MG/ML
0.02 VIAL (ML) INJECTION
Status: DISCONTINUED | OUTPATIENT
Start: 2022-12-05 | End: 2022-12-05 | Stop reason: HOSPADM

## 2022-12-05 RX ORDER — TALC
6 POWDER (GRAM) TOPICAL NIGHTLY PRN
Status: DISCONTINUED | OUTPATIENT
Start: 2022-12-05 | End: 2022-12-05 | Stop reason: HOSPADM

## 2022-12-05 RX ORDER — SODIUM CHLORIDE 0.9 % (FLUSH) 0.9 %
10 SYRINGE (ML) INJECTION EVERY 12 HOURS PRN
Status: DISCONTINUED | OUTPATIENT
Start: 2022-12-05 | End: 2022-12-05 | Stop reason: HOSPADM

## 2022-12-05 RX ORDER — MAG HYDROX/ALUMINUM HYD/SIMETH 200-200-20
30 SUSPENSION, ORAL (FINAL DOSE FORM) ORAL 4 TIMES DAILY PRN
Status: DISCONTINUED | OUTPATIENT
Start: 2022-12-05 | End: 2022-12-05 | Stop reason: HOSPADM

## 2022-12-05 RX ORDER — SIMETHICONE 80 MG
1 TABLET,CHEWABLE ORAL 4 TIMES DAILY PRN
Status: DISCONTINUED | OUTPATIENT
Start: 2022-12-05 | End: 2022-12-05 | Stop reason: HOSPADM

## 2022-12-05 RX ADMIN — DIVALPROEX SODIUM 500 MG: 500 TABLET, EXTENDED RELEASE ORAL at 08:12

## 2022-12-05 RX ADMIN — HEPARIN SODIUM 5000 UNITS: 5000 INJECTION INTRAVENOUS; SUBCUTANEOUS at 01:12

## 2022-12-05 RX ADMIN — PREGABALIN 200 MG: 50 CAPSULE ORAL at 08:12

## 2022-12-05 RX ADMIN — TRAZODONE HYDROCHLORIDE 150 MG: 50 TABLET ORAL at 03:12

## 2022-12-05 RX ADMIN — HEPARIN SODIUM 5000 UNITS: 5000 INJECTION INTRAVENOUS; SUBCUTANEOUS at 06:12

## 2022-12-05 NOTE — ASSESSMENT & PLAN NOTE
Presented with 4 reported seizures with residual right sided numbness   Evaluated by vascular neurology in the ER, without any acute intervention     Plan:   - Neurology consult  - Resume home AEDs  - Seizure precautions   - Will hold off on ativan PRN at this time, as the patient will benefit from being assessed by a provider prior to ativan, to avoid benzodiazepine abuse.

## 2022-12-05 NOTE — NURSING
Discharge instructions provided to the patient, included but not limited to follow with PCP, neuro and psychiatry.  IV removed, cath tip intact.  Tele monitor discontinued.  No concerns voiced. Awaiting patient escort for w/c transport to private vehicle.  Family at bedside.

## 2022-12-05 NOTE — ASSESSMENT & PLAN NOTE
Patient's FSGs are controlled on current medication regimen.  Last A1c reviewed-   Lab Results   Component Value Date    HGBA1C 5.7 (H) 08/22/2022     Most recent fingerstick glucose reviewed-   Recent Labs   Lab 12/04/22  1508   POCTGLUCOSE 129*     Current correctional scale  Low  Maintain anti-hyperglycemic dose as follows-   Antihyperglycemics (From admission, onward)    None        Hold Oral hypoglycemics while patient is in the hospital.

## 2022-12-05 NOTE — PLAN OF CARE
Problem: Adult Inpatient Plan of Care  Goal: Plan of Care Review  Outcome: Met  Goal: Patient-Specific Goal (Individualized)  Outcome: Met  Goal: Absence of Hospital-Acquired Illness or Injury  Outcome: Met  Goal: Optimal Comfort and Wellbeing  Outcome: Met  Goal: Readiness for Transition of Care  Outcome: Met     Problem: Diabetes Comorbidity  Goal: Blood Glucose Level Within Targeted Range  Outcome: Met     Problem: Seizure, Active Management  Goal: Absence of Seizure/Seizure-Related Injury  Outcome: Met

## 2022-12-05 NOTE — SUBJECTIVE & OBJECTIVE
Past Medical History:   Diagnosis Date    ADHD (attention deficit hyperactivity disorder)     Anxiety     Arthritis     Asthma     Behavioral problem     Bipolar 1 disorder     CHF (congestive heart failure)     COPD (chronic obstructive pulmonary disease)     Depression     Diabetes mellitus type II     Hx of psychiatric care     Hyperlipidemia     Hypertension     Lumbar radiculopathy     Neuralgia     Neuritis     Psychiatric problem     PTSD (post-traumatic stress disorder)     Seizures     Seizure-last 2015-shake and fall-doesnt remember anything    Self-harming behavior     Sleep apnea     on CPAP    Stroke 2015    Stroke     Therapy        Past Surgical History:   Procedure Laterality Date    COLONOSCOPY N/A 2016    Procedure: COLONOSCOPY;  Surgeon: Roebrt Hernandez MD;  Location: [facility] ([address] [place] [address]);  Service: Endoscopy;  Laterality: N/A;  Schedule for next available CRS physician - EGD @ 8:30 with Dr. Naylor    CYST REMOVAL      Fatty cyst on right face cheek and left upper arm     DILATION AND CURETTAGE OF UTERUS      Miscarriage    HYSTERECTOMY      DUB - Total    OOPHORECTOMY  2008    TOTAL ABDOMINAL HYSTERECTOMY  2008    TUBAL LIGATION         Review of patient's allergies indicates:   Allergen Reactions    Penicillins Swelling and Rash    Neosporin [neomycin-bacitracin-polymyxin] Rash    Shellfish containing products     Shrimp Hives       Current Facility-Administered Medications on File Prior to Encounter   Medication    [COMPLETED] potassium chloride SA CR tablet 40 mEq     Current Outpatient Medications on File Prior to Encounter   Medication Sig    albuterol (PROVENTIL HFA) 90 mcg/actuation inhaler Inhale 2 puffs into the lungs every 6 (six) hours as needed for Wheezing. Rescue    albuterol (PROVENTIL) 2.5 mg /3 mL (0.083 %) nebulizer solution USE ONE VIAL IN NEBULIZER EVERY 6 HOURS AS NEEDED FOR  WHEEZING.    atorvastatin (LIPITOR) 20 MG tablet SMARTSI Tablet(s) By  "Mouth Every Evening    butalbital-acetaminophen-caffeine -40 mg (FIORICET, ESGIC) -40 mg per tablet Take 1 tablet by mouth every 6 (six) hours as needed for Headaches.    cariprazine (VRAYLAR) 3 mg Cap Take 1 capsule (3 mg total) by mouth Daily.    divalproex 500 MG Tb24 Take 500 mg by mouth once daily.    ipratropium (ATROVENT) 0.02 % nebulizer solution     linaGLIPtin (TRADJENTA) 5 mg Tab tablet Take 1 tablet (5 mg total) by mouth once daily.    magnesium oxide (MAG-OX) 400 mg (241.3 mg magnesium) tablet Take 1 tablet by mouth 2 (two) times daily.    metFORMIN (GLUCOPHAGE) 500 MG tablet Take 1 tablet (500 mg total) by mouth daily with breakfast.    mupirocin (BACTROBAN) 2 % ointment Apply topically 3 (three) times daily.    pregabalin (LYRICA) 200 MG Cap Take 1 capsule (200 mg total) by mouth 2 (two) times daily.    traZODone (DESYREL) 100 MG tablet Take 1 tablet (100 mg total) by mouth every evening.    traZODone (DESYREL) 50 MG tablet Take 1 tablet (50 mg total) by mouth every evening.    [DISCONTINUED] cariprazine (VRAYLAR) 1.5 mg Cap Take 1 capsule (1.5 mg total) by mouth Daily.    blood sugar diagnostic Strp Use 1  True Metrix test strip three times per day to check blood sugar ( single use only )    blood-glucose meter (TRUE METRIX GLUCOSE METER) Misc Use three times per day to check blood sugar    clobetasoL (TEMOVATE) 0.05 % external solution     epinephrine (EPIPEN 2-LARS) 0.3 mg/0.3 mL (1:1,000) AtIn Inject 0.3 mLs (0.3 mg total) into the muscle once. (Patient not taking: Reported on 8/30/2022)    pen needle, diabetic 31 gauge x 5/16" Ndle Inject 1 Stick into the skin once daily.    polyethylene glycol (GLYCOLAX) 17 gram PwPk Take 17 g by mouth daily as needed (constipation).    TRUEPLUS LANCETS 33 gauge Misc USE 1 LANCET TO CHECK GLUCOSE THREE TIMES DAILY SINGLE USE ONLY    UNKNOWN TO PATIENT Take 20 mg by mouth 2 (two) times a day. Potassium - given by Dr. Curtis    [DISCONTINUED] clonazePAM " (KLONOPIN) 1 MG tablet Take 1 tablet (1 mg total) by mouth 2 (two) times daily as needed for Anxiety. (Patient not taking: Reported on 2022)    [DISCONTINUED] insulin detemir U-100 (LEVEMIR FLEXTOUCH U-100 INSULN) 100 unit/mL (3 mL) InPn pen Inject 53 Units into the skin every evening. (Patient not taking: Reported on 2022)    [DISCONTINUED] insulin lispro (HUMALOG KWIKPEN INSULIN) 100 unit/mL pen Inject 16- 22 units twice a day with lunch and supper (Patient not taking: Reported on 2022)    [DISCONTINUED] valsartan (DIOVAN) 40 MG tablet Take 40 mg by mouth once daily.     Family History       Problem Relation (Age of Onset)    Dementia Father    Diabetes Father    Heart disease Father    Hyperlipidemia Father    Hypertension Father          Tobacco Use    Smoking status: Former     Packs/day: 0.10     Years: 26.00     Pack years: 2.60     Types: Cigarettes     Start date: 1986     Quit date: 2022     Years since quittin.3    Smokeless tobacco: Current    Tobacco comments:     told about [facility] smoking cessation program-given smoking clinic pamphlet   Substance and Sexual Activity    Alcohol use: No    Drug use: No    Sexual activity: Yes     Partners: Male     Birth control/protection: Surgical     Comment: , CAYLA, BSO     Review of Systems   Constitutional: Negative.    HENT: Negative.     Eyes: Negative.    Respiratory: Negative.     Cardiovascular: Negative.    Gastrointestinal: Negative.    Endocrine: Negative.    Genitourinary: Negative.    Musculoskeletal: Negative.    Skin: Negative.    Allergic/Immunologic: Negative.    Neurological:  Positive for speech difficulty, weakness, numbness and headaches.   Psychiatric/Behavioral: Negative.     Objective:     Vital Signs (Most Recent):    Vital Signs (24h Range):  Temp:  [97.4 °F (36.3 °C)] 97.4 °F (36.3 °C)  Pulse:  [] 96  Resp:  [18-22] 18  SpO2:  [98 %-100 %] 98 %  BP: (123-165)/(67-97) 149/87        There is no  height or weight on file to calculate BMI.    Physical Exam  Vitals and nursing note reviewed.   Constitutional:       General: She is not in acute distress.     Appearance: Normal appearance. She is not ill-appearing.   HENT:      Head: Normocephalic and atraumatic.      Nose: Nose normal.      Mouth/Throat:      Mouth: Mucous membranes are moist.   Eyes:      Extraocular Movements: Extraocular movements intact.   Cardiovascular:      Rate and Rhythm: Normal rate.      Pulses: Normal pulses.      Heart sounds: No murmur heard.  Pulmonary:      Effort: Pulmonary effort is normal. No respiratory distress.   Abdominal:      General: Abdomen is flat.      Palpations: Abdomen is soft.      Tenderness: There is no abdominal tenderness.   Musculoskeletal:      Right lower leg: No edema.      Left lower leg: No edema.   Skin:     General: Skin is warm.      Capillary Refill: Capillary refill takes less than 2 seconds.   Neurological:      Mental Status: She is alert.      Comments: Reported absent sensation in right arm (from shoulder to elbow)   Reported absent sensation in right thigh (from hip to knee)  Able to ambulate without difficulty    Psychiatric:         Mood and Affect: Mood normal.           Significant Labs: All pertinent labs within the past 24 hours have been reviewed.    Significant Imaging: I have reviewed all pertinent imaging results/findings within the past 24 hours.

## 2022-12-05 NOTE — PLAN OF CARE
12/05/22 1308   Final Note   Assessment Type Final Discharge Note   Anticipated Discharge Disposition Home   Hospital Resources/Appts/Education Provided Appointments scheduled and added to AVS   Post-Acute Status   Post-Acute Authorization Other   Other Status No Post-Acute Service Needs   Pts nurse Tramaine notified that the pt can d/c from CM standpoint

## 2022-12-05 NOTE — DISCHARGE INSTRUCTIONS
No signs of stroke on imaging.  Please follow-up with PCP, Neurology, and Psychiatry for monitoring and medication adjustments.        Jayesh Menjivar MD  Internal Medicine Staff

## 2022-12-05 NOTE — PLAN OF CARE
12/05/22 1020   Discharge Planning   Assessment Type Discharge Planning Brief Assessment   Resource/Environmental Concerns none   Support Systems Spouse/significant other   Equipment Currently Used at Home cane, straight   Current Living Arrangements home/apartment/condo   Patient/Family Anticipates Transition to home with family   Patient/Family Anticipated Services at Transition none   DME Needed Upon Discharge  none   Discharge Plan A Home with family  (with instructions to follow up)     [id number] [place] - [id number] [address]  [place] [address]  Phone: [phone] Fax: [phone]

## 2022-12-05 NOTE — HPI
"This is a 46 year old female with a PMHx of T2DM, HTN, HFpEF (EF: 58%), asthma, bipolar disorder, conversion disorder, depression, anxiety and borderline personality disorder who presents with seizure like activity.     Patient presented to [facility] ER after having 4 seizure like activity the day of presentation. She reports that she was in her usual state of health until she suddenly felt like she cant walk. She fell to the ground and had multiple "seizures" in one hour. She reports having generalized shaking of all extremities, each episode lasting 5-6 minutes. After her episodes, she feels like her speech has been affected, reports having numbness/stabbing feeling of her right arm and leg, and reports right eye blurry vision. Her numbness/stabbing sensation extends from her right shoulder to right elbow, and from her right hip to her right knee. Its intermittent and occurs about every 15 minutes. When the numbness/stabbing gets severe, she can't move her arm or leg. She reports having an episode of incontinence when she had her seizure. In the ED, she was HDS. Labs were unremarkable including normal CPK. CT head was negative. vascular neurology was consulted and recommended no stroke intervention. The patient was admitted for neurology evaluation.   "

## 2022-12-05 NOTE — NURSING
Report received from Miryam mercado [facility]. Patient to come to [facility] room [facility] via EMS transportation

## 2022-12-05 NOTE — CONSULTS
[facility] - Telemetry  Neurology  Consult Note    Patient Name: Eva Lane  MRN: [id number]  Admission Date: 12/5/2022  Hospital Length of Stay: 0 days  Code Status: Full Code   Attending Provider: Jayesh Menjivar MD   Consulting Provider: Jacob Hartman MD  Primary Care Physician: Cintia Gustafson NP  Principal Problem:Seizure-like activity    Consults  Subjective:     Chief Complaint:  Seizures    HPI:  46 year old female with a medical Hx of DM, HTN, HFpEF (EF: 58%), asthma, bipolar disorder, conversion disorder, depression, anxiety, borderline personality disorder who presented to ED after reportedly having seizures. Pt  suddenly felt like she could not walk, fell to the ground, and had multiple seizures. After her episodes, she felt her speech was slurred. Currently pt reports no symptoms. Her workup has been unremarkable including normal CPK. No acute abnormal findings on head CT.    Past Medical History:   Diagnosis Date    ADHD (attention deficit hyperactivity disorder)     Anxiety     Arthritis     Asthma     Behavioral problem     Bipolar 1 disorder     CHF (congestive heart failure)     COPD (chronic obstructive pulmonary disease)     Depression     Diabetes mellitus type II     Hx of psychiatric care     Hyperlipidemia     Hypertension     Lumbar radiculopathy     Neuralgia     Neuritis     Psychiatric problem     PTSD (post-traumatic stress disorder)     Seizures     Seizure-last 8/2015-shake and fall-doesnt remember anything    Self-harming behavior     Sleep apnea     on CPAP    Stroke 9/22/2015    Stroke     Therapy        Past Surgical History:   Procedure Laterality Date    COLONOSCOPY N/A 7/1/2016    Procedure: COLONOSCOPY;  Surgeon: Robert Hernandez MD;  Location: [address]);  Service: Endoscopy;  Laterality: N/A;  Schedule for next available CRS physician - EGD @ 8:30 with Dr. Naylor    CYST REMOVAL  2000    Fatty cyst on right face cheek and left upper arm     DILATION AND CURETTAGE  OF UTERUS  2006    Miscarriage    HYSTERECTOMY      DUB - Total    OOPHORECTOMY  2008    TOTAL ABDOMINAL HYSTERECTOMY  2008    TUBAL LIGATION         Review of patient's allergies indicates:   Allergen Reactions    Penicillins Swelling and Rash    Neosporin [neomycin-bacitracin-polymyxin] Rash    Shellfish containing products     Shrimp Hives       Current Neurological Medications:     Current Facility-Administered Medications on File Prior to Encounter   Medication    [COMPLETED] potassium chloride SA CR tablet 40 mEq     Current Outpatient Medications on File Prior to Encounter   Medication Sig    albuterol (PROVENTIL HFA) 90 mcg/actuation inhaler Inhale 2 puffs into the lungs every 6 (six) hours as needed for Wheezing. Rescue    albuterol (PROVENTIL) 2.5 mg /3 mL (0.083 %) nebulizer solution USE ONE VIAL IN NEBULIZER EVERY 6 HOURS AS NEEDED FOR  WHEEZING.    atorvastatin (LIPITOR) 20 MG tablet SMARTSI Tablet(s) By Mouth Every Evening    butalbital-acetaminophen-caffeine -40 mg (FIORICET, ESGIC) -40 mg per tablet Take 1 tablet by mouth every 6 (six) hours as needed for Headaches.    cariprazine (VRAYLAR) 3 mg Cap Take 1 capsule (3 mg total) by mouth Daily.    divalproex 500 MG Tb24 Take 500 mg by mouth once daily.    ipratropium (ATROVENT) 0.02 % nebulizer solution     linaGLIPtin (TRADJENTA) 5 mg Tab tablet Take 1 tablet (5 mg total) by mouth once daily.    magnesium oxide (MAG-OX) 400 mg (241.3 mg magnesium) tablet Take 1 tablet by mouth 2 (two) times daily.    metFORMIN (GLUCOPHAGE) 500 MG tablet Take 1 tablet (500 mg total) by mouth daily with breakfast.    mupirocin (BACTROBAN) 2 % ointment Apply topically 3 (three) times daily.    pregabalin (LYRICA) 200 MG Cap Take 1 capsule (200 mg total) by mouth 2 (two) times daily.    traZODone (DESYREL) 100 MG tablet Take 1 tablet (100 mg total) by mouth every evening.    traZODone (DESYREL) 50 MG tablet Take 1 tablet (50 mg total) by mouth  "every evening.    [DISCONTINUED] cariprazine (VRAYLAR) 1.5 mg Cap Take 1 capsule (1.5 mg total) by mouth Daily.    blood sugar diagnostic Strp Use 1  True Metrix test strip three times per day to check blood sugar ( single use only )    blood-glucose meter (TRUE METRIX GLUCOSE METER) Misc Use three times per day to check blood sugar    clobetasoL (TEMOVATE) 0.05 % external solution     epinephrine (EPIPEN 2-LARS) 0.3 mg/0.3 mL (1:1,000) AtIn Inject 0.3 mLs (0.3 mg total) into the muscle once. (Patient not taking: Reported on 2022)    pen needle, diabetic 31 gauge x 5/16" Ndle Inject 1 Stick into the skin once daily.    polyethylene glycol (GLYCOLAX) 17 gram PwPk Take 17 g by mouth daily as needed (constipation).    TRUEPLUS LANCETS 33 gauge Misc USE 1 LANCET TO CHECK GLUCOSE THREE TIMES DAILY SINGLE USE ONLY    UNKNOWN TO PATIENT Take 20 mg by mouth 2 (two) times a day. Potassium - given by Dr. Curtis    [DISCONTINUED] clonazePAM (KLONOPIN) 1 MG tablet Take 1 tablet (1 mg total) by mouth 2 (two) times daily as needed for Anxiety. (Patient not taking: Reported on 2022)    [DISCONTINUED] insulin detemir U-100 (LEVEMIR FLEXTOUCH U-100 INSULN) 100 unit/mL (3 mL) InPn pen Inject 53 Units into the skin every evening. (Patient not taking: Reported on 2022)    [DISCONTINUED] insulin lispro (HUMALOG KWIKPEN INSULIN) 100 unit/mL pen Inject 16- 22 units twice a day with lunch and supper (Patient not taking: Reported on 2022)    [DISCONTINUED] valsartan (DIOVAN) 40 MG tablet Take 40 mg by mouth once daily.      Family History       Problem Relation (Age of Onset)    Dementia Father    Diabetes Father    Heart disease Father    Hyperlipidemia Father    Hypertension Father          Tobacco Use    Smoking status: Former     Packs/day: 0.10     Years: 26.00     Pack years: 2.60     Types: Cigarettes     Start date: 1986     Quit date: 2022     Years since quittin.3    Smokeless tobacco: Current    " Tobacco comments:     told about [facility] smoking cessation program-given smoking clinic pamphlet   Substance and Sexual Activity    Alcohol use: No    Drug use: No    Sexual activity: Yes     Partners: Male     Birth control/protection: Surgical     Comment: , CAYLA, BSO     Review of Systems   Constitutional:  Negative for fever.   HENT:  Negative for trouble swallowing.    Eyes:  Negative for photophobia.   Respiratory:  Negative for shortness of breath.    Cardiovascular:  Negative for chest pain.   Gastrointestinal:  Negative for abdominal pain.   Genitourinary:  Negative for flank pain.   Musculoskeletal:  Negative for back pain.   Neurological:  Negative for numbness.   Psychiatric/Behavioral:  Negative for confusion.        Objective:     Vital Signs (Most Recent):  Temp: 98.2 °F (36.8 °C) (12/05/22 1124)  Pulse: 105 (12/05/22 1124)  Resp: 16 (12/05/22 1124)  BP: 113/65 (12/05/22 1124)  SpO2: 99 % (12/05/22 1124) Vital Signs (24h Range):  Temp:  [97.4 °F (36.3 °C)-98.4 °F (36.9 °C)] 98.2 °F (36.8 °C)  Pulse:  [] 105  Resp:  [16-22] 16  SpO2:  [97 %-100 %] 99 %  BP: (113-165)/(65-97) 113/65     Weight: 56.5 kg (124 lb 9 oz)  Body mass index is 21.38 kg/m².    Physical Exam  Constitutional:       General: She is not in acute distress.  HENT:      Head: Normocephalic.   Eyes:      General:         Right eye: No discharge.         Left eye: No discharge.   Cardiovascular:      Rate and Rhythm: Normal rate.   Pulmonary:      Breath sounds: Normal breath sounds.   Abdominal:      Palpations: Abdomen is soft.   Musculoskeletal:         General: No swelling.      Cervical back: Neck supple.   Skin:     Findings: No rash.   Neurological:      Mental Status: She is oriented to person, place, and time.      Motor: Motor strength is normal.      Coordination: Finger-Nose-Finger Test normal.   Psychiatric:         Speech: Speech normal.       NEUROLOGICAL EXAMINATION:     MENTAL STATUS   Oriented to person,  place, and time.   Speech: speech is normal   Level of consciousness: alert    CRANIAL NERVES     CN III, IV, VI   Right pupil: Size: 2 mm.   Left pupil: Size: 2 mm.   Nystagmus: none   Conjugate gaze: present    CN V   Right facial sensation deficit: none  Left facial sensation deficit: none    CN VII   Right facial weakness: none  Left facial weakness: none    CN XII   Tongue deviation: none    MOTOR EXAM     Strength   Strength 5/5 throughout.     GAIT AND COORDINATION      Coordination   Finger to nose coordination: normal       Fine tremor on hands     Significant Labs: CBC: No results for input(s): WBC, HGB, HCT, PLT in the last 48 hours.  CMP: No results for input(s): GLU, NA, K, CL, CO2, BUN, CREATININE, CALCIUM, MG, PROT, ALBUMIN, BILITOT, ALKPHOS, AST, ALT, ANIONGAP, EGFRNONAA in the last 48 hours.    Significant Imaging: I have reviewed all pertinent imaging results/findings within the past 24 hours.    Head CT  FINDINGS:  Ventricles and sulci are normal in size for age without evidence of hydrocephalus.     The brain parenchyma appears within normal limits.  No definite parenchymal mass, hemorrhage, edema or acute major vascular distribution infarct.     No extra-axial blood or fluid collections.     Skull/extracranial contents (limited evaluation):     No displaced calvarial fracture.     The mastoid air cells and visualized paranasal sinuses are essentially clear.     Impression:     No evidence of an acute intracranial abnormality.  Further evaluation with MRI could be performed, as clinically warranted.        Electronically signed by: Nadeem Villavicencio  Date:                                            12/04/2022  Time:                                           15:34      Assessment and Plan:     45 y/o female consulted for AMS    Seizures: this could be part of functional neurological disorder. Cannot R/O epileptic events but seems less likely at this point. If taking benzo's at home pt should not stop  taking them abruptly. Head CT with no acute abnormalities. No metabolic derangements.  No new meds. OK to D/C home.  Seizure restrictions are but not limited to: no driving for six months after last seizure; avoid swimming, high altitude activities, operating heavy machinery, bathing unattended, or engaging in activities in where a seizure will cause harm to self or others.     Active Diagnoses:    Diagnosis Date Noted POA    PRINCIPAL PROBLEM:  Seizure-like activity [R56.9] 12/04/2022 Yes    Chronic diastolic congestive heart failure [I50.32] 08/21/2022 Yes    Bipolar 2 disorder [F31.81] 07/07/2022 Yes    Type 2 diabetes mellitus with neurologic complication, without long-term current use of insulin [E11.49] 05/05/2021 Yes    HILARIA (generalized anxiety disorder) [F41.1] 02/04/2016 Yes    Essential hypertension [I10] 09/23/2015 Yes    Conversion disorder [F44.9] 09/23/2015 Yes    Bronchial asthma [J45.909] 12/20/2012 Yes    Peripheral neuropathy [G62.9] 12/20/2012 Yes    Mixed hyperlipidemia [E78.2] 12/20/2012 Yes      Problems Resolved During this Admission:       VTE Risk Mitigation (From admission, onward)           Ordered     heparin (porcine) injection 5,000 Units  Every 8 hours         12/05/22 0122     IP VTE HIGH RISK PATIENT  Once         12/05/22 0122     Place sequential compression device  Until discontinued         12/05/22 0122                    Thank you for your consult. I will follow-up with patient. Please contact us if you have any additional questions.    Jacob Hartman MD  Neurology  [facility]

## 2022-12-05 NOTE — H&P
"[facility] Medicine  History & Physical    Patient Name: Eva Lane  MRN: [id number]  Patient Class: OP- Observation  Admission Date: 12/5/2022  Attending Physician: Jayesh Menjivar MD   Primary Care Provider: Cintia Gustafson NP         Patient information was obtained from patient and ER records.     Subjective:     Principal Problem:Seizure-like activity    Chief Complaint: No chief complaint on file.       HPI: This is a 46 year old female with a PMHx of T2DM, HTN, HFpEF (EF: 58%), asthma, bipolar disorder, conversion disorder, depression, anxiety and borderline personality disorder who presents with seizure like activity.     Patient presented to [facility] ER after having 4 seizure like activity the day of presentation. She reports that she was in her usual state of health until she suddenly felt like she cant walk. She fell to the ground and had multiple "seizures" in one hour. She reports having generalized shaking of all extremities, each episode lasting 5-6 minutes. After her episodes, she feels like her speech has been affected, reports having numbness/stabbing feeling of her right arm and leg, and reports right eye blurry vision. Her numbness/stabbing sensation extends from her right shoulder to right elbow, and from her right hip to her right knee. Its intermittent and occurs about every 15 minutes. When the numbness/stabbing gets severe, she can't move her arm or leg. She reports having an episode of incontinence when she had her seizure. In the ED, she was HDS. Labs were unremarkable including normal CPK. CT head was negative. vascular neurology was consulted and recommended no stroke intervention. The patient was admitted for neurology evaluation.       Past Medical History:   Diagnosis Date    ADHD (attention deficit hyperactivity disorder)     Anxiety     Arthritis     Asthma     Behavioral problem     Bipolar 1 disorder     CHF (congestive heart failure)     " COPD (chronic obstructive pulmonary disease)     Depression     Diabetes mellitus type II     Hx of psychiatric care     Hyperlipidemia     Hypertension     Lumbar radiculopathy     Neuralgia     Neuritis     Psychiatric problem     PTSD (post-traumatic stress disorder)     Seizures     Seizure-last 2015-shake and fall-doesnt remember anything    Self-harming behavior     Sleep apnea     on CPAP    Stroke 2015    Stroke     Therapy        Past Surgical History:   Procedure Laterality Date    COLONOSCOPY N/A 2016    Procedure: COLONOSCOPY;  Surgeon: Robert Hernandez MD;  Location: [address]);  Service: Endoscopy;  Laterality: N/A;  Schedule for next available CRS physician - EGD @ 8:30 with Dr. Naylor    CYST REMOVAL      Fatty cyst on right face cheek and left upper arm     DILATION AND CURETTAGE OF UTERUS      Miscarriage    HYSTERECTOMY      DUB - Total    OOPHORECTOMY  2008    TOTAL ABDOMINAL HYSTERECTOMY  2008    TUBAL LIGATION         Review of patient's allergies indicates:   Allergen Reactions    Penicillins Swelling and Rash    Neosporin [neomycin-bacitracin-polymyxin] Rash    Shellfish containing products     Shrimp Hives       Current Facility-Administered Medications on File Prior to Encounter   Medication    [COMPLETED] potassium chloride SA CR tablet 40 mEq     Current Outpatient Medications on File Prior to Encounter   Medication Sig    albuterol (PROVENTIL HFA) 90 mcg/actuation inhaler Inhale 2 puffs into the lungs every 6 (six) hours as needed for Wheezing. Rescue    albuterol (PROVENTIL) 2.5 mg /3 mL (0.083 %) nebulizer solution USE ONE VIAL IN NEBULIZER EVERY 6 HOURS AS NEEDED FOR  WHEEZING.    atorvastatin (LIPITOR) 20 MG tablet SMARTSI Tablet(s) By Mouth Every Evening    butalbital-acetaminophen-caffeine -40 mg (FIORICET, ESGIC) -40 mg per tablet Take 1 tablet by mouth every 6 (six) hours as needed for Headaches.  "   cariprazine (VRAYLAR) 3 mg Cap Take 1 capsule (3 mg total) by mouth Daily.    divalproex 500 MG Tb24 Take 500 mg by mouth once daily.    ipratropium (ATROVENT) 0.02 % nebulizer solution     linaGLIPtin (TRADJENTA) 5 mg Tab tablet Take 1 tablet (5 mg total) by mouth once daily.    magnesium oxide (MAG-OX) 400 mg (241.3 mg magnesium) tablet Take 1 tablet by mouth 2 (two) times daily.    metFORMIN (GLUCOPHAGE) 500 MG tablet Take 1 tablet (500 mg total) by mouth daily with breakfast.    mupirocin (BACTROBAN) 2 % ointment Apply topically 3 (three) times daily.    pregabalin (LYRICA) 200 MG Cap Take 1 capsule (200 mg total) by mouth 2 (two) times daily.    traZODone (DESYREL) 100 MG tablet Take 1 tablet (100 mg total) by mouth every evening.    traZODone (DESYREL) 50 MG tablet Take 1 tablet (50 mg total) by mouth every evening.    [DISCONTINUED] cariprazine (VRAYLAR) 1.5 mg Cap Take 1 capsule (1.5 mg total) by mouth Daily.    blood sugar diagnostic Strp Use 1  True Metrix test strip three times per day to check blood sugar ( single use only )    blood-glucose meter (TRUE METRIX GLUCOSE METER) Misc Use three times per day to check blood sugar    clobetasoL (TEMOVATE) 0.05 % external solution     epinephrine (EPIPEN 2-LARS) 0.3 mg/0.3 mL (1:1,000) AtIn Inject 0.3 mLs (0.3 mg total) into the muscle once. (Patient not taking: Reported on 8/30/2022)    pen needle, diabetic 31 gauge x 5/16" Ndle Inject 1 Stick into the skin once daily.    polyethylene glycol (GLYCOLAX) 17 gram PwPk Take 17 g by mouth daily as needed (constipation).    TRUEPLUS LANCETS 33 gauge Misc USE 1 LANCET TO CHECK GLUCOSE THREE TIMES DAILY SINGLE USE ONLY    UNKNOWN TO PATIENT Take 20 mg by mouth 2 (two) times a day. Potassium - given by Dr. Curtis    [DISCONTINUED] clonazePAM (KLONOPIN) 1 MG tablet Take 1 tablet (1 mg total) by mouth 2 (two) times daily as needed for Anxiety. (Patient not taking: Reported on 11/11/2022)    " [DISCONTINUED] insulin detemir U-100 (LEVEMIR FLEXTOUCH U-100 INSULN) 100 unit/mL (3 mL) InPn pen Inject 53 Units into the skin every evening. (Patient not taking: Reported on 2022)    [DISCONTINUED] insulin lispro (HUMALOG KWIKPEN INSULIN) 100 unit/mL pen Inject 16- 22 units twice a day with lunch and supper (Patient not taking: Reported on 2022)    [DISCONTINUED] valsartan (DIOVAN) 40 MG tablet Take 40 mg by mouth once daily.     Family History       Problem Relation (Age of Onset)    Dementia Father    Diabetes Father    Heart disease Father    Hyperlipidemia Father    Hypertension Father          Tobacco Use    Smoking status: Former     Packs/day: 0.10     Years: 26.00     Pack years: 2.60     Types: Cigarettes     Start date: 1986     Quit date: 2022     Years since quittin.3    Smokeless tobacco: Current    Tobacco comments:     told about [facility] smoking cessation program-given smoking clinic pamphlet   Substance and Sexual Activity    Alcohol use: No    Drug use: No    Sexual activity: Yes     Partners: Male     Birth control/protection: Surgical     Comment: , CAYLA, BSO     Review of Systems   Constitutional: Negative.    HENT: Negative.     Eyes: Negative.    Respiratory: Negative.     Cardiovascular: Negative.    Gastrointestinal: Negative.    Endocrine: Negative.    Genitourinary: Negative.    Musculoskeletal: Negative.    Skin: Negative.    Allergic/Immunologic: Negative.    Neurological:  Positive for speech difficulty, weakness, numbness and headaches.   Psychiatric/Behavioral: Negative.     Objective:     Vital Signs (Most Recent):    Vital Signs (24h Range):  Temp:  [97.4 °F (36.3 °C)] 97.4 °F (36.3 °C)  Pulse:  [] 96  Resp:  [18-22] 18  SpO2:  [98 %-100 %] 98 %  BP: (123-165)/(67-97) 149/87        There is no height or weight on file to calculate BMI.    Physical Exam  Vitals and nursing note reviewed.   Constitutional:       General: She is not in  acute distress.     Appearance: Normal appearance. She is not ill-appearing.   HENT:      Head: Normocephalic and atraumatic.      Nose: Nose normal.      Mouth/Throat:      Mouth: Mucous membranes are moist.   Eyes:      Extraocular Movements: Extraocular movements intact.   Cardiovascular:      Rate and Rhythm: Normal rate.      Pulses: Normal pulses.      Heart sounds: No murmur heard.  Pulmonary:      Effort: Pulmonary effort is normal. No respiratory distress.   Abdominal:      General: Abdomen is flat.      Palpations: Abdomen is soft.      Tenderness: There is no abdominal tenderness.   Musculoskeletal:      Right lower leg: No edema.      Left lower leg: No edema.   Skin:     General: Skin is warm.      Capillary Refill: Capillary refill takes less than 2 seconds.   Neurological:      Mental Status: She is alert.      Comments: Reported absent sensation in right arm (from shoulder to elbow)   Reported absent sensation in right thigh (from hip to knee)  Able to ambulate without difficulty    Psychiatric:         Mood and Affect: Mood normal.           Significant Labs: All pertinent labs within the past 24 hours have been reviewed.    Significant Imaging: I have reviewed all pertinent imaging results/findings within the past 24 hours.    Assessment/Plan:     * Seizure-like activity  Presented with 4 reported seizures with residual right sided numbness   Evaluated by vascular neurology in the ER, without any acute intervention     Plan:   - Neurology consult  - Resume home AEDs  - Seizure precautions   - Will hold off on ativan PRN at this time, as the patient will benefit from being assessed by a provider prior to ativan, to avoid benzodiazepine abuse.       Chronic diastolic congestive heart failure  Chronic. Compensated   I/Os, daily weights    Bipolar 2 disorder  Resume home medications      Type 2 diabetes mellitus with neurologic complication, without long-term current use of insulin  Patient's FSGs are  controlled on current medication regimen.  Last A1c reviewed-   Lab Results   Component Value Date    HGBA1C 5.7 (H) 08/22/2022     Most recent fingerstick glucose reviewed-   Recent Labs   Lab 12/04/22  1508   POCTGLUCOSE 129*     Current correctional scale  Low  Maintain anti-hyperglycemic dose as follows-   Antihyperglycemics (From admission, onward)    None        Hold Oral hypoglycemics while patient is in the hospital.    HILARIA (generalized anxiety disorder)  Continue home medications      Conversion disorder  Reported seizure events since childhood   Psychiatry evaluation as outpatient       Essential hypertension  Monitor BP      Mixed hyperlipidemia  Resume statin       Peripheral neuropathy  Resume Lyrica      Bronchial asthma  PRN Duo-Nebs        VTE Risk Mitigation (From admission, onward)         Ordered     heparin (porcine) injection 5,000 Units  Every 8 hours         12/05/22 0122     IP VTE HIGH RISK PATIENT  Once         12/05/22 0122     Place sequential compression device  Until discontinued         12/05/22 0122                   Srinath Alvarez MD  Department of Hospital Medicine   [facility] - Telemetry

## 2022-12-05 NOTE — DISCHARGE SUMMARY
"[facility] Medicine  Discharge Summary      Patient Name: Eva Lane  MRN: [id number]  [facility]: [id number]  Patient Class: OP- Observation  Admission Date: 12/5/2022  Hospital Length of Stay: 0 days  Discharge Date and Time:  12/05/2022 12:58 PM  Attending Physician: Jayesh Menjivar MD   Discharging Provider: Jayesh Menjivar MD  Primary Care Provider: Cintia Gustafson NP    Primary Care Team: Networked reference to record PCT     HPI:   This is a 46 year old female with a PMHx of T2DM, HTN, HFpEF (EF: 58%), asthma, bipolar disorder, conversion disorder, depression, anxiety and borderline personality disorder who presents with seizure like activity.     Patient presented to [facility] ER after having 4 seizure like activity the day of presentation. She reports that she was in her usual state of health until she suddenly felt like she cant walk. She fell to the ground and had multiple "seizures" in one hour. She reports having generalized shaking of all extremities, each episode lasting 5-6 minutes. After her episodes, she feels like her speech has been affected, reports having numbness/stabbing feeling of her right arm and leg, and reports right eye blurry vision. Her numbness/stabbing sensation extends from her right shoulder to right elbow, and from her right hip to her right knee. Its intermittent and occurs about every 15 minutes. When the numbness/stabbing gets severe, she can't move her arm or leg. She reports having an episode of incontinence when she had her seizure. In the ED, she was HDS. Labs were unremarkable including normal CPK. CT head was negative. vascular neurology was consulted and recommended no stroke intervention. The patient was admitted for neurology evaluation.       * No surgery found *      Hospital Course:   No signs of stroke, evaluated by Neurology.  No signs of stroke on imaging or on exam.      Extensive psychiatric history of bipolar, borderline personality " disorder, conversion disorder, pseudoseizures, general anxiety disorder, social anxiety disorder, major depressive disorder.    Follow-up with Psychiatry, Neurology, PCP.       Goals of Care Treatment Preferences:  Code Status: Full Code      Consults:   Consults (From admission, onward)        Status Ordering Provider     Inpatient consult to Neurology  Once        Provider:  Jacob Hartman MD    Acknowledged ELLIOT KEITA          No new Assessment & Plan notes have been filed under this hospital service since the last note was generated.  Service: Hospital Medicine    Final Active Diagnoses:    Diagnosis Date Noted POA    PRINCIPAL PROBLEM:  Seizure-like activity [R56.9] 12/04/2022 Yes    Chronic diastolic congestive heart failure [I50.32] 08/21/2022 Yes    Bipolar 2 disorder [F31.81] 07/07/2022 Yes    Type 2 diabetes mellitus with neurologic complication, without long-term current use of insulin [E11.49] 05/05/2021 Yes    HILARIA (generalized anxiety disorder) [F41.1] 02/04/2016 Yes    Essential hypertension [I10] 09/23/2015 Yes    Conversion disorder [F44.9] 09/23/2015 Yes    Bronchial asthma [J45.909] 12/20/2012 Yes    Peripheral neuropathy [G62.9] 12/20/2012 Yes    Mixed hyperlipidemia [E78.2] 12/20/2012 Yes      Problems Resolved During this Admission:       Discharged Condition: good    Disposition:  home    Follow Up:  Below    Patient Instructions:      Ambulatory referral/consult to Internal Medicine   Standing Status: Future   Referral Priority: Routine Referral Type: Consultation   Referral Reason: Specialty Services Required   Requested Specialty: Internal Medicine   Number of Visits Requested: 1     Ambulatory referral/consult to Psychiatry   Standing Status: Future   Referral Priority: Routine Referral Type: Psychiatric   Referral Reason: Specialty Services Required   Requested Specialty: Psychiatry   Number of Visits Requested: 1     Ambulatory referral/consult to Neurology   Standing Status:  Future   Referral Priority: Routine Referral Type: Consultation   Referral Reason: Specialty Services Required   Requested Specialty: Neurology   Number of Visits Requested: 1       Significant Diagnostic Studies:     Recent Results (from the past 100 hour(s))   POCT glucose    Collection Time: 12/04/22  3:08 PM   Result Value Ref Range    POCT Glucose 129 (H) 70 - 110 mg/dL   CBC auto differential    Collection Time: 12/04/22  3:30 PM   Result Value Ref Range    WBC 7.64 3.90 - 12.70 K/uL    RBC 4.19 4.00 - 5.40 M/uL    Hemoglobin 12.9 12.0 - 16.0 g/dL    Hematocrit 37.6 37.0 - 48.5 %    MCV 90 82 - 98 fL    MCH 30.8 27.0 - 31.0 pg    MCHC 34.3 32.0 - 36.0 g/dL    RDW 12.8 11.5 - 14.5 %    Platelets 300 150 - 450 K/uL    MPV 11.3 9.2 - 12.9 fL    Immature Granulocytes 0.3 0.0 - 0.5 %    Gran # (ANC) 4.6 1.8 - 7.7 K/uL    Immature Grans (Abs) 0.02 0.00 - 0.04 K/uL    Lymph # 2.2 1.0 - 4.8 K/uL    Mono # 0.5 0.3 - 1.0 K/uL    Eos # 0.2 0.0 - 0.5 K/uL    Baso # 0.05 0.00 - 0.20 K/uL    nRBC 0 0 /100 WBC    Gran % 60.0 38.0 - 73.0 %    Lymph % 29.2 18.0 - 48.0 %    Mono % 6.7 4.0 - 15.0 %    Eosinophil % 3.1 0.0 - 8.0 %    Basophil % 0.7 0.0 - 1.9 %    Differential Method Automated    Comprehensive metabolic panel    Collection Time: 12/04/22  3:30 PM   Result Value Ref Range    Sodium 143 136 - 145 mmol/L    Potassium 3.3 (L) 3.5 - 5.1 mmol/L    Chloride 106 95 - 110 mmol/L    CO2 25 23 - 29 mmol/L    Glucose 87 70 - 110 mg/dL    BUN 8 6 - 20 mg/dL    Creatinine 0.7 0.5 - 1.4 mg/dL    Calcium 9.2 8.7 - 10.5 mg/dL    Total Protein 6.9 6.0 - 8.4 g/dL    Albumin 3.7 3.5 - 5.2 g/dL    Total Bilirubin 0.3 0.1 - 1.0 mg/dL    Alkaline Phosphatase 82 55 - 135 U/L    AST 9 (L) 10 - 40 U/L    ALT 11 10 - 44 U/L    Anion Gap 12 8 - 16 mmol/L    eGFR >60 >60 mL/min/1.73 m^2   Protime-INR    Collection Time: 12/04/22  3:30 PM   Result Value Ref Range    Prothrombin Time 11.9 9.0 - 12.5 sec    INR 1.2 0.8 - 1.2   Protime-INR     Collection Time: 12/04/22  3:30 PM   Result Value Ref Range    Prothrombin Time 11.9 9.0 - 12.5 sec    INR 1.2 0.8 - 1.2   Troponin I    Collection Time: 12/04/22  3:30 PM   Result Value Ref Range    Troponin I <0.006 0.000 - 0.026 ng/mL   Brain natriuretic peptide    Collection Time: 12/04/22  3:30 PM   Result Value Ref Range    BNP 14 0 - 99 pg/mL   CPK    Collection Time: 12/04/22  3:30 PM   Result Value Ref Range    CPK 37 20 - 180 U/L   CK-MB    Collection Time: 12/04/22  3:30 PM   Result Value Ref Range    CPK 37 20 - 180 U/L    CPK MB 0.7 0.1 - 6.5 ng/mL    MB % 1.9 0.0 - 5.0 %   Urinalysis    Collection Time: 12/04/22  3:49 PM   Result Value Ref Range    Specimen UA Urine, Clean Catch     Color, UA Yellow Yellow, Straw, Marybel    Appearance, UA Clear Clear    pH, UA 6.0 5.0 - 8.0    Specific Gravity, UA 1.010 1.005 - 1.030    Protein, UA Negative Negative    Glucose, UA Negative Negative    Ketones, UA Negative Negative    Bilirubin (UA) Negative Negative    Occult Blood UA Negative Negative    Nitrite, UA Negative Negative    Urobilinogen, UA Negative <2.0 EU/dL    Leukocytes, UA Negative Negative       Microbiology Results (last 7 days)     ** No results found for the last 168 hours. **           CT Head Without Contrast  Order: [id number]   Status: Final result      Visible to patient: Yes (not seen)      Next appt: 12/06/2022 at 09:45 AM in Internal Medicine (Cintia Gustafson NP)      0 Result Notes  Details    Reading Physician Reading Date Result Priority   Nadeem Villavicencio MD  [phone] 12/4/2022 STAT     Narrative & Impression  EXAMINATION:  CT HEAD WITHOUT CONTRAST     CLINICAL HISTORY:  Neuro deficit, acute, stroke suspected;     TECHNIQUE:  Low dose axial images were obtained through the head.  Coronal and sagittal reformations were also performed. Contrast was not administered.     COMPARISON:  MRI brain 08/21/2022, CT head 08/21/2022     FINDINGS:  Ventricles and sulci are normal in size for age  without evidence of hydrocephalus.     The brain parenchyma appears within normal limits.  No definite parenchymal mass, hemorrhage, edema or acute major vascular distribution infarct.     No extra-axial blood or fluid collections.     Skull/extracranial contents (limited evaluation):     No displaced calvarial fracture.     The mastoid air cells and visualized paranasal sinuses are essentially clear.     Impression:     No evidence of an acute intracranial abnormality.  Further evaluation with MRI could be performed, as clinically warranted.        Electronically signed by: Nadeem Villavicencio  Date:                                            2022  Time:                                           15:34             Pending Diagnostic Studies:     None         Medications:  Reconciled Home Medications:      Medication List      CONTINUE taking these medications    * albuterol 2.5 mg /3 mL (0.083 %) nebulizer solution  Commonly known as: PROVENTIL  USE ONE VIAL IN NEBULIZER EVERY 6 HOURS AS NEEDED FOR  WHEEZING.     * albuterol 90 mcg/actuation inhaler  Commonly known as: PROVENTIL HFA  Inhale 2 puffs into the lungs every 6 (six) hours as needed for Wheezing. Rescue     atorvastatin 20 MG tablet  Commonly known as: LIPITOR  SMARTSI Tablet(s) By Mouth Every Evening     blood sugar diagnostic Strp  Use 1  True Metrix test strip three times per day to check blood sugar ( single use only )     blood-glucose meter Misc  Commonly known as: TRUE METRIX GLUCOSE METER  Use three times per day to check blood sugar     butalbital-acetaminophen-caffeine -40 mg -40 mg per tablet  Commonly known as: FIORICET, ESGIC  Take 1 tablet by mouth every 6 (six) hours as needed for Headaches.     clobetasoL 0.05 % external solution  Commonly known as: TEMOVATE     divalproex 500 MG Tb24  Take 500 mg by mouth once daily.     EPINEPHrine 0.3 mg/0.3 mL Atin  Commonly known as: EPIPEN 2-LARS  Inject 0.3 mLs (0.3 mg total) into the  "muscle once.     ipratropium 0.02 % nebulizer solution  Commonly known as: ATROVENT     magnesium oxide 400 mg (241.3 mg magnesium) tablet  Commonly known as: MAG-OX  Take 1 tablet by mouth 2 (two) times daily.     metFORMIN 500 MG tablet  Commonly known as: GLUCOPHAGE  Take 1 tablet (500 mg total) by mouth daily with breakfast.     mupirocin 2 % ointment  Commonly known as: BACTROBAN  Apply topically 3 (three) times daily.     pen needle, diabetic 31 gauge x 5/16" Ndle  Inject 1 Stick into the skin once daily.     polyethylene glycol 17 gram Pwpk  Commonly known as: GLYCOLAX  Take 17 g by mouth daily as needed (constipation).     pregabalin 200 MG Cap  Commonly known as: LYRICA  Take 1 capsule (200 mg total) by mouth 2 (two) times daily.     TRADJENTA 5 mg Tab tablet  Generic drug: linaGLIPtin  Take 1 tablet (5 mg total) by mouth once daily.     * traZODone 50 MG tablet  Commonly known as: DESYREL  Take 1 tablet (50 mg total) by mouth every evening.     * traZODone 100 MG tablet  Commonly known as: DESYREL  Take 1 tablet (100 mg total) by mouth every evening.     TRUEPLUS LANCETS 33 gauge Misc  Generic drug: lancets  USE 1 LANCET TO CHECK GLUCOSE THREE TIMES DAILY SINGLE USE ONLY     VRAYLAR 3 mg Cap  Generic drug: cariprazine  Take 1 capsule (3 mg total) by mouth Daily.         * This list has 4 medication(s) that are the same as other medications prescribed for you. Read the directions carefully, and ask your doctor or other care provider to review them with you.            STOP taking these medications    UNKNOWN TO PATIENT            Indwelling Lines/Drains at time of discharge:   Lines/Drains/Airways     None                 Time spent on the discharge of patient: 35 minutes         Jayesh Menjivar MD  Department of Hospital Medicine  [facility] - Telemetry  "

## 2022-12-05 NOTE — PROGRESS NOTES
I have seen this patient, reviewed the history and physical, assessment and plan. I have personally interviewed and examined the patient at bedside and agree with the findings with the following comments/updates:    Patient unable to walk suddenly, stroke w/up neg  Suspected conversion disorder, previously dx  Placed in OBS  Awaiting Neuro assessment  Outpt Psych f/u      Jayesh Menjivar MD  Internal Medicine Staff  [phone] pager

## 2022-12-05 NOTE — NURSING
Upon arrival, [facility] EMTs stated that pt pulled IV out; noted pt ambulating in room unassisted w/o any difficulty.

## 2022-12-05 NOTE — HOSPITAL COURSE
No signs of stroke, evaluated by Neurology.  No signs of stroke on imaging or on exam.      Extensive psychiatric history of bipolar, borderline personality disorder, conversion disorder, pseudoseizures, general anxiety disorder, social anxiety disorder, major depressive disorder.    Follow-up with Psychiatry, Neurology, PCP.

## 2022-12-05 NOTE — PLAN OF CARE
Today's Date: 12/05/2022   Patient Name: Eva Lane   YOB: 1975         [facility] Medicine Dept.    [facility] [address] [place] [address]  Phone: [phone]     Eva Lane has been hospitalized at the [facility], Presented to ED on 12/04/2022 and discharged on 12/05/2022. Patient may return on Thursday 12/08/22 without restrictions. Kindly excuse the patient from work duties during their hospital stay & recovery.       Please contact us, if you have any questions about the length of stay or recovery time; however, details of the patient's medical history will remain confidential.            ________________________  Jayesh Menjivar MD  Date Signed: 12/5/2022

## 2022-12-06 ENCOUNTER — OFFICE VISIT (OUTPATIENT)
Dept: INTERNAL MEDICINE | Facility: CLINIC | Age: 47
End: 2022-12-06
Payer: MEDICAID

## 2022-12-06 VITALS
BODY MASS INDEX: 22.28 KG/M2 | WEIGHT: 130.5 LBS | DIASTOLIC BLOOD PRESSURE: 70 MMHG | RESPIRATION RATE: 20 BRPM | SYSTOLIC BLOOD PRESSURE: 92 MMHG | HEART RATE: 116 BPM | HEIGHT: 64 IN

## 2022-12-06 DIAGNOSIS — R56.9 SEIZURE-LIKE ACTIVITY: ICD-10-CM

## 2022-12-06 DIAGNOSIS — F31.81 BIPOLAR 2 DISORDER: Primary | ICD-10-CM

## 2022-12-06 PROCEDURE — 99999 PR PBB SHADOW E&M-EST. PATIENT-LVL V: ICD-10-PCS | Mod: PBBFAC,,, | Performed by: NURSE PRACTITIONER

## 2022-12-06 PROCEDURE — 4010F PR ACE/ARB THEARPY RXD/TAKEN: ICD-10-PCS | Mod: CPTII,,, | Performed by: NURSE PRACTITIONER

## 2022-12-06 PROCEDURE — 3044F PR MOST RECENT HEMOGLOBIN A1C LEVEL <7.0%: ICD-10-PCS | Mod: CPTII,,, | Performed by: NURSE PRACTITIONER

## 2022-12-06 PROCEDURE — 3044F HG A1C LEVEL LT 7.0%: CPT | Mod: CPTII,,, | Performed by: NURSE PRACTITIONER

## 2022-12-06 PROCEDURE — 3008F BODY MASS INDEX DOCD: CPT | Mod: CPTII,,, | Performed by: NURSE PRACTITIONER

## 2022-12-06 PROCEDURE — 3074F SYST BP LT 130 MM HG: CPT | Mod: CPTII,,, | Performed by: NURSE PRACTITIONER

## 2022-12-06 PROCEDURE — 4010F ACE/ARB THERAPY RXD/TAKEN: CPT | Mod: CPTII,,, | Performed by: NURSE PRACTITIONER

## 2022-12-06 PROCEDURE — 99214 OFFICE O/P EST MOD 30 MIN: CPT | Mod: S$PBB,,, | Performed by: NURSE PRACTITIONER

## 2022-12-06 PROCEDURE — 3078F PR MOST RECENT DIASTOLIC BLOOD PRESSURE < 80 MM HG: ICD-10-PCS | Mod: CPTII,,, | Performed by: NURSE PRACTITIONER

## 2022-12-06 PROCEDURE — 99214 PR OFFICE/OUTPT VISIT, EST, LEVL IV, 30-39 MIN: ICD-10-PCS | Mod: S$PBB,,, | Performed by: NURSE PRACTITIONER

## 2022-12-06 PROCEDURE — 1159F PR MEDICATION LIST DOCUMENTED IN MEDICAL RECORD: ICD-10-PCS | Mod: CPTII,,, | Performed by: NURSE PRACTITIONER

## 2022-12-06 PROCEDURE — 3078F DIAST BP <80 MM HG: CPT | Mod: CPTII,,, | Performed by: NURSE PRACTITIONER

## 2022-12-06 PROCEDURE — 3008F PR BODY MASS INDEX (BMI) DOCUMENTED: ICD-10-PCS | Mod: CPTII,,, | Performed by: NURSE PRACTITIONER

## 2022-12-06 PROCEDURE — 99999 PR PBB SHADOW E&M-EST. PATIENT-LVL V: CPT | Mod: PBBFAC,,, | Performed by: NURSE PRACTITIONER

## 2022-12-06 PROCEDURE — 1159F MED LIST DOCD IN RCRD: CPT | Mod: CPTII,,, | Performed by: NURSE PRACTITIONER

## 2022-12-06 PROCEDURE — 3074F PR MOST RECENT SYSTOLIC BLOOD PRESSURE < 130 MM HG: ICD-10-PCS | Mod: CPTII,,, | Performed by: NURSE PRACTITIONER

## 2022-12-06 PROCEDURE — 99215 OFFICE O/P EST HI 40 MIN: CPT | Mod: PBBFAC,PN | Performed by: NURSE PRACTITIONER

## 2022-12-06 RX ORDER — QUETIAPINE FUMARATE 400 MG/1
400 TABLET, FILM COATED ORAL 2 TIMES DAILY
Qty: 60 TABLET | Refills: 2 | Status: SHIPPED | OUTPATIENT
Start: 2022-12-06 | End: 2023-01-13 | Stop reason: SDUPTHER

## 2022-12-06 RX ORDER — DIVALPROEX SODIUM 500 MG/1
500 TABLET, FILM COATED, EXTENDED RELEASE ORAL DAILY
Qty: 30 TABLET | Refills: 5 | Status: SHIPPED | OUTPATIENT
Start: 2022-12-06 | End: 2023-04-05 | Stop reason: SDUPTHER

## 2022-12-06 RX ORDER — ADALIMUMAB 4 MG/ML
KIT INJECTION
COMMUNITY
Start: 2022-11-17 | End: 2023-04-13

## 2022-12-06 RX ORDER — QUETIAPINE FUMARATE 400 MG/1
400 TABLET, FILM COATED ORAL
COMMUNITY
Start: 2022-12-02 | End: 2022-12-06 | Stop reason: SDUPTHER

## 2022-12-20 ENCOUNTER — PATIENT MESSAGE (OUTPATIENT)
Dept: INTERNAL MEDICINE | Facility: CLINIC | Age: 47
End: 2022-12-20
Payer: MEDICAID

## 2023-01-09 ENCOUNTER — PATIENT MESSAGE (OUTPATIENT)
Dept: ADMINISTRATIVE | Facility: HOSPITAL | Age: 48
End: 2023-01-09
Payer: MEDICAID

## 2023-01-13 DIAGNOSIS — F31.81 BIPOLAR 2 DISORDER: ICD-10-CM

## 2023-01-16 ENCOUNTER — PATIENT MESSAGE (OUTPATIENT)
Dept: INTERNAL MEDICINE | Facility: CLINIC | Age: 48
End: 2023-01-16
Payer: MEDICAID

## 2023-01-16 RX ORDER — QUETIAPINE FUMARATE 400 MG/1
400 TABLET, FILM COATED ORAL 2 TIMES DAILY
Qty: 60 TABLET | Refills: 2 | Status: SHIPPED | OUTPATIENT
Start: 2023-01-16 | End: 2023-04-13

## 2023-01-17 ENCOUNTER — PATIENT MESSAGE (OUTPATIENT)
Dept: INTERNAL MEDICINE | Facility: CLINIC | Age: 48
End: 2023-01-17

## 2023-01-17 ENCOUNTER — CLINICAL SUPPORT (OUTPATIENT)
Dept: INTERNAL MEDICINE | Facility: CLINIC | Age: 48
End: 2023-01-17
Payer: MEDICAID

## 2023-01-17 DIAGNOSIS — E11.42 TYPE 2 DIABETES MELLITUS WITH DIABETIC POLYNEUROPATHY, WITHOUT LONG-TERM CURRENT USE OF INSULIN: ICD-10-CM

## 2023-01-17 LAB
ALBUMIN SERPL BCP-MCNC: 3.8 G/DL (ref 3.5–5.2)
ALP SERPL-CCNC: 82 U/L (ref 55–135)
ALT SERPL W/O P-5'-P-CCNC: 9 U/L (ref 10–44)
ANION GAP SERPL CALC-SCNC: 8 MMOL/L (ref 8–16)
AST SERPL-CCNC: 19 U/L (ref 10–40)
BILIRUB SERPL-MCNC: 0.6 MG/DL (ref 0.1–1)
BUN SERPL-MCNC: 17 MG/DL (ref 6–20)
CALCIUM SERPL-MCNC: 9.2 MG/DL (ref 8.7–10.5)
CHLORIDE SERPL-SCNC: 109 MMOL/L (ref 95–110)
CO2 SERPL-SCNC: 22 MMOL/L (ref 23–29)
CREAT SERPL-MCNC: 0.9 MG/DL (ref 0.5–1.4)
EST. GFR  (NO RACE VARIABLE): >60 ML/MIN/1.73 M^2
ESTIMATED AVG GLUCOSE: 163 MG/DL (ref 68–131)
GLUCOSE SERPL-MCNC: 150 MG/DL (ref 70–110)
HBA1C MFR BLD: 7.3 % (ref 4–5.6)
POTASSIUM SERPL-SCNC: 4.3 MMOL/L (ref 3.5–5.1)
PROT SERPL-MCNC: 7.4 G/DL (ref 6–8.4)
SODIUM SERPL-SCNC: 139 MMOL/L (ref 136–145)

## 2023-01-17 PROCEDURE — 83036 HEMOGLOBIN GLYCOSYLATED A1C: CPT | Performed by: NURSE PRACTITIONER

## 2023-01-17 PROCEDURE — 80053 COMPREHEN METABOLIC PANEL: CPT | Performed by: NURSE PRACTITIONER

## 2023-01-17 RX ORDER — CYCLOBENZAPRINE HCL 5 MG
5 TABLET ORAL 3 TIMES DAILY PRN
Qty: 20 TABLET | Refills: 0 | Status: SHIPPED | OUTPATIENT
Start: 2023-01-17 | End: 2023-02-12 | Stop reason: SDUPTHER

## 2023-01-20 ENCOUNTER — OFFICE VISIT (OUTPATIENT)
Dept: INTERNAL MEDICINE | Facility: CLINIC | Age: 48
End: 2023-01-20
Payer: MEDICAID

## 2023-01-20 VITALS
DIASTOLIC BLOOD PRESSURE: 76 MMHG | HEART RATE: 93 BPM | RESPIRATION RATE: 18 BRPM | OXYGEN SATURATION: 98 % | HEIGHT: 64 IN | BODY MASS INDEX: 23.67 KG/M2 | WEIGHT: 138.63 LBS | SYSTOLIC BLOOD PRESSURE: 112 MMHG

## 2023-01-20 DIAGNOSIS — I10 ESSENTIAL HYPERTENSION: ICD-10-CM

## 2023-01-20 DIAGNOSIS — E11.42 TYPE 2 DIABETES MELLITUS WITH DIABETIC POLYNEUROPATHY, WITHOUT LONG-TERM CURRENT USE OF INSULIN: Primary | ICD-10-CM

## 2023-01-20 DIAGNOSIS — Z72.0 TOBACCO ABUSE: ICD-10-CM

## 2023-01-20 DIAGNOSIS — E78.5 HYPERLIPIDEMIA LDL GOAL <70: ICD-10-CM

## 2023-01-20 PROCEDURE — 3078F DIAST BP <80 MM HG: CPT | Mod: CPTII,,, | Performed by: NURSE PRACTITIONER

## 2023-01-20 PROCEDURE — 3078F PR MOST RECENT DIASTOLIC BLOOD PRESSURE < 80 MM HG: ICD-10-PCS | Mod: CPTII,,, | Performed by: NURSE PRACTITIONER

## 2023-01-20 PROCEDURE — 3008F PR BODY MASS INDEX (BMI) DOCUMENTED: ICD-10-PCS | Mod: CPTII,,, | Performed by: NURSE PRACTITIONER

## 2023-01-20 PROCEDURE — 3051F HG A1C>EQUAL 7.0%<8.0%: CPT | Mod: CPTII,,, | Performed by: NURSE PRACTITIONER

## 2023-01-20 PROCEDURE — 3074F SYST BP LT 130 MM HG: CPT | Mod: CPTII,,, | Performed by: NURSE PRACTITIONER

## 2023-01-20 PROCEDURE — 3051F PR MOST RECENT HEMOGLOBIN A1C LEVEL 7.0 - < 8.0%: ICD-10-PCS | Mod: CPTII,,, | Performed by: NURSE PRACTITIONER

## 2023-01-20 PROCEDURE — 99999 PR PBB SHADOW E&M-EST. PATIENT-LVL V: ICD-10-PCS | Mod: PBBFAC,,, | Performed by: NURSE PRACTITIONER

## 2023-01-20 PROCEDURE — 1159F PR MEDICATION LIST DOCUMENTED IN MEDICAL RECORD: ICD-10-PCS | Mod: CPTII,,, | Performed by: NURSE PRACTITIONER

## 2023-01-20 PROCEDURE — 99214 PR OFFICE/OUTPT VISIT, EST, LEVL IV, 30-39 MIN: ICD-10-PCS | Mod: S$PBB,,, | Performed by: NURSE PRACTITIONER

## 2023-01-20 PROCEDURE — 3008F BODY MASS INDEX DOCD: CPT | Mod: CPTII,,, | Performed by: NURSE PRACTITIONER

## 2023-01-20 PROCEDURE — 3074F PR MOST RECENT SYSTOLIC BLOOD PRESSURE < 130 MM HG: ICD-10-PCS | Mod: CPTII,,, | Performed by: NURSE PRACTITIONER

## 2023-01-20 PROCEDURE — 99215 OFFICE O/P EST HI 40 MIN: CPT | Mod: PBBFAC,PN | Performed by: NURSE PRACTITIONER

## 2023-01-20 PROCEDURE — 99214 OFFICE O/P EST MOD 30 MIN: CPT | Mod: S$PBB,,, | Performed by: NURSE PRACTITIONER

## 2023-01-20 PROCEDURE — 1159F MED LIST DOCD IN RCRD: CPT | Mod: CPTII,,, | Performed by: NURSE PRACTITIONER

## 2023-01-20 PROCEDURE — 99999 PR PBB SHADOW E&M-EST. PATIENT-LVL V: CPT | Mod: PBBFAC,,, | Performed by: NURSE PRACTITIONER

## 2023-01-20 RX ORDER — VARENICLINE TARTRATE 0.5 (11)-1
KIT ORAL
Qty: 1 EACH | Refills: 0 | Status: SHIPPED | OUTPATIENT
Start: 2023-01-20 | End: 2023-09-05

## 2023-01-20 NOTE — PROGRESS NOTES
Subjective:       Patient ID: Eva Lane is a 47 y.o. female.    Chief Complaint: Follow-up (3 Month f/u- results)    Patient is known, to me and presents with   Chief Complaint   Patient presents with    Follow-up     3 Month f/u- results   .  Denies chest pain and shortness of breath.  Patient presents with check up and lab results. She is doing well. States that her blood sugars went up over the holidays but is doing better with her diet. Taking her medications. She really wants to quit smoking. She is interested in chantix. She is well aware of the black box warnings. States that if she develops any sihi she will stop meds immediately    HPI  Review of Systems   Constitutional:  Negative for activity change, appetite change, fatigue, fever and unexpected weight change.   HENT:  Negative for congestion, ear discharge, ear pain, hearing loss, postnasal drip and tinnitus.    Eyes:  Negative for photophobia, pain and visual disturbance.   Respiratory:  Negative for cough, shortness of breath, wheezing and stridor.    Cardiovascular:  Negative for chest pain, palpitations and leg swelling.   Gastrointestinal:  Negative for abdominal distention.   Genitourinary:  Negative for difficulty urinating, dysuria, frequency, hematuria and urgency.   Musculoskeletal:  Negative for arthralgias, back pain, gait problem, joint swelling and neck pain.   Skin: Negative.    Neurological:  Negative for dizziness, seizures, syncope, weakness, light-headedness, numbness and headaches.   Hematological:  Negative for adenopathy. Does not bruise/bleed easily.   Psychiatric/Behavioral:  Negative for behavioral problems, confusion, dysphoric mood, hallucinations, sleep disturbance and suicidal ideas.      Objective:      Physical Exam  Constitutional:       General: She is not in acute distress.     Appearance: She is well-developed.   HENT:      Head: Normocephalic and atraumatic.      Right Ear: Tympanic membrane and external  ear normal.      Left Ear: Tympanic membrane and external ear normal.      Nose: Nose normal.      Mouth/Throat:      Mouth: Mucous membranes are moist.      Pharynx: No oropharyngeal exudate.   Eyes:      General: No scleral icterus.        Right eye: No discharge.         Left eye: No discharge.      Conjunctiva/sclera: Conjunctivae normal.      Pupils: Pupils are equal, round, and reactive to light.   Neck:      Thyroid: No thyromegaly.      Vascular: No JVD.   Cardiovascular:      Rate and Rhythm: Normal rate and regular rhythm.      Heart sounds: Normal heart sounds. No murmur heard.    No friction rub. No gallop.   Pulmonary:      Effort: Pulmonary effort is normal. No respiratory distress.      Breath sounds: Normal breath sounds. No stridor. No wheezing or rales.   Chest:      Chest wall: No tenderness.   Abdominal:      General: Bowel sounds are normal. There is no distension.      Palpations: Abdomen is soft. There is no mass.      Tenderness: There is no abdominal tenderness. There is no right CVA tenderness, left CVA tenderness, guarding or rebound.      Hernia: No hernia is present.   Musculoskeletal:         General: No swelling, tenderness, deformity or signs of injury. Normal range of motion.      Cervical back: Normal range of motion and neck supple.      Right lower leg: No edema.      Left lower leg: No edema.   Lymphadenopathy:      Cervical: No cervical adenopathy.   Skin:     General: Skin is warm and dry.      Capillary Refill: Capillary refill takes less than 2 seconds.      Coloration: Skin is not jaundiced or pale.      Findings: No bruising, erythema, lesion or rash.   Neurological:      General: No focal deficit present.      Mental Status: She is alert and oriented to person, place, and time.      Cranial Nerves: No cranial nerve deficit.      Sensory: No sensory deficit.      Motor: No weakness or abnormal muscle tone.      Coordination: Coordination normal.      Gait: Gait normal.       "Deep Tendon Reflexes: Reflexes are normal and symmetric. Reflexes normal.   Psychiatric:         Mood and Affect: Mood normal.         Behavior: Behavior normal.         Thought Content: Thought content normal.         Judgment: Judgment normal.      Comments: Negative sihi noted       Assessment:       1. Type 2 diabetes mellitus with diabetic polyneuropathy, without long-term current use of insulin    2. Essential hypertension    3. Hyperlipidemia LDL goal <70    4. Tobacco abuse        Plan:   1. Type 2 diabetes mellitus with diabetic polyneuropathy, without long-term current use of insulin  -     CBC Auto Differential; Future; Expected date: 04/20/2023  -     Comprehensive Metabolic Panel; Future; Expected date: 04/20/2023  -     Microalbumin/Creatinine Ratio, Urine; Future; Expected date: 04/20/2023  -     Hemoglobin A1C; Future; Expected date: 04/20/2023    2. Essential hypertension  -     CBC Auto Differential; Future; Expected date: 04/20/2023  -     Comprehensive Metabolic Panel; Future; Expected date: 04/20/2023  -     Microalbumin/Creatinine Ratio, Urine; Future; Expected date: 04/20/2023    3. Hyperlipidemia LDL goal <70  -     CBC Auto Differential; Future; Expected date: 04/20/2023  -     Comprehensive Metabolic Panel; Future; Expected date: 04/20/2023  -     TSH; Future; Expected date: 04/20/2023  -     Lipid Panel; Future; Expected date: 04/20/2023    4. Tobacco abuse  -     varenicline (CHANTIX STARTING MONTH BOX) 0.5 mg (11)- 1 mg (42) tablet; Take one 0.5mg tab by mouth once daily X3 days,then increase to one 0.5mg tab twice daily X4 days,then increase to one 1mg tab twice daily  Dispense: 1 each; Refill: 0       "This note will not be shared with the patient."  Does not want smoking cessation class  Assistance with smoking cessation was offered, including:  [x]  Medications  [x]  Counseling  []  Printed Information on Smoking Cessation  []  Referral to a Smoking Cessation Program    Patient was " counseled regarding smoking for >10 minutes.   If any thoughts of sihi occur stop chantix immediately and call 911   Rtc as scheduled

## 2023-02-10 ENCOUNTER — PATIENT MESSAGE (OUTPATIENT)
Dept: INTERNAL MEDICINE | Facility: CLINIC | Age: 48
End: 2023-02-10
Payer: MEDICAID

## 2023-02-12 ENCOUNTER — PATIENT MESSAGE (OUTPATIENT)
Dept: INTERNAL MEDICINE | Facility: CLINIC | Age: 48
End: 2023-02-12
Payer: MEDICAID

## 2023-02-13 ENCOUNTER — PATIENT MESSAGE (OUTPATIENT)
Dept: INTERNAL MEDICINE | Facility: CLINIC | Age: 48
End: 2023-02-13
Payer: MEDICAID

## 2023-02-14 NOTE — ED NOTES
Discharged to home/self care.    - Condition at discharge: Good  - Mode of Discharge: Wheelchair.  - The patient left the ED accompanied by a family member.  - The discharge instructions were discussed with the patient.  - They state an understanding of the discharge instructions.  - Walked pt to the discharge station.  
Spoke with the  Amado on the phone about his concerns that, given the picture of the injury he received, maybe the patient intentionally attempt to hurt herself.  Dr. Cosme  And primary nurse to bedside.   on speakerphone.  Patient again denies SI, HI, hallucinations, and depression.  Denied history of psychiatry illness and SI / suicide attempt.  Daughter who was at house stated they did not have any reason to believe her mother tried to hurt herself.  Dr. Cosme confirmed that daughter was comfortable taking her home and patient/daughter stated she had no more access to box cutters or anything to harm herself.  Patient okay to discharge, per Dr. Cosme.    
PAST SURGICAL HISTORY:  H/O lithotripsy renal

## 2023-03-23 ENCOUNTER — OFFICE VISIT (OUTPATIENT)
Dept: INTERNAL MEDICINE | Facility: CLINIC | Age: 48
End: 2023-03-23
Payer: MEDICAID

## 2023-03-23 VITALS
BODY MASS INDEX: 23.31 KG/M2 | WEIGHT: 136.5 LBS | HEART RATE: 101 BPM | DIASTOLIC BLOOD PRESSURE: 62 MMHG | RESPIRATION RATE: 18 BRPM | SYSTOLIC BLOOD PRESSURE: 106 MMHG | HEIGHT: 64 IN | OXYGEN SATURATION: 98 %

## 2023-03-23 DIAGNOSIS — F44.9 CONVERSION DISORDER: ICD-10-CM

## 2023-03-23 DIAGNOSIS — F60.3 BORDERLINE PERSONALITY DISORDER: ICD-10-CM

## 2023-03-23 DIAGNOSIS — E11.42 TYPE 2 DIABETES MELLITUS WITH DIABETIC POLYNEUROPATHY, WITHOUT LONG-TERM CURRENT USE OF INSULIN: ICD-10-CM

## 2023-03-23 DIAGNOSIS — I10 ESSENTIAL HYPERTENSION: ICD-10-CM

## 2023-03-23 DIAGNOSIS — F44.5 PSEUDOSEIZURES: Primary | ICD-10-CM

## 2023-03-23 DIAGNOSIS — E78.2 MIXED HYPERLIPIDEMIA: ICD-10-CM

## 2023-03-23 DIAGNOSIS — F33.1 MDD (MAJOR DEPRESSIVE DISORDER), RECURRENT EPISODE, MODERATE: ICD-10-CM

## 2023-03-23 PROCEDURE — 3051F PR MOST RECENT HEMOGLOBIN A1C LEVEL 7.0 - < 8.0%: ICD-10-PCS | Mod: CPTII,,, | Performed by: NURSE PRACTITIONER

## 2023-03-23 PROCEDURE — 3074F SYST BP LT 130 MM HG: CPT | Mod: CPTII,,, | Performed by: NURSE PRACTITIONER

## 2023-03-23 PROCEDURE — 3008F PR BODY MASS INDEX (BMI) DOCUMENTED: ICD-10-PCS | Mod: CPTII,,, | Performed by: NURSE PRACTITIONER

## 2023-03-23 PROCEDURE — 99214 OFFICE O/P EST MOD 30 MIN: CPT | Mod: S$PBB,,, | Performed by: NURSE PRACTITIONER

## 2023-03-23 PROCEDURE — 3078F PR MOST RECENT DIASTOLIC BLOOD PRESSURE < 80 MM HG: ICD-10-PCS | Mod: CPTII,,, | Performed by: NURSE PRACTITIONER

## 2023-03-23 PROCEDURE — 3078F DIAST BP <80 MM HG: CPT | Mod: CPTII,,, | Performed by: NURSE PRACTITIONER

## 2023-03-23 PROCEDURE — 99214 PR OFFICE/OUTPT VISIT, EST, LEVL IV, 30-39 MIN: ICD-10-PCS | Mod: S$PBB,,, | Performed by: NURSE PRACTITIONER

## 2023-03-23 PROCEDURE — 1159F PR MEDICATION LIST DOCUMENTED IN MEDICAL RECORD: ICD-10-PCS | Mod: CPTII,,, | Performed by: NURSE PRACTITIONER

## 2023-03-23 PROCEDURE — 3051F HG A1C>EQUAL 7.0%<8.0%: CPT | Mod: CPTII,,, | Performed by: NURSE PRACTITIONER

## 2023-03-23 PROCEDURE — 99999 PR PBB SHADOW E&M-EST. PATIENT-LVL V: ICD-10-PCS | Mod: PBBFAC,,, | Performed by: NURSE PRACTITIONER

## 2023-03-23 PROCEDURE — 1159F MED LIST DOCD IN RCRD: CPT | Mod: CPTII,,, | Performed by: NURSE PRACTITIONER

## 2023-03-23 PROCEDURE — 3008F BODY MASS INDEX DOCD: CPT | Mod: CPTII,,, | Performed by: NURSE PRACTITIONER

## 2023-03-23 PROCEDURE — 99999 PR PBB SHADOW E&M-EST. PATIENT-LVL V: CPT | Mod: PBBFAC,,, | Performed by: NURSE PRACTITIONER

## 2023-03-23 PROCEDURE — 99215 OFFICE O/P EST HI 40 MIN: CPT | Mod: PBBFAC,PN | Performed by: NURSE PRACTITIONER

## 2023-03-23 PROCEDURE — 3074F PR MOST RECENT SYSTOLIC BLOOD PRESSURE < 130 MM HG: ICD-10-PCS | Mod: CPTII,,, | Performed by: NURSE PRACTITIONER

## 2023-03-31 NOTE — PROGRESS NOTES
Subjective:       Patient ID: Eva Lane is a 47 y.o. female.    Chief Complaint: Follow-up    Patient is known, to me and presents with   Chief Complaint   Patient presents with    Follow-up   .  Denies chest pain and shortness of breath.  Patient presents with check up and needs labs. She will need to have a psy referral due to worsening depression and states that she is under a lot of stress. No sihi noted.    Follow-up  Associated symptoms include arthralgias, chest pain, headaches and neck pain. Pertinent negatives include no congestion, coughing, fatigue, fever, joint swelling, numbness, vomiting or weakness.   Review of Systems   Constitutional:  Positive for activity change. Negative for appetite change, fatigue, fever and unexpected weight change.   HENT:  Positive for trouble swallowing. Negative for congestion, ear discharge, ear pain, hearing loss, postnasal drip, rhinorrhea and tinnitus.    Eyes:  Positive for discharge. Negative for photophobia, pain and visual disturbance.   Respiratory:  Positive for chest tightness. Negative for cough, shortness of breath, wheezing and stridor.    Cardiovascular:  Positive for chest pain and palpitations. Negative for leg swelling.   Gastrointestinal:  Positive for constipation. Negative for abdominal distention, blood in stool, diarrhea and vomiting.   Endocrine: Positive for polydipsia and polyuria.   Genitourinary:  Negative for difficulty urinating, dysuria, frequency, hematuria, menstrual problem and urgency.   Musculoskeletal:  Positive for arthralgias and neck pain. Negative for back pain, gait problem and joint swelling.   Skin: Negative.    Neurological:  Positive for headaches. Negative for dizziness, seizures, syncope, weakness, light-headedness and numbness.   Hematological:  Negative for adenopathy. Does not bruise/bleed easily.   Psychiatric/Behavioral:  Positive for confusion and dysphoric mood. Negative for behavioral problems, decreased  concentration, hallucinations, self-injury, sleep disturbance and suicidal ideas. The patient is not nervous/anxious and is not hyperactive.      Objective:      Physical Exam  Constitutional:       General: She is not in acute distress.     Appearance: She is well-developed.   HENT:      Head: Normocephalic and atraumatic.      Right Ear: Tympanic membrane and external ear normal.      Left Ear: Tympanic membrane and external ear normal.      Nose: Nose normal.      Mouth/Throat:      Mouth: Mucous membranes are moist.      Pharynx: No oropharyngeal exudate.   Eyes:      General: No scleral icterus.        Right eye: No discharge.         Left eye: No discharge.      Conjunctiva/sclera: Conjunctivae normal.      Pupils: Pupils are equal, round, and reactive to light.   Neck:      Thyroid: No thyromegaly.      Vascular: No JVD.   Cardiovascular:      Rate and Rhythm: Normal rate and regular rhythm.      Heart sounds: Normal heart sounds. No murmur heard.    No friction rub. No gallop.   Pulmonary:      Effort: Pulmonary effort is normal. No respiratory distress.      Breath sounds: Normal breath sounds. No stridor. No wheezing or rales.   Chest:      Chest wall: No tenderness.   Abdominal:      General: Bowel sounds are normal. There is no distension.      Palpations: Abdomen is soft. There is no mass.      Tenderness: There is no abdominal tenderness. There is no right CVA tenderness, left CVA tenderness, guarding or rebound.      Hernia: No hernia is present.   Musculoskeletal:         General: No swelling, tenderness, deformity or signs of injury. Normal range of motion.      Cervical back: Normal range of motion and neck supple.      Right lower leg: No edema.      Left lower leg: No edema.   Lymphadenopathy:      Cervical: No cervical adenopathy.   Skin:     General: Skin is warm and dry.      Capillary Refill: Capillary refill takes less than 2 seconds.      Coloration: Skin is not jaundiced or pale.       Findings: No bruising, erythema, lesion or rash.   Neurological:      General: No focal deficit present.      Mental Status: She is alert and oriented to person, place, and time.      Cranial Nerves: No cranial nerve deficit.      Sensory: No sensory deficit.      Motor: No weakness or abnormal muscle tone.      Coordination: Coordination normal.      Gait: Gait normal.      Deep Tendon Reflexes: Reflexes are normal and symmetric. Reflexes normal.   Psychiatric:         Mood and Affect: Mood and affect normal.         Speech: Speech normal.         Behavior: Behavior normal. Behavior is cooperative.         Thought Content: Thought content normal. Thought content does not include homicidal or suicidal ideation. Thought content does not include homicidal or suicidal plan.         Cognition and Memory: Cognition and memory normal.         Judgment: Judgment normal.       Assessment:       1. Pseudoseizures    2. Type 2 diabetes mellitus with diabetic polyneuropathy, without long-term current use of insulin    3. Mixed hyperlipidemia    4. Essential hypertension    5. MDD (major depressive disorder), recurrent episode, moderate    6. Conversion disorder    7. Borderline personality disorder          Plan:   1. Pseudoseizures  -     CBC Auto Differential; Future; Expected date: 03/27/2023  -     Comprehensive Metabolic Panel; Future; Expected date: 03/27/2023    2. Type 2 diabetes mellitus with diabetic polyneuropathy, without long-term current use of insulin  -     CBC Auto Differential; Future; Expected date: 03/27/2023  -     Comprehensive Metabolic Panel; Future; Expected date: 03/27/2023  -     Microalbumin/Creatinine Ratio, Urine; Future; Expected date: 03/27/2023  -     Hemoglobin A1C; Future; Expected date: 03/27/2023    3. Mixed hyperlipidemia  -     CBC Auto Differential; Future; Expected date: 03/27/2023  -     Comprehensive Metabolic Panel; Future; Expected date: 03/27/2023  -     TSH; Future; Expected date:  "03/27/2023  -     Lipid Panel; Future; Expected date: 03/27/2023    4. Essential hypertension  -     CBC Auto Differential; Future; Expected date: 03/27/2023  -     Comprehensive Metabolic Panel; Future; Expected date: 03/27/2023  -     Microalbumin/Creatinine Ratio, Urine; Future; Expected date: 03/27/2023    5. MDD (major depressive disorder), recurrent episode, moderate  -     Ambulatory referral/consult to Psychiatry; Future; Expected date: 03/30/2023    6. Conversion disorder  -     Ambulatory referral/consult to Psychiatry; Future; Expected date: 03/30/2023    7. Borderline personality disorder  -     Ambulatory referral/consult to Psychiatry; Future; Expected date: 03/30/2023     "This note will not be shared with the patient."  Will send to psy for further evaluation   Continue with plan of care for now  Check CBC, CMP, TSH, Fasting lipid profile, HgA1c, Microalbuminuria in three months.  Medication compliance was discussed with the patient.  The patient was instructed to monitor glucoses closely using glucometer at home and to record the values in a log.  The patient was instructed to obtain an Optometry exam and microalbumin q year.  The patient was instructed to obtain a hemoglobin A1C q 3-4 months.  The patient was instructed to check the feet visually daily and to monitor for infection.  The patient was instructed to exercise at least 3 times a week.  The patient was instructed to follow a 1800 ADA diet.  The patient was instructed to monitor weight daily and try to keep it as close to ideal body weight as possible.  The patient was instructed on using exercise frequently to reduce BP.  The patient was instructed on using a low salt diet.  Monitor BP at home and to record the values in a log.  RTC in three months.   "

## 2023-04-03 ENCOUNTER — PATIENT MESSAGE (OUTPATIENT)
Dept: ADMINISTRATIVE | Facility: HOSPITAL | Age: 48
End: 2023-04-03
Payer: MEDICAID

## 2023-04-13 ENCOUNTER — OFFICE VISIT (OUTPATIENT)
Dept: PSYCHIATRY | Facility: CLINIC | Age: 48
End: 2023-04-13
Payer: MEDICAID

## 2023-04-13 VITALS
SYSTOLIC BLOOD PRESSURE: 105 MMHG | HEART RATE: 101 BPM | RESPIRATION RATE: 17 BRPM | BODY MASS INDEX: 24.13 KG/M2 | HEIGHT: 64 IN | DIASTOLIC BLOOD PRESSURE: 68 MMHG | OXYGEN SATURATION: 96 % | WEIGHT: 141.31 LBS

## 2023-04-13 DIAGNOSIS — F33.1 MDD (MAJOR DEPRESSIVE DISORDER), RECURRENT EPISODE, MODERATE: ICD-10-CM

## 2023-04-13 DIAGNOSIS — F60.3 BORDERLINE PERSONALITY DISORDER: ICD-10-CM

## 2023-04-13 DIAGNOSIS — F51.04 CHRONIC INSOMNIA: ICD-10-CM

## 2023-04-13 DIAGNOSIS — F44.9 CONVERSION DISORDER: ICD-10-CM

## 2023-04-13 DIAGNOSIS — F20.3 UNDIFFERENTIATED SCHIZOPHRENIA: Primary | ICD-10-CM

## 2023-04-13 PROCEDURE — 90792 PR PSYCHIATRIC DIAGNOSTIC EVALUATION W/MEDICAL SERVICES: ICD-10-PCS | Mod: ,,,

## 2023-04-13 PROCEDURE — 3074F SYST BP LT 130 MM HG: CPT | Mod: CPTII,,,

## 2023-04-13 PROCEDURE — 1159F MED LIST DOCD IN RCRD: CPT | Mod: CPTII,,,

## 2023-04-13 PROCEDURE — 3008F BODY MASS INDEX DOCD: CPT | Mod: CPTII,,,

## 2023-04-13 PROCEDURE — 1159F PR MEDICATION LIST DOCUMENTED IN MEDICAL RECORD: ICD-10-PCS | Mod: CPTII,,,

## 2023-04-13 PROCEDURE — 1160F PR REVIEW ALL MEDS BY PRESCRIBER/CLIN PHARMACIST DOCUMENTED: ICD-10-PCS | Mod: CPTII,,,

## 2023-04-13 PROCEDURE — 3074F PR MOST RECENT SYSTOLIC BLOOD PRESSURE < 130 MM HG: ICD-10-PCS | Mod: CPTII,,,

## 2023-04-13 PROCEDURE — 3078F PR MOST RECENT DIASTOLIC BLOOD PRESSURE < 80 MM HG: ICD-10-PCS | Mod: CPTII,,,

## 2023-04-13 PROCEDURE — 99999 PR PBB SHADOW E&M-EST. PATIENT-LVL IV: ICD-10-PCS | Mod: PBBFAC,,,

## 2023-04-13 PROCEDURE — 1160F RVW MEDS BY RX/DR IN RCRD: CPT | Mod: CPTII,,,

## 2023-04-13 PROCEDURE — 3051F HG A1C>EQUAL 7.0%<8.0%: CPT | Mod: CPTII,,,

## 2023-04-13 PROCEDURE — 3078F DIAST BP <80 MM HG: CPT | Mod: CPTII,,,

## 2023-04-13 PROCEDURE — 90792 PSYCH DIAG EVAL W/MED SRVCS: CPT | Mod: ,,,

## 2023-04-13 PROCEDURE — 99214 OFFICE O/P EST MOD 30 MIN: CPT | Mod: PBBFAC

## 2023-04-13 PROCEDURE — 3051F PR MOST RECENT HEMOGLOBIN A1C LEVEL 7.0 - < 8.0%: ICD-10-PCS | Mod: CPTII,,,

## 2023-04-13 PROCEDURE — 99999 PR PBB SHADOW E&M-EST. PATIENT-LVL IV: CPT | Mod: PBBFAC,,,

## 2023-04-13 PROCEDURE — 3008F PR BODY MASS INDEX (BMI) DOCUMENTED: ICD-10-PCS | Mod: CPTII,,,

## 2023-04-13 RX ORDER — OLANZAPINE 10 MG/1
10 TABLET ORAL NIGHTLY
Qty: 30 TABLET | Refills: 0 | Status: SHIPPED | OUTPATIENT
Start: 2023-04-13 | End: 2023-08-16

## 2023-04-24 ENCOUNTER — CLINICAL SUPPORT (OUTPATIENT)
Dept: INTERNAL MEDICINE | Facility: CLINIC | Age: 48
End: 2023-04-24
Payer: MEDICAID

## 2023-04-24 DIAGNOSIS — E78.5 HYPERLIPIDEMIA LDL GOAL <70: ICD-10-CM

## 2023-04-24 DIAGNOSIS — I10 ESSENTIAL HYPERTENSION: ICD-10-CM

## 2023-04-24 DIAGNOSIS — E11.42 TYPE 2 DIABETES MELLITUS WITH DIABETIC POLYNEUROPATHY, WITHOUT LONG-TERM CURRENT USE OF INSULIN: ICD-10-CM

## 2023-04-24 LAB
ALBUMIN SERPL BCP-MCNC: 3.7 G/DL (ref 3.5–5.2)
ALBUMIN/CREAT UR: 15.2 UG/MG (ref 0–30)
ALP SERPL-CCNC: 111 U/L (ref 55–135)
ALT SERPL W/O P-5'-P-CCNC: 13 U/L (ref 10–44)
ANION GAP SERPL CALC-SCNC: 12 MMOL/L (ref 8–16)
AST SERPL-CCNC: 15 U/L (ref 10–40)
BASOPHILS # BLD AUTO: 0.05 K/UL (ref 0–0.2)
BASOPHILS NFR BLD: 0.5 % (ref 0–1.9)
BILIRUB SERPL-MCNC: 0.6 MG/DL (ref 0.1–1)
BUN SERPL-MCNC: 16 MG/DL (ref 6–20)
CALCIUM SERPL-MCNC: 9.6 MG/DL (ref 8.7–10.5)
CHLORIDE SERPL-SCNC: 101 MMOL/L (ref 95–110)
CHOLEST SERPL-MCNC: 197 MG/DL (ref 120–199)
CHOLEST/HDLC SERPL: 5.6 {RATIO} (ref 2–5)
CO2 SERPL-SCNC: 25 MMOL/L (ref 23–29)
CREAT SERPL-MCNC: 0.9 MG/DL (ref 0.5–1.4)
CREAT UR-MCNC: 132 MG/DL (ref 15–325)
DIFFERENTIAL METHOD: ABNORMAL
EOSINOPHIL # BLD AUTO: 0.2 K/UL (ref 0–0.5)
EOSINOPHIL NFR BLD: 2.2 % (ref 0–8)
ERYTHROCYTE [DISTWIDTH] IN BLOOD BY AUTOMATED COUNT: 12.2 % (ref 11.5–14.5)
EST. GFR  (NO RACE VARIABLE): >60 ML/MIN/1.73 M^2
ESTIMATED AVG GLUCOSE: 246 MG/DL (ref 68–131)
GLUCOSE SERPL-MCNC: 353 MG/DL (ref 70–110)
HBA1C MFR BLD: 10.2 % (ref 4–5.6)
HCT VFR BLD AUTO: 39.8 % (ref 37–48.5)
HDLC SERPL-MCNC: 35 MG/DL (ref 40–75)
HDLC SERPL: 17.8 % (ref 20–50)
HGB BLD-MCNC: 13 G/DL (ref 12–16)
IMM GRANULOCYTES # BLD AUTO: 0.06 K/UL (ref 0–0.04)
IMM GRANULOCYTES NFR BLD AUTO: 0.7 % (ref 0–0.5)
LDLC SERPL CALC-MCNC: 134 MG/DL (ref 63–159)
LYMPHOCYTES # BLD AUTO: 2.4 K/UL (ref 1–4.8)
LYMPHOCYTES NFR BLD: 25.8 % (ref 18–48)
MCH RBC QN AUTO: 30.6 PG (ref 27–31)
MCHC RBC AUTO-ENTMCNC: 32.7 G/DL (ref 32–36)
MCV RBC AUTO: 94 FL (ref 82–98)
MICROALBUMIN UR DL<=1MG/L-MCNC: 20 UG/ML
MONOCYTES # BLD AUTO: 0.6 K/UL (ref 0.3–1)
MONOCYTES NFR BLD: 6.3 % (ref 4–15)
NEUTROPHILS # BLD AUTO: 5.9 K/UL (ref 1.8–7.7)
NEUTROPHILS NFR BLD: 64.5 % (ref 38–73)
NONHDLC SERPL-MCNC: 162 MG/DL
NRBC BLD-RTO: 0 /100 WBC
PLATELET # BLD AUTO: 311 K/UL (ref 150–450)
PMV BLD AUTO: 12.2 FL (ref 9.2–12.9)
POTASSIUM SERPL-SCNC: 3.9 MMOL/L (ref 3.5–5.1)
PROT SERPL-MCNC: 7.8 G/DL (ref 6–8.4)
RBC # BLD AUTO: 4.25 M/UL (ref 4–5.4)
SODIUM SERPL-SCNC: 138 MMOL/L (ref 136–145)
TRIGL SERPL-MCNC: 140 MG/DL (ref 30–150)
TSH SERPL DL<=0.005 MIU/L-ACNC: 1.5 UIU/ML (ref 0.4–4)
WBC # BLD AUTO: 9.17 K/UL (ref 3.9–12.7)

## 2023-04-24 PROCEDURE — 80053 COMPREHEN METABOLIC PANEL: CPT | Performed by: NURSE PRACTITIONER

## 2023-04-24 PROCEDURE — 82570 ASSAY OF URINE CREATININE: CPT | Performed by: NURSE PRACTITIONER

## 2023-04-24 PROCEDURE — 85025 COMPLETE CBC W/AUTO DIFF WBC: CPT | Performed by: NURSE PRACTITIONER

## 2023-04-24 PROCEDURE — 80061 LIPID PANEL: CPT | Performed by: NURSE PRACTITIONER

## 2023-04-24 PROCEDURE — 36415 COLL VENOUS BLD VENIPUNCTURE: CPT | Performed by: NURSE PRACTITIONER

## 2023-04-24 PROCEDURE — 84443 ASSAY THYROID STIM HORMONE: CPT | Performed by: NURSE PRACTITIONER

## 2023-04-24 PROCEDURE — 83036 HEMOGLOBIN GLYCOSYLATED A1C: CPT | Performed by: NURSE PRACTITIONER

## 2023-05-02 ENCOUNTER — OFFICE VISIT (OUTPATIENT)
Dept: INTERNAL MEDICINE | Facility: CLINIC | Age: 48
End: 2023-05-02
Payer: MEDICAID

## 2023-05-02 VITALS
DIASTOLIC BLOOD PRESSURE: 84 MMHG | RESPIRATION RATE: 18 BRPM | HEIGHT: 64 IN | OXYGEN SATURATION: 99 % | SYSTOLIC BLOOD PRESSURE: 126 MMHG | BODY MASS INDEX: 23.56 KG/M2 | HEART RATE: 88 BPM | WEIGHT: 138 LBS

## 2023-05-02 DIAGNOSIS — E11.65 UNCONTROLLED TYPE 2 DIABETES MELLITUS WITH HYPERGLYCEMIA: Primary | ICD-10-CM

## 2023-05-02 DIAGNOSIS — I10 ESSENTIAL HYPERTENSION: ICD-10-CM

## 2023-05-02 DIAGNOSIS — E78.2 MIXED HYPERLIPIDEMIA: ICD-10-CM

## 2023-05-02 PROCEDURE — 3066F NEPHROPATHY DOC TX: CPT | Mod: CPTII,,, | Performed by: NURSE PRACTITIONER

## 2023-05-02 PROCEDURE — 1159F PR MEDICATION LIST DOCUMENTED IN MEDICAL RECORD: ICD-10-PCS | Mod: CPTII,,, | Performed by: NURSE PRACTITIONER

## 2023-05-02 PROCEDURE — 3074F PR MOST RECENT SYSTOLIC BLOOD PRESSURE < 130 MM HG: ICD-10-PCS | Mod: CPTII,,, | Performed by: NURSE PRACTITIONER

## 2023-05-02 PROCEDURE — 3074F SYST BP LT 130 MM HG: CPT | Mod: CPTII,,, | Performed by: NURSE PRACTITIONER

## 2023-05-02 PROCEDURE — 3079F DIAST BP 80-89 MM HG: CPT | Mod: CPTII,,, | Performed by: NURSE PRACTITIONER

## 2023-05-02 PROCEDURE — 3079F PR MOST RECENT DIASTOLIC BLOOD PRESSURE 80-89 MM HG: ICD-10-PCS | Mod: CPTII,,, | Performed by: NURSE PRACTITIONER

## 2023-05-02 PROCEDURE — 3046F PR MOST RECENT HEMOGLOBIN A1C LEVEL > 9.0%: ICD-10-PCS | Mod: CPTII,,, | Performed by: NURSE PRACTITIONER

## 2023-05-02 PROCEDURE — 3061F PR NEG MICROALBUMINURIA RESULT DOCUMENTED/REVIEW: ICD-10-PCS | Mod: CPTII,,, | Performed by: NURSE PRACTITIONER

## 2023-05-02 PROCEDURE — 3008F BODY MASS INDEX DOCD: CPT | Mod: CPTII,,, | Performed by: NURSE PRACTITIONER

## 2023-05-02 PROCEDURE — 99214 PR OFFICE/OUTPT VISIT, EST, LEVL IV, 30-39 MIN: ICD-10-PCS | Mod: S$PBB,,, | Performed by: NURSE PRACTITIONER

## 2023-05-02 PROCEDURE — 99214 OFFICE O/P EST MOD 30 MIN: CPT | Mod: S$PBB,,, | Performed by: NURSE PRACTITIONER

## 2023-05-02 PROCEDURE — 1159F MED LIST DOCD IN RCRD: CPT | Mod: CPTII,,, | Performed by: NURSE PRACTITIONER

## 2023-05-02 PROCEDURE — 99215 OFFICE O/P EST HI 40 MIN: CPT | Mod: PBBFAC,PN | Performed by: NURSE PRACTITIONER

## 2023-05-02 PROCEDURE — 3008F PR BODY MASS INDEX (BMI) DOCUMENTED: ICD-10-PCS | Mod: CPTII,,, | Performed by: NURSE PRACTITIONER

## 2023-05-02 PROCEDURE — 99999 PR PBB SHADOW E&M-EST. PATIENT-LVL V: CPT | Mod: PBBFAC,,, | Performed by: NURSE PRACTITIONER

## 2023-05-02 PROCEDURE — 3066F PR DOCUMENTATION OF TREATMENT FOR NEPHROPATHY: ICD-10-PCS | Mod: CPTII,,, | Performed by: NURSE PRACTITIONER

## 2023-05-02 PROCEDURE — 99999 PR PBB SHADOW E&M-EST. PATIENT-LVL V: ICD-10-PCS | Mod: PBBFAC,,, | Performed by: NURSE PRACTITIONER

## 2023-05-02 PROCEDURE — 3061F NEG MICROALBUMINURIA REV: CPT | Mod: CPTII,,, | Performed by: NURSE PRACTITIONER

## 2023-05-02 PROCEDURE — 3046F HEMOGLOBIN A1C LEVEL >9.0%: CPT | Mod: CPTII,,, | Performed by: NURSE PRACTITIONER

## 2023-05-02 RX ORDER — ALCOHOL ANTISEPTIC PADS
1 PADS, MEDICATED (EA) TOPICAL 3 TIMES DAILY
Qty: 100 EACH | Refills: 5 | Status: SHIPPED | OUTPATIENT
Start: 2023-05-02

## 2023-05-02 RX ORDER — INSULIN DETEMIR 100 [IU]/ML
53 INJECTION, SOLUTION SUBCUTANEOUS NIGHTLY
Qty: 5 PACKET | Refills: 3 | Status: SHIPPED | OUTPATIENT
Start: 2023-05-02

## 2023-05-02 RX ORDER — INSULIN LISPRO 100 [IU]/ML
16 INJECTION, SOLUTION INTRAVENOUS; SUBCUTANEOUS 2 TIMES DAILY WITH MEALS
Qty: 9.6 ML | Refills: 5 | Status: SHIPPED | OUTPATIENT
Start: 2023-05-02 | End: 2024-05-01

## 2023-05-02 RX ORDER — USTEKINUMAB 45 MG/.5ML
INJECTION, SOLUTION SUBCUTANEOUS
COMMUNITY
Start: 2023-04-12 | End: 2023-09-05

## 2023-05-02 NOTE — PROGRESS NOTES
Subjective:       Patient ID: Eva Lane is a 47 y.o. female.    Chief Complaint: Follow-up (Check up-results/)    Patient is known, to me and presents with   Chief Complaint   Patient presents with    Follow-up     Check up-results     .  Denies chest pain and shortness of breath.  Patient presents with check up and labs. She states that she is not eating healthy but started back up. Also back on her insulin so will need to have refills. Otherwise she feels ok. Mood is stable    Follow-up  Pertinent negatives include no arthralgias, chest pain, congestion, coughing, fatigue, fever, headaches, joint swelling, neck pain, numbness, vomiting or weakness.   Review of Systems   Constitutional:  Negative for activity change, appetite change, fatigue, fever and unexpected weight change.   HENT:  Positive for trouble swallowing. Negative for congestion, ear discharge, ear pain, hearing loss, postnasal drip, rhinorrhea and tinnitus.    Eyes:  Negative for photophobia, pain, discharge and visual disturbance.   Respiratory:  Negative for cough, chest tightness, shortness of breath, wheezing and stridor.    Cardiovascular:  Negative for chest pain, palpitations and leg swelling.   Gastrointestinal:  Negative for abdominal distention, blood in stool, constipation, diarrhea and vomiting.   Endocrine: Negative for polydipsia and polyuria.   Genitourinary:  Negative for difficulty urinating, dysuria, frequency, hematuria, menstrual problem and urgency.   Musculoskeletal:  Negative for arthralgias, back pain, gait problem, joint swelling and neck pain.   Skin: Negative.    Neurological:  Negative for dizziness, seizures, syncope, weakness, light-headedness, numbness and headaches.   Hematological:  Negative for adenopathy. Does not bruise/bleed easily.   Psychiatric/Behavioral:  Negative for behavioral problems, confusion, dysphoric mood, hallucinations, sleep disturbance and suicidal ideas. The patient is not  nervous/anxious.      Objective:      Physical Exam  Constitutional:       General: She is not in acute distress.     Appearance: She is well-developed.   HENT:      Head: Normocephalic and atraumatic.      Right Ear: Tympanic membrane and external ear normal.      Left Ear: Tympanic membrane and external ear normal.      Nose: Nose normal.      Mouth/Throat:      Mouth: Mucous membranes are moist.      Pharynx: No oropharyngeal exudate.   Eyes:      General: No scleral icterus.        Right eye: No discharge.         Left eye: No discharge.      Conjunctiva/sclera: Conjunctivae normal.      Pupils: Pupils are equal, round, and reactive to light.   Neck:      Thyroid: No thyromegaly.      Vascular: No JVD.   Cardiovascular:      Rate and Rhythm: Normal rate and regular rhythm.      Heart sounds: Normal heart sounds. No murmur heard.    No friction rub. No gallop.   Pulmonary:      Effort: Pulmonary effort is normal. No respiratory distress.      Breath sounds: Normal breath sounds. No stridor. No wheezing or rales.   Chest:      Chest wall: No tenderness.   Abdominal:      General: Bowel sounds are normal. There is no distension.      Palpations: Abdomen is soft. There is no mass.      Tenderness: There is no abdominal tenderness. There is no right CVA tenderness, left CVA tenderness, guarding or rebound.      Hernia: No hernia is present.   Musculoskeletal:         General: No swelling, tenderness, deformity or signs of injury. Normal range of motion.      Cervical back: Normal range of motion and neck supple.      Right lower leg: No edema.      Left lower leg: No edema.   Lymphadenopathy:      Cervical: No cervical adenopathy.   Skin:     General: Skin is warm and dry.      Capillary Refill: Capillary refill takes less than 2 seconds.      Coloration: Skin is not jaundiced or pale.      Findings: No bruising, erythema, lesion or rash.   Neurological:      General: No focal deficit present.      Mental Status: She  "is alert and oriented to person, place, and time.      Cranial Nerves: No cranial nerve deficit.      Sensory: No sensory deficit.      Motor: No weakness or abnormal muscle tone.      Coordination: Coordination normal.      Gait: Gait normal.      Deep Tendon Reflexes: Reflexes are normal and symmetric. Reflexes normal.   Psychiatric:         Attention and Perception: Attention and perception normal.         Mood and Affect: Mood and affect normal. Mood is not anxious or depressed.         Speech: Speech normal.         Behavior: Behavior normal. Behavior is cooperative.         Thought Content: Thought content normal. Thought content is not paranoid or delusional. Thought content does not include homicidal or suicidal ideation. Thought content does not include homicidal or suicidal plan.         Cognition and Memory: Cognition and memory normal.         Judgment: Judgment normal.       Assessment:       1. Uncontrolled type 2 diabetes mellitus with hyperglycemia    2. Essential hypertension    3. Mixed hyperlipidemia        Plan:   1. Uncontrolled type 2 diabetes mellitus with hyperglycemia  -     insulin detemir U-100, Levemir, (LEVEMIR FLEXPEN) 100 unit/mL (3 mL) InPn pen; Inject 53 Units into the skin every evening.  Dispense: 5 packet; Refill: 3  -     insulin lispro (HUMALOG KWIKPEN INSULIN) 100 unit/mL pen; Inject 16 Units into the skin 2 (two) times daily with meals.  Dispense: 9.6 mL; Refill: 5  -     pen needle, diabetic (COMFORT EZ PEN NEEDLES) 33 gauge x 5/16" Ndle; 1 Stick by Misc.(Non-Drug; Combo Route) route 3 (three) times daily.  Dispense: 100 each; Refill: 5  -     Comprehensive Metabolic Panel; Future; Expected date: 07/31/2023  -     Hemoglobin A1C; Future; Expected date: 07/31/2023    2. Essential hypertension  -     Comprehensive Metabolic Panel; Future; Expected date: 07/31/2023    3. Mixed hyperlipidemia  -     Comprehensive Metabolic Panel; Future; Expected date: 07/31/2023       "This note " "will not be shared with the patient."  Will refill insulin  Check CBC, CMP, TSH, Fasting lipid profile, HgA1c, Microalbuminuria in three months.  Medication compliance was discussed with the patient.  The patient was instructed to monitor glucoses closely using glucometer at home and to record the values in a log.  The patient was instructed to obtain an Optometry exam and microalbumin q year.  The patient was instructed to obtain a hemoglobin A1C q 3-4 months.  The patient was instructed to check the feet visually daily and to monitor for infection.  The patient was instructed to exercise at least 3 times a week.  The patient was instructed to follow a 1800 ADA diet.  The patient was instructed to monitor weight daily and try to keep it as close to ideal body weight as possible.  The patient was instructed on using exercise frequently to reduce BP.  The patient was instructed on using a low salt diet.  Monitor BP at home and to record the values in a log.  RTC in three months.   "

## 2023-05-18 ENCOUNTER — HOSPITAL ENCOUNTER (EMERGENCY)
Facility: HOSPITAL | Age: 48
Discharge: HOME | End: 2023-05-18
Attending: SURGERY
Payer: MEDICAID

## 2023-05-18 VITALS
OXYGEN SATURATION: 96 % | BODY MASS INDEX: 23.31 KG/M2 | SYSTOLIC BLOOD PRESSURE: 121 MMHG | DIASTOLIC BLOOD PRESSURE: 69 MMHG | WEIGHT: 135.81 LBS | TEMPERATURE: 97 F | RESPIRATION RATE: 19 BRPM | HEART RATE: 88 BPM

## 2023-05-18 DIAGNOSIS — J20.9 ACUTE BRONCHITIS, UNSPECIFIED ORGANISM: Primary | ICD-10-CM

## 2023-05-18 DIAGNOSIS — M94.0 COSTOCHONDRITIS, ACUTE: ICD-10-CM

## 2023-05-18 DIAGNOSIS — R07.9 CHEST PAIN: ICD-10-CM

## 2023-05-18 DIAGNOSIS — R73.9 HYPERGLYCEMIA: ICD-10-CM

## 2023-05-18 DIAGNOSIS — R05.9 COUGH: ICD-10-CM

## 2023-05-18 LAB
ALBUMIN SERPL BCP-MCNC: 4 G/DL (ref 3.5–5.2)
ALP SERPL-CCNC: 129 U/L (ref 55–135)
ALT SERPL W/O P-5'-P-CCNC: 17 U/L (ref 10–44)
ANION GAP SERPL CALC-SCNC: 14 MMOL/L (ref 8–16)
AST SERPL-CCNC: 14 U/L (ref 10–40)
BASOPHILS # BLD AUTO: 0.04 K/UL (ref 0–0.2)
BASOPHILS NFR BLD: 0.3 % (ref 0–1.9)
BILIRUB SERPL-MCNC: 0.9 MG/DL (ref 0.1–1)
BNP SERPL-MCNC: <10 PG/ML (ref 0–99)
BUN SERPL-MCNC: 22 MG/DL (ref 6–20)
CALCIUM SERPL-MCNC: 9.8 MG/DL (ref 8.7–10.5)
CHLORIDE SERPL-SCNC: 103 MMOL/L (ref 95–110)
CO2 SERPL-SCNC: 21 MMOL/L (ref 23–29)
CREAT SERPL-MCNC: 1 MG/DL (ref 0.5–1.4)
DIFFERENTIAL METHOD: ABNORMAL
EOSINOPHIL # BLD AUTO: 0.2 K/UL (ref 0–0.5)
EOSINOPHIL NFR BLD: 1.3 % (ref 0–8)
ERYTHROCYTE [DISTWIDTH] IN BLOOD BY AUTOMATED COUNT: 13 % (ref 11.5–14.5)
EST. GFR  (NO RACE VARIABLE): >60 ML/MIN/1.73 M^2
GLUCOSE SERPL-MCNC: 320 MG/DL (ref 70–110)
HCT VFR BLD AUTO: 42.8 % (ref 37–48.5)
HGB BLD-MCNC: 14.2 G/DL (ref 12–16)
IMM GRANULOCYTES # BLD AUTO: 0.04 K/UL (ref 0–0.04)
IMM GRANULOCYTES NFR BLD AUTO: 0.3 % (ref 0–0.5)
LYMPHOCYTES # BLD AUTO: 2.2 K/UL (ref 1–4.8)
LYMPHOCYTES NFR BLD: 18 % (ref 18–48)
MCH RBC QN AUTO: 30.9 PG (ref 27–31)
MCHC RBC AUTO-ENTMCNC: 33.2 G/DL (ref 32–36)
MCV RBC AUTO: 93 FL (ref 82–98)
MONOCYTES # BLD AUTO: 0.7 K/UL (ref 0.3–1)
MONOCYTES NFR BLD: 5.5 % (ref 4–15)
NEUTROPHILS # BLD AUTO: 9.1 K/UL (ref 1.8–7.7)
NEUTROPHILS NFR BLD: 74.6 % (ref 38–73)
NRBC BLD-RTO: 0 /100 WBC
PLATELET # BLD AUTO: 197 K/UL (ref 150–450)
PMV BLD AUTO: 11.9 FL (ref 9.2–12.9)
POTASSIUM SERPL-SCNC: 3.9 MMOL/L (ref 3.5–5.1)
PROT SERPL-MCNC: 8 G/DL (ref 6–8.4)
RBC # BLD AUTO: 4.59 M/UL (ref 4–5.4)
SODIUM SERPL-SCNC: 138 MMOL/L (ref 136–145)
TROPONIN I SERPL DL<=0.01 NG/ML-MCNC: <0.006 NG/ML (ref 0–0.03)
WBC # BLD AUTO: 12.18 K/UL (ref 3.9–12.7)

## 2023-05-18 PROCEDURE — 84484 ASSAY OF TROPONIN QUANT: CPT

## 2023-05-18 PROCEDURE — 85025 COMPLETE CBC W/AUTO DIFF WBC: CPT

## 2023-05-18 PROCEDURE — 83880 ASSAY OF NATRIURETIC PEPTIDE: CPT

## 2023-05-18 PROCEDURE — 80053 COMPREHEN METABOLIC PANEL: CPT

## 2023-05-18 PROCEDURE — 93005 ELECTROCARDIOGRAM TRACING: CPT

## 2023-05-18 PROCEDURE — 93010 EKG 12-LEAD: ICD-10-PCS | Mod: ,,, | Performed by: INTERNAL MEDICINE

## 2023-05-18 PROCEDURE — 93010 ELECTROCARDIOGRAM REPORT: CPT | Mod: ,,, | Performed by: INTERNAL MEDICINE

## 2023-05-18 PROCEDURE — 99285 EMERGENCY DEPT VISIT HI MDM: CPT | Mod: 25

## 2023-05-18 PROCEDURE — 25000003 PHARM REV CODE 250

## 2023-05-18 PROCEDURE — 36415 COLL VENOUS BLD VENIPUNCTURE: CPT

## 2023-05-18 RX ORDER — AZITHROMYCIN 250 MG/1
250 TABLET, FILM COATED ORAL DAILY
Qty: 6 TABLET | Refills: 0 | Status: SHIPPED | OUTPATIENT
Start: 2023-05-18 | End: 2023-07-06

## 2023-05-18 RX ORDER — ASPIRIN 325 MG
325 TABLET, DELAYED RELEASE (ENTERIC COATED) ORAL
Status: COMPLETED | OUTPATIENT
Start: 2023-05-18 | End: 2023-05-18

## 2023-05-18 RX ORDER — PROMETHAZINE HYDROCHLORIDE AND DEXTROMETHORPHAN HYDROBROMIDE 6.25; 15 MG/5ML; MG/5ML
5 SYRUP ORAL EVERY 6 HOURS PRN
Qty: 100 ML | Refills: 0 | Status: SHIPPED | OUTPATIENT
Start: 2023-05-18 | End: 2023-05-23

## 2023-05-18 RX ORDER — METHYLPREDNISOLONE 4 MG/1
TABLET ORAL
Qty: 21 EACH | Refills: 0 | Status: SHIPPED | OUTPATIENT
Start: 2023-05-18 | End: 2023-05-18

## 2023-05-18 RX ADMIN — ASPIRIN 325 MG: 325 TABLET, COATED ORAL at 12:05

## 2023-05-18 NOTE — ED PROVIDER NOTES
Encounter Date: 5/18/2023       History     Chief Complaint   Patient presents with    Chest Pain     Pt c/o constant non radiating left side chest pain that started last night.     This note is dictated on M*Modal word recognition program.  There are word recognition mistakes and grammatical errors that are occasionally missed on review.     Eva Lane is a 47 y.o. female presents to ER today with complaints of left-sided lower rib pain and coughing for the last 2 days.  Patient reports she is a smoker has a history of pneumonia/pleurisy in the past.  Patient denies any midsternal chest pain.    The history is provided by the patient.   Review of patient's allergies indicates:   Allergen Reactions    Penicillins Swelling and Rash    Neosporin [neomycin-bacitracin-polymyxin] Rash    Shellfish containing products     Shrimp Hives     Past Medical History:   Diagnosis Date    Addiction to drug     ADHD (attention deficit hyperactivity disorder)     Anxiety     Arthritis     Asthma     Behavioral problem     Bipolar 1 disorder     Borderline personality disorder 09/23/2015    CHF (congestive heart failure)     Conversion disorder 09/23/2015    COPD (chronic obstructive pulmonary disease)     Depression     Diabetes mellitus type II     Fatigue     Hallucination     Headache     History of psychiatric hospitalization     Hx of psychiatric care     Hyperlipidemia     Hypertension     Lumbar radiculopathy     Allie     Neuralgia     Neuritis     Panic disorder     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    Psychiatric problem     Psychosis     PTSD (post-traumatic stress disorder)     Seizures     Seizure-last 8/2015-shake and fall-doesnt remember anything    Self-harming behavior     Sleep apnea     on CPAP    Sleep difficulties     Social anxiety disorder 02/04/2016    Stroke 09/22/2015    Stroke     Suicide attempt     Therapy     Thyroid disease      Past Surgical History:   Procedure Laterality Date    COLONOSCOPY N/A  2016    Procedure: COLONOSCOPY;  Surgeon: Robert Hernandez MD;  Location: [facility] ([address] [place] [address]);  Service: Endoscopy;  Laterality: N/A;  Schedule for next available CRS physician - EGD @ 8:30 with Dr. Naylor    CYST REMOVAL      Fatty cyst on right face cheek and left upper arm     DILATION AND CURETTAGE OF UTERUS      Miscarriage    HYSTERECTOMY      DUB - Total    OOPHORECTOMY  2008    TOTAL ABDOMINAL HYSTERECTOMY  2008    TUBAL LIGATION       Family History   Problem Relation Age of Onset    No Known Problems Mother     Heart disease Father     Hyperlipidemia Father     Hypertension Father     Diabetes Father     Dementia Father     Breast cancer Neg Hx     Colon cancer Neg Hx     Ovarian cancer Neg Hx      Social History     Tobacco Use    Smoking status: Former     Packs/day: 0.25     Years: 26.00     Pack years: 6.50     Types: Cigarettes     Start date: 1986     Quit date: 3/30/2023     Years since quittin.1    Smokeless tobacco: Never    Tobacco comments:     told about [facility] smoking cessation program-given smoking clinic pamphlet   Substance Use Topics    Alcohol use: No    Drug use: No     Review of Systems   Constitutional: Negative.    HENT: Negative.     Respiratory:  Positive for cough.         Patient reports left lower rib chest pain.   Cardiovascular: Negative.    Gastrointestinal: Negative.    Endocrine: Negative.    Genitourinary: Negative.    Musculoskeletal: Negative.    Skin: Negative.    Allergic/Immunologic: Negative.    Neurological: Negative.    Hematological: Negative.    Psychiatric/Behavioral: Negative.       Physical Exam     Initial Vitals [23 1246]   BP Pulse Resp Temp SpO2   127/74 (!) 115 16 97.2 °F (36.2 °C) 96 %      MAP       --         Physical Exam    Constitutional: She appears well-developed and well-nourished. She is not diaphoretic. No distress.   HENT:   Right Ear: External ear normal.   Left Ear: External ear normal.   Eyes: Pupils are  equal, round, and reactive to light. Right eye exhibits no discharge. Left eye exhibits no discharge. No scleral icterus.   Neck: Neck supple.   Normal range of motion.  Cardiovascular:  Normal rate, regular rhythm and normal heart sounds.           Pulmonary/Chest: Breath sounds normal. No respiratory distress. She has no wheezes. She has no rhonchi. She has no rales. She exhibits no tenderness.   Abdominal: Abdomen is soft.   Musculoskeletal:         General: Tenderness present. No edema.      Cervical back: Normal range of motion and neck supple.      Comments: Patient has tenderness to left lower rib area on examination today.     Neurological: She is alert and oriented to person, place, and time. GCS score is 15. GCS eye subscore is 4. GCS verbal subscore is 5. GCS motor subscore is 6.   Skin: Capillary refill takes less than 2 seconds. No rash and no abscess noted. No erythema. No pallor.   Psychiatric: She has a normal mood and affect. Her behavior is normal. Thought content normal.       ED Course   Procedures  Labs Reviewed   CBC W/ AUTO DIFFERENTIAL - Abnormal; Notable for the following components:       Result Value    Gran # (ANC) 9.1 (*)     Gran % 74.6 (*)     All other components within normal limits   COMPREHENSIVE METABOLIC PANEL - Abnormal; Notable for the following components:    CO2 21 (*)     Glucose 320 (*)     BUN 22 (*)     All other components within normal limits   TROPONIN I   B-TYPE NATRIURETIC PEPTIDE     EKG Readings: (Independently Interpreted)   Initial Reading: No STEMI. Rhythm: Normal Sinus Rhythm. Ectopy: No Ectopy. Axis: Normal.     Imaging Results              X-Ray Chest 1 View (Final result)  Result time 05/18/23 13:59:19      Final result by Chencho Luz Jr., MD (05/18/23 13:59:19)                   Impression:      Negative chest.      Electronically signed by: Chencho Luz MD  Date:    05/18/2023  Time:    13:59               Narrative:    EXAMINATION:  XR CHEST 1  VIEW    CLINICAL HISTORY:  Cough, unspecified    TECHNIQUE:  Erect AP view of the chest.    COMPARISON:  03/18/2022    FINDINGS:  A single view of the chest was performed without the benefit of previous comparison. The heart size and pulmonary vascularity are within the range of normal. There is no significant hilar nor mediastinal process. The aerated lungs are well expanded and clear. The right and left CP angles are rather sharp. The osseous structures show nothing unusual.                                       Medications   aspirin EC tablet 325 mg (325 mg Oral Given 5/18/23 1254)     Medical Decision Making:   Differential Diagnosis:   ACS, pneumonia, acute bronchitis, costochondritis, pleurisy  Clinical Tests:   Lab Tests: Ordered and Reviewed  Radiological Study: Ordered and Reviewed  ED Management:  CBC and CMP revealed no acute abnormality  Troponin within normal limits   EKG reveals normal sinus rhythm  BNP less than 10   X-ray of chest today reveals no acute cardiopulmonary abnormality   Patient's symptoms are consistent with acute bronchitis/pleurisy/costochondritis   Will treat patient with a Z-Jus, and cough medicine.  Will hold off on steroid treatment due to patient's A1c being 10.2  3 weeks ago.  Patient was recently placed on insulin by her PCP for better glycemic control.  Patient stable at time of discharge in no acute distress.  No life-threatening illnesses were found during ER visit today.  Patient was instructed to follow-up with PCP or other recommended specialist within the next 48-72 hours.  Patient was instructed to return to ER immediately for any worsening or concerning symptoms.  All discharge instructions discussed with patient, and patient agrees to comply with discharge instructions given today.                         Clinical Impression:   Final diagnoses:  [R07.9] Chest pain  [R05.9] Cough  [J20.9] Acute bronchitis, unspecified organism (Primary)  [M94.0] Costochondritis,  acute  [R73.9] Hyperglycemia        ED Disposition Condition    Discharge Stable          ED Prescriptions       Medication Sig Dispense Start Date End Date Auth. Provider    methylPREDNISolone (MEDROL DOSEPACK) 4 mg tablet  (Status: Discontinued) use as directed 21 each 5/18/2023 5/18/2023 Nadeem Rogel NP    promethazine-dextromethorphan (PROMETHAZINE-DM) 6.25-15 mg/5 mL Syrp Take 5 mLs by mouth every 6 (six) hours as needed (cough). 100 mL 5/18/2023 5/23/2023 Nadeem Rogel NP    azithromycin (Z-LARS) 250 MG tablet Take 1 tablet (250 mg total) by mouth once daily. Take first 2 tablets together, then 1 every day until finished. 6 tablet 5/18/2023 -- Nadeem Rogel NP          Follow-up Information       Follow up With Specialties Details Why Contact Info    Cintia Gustafson NP Family Medicine In 2 days  [address] [facility] [address]  [phone]               Nadeem Rogel NP  05/18/23 1397

## 2023-06-02 ENCOUNTER — OFFICE VISIT (OUTPATIENT)
Dept: INTERNAL MEDICINE | Facility: CLINIC | Age: 48
End: 2023-06-02
Payer: MEDICAID

## 2023-06-02 VITALS
DIASTOLIC BLOOD PRESSURE: 78 MMHG | BODY MASS INDEX: 23.05 KG/M2 | HEART RATE: 110 BPM | SYSTOLIC BLOOD PRESSURE: 110 MMHG | RESPIRATION RATE: 20 BRPM | HEIGHT: 64 IN | OXYGEN SATURATION: 97 % | TEMPERATURE: 99 F | WEIGHT: 135 LBS

## 2023-06-02 DIAGNOSIS — B96.89 ACUTE BACTERIAL BRONCHITIS: ICD-10-CM

## 2023-06-02 DIAGNOSIS — R07.81 PLEURITIC PAIN: ICD-10-CM

## 2023-06-02 DIAGNOSIS — J20.8 ACUTE BACTERIAL BRONCHITIS: ICD-10-CM

## 2023-06-02 PROCEDURE — 3078F DIAST BP <80 MM HG: CPT | Mod: CPTII,,, | Performed by: NURSE PRACTITIONER

## 2023-06-02 PROCEDURE — 3046F PR MOST RECENT HEMOGLOBIN A1C LEVEL > 9.0%: ICD-10-PCS | Mod: CPTII,,, | Performed by: NURSE PRACTITIONER

## 2023-06-02 PROCEDURE — 3008F BODY MASS INDEX DOCD: CPT | Mod: CPTII,,, | Performed by: NURSE PRACTITIONER

## 2023-06-02 PROCEDURE — 3074F PR MOST RECENT SYSTOLIC BLOOD PRESSURE < 130 MM HG: ICD-10-PCS | Mod: CPTII,,, | Performed by: NURSE PRACTITIONER

## 2023-06-02 PROCEDURE — 99213 PR OFFICE/OUTPT VISIT, EST, LEVL III, 20-29 MIN: ICD-10-PCS | Mod: S$PBB,,, | Performed by: NURSE PRACTITIONER

## 2023-06-02 PROCEDURE — 3061F NEG MICROALBUMINURIA REV: CPT | Mod: CPTII,,, | Performed by: NURSE PRACTITIONER

## 2023-06-02 PROCEDURE — 3061F PR NEG MICROALBUMINURIA RESULT DOCUMENTED/REVIEW: ICD-10-PCS | Mod: CPTII,,, | Performed by: NURSE PRACTITIONER

## 2023-06-02 PROCEDURE — 3008F PR BODY MASS INDEX (BMI) DOCUMENTED: ICD-10-PCS | Mod: CPTII,,, | Performed by: NURSE PRACTITIONER

## 2023-06-02 PROCEDURE — 3066F NEPHROPATHY DOC TX: CPT | Mod: CPTII,,, | Performed by: NURSE PRACTITIONER

## 2023-06-02 PROCEDURE — 3078F PR MOST RECENT DIASTOLIC BLOOD PRESSURE < 80 MM HG: ICD-10-PCS | Mod: CPTII,,, | Performed by: NURSE PRACTITIONER

## 2023-06-02 PROCEDURE — 96372 THER/PROPH/DIAG INJ SC/IM: CPT | Mod: PBBFAC,PN

## 2023-06-02 PROCEDURE — 99215 OFFICE O/P EST HI 40 MIN: CPT | Mod: PBBFAC,25,PN | Performed by: NURSE PRACTITIONER

## 2023-06-02 PROCEDURE — 3074F SYST BP LT 130 MM HG: CPT | Mod: CPTII,,, | Performed by: NURSE PRACTITIONER

## 2023-06-02 PROCEDURE — 3046F HEMOGLOBIN A1C LEVEL >9.0%: CPT | Mod: CPTII,,, | Performed by: NURSE PRACTITIONER

## 2023-06-02 PROCEDURE — 99999 PR PBB SHADOW E&M-EST. PATIENT-LVL V: CPT | Mod: PBBFAC,,, | Performed by: NURSE PRACTITIONER

## 2023-06-02 PROCEDURE — 3066F PR DOCUMENTATION OF TREATMENT FOR NEPHROPATHY: ICD-10-PCS | Mod: CPTII,,, | Performed by: NURSE PRACTITIONER

## 2023-06-02 PROCEDURE — 99213 OFFICE O/P EST LOW 20 MIN: CPT | Mod: S$PBB,,, | Performed by: NURSE PRACTITIONER

## 2023-06-02 PROCEDURE — 99999 PR PBB SHADOW E&M-EST. PATIENT-LVL V: ICD-10-PCS | Mod: PBBFAC,,, | Performed by: NURSE PRACTITIONER

## 2023-06-02 PROCEDURE — 1159F MED LIST DOCD IN RCRD: CPT | Mod: CPTII,,, | Performed by: NURSE PRACTITIONER

## 2023-06-02 PROCEDURE — 1159F PR MEDICATION LIST DOCUMENTED IN MEDICAL RECORD: ICD-10-PCS | Mod: CPTII,,, | Performed by: NURSE PRACTITIONER

## 2023-06-02 RX ORDER — KETOROLAC TROMETHAMINE 30 MG/ML
60 INJECTION, SOLUTION INTRAMUSCULAR; INTRAVENOUS
Status: COMPLETED | OUTPATIENT
Start: 2023-06-02 | End: 2023-06-02

## 2023-06-02 RX ORDER — DOXYCYCLINE HYCLATE 100 MG
100 TABLET ORAL EVERY 12 HOURS
Qty: 14 TABLET | Refills: 0 | Status: SHIPPED | OUTPATIENT
Start: 2023-06-02 | End: 2023-06-09

## 2023-06-02 RX ORDER — ALBUTEROL SULFATE 0.83 MG/ML
SOLUTION RESPIRATORY (INHALATION)
Qty: 90 ML | Refills: 3 | Status: SHIPPED | OUTPATIENT
Start: 2023-06-02

## 2023-06-02 RX ADMIN — KETOROLAC TROMETHAMINE 60 MG: 30 INJECTION, SOLUTION INTRAMUSCULAR at 02:06

## 2023-06-02 NOTE — PROGRESS NOTES
Subjective:       Patient ID: Eva Lane is a 47 y.o. female.    Chief Complaint: Follow-up (X ER- x 1 wk bronchitis/pneumonia with cough )    Patient is known, to me and presents with   Chief Complaint   Patient presents with    Follow-up     X ER- x 1 wk bronchitis/pneumonia with cough    .  Denies chest pain and shortness of breath.  Patient presents with follow up ER for bronchitis. She reports that she is not improving. Had chest x-ray which revealed no pneumonia. Having some pleuritic pain. No sob noted.   Follow-up  Associated symptoms include coughing.   Review of Systems   Constitutional: Negative.    HENT: Negative.     Respiratory:  Positive for cough. Negative for apnea, choking, chest tightness, shortness of breath, wheezing and stridor.    Cardiovascular: Negative.    Skin: Negative.    Hematological: Negative.      Objective:      Physical Exam  Constitutional:       General: She is not in acute distress.     Appearance: Normal appearance. She is not ill-appearing, toxic-appearing or diaphoretic.   HENT:      Head: Normocephalic and atraumatic.      Right Ear: Tympanic membrane normal.      Left Ear: Tympanic membrane normal.      Nose: Nose normal. No congestion.      Mouth/Throat:      Mouth: Mucous membranes are moist.      Pharynx: Oropharynx is clear. No posterior oropharyngeal erythema.   Eyes:      General:         Right eye: No discharge.         Left eye: No discharge.      Conjunctiva/sclera: Conjunctivae normal.   Neck:      Vascular: No carotid bruit.   Cardiovascular:      Rate and Rhythm: Regular rhythm.      Heart sounds: Normal heart sounds. No murmur heard.  Pulmonary:      Effort: Pulmonary effort is normal.      Breath sounds: Normal breath sounds. No wheezing, rhonchi or rales.   Musculoskeletal:      Cervical back: Normal range of motion and neck supple. No rigidity or tenderness.   Lymphadenopathy:      Cervical: No cervical adenopathy.   Skin:     General: Skin is warm  "and dry.      Capillary Refill: Capillary refill takes less than 2 seconds.      Coloration: Skin is not jaundiced or pale.      Findings: No bruising, erythema, lesion or rash.   Neurological:      Mental Status: She is alert.       Assessment:       1. Pleuritic pain    2. Acute bacterial bronchitis        Plan:   1. Pleuritic pain  -     ketorolac injection 60 mg    2. Acute bacterial bronchitis  -     albuterol (PROVENTIL) 2.5 mg /3 mL (0.083 %) nebulizer solution; USE ONE VIAL IN NEBULIZER EVERY 6 HOURS AS NEEDED FOR  WHEEZING.  Dispense: 90 mL; Refill: 3  -     doxycycline (VIBRA-TABS) 100 MG tablet; Take 1 tablet (100 mg total) by mouth every 12 (twelve) hours. for 7 days  Dispense: 14 tablet; Refill: 0       "This note will not be shared with the patient."  See above treatment plan   Do neb tx/s   No improvement by Monday call me  Rtc as scheduled  "

## 2023-06-05 ENCOUNTER — TELEPHONE (OUTPATIENT)
Dept: INTERNAL MEDICINE | Facility: CLINIC | Age: 48
End: 2023-06-05
Payer: MEDICAID

## 2023-06-06 ENCOUNTER — TELEPHONE (OUTPATIENT)
Dept: INTERNAL MEDICINE | Facility: CLINIC | Age: 48
End: 2023-06-06
Payer: MEDICAID

## 2023-06-06 NOTE — TELEPHONE ENCOUNTER
She needs to do her breathing tx at least three times a day, stay on the mucinex and finish her antibx. These upper resp infections sometimes can take up to two weeks to resolve or longer.

## 2023-06-06 NOTE — TELEPHONE ENCOUNTER
Pt called for an update on how she's feeling since the ER  She's still not better felling worse with cough and pain was running fever which that improved but now with a wet mucus type cough and seeing some  blood in her mucus  Please advise  Thanks!  WM

## 2023-06-29 NOTE — ASSESSMENT & PLAN NOTE
2020: Initial Gastroenterology Clinic Visit   Ms. Lujan is a 77 year old female with past medical history of total abdominal hysterectomy (), cholecystectomy, renal artery stenosis s/p stent currently on clopidogrel, gastroparesis diagnosed in  based on gastric emptying study, history of lymphocytic colitis diagnosed based on flexible sigmoidoscopy in  who presents for evaluation of diarrhea.    Ms. Lujan had a history of diarrhea dating back to  at which point she was diagnosed with lymphocytic colitis. She was treated with budesonide with improvement of his symptoms. She did not experience significant diarrhea for over several years. Two months prior to her visit, she has noted a change in her bowel habits. She experiences increased frequency of bowel movements which she describes as loose. She received rifaximin with her PCP which led to mild improvement of symptoms.   She was subsequently diagnosed with cystitis and was prescribed amoxicillin-clavulanic acid which led to exacerbation of diarrhea. Since that time, she has had fecal urgency as well as rectal pressure. She denies weight loss, nausea, vomiting.   Prior Laboratory Evaluation: 2020: 2020: CBC, chemistry panel, LFTs normal. Stool culture negative. Giardia negative. TTG-IgA <2, serum IgA low at 58.   Prior Imagin2008: Gastric Emptying Study Impression: 1. Prolonged gastric emptying.  2. The stomach is not particularly distended in appearance and there is small bowel activity increasing over the course of the examination. This would argue against gastric outlet obstruction and would raise the possiblity of abnormal motility.   2009: MRCP Impression: Prominent common bile duct is within normal limits considering prior cholecystectomy. There is no filling defect in the duct or lesion in the head of the pancreas.   10/16/2009: Abdominal Ultrasound 1. Normal response of the common bile duct following fatty meal. 2. Tiny hepatic and right renal cysts.  Prior Endoscopic Evaluation:  2021: EGD The examined esophagus was normal. The Z-line was found 36 cm from the incisors. A small hiatal hernia was present. Multiple 5 to 15 mm sessile polyps with no stigmata of recent bleeding were found in the stomach. Biopsied for sampling.  Patchy erythematous mucosa without bleeding was found in the gastric antrum. Biopsied. Two 2 to 4 mm sessile polyps were found in the gastric body. The polyps were removed with a cold biopsy forceps.  Nodular mucosa was found in the gastric fundus. Biopsied. The cardia and gastric fundus were normal on retroflexion/. The examined duodenum was normal. Biopsied.  2021: Pathology from EGD A.	Duodenum, Biopsy: No significant abnormality. B.	Stomach, Antrum, Biopsy: No significant abnormality. C.	Stomach, Biopsy: Fundic gland polyp. D.	Stomach, Fundus, Biopsy: Fundic gland polyp E.	Stomach, Polypectomy: Fundic gland polyp. 2021: Colonoscopy  Findings: The perianal and digital rectal examinations were normal.  The terminal ileum appeared normal. A 3 mm polyp was found in the transverse colon. Removed with cold biopsy forceps,. 6 mm polyp was found in the transverse colon. The polyp was removed with cold snare. 3 mm polyp was found in the descending colon. Polypectomy with cold forceps. 3 mm polyp in the sigmoid colon. Polyp removed with cold forceps. Multiple small and large-mouthed diverticula were found in the sigmoid colon. The rest of the colon otherwise was normal. Random colon biopsies obtained to evaluate for microscopic colitis.  Hemorrhoids were found during retroflexion.  2021: Pathology from Colonoscopy  F.	Colon, Random, Biopsy: No significant abnormality G.	Colon, Transverse x 2, Polypectomy: Tubular adenoma H.	Colon, Descending, Polypectomy: Tubular adenoma I.	Colon, Sigmoid, Polypectomy: Tubular adenoma  2021: Gastroenterology Follow-Up Appointment Her diarrhea has continued to improve. She does have episodes of loose bowel movements but this occurs rarely. Denies melena, hematochezia. Denies abdominal pain. 2023 Bonnie presents for follow-up of irritable bowel syndrome with diarrhea predominance.  Since her last visit she continues to complain of multiple loose urgent bowel movements.  She had an extensive work-up that is negative.  She feels that fiber makes her symptoms worse.  She does have bloating along with a history of gastroparesis.  Her gastroparesis symptoms have been stable on low fat and fiber meals.  Today we have discussed her work-up.  I want her to start amitriptyline 10 mg at night and Florastor daily.  MB  Continue home medications

## 2023-07-06 ENCOUNTER — OFFICE VISIT (OUTPATIENT)
Dept: INTERNAL MEDICINE | Facility: CLINIC | Age: 48
End: 2023-07-06
Payer: MEDICAID

## 2023-07-06 VITALS
RESPIRATION RATE: 18 BRPM | WEIGHT: 126.31 LBS | SYSTOLIC BLOOD PRESSURE: 118 MMHG | BODY MASS INDEX: 21.56 KG/M2 | HEIGHT: 64 IN | HEART RATE: 88 BPM | TEMPERATURE: 99 F | DIASTOLIC BLOOD PRESSURE: 78 MMHG | OXYGEN SATURATION: 95 %

## 2023-07-06 DIAGNOSIS — J45.909 ASTHMA, UNSPECIFIED ASTHMA SEVERITY, UNSPECIFIED WHETHER COMPLICATED, UNSPECIFIED WHETHER PERSISTENT: Primary | ICD-10-CM

## 2023-07-06 PROCEDURE — 99999 PR PBB SHADOW E&M-EST. PATIENT-LVL V: ICD-10-PCS | Mod: PBBFAC,,, | Performed by: NURSE PRACTITIONER

## 2023-07-06 PROCEDURE — 96372 THER/PROPH/DIAG INJ SC/IM: CPT | Mod: PBBFAC,PN

## 2023-07-06 PROCEDURE — 3046F PR MOST RECENT HEMOGLOBIN A1C LEVEL > 9.0%: ICD-10-PCS | Mod: CPTII,,, | Performed by: NURSE PRACTITIONER

## 2023-07-06 PROCEDURE — 3066F PR DOCUMENTATION OF TREATMENT FOR NEPHROPATHY: ICD-10-PCS | Mod: CPTII,,, | Performed by: NURSE PRACTITIONER

## 2023-07-06 PROCEDURE — 3061F NEG MICROALBUMINURIA REV: CPT | Mod: CPTII,,, | Performed by: NURSE PRACTITIONER

## 2023-07-06 PROCEDURE — 3074F PR MOST RECENT SYSTOLIC BLOOD PRESSURE < 130 MM HG: ICD-10-PCS | Mod: CPTII,,, | Performed by: NURSE PRACTITIONER

## 2023-07-06 PROCEDURE — 3078F PR MOST RECENT DIASTOLIC BLOOD PRESSURE < 80 MM HG: ICD-10-PCS | Mod: CPTII,,, | Performed by: NURSE PRACTITIONER

## 2023-07-06 PROCEDURE — 99213 PR OFFICE/OUTPT VISIT, EST, LEVL III, 20-29 MIN: ICD-10-PCS | Mod: S$PBB,,, | Performed by: NURSE PRACTITIONER

## 2023-07-06 PROCEDURE — 3046F HEMOGLOBIN A1C LEVEL >9.0%: CPT | Mod: CPTII,,, | Performed by: NURSE PRACTITIONER

## 2023-07-06 PROCEDURE — 3008F PR BODY MASS INDEX (BMI) DOCUMENTED: ICD-10-PCS | Mod: CPTII,,, | Performed by: NURSE PRACTITIONER

## 2023-07-06 PROCEDURE — 99215 OFFICE O/P EST HI 40 MIN: CPT | Mod: PBBFAC,PN | Performed by: NURSE PRACTITIONER

## 2023-07-06 PROCEDURE — 3074F SYST BP LT 130 MM HG: CPT | Mod: CPTII,,, | Performed by: NURSE PRACTITIONER

## 2023-07-06 PROCEDURE — 1159F PR MEDICATION LIST DOCUMENTED IN MEDICAL RECORD: ICD-10-PCS | Mod: CPTII,,, | Performed by: NURSE PRACTITIONER

## 2023-07-06 PROCEDURE — 3078F DIAST BP <80 MM HG: CPT | Mod: CPTII,,, | Performed by: NURSE PRACTITIONER

## 2023-07-06 PROCEDURE — 3008F BODY MASS INDEX DOCD: CPT | Mod: CPTII,,, | Performed by: NURSE PRACTITIONER

## 2023-07-06 PROCEDURE — 99213 OFFICE O/P EST LOW 20 MIN: CPT | Mod: S$PBB,,, | Performed by: NURSE PRACTITIONER

## 2023-07-06 PROCEDURE — 3066F NEPHROPATHY DOC TX: CPT | Mod: CPTII,,, | Performed by: NURSE PRACTITIONER

## 2023-07-06 PROCEDURE — 3061F PR NEG MICROALBUMINURIA RESULT DOCUMENTED/REVIEW: ICD-10-PCS | Mod: CPTII,,, | Performed by: NURSE PRACTITIONER

## 2023-07-06 PROCEDURE — 99999 PR PBB SHADOW E&M-EST. PATIENT-LVL V: CPT | Mod: PBBFAC,,, | Performed by: NURSE PRACTITIONER

## 2023-07-06 PROCEDURE — 1159F MED LIST DOCD IN RCRD: CPT | Mod: CPTII,,, | Performed by: NURSE PRACTITIONER

## 2023-07-06 RX ORDER — TRIAMCINOLONE ACETONIDE 40 MG/ML
40 INJECTION, SUSPENSION INTRA-ARTICULAR; INTRAMUSCULAR
Status: COMPLETED | OUTPATIENT
Start: 2023-07-06 | End: 2023-07-06

## 2023-07-06 RX ORDER — METHYLPREDNISOLONE 4 MG/1
TABLET ORAL
Qty: 21 TABLET | Refills: 0 | Status: SHIPPED | OUTPATIENT
Start: 2023-07-06 | End: 2023-07-27

## 2023-07-06 RX ORDER — DOXYCYCLINE HYCLATE 100 MG
100 TABLET ORAL EVERY 12 HOURS
Qty: 14 TABLET | Refills: 0 | Status: SHIPPED | OUTPATIENT
Start: 2023-07-06 | End: 2023-07-13

## 2023-07-06 RX ADMIN — TRIAMCINOLONE ACETONIDE 40 MG: 40 INJECTION, SUSPENSION INTRA-ARTICULAR; INTRAMUSCULAR at 03:07

## 2023-07-06 NOTE — PROGRESS NOTES
Subjective:       Patient ID: Eva Lane is a 47 y.o. female.    Chief Complaint: Follow-up (Still not feeling better - x 3 weeks with cough )    Patient is known, to me and presents with   Chief Complaint   Patient presents with    Follow-up     Still not feeling better - x 3 weeks with cough    .  Denies chest pain and shortness of breath.  Patient presents with no improvement with asthma and states that she even quit smoking. States that she is not improving with the meds that were given. Not sleeping well due to cough    Follow-up  Associated symptoms include coughing and a sore throat. Pertinent negatives include no chest pain, chills, fever, headaches, myalgias or rash.   Cough  The current episode started more than 1 month ago. The problem has been gradually worsening. The problem occurs constantly. The cough is Productive of sputum and productive of purulent sputum. Associated symptoms include rhinorrhea, a sore throat and wheezing. Pertinent negatives include no chest pain, chills, ear congestion, ear pain, fever, headaches, heartburn, hemoptysis, myalgias, nasal congestion, postnasal drip, rash, shortness of breath, sweats or weight loss. The symptoms are aggravated by lying down and stress. Risk factors for lung disease include smoking/tobacco exposure. The treatment provided no relief. There is no history of asthma, bronchiectasis, bronchitis, COPD, emphysema, environmental allergies or pneumonia.   Review of Systems   Constitutional:  Negative for chills, fever and weight loss.   HENT:  Positive for rhinorrhea and sore throat. Negative for ear pain and postnasal drip.    Respiratory:  Positive for cough and wheezing. Negative for hemoptysis and shortness of breath.    Cardiovascular:  Negative for chest pain, palpitations and leg swelling.   Gastrointestinal:  Negative for heartburn.   Musculoskeletal:  Negative for myalgias.   Skin:  Negative for rash.   Allergic/Immunologic: Negative for  environmental allergies.   Neurological:  Negative for headaches.   Hematological: Negative.      Objective:      Physical Exam  Constitutional:       General: She is not in acute distress.     Appearance: Normal appearance. She is not ill-appearing, toxic-appearing or diaphoretic.   HENT:      Head: Normocephalic and atraumatic.      Right Ear: Tympanic membrane normal.      Left Ear: Tympanic membrane normal.      Nose: Congestion present.      Mouth/Throat:      Mouth: Mucous membranes are moist.      Pharynx: Oropharynx is clear. No posterior oropharyngeal erythema.   Eyes:      General: No scleral icterus.        Right eye: No discharge.         Left eye: No discharge.      Conjunctiva/sclera: Conjunctivae normal.   Neck:      Vascular: No carotid bruit.   Cardiovascular:      Rate and Rhythm: Regular rhythm.      Heart sounds: Normal heart sounds. No murmur heard.  Pulmonary:      Effort: Pulmonary effort is normal. No respiratory distress.      Breath sounds: No stridor. Wheezing present. No rhonchi or rales.   Chest:      Chest wall: No tenderness.   Musculoskeletal:      Cervical back: Normal range of motion and neck supple. No rigidity or tenderness.   Lymphadenopathy:      Cervical: No cervical adenopathy.   Skin:     General: Skin is warm and dry.      Capillary Refill: Capillary refill takes less than 2 seconds.      Coloration: Skin is not jaundiced or pale.      Findings: No bruising, erythema, lesion or rash.   Neurological:      Mental Status: She is alert.       Assessment:       1. Asthma, unspecified asthma severity, unspecified whether complicated, unspecified whether persistent        Plan:   1. Asthma, unspecified asthma severity, unspecified whether complicated, unspecified whether persistent  -     doxycycline (VIBRA-TABS) 100 MG tablet; Take 1 tablet (100 mg total) by mouth every 12 (twelve) hours. for 7 days  Dispense: 14 tablet; Refill: 0  -     triamcinolone acetonide injection 40 mg  -   "   methylPREDNISolone (MEDROL DOSEPACK) 4 mg tablet; use as directed  Dispense: 21 tablet; Refill: 0       "This note will not be shared with the patient."  Monitor blood sugars and watch diet  Start oral steroids tomorrow   Do breathing tx   Rtc if no improvement   "

## 2023-07-10 ENCOUNTER — PATIENT MESSAGE (OUTPATIENT)
Dept: ADMINISTRATIVE | Facility: HOSPITAL | Age: 48
End: 2023-07-10
Payer: MEDICAID

## 2023-07-14 ENCOUNTER — TELEPHONE (OUTPATIENT)
Dept: INTERNAL MEDICINE | Facility: CLINIC | Age: 48
End: 2023-07-14
Payer: MEDICAID

## 2023-07-14 RX ORDER — TERCONAZOLE 8 MG/G
1 CREAM VAGINAL NIGHTLY
Qty: 20 G | Refills: 0 | Status: SHIPPED | OUTPATIENT
Start: 2023-07-14 | End: 2023-09-05

## 2023-07-14 NOTE — TELEPHONE ENCOUNTER
----- Message from Chika Rosales MA sent at 2023  2:43 PM CDT -----  Eva Lane  MRN: [id number]  : 1975  PCP: Cintia Gustafson  Home Phone      [phone]  Work Phone      Not on file.  Mobile          [phone]      MESSAGE:   Patient c/o rash around vaginal area, She would like for Cintia to prescribe cream.    Please Advise:  [phone]    Send meds to [facility] ([place])

## 2023-08-08 ENCOUNTER — CLINICAL SUPPORT (OUTPATIENT)
Dept: INTERNAL MEDICINE | Facility: CLINIC | Age: 48
End: 2023-08-08
Payer: MEDICAID

## 2023-08-08 DIAGNOSIS — E78.2 MIXED HYPERLIPIDEMIA: ICD-10-CM

## 2023-08-08 DIAGNOSIS — I10 ESSENTIAL HYPERTENSION: ICD-10-CM

## 2023-08-08 DIAGNOSIS — E11.65 UNCONTROLLED TYPE 2 DIABETES MELLITUS WITH HYPERGLYCEMIA: ICD-10-CM

## 2023-08-08 LAB
ALBUMIN SERPL BCP-MCNC: 3.3 G/DL (ref 3.5–5.2)
ALP SERPL-CCNC: 132 U/L (ref 55–135)
ALT SERPL W/O P-5'-P-CCNC: 8 U/L (ref 10–44)
ANION GAP SERPL CALC-SCNC: 9 MMOL/L (ref 8–16)
AST SERPL-CCNC: 16 U/L (ref 10–40)
BILIRUB SERPL-MCNC: 0.3 MG/DL (ref 0.1–1)
BUN SERPL-MCNC: 12 MG/DL (ref 6–20)
CALCIUM SERPL-MCNC: 9.2 MG/DL (ref 8.7–10.5)
CHLORIDE SERPL-SCNC: 101 MMOL/L (ref 95–110)
CO2 SERPL-SCNC: 28 MMOL/L (ref 23–29)
CREAT SERPL-MCNC: 0.7 MG/DL (ref 0.5–1.4)
EST. GFR  (NO RACE VARIABLE): >60 ML/MIN/1.73 M^2
ESTIMATED AVG GLUCOSE: 194 MG/DL (ref 68–131)
GLUCOSE SERPL-MCNC: 265 MG/DL (ref 70–110)
HBA1C MFR BLD: 8.4 % (ref 4–5.6)
POTASSIUM SERPL-SCNC: 3.4 MMOL/L (ref 3.5–5.1)
PROT SERPL-MCNC: 7 G/DL (ref 6–8.4)
SODIUM SERPL-SCNC: 138 MMOL/L (ref 136–145)

## 2023-08-08 PROCEDURE — 83036 HEMOGLOBIN GLYCOSYLATED A1C: CPT | Performed by: NURSE PRACTITIONER

## 2023-08-08 PROCEDURE — 80053 COMPREHEN METABOLIC PANEL: CPT | Performed by: NURSE PRACTITIONER

## 2023-08-15 RX ORDER — CARIPRAZINE 4.5 MG/1
4.5 CAPSULE, GELATIN COATED ORAL
Qty: 30 CAPSULE | Refills: 2 | Status: SHIPPED | OUTPATIENT
Start: 2023-08-15 | End: 2023-11-14

## 2023-08-15 RX ORDER — CARIPRAZINE 4.5 MG/1
4.5 CAPSULE, GELATIN COATED ORAL DAILY
Qty: 30 CAPSULE | Refills: 2 | Status: SHIPPED | OUTPATIENT
Start: 2023-08-15 | End: 2023-08-15

## 2023-08-16 ENCOUNTER — OFFICE VISIT (OUTPATIENT)
Dept: INTERNAL MEDICINE | Facility: CLINIC | Age: 48
End: 2023-08-16
Payer: MEDICAID

## 2023-08-16 VITALS
RESPIRATION RATE: 18 BRPM | BODY MASS INDEX: 23.01 KG/M2 | WEIGHT: 129.88 LBS | OXYGEN SATURATION: 99 % | HEART RATE: 110 BPM | DIASTOLIC BLOOD PRESSURE: 74 MMHG | HEIGHT: 63 IN | SYSTOLIC BLOOD PRESSURE: 114 MMHG

## 2023-08-16 DIAGNOSIS — E11.65 UNCONTROLLED TYPE 2 DIABETES MELLITUS WITH HYPERGLYCEMIA: Primary | ICD-10-CM

## 2023-08-16 DIAGNOSIS — F31.81 BIPOLAR 2 DISORDER: ICD-10-CM

## 2023-08-16 DIAGNOSIS — I10 ESSENTIAL HYPERTENSION: ICD-10-CM

## 2023-08-16 DIAGNOSIS — E78.2 MIXED HYPERLIPIDEMIA: ICD-10-CM

## 2023-08-16 DIAGNOSIS — F51.01 PRIMARY INSOMNIA: ICD-10-CM

## 2023-08-16 PROCEDURE — 1159F PR MEDICATION LIST DOCUMENTED IN MEDICAL RECORD: ICD-10-PCS | Mod: CPTII,,, | Performed by: NURSE PRACTITIONER

## 2023-08-16 PROCEDURE — 3066F NEPHROPATHY DOC TX: CPT | Mod: CPTII,,, | Performed by: NURSE PRACTITIONER

## 2023-08-16 PROCEDURE — 3052F HG A1C>EQUAL 8.0%<EQUAL 9.0%: CPT | Mod: CPTII,,, | Performed by: NURSE PRACTITIONER

## 2023-08-16 PROCEDURE — 99215 OFFICE O/P EST HI 40 MIN: CPT | Mod: PBBFAC,PN | Performed by: NURSE PRACTITIONER

## 2023-08-16 PROCEDURE — 3074F SYST BP LT 130 MM HG: CPT | Mod: CPTII,,, | Performed by: NURSE PRACTITIONER

## 2023-08-16 PROCEDURE — 1159F MED LIST DOCD IN RCRD: CPT | Mod: CPTII,,, | Performed by: NURSE PRACTITIONER

## 2023-08-16 PROCEDURE — 3066F PR DOCUMENTATION OF TREATMENT FOR NEPHROPATHY: ICD-10-PCS | Mod: CPTII,,, | Performed by: NURSE PRACTITIONER

## 2023-08-16 PROCEDURE — 3052F PR MOST RECENT HEMOGLOBIN A1C LEVEL 8.0 - < 9.0%: ICD-10-PCS | Mod: CPTII,,, | Performed by: NURSE PRACTITIONER

## 2023-08-16 PROCEDURE — 99214 OFFICE O/P EST MOD 30 MIN: CPT | Mod: S$PBB,,, | Performed by: NURSE PRACTITIONER

## 2023-08-16 PROCEDURE — 3078F PR MOST RECENT DIASTOLIC BLOOD PRESSURE < 80 MM HG: ICD-10-PCS | Mod: CPTII,,, | Performed by: NURSE PRACTITIONER

## 2023-08-16 PROCEDURE — 3074F PR MOST RECENT SYSTOLIC BLOOD PRESSURE < 130 MM HG: ICD-10-PCS | Mod: CPTII,,, | Performed by: NURSE PRACTITIONER

## 2023-08-16 PROCEDURE — 3061F NEG MICROALBUMINURIA REV: CPT | Mod: CPTII,,, | Performed by: NURSE PRACTITIONER

## 2023-08-16 PROCEDURE — 3061F PR NEG MICROALBUMINURIA RESULT DOCUMENTED/REVIEW: ICD-10-PCS | Mod: CPTII,,, | Performed by: NURSE PRACTITIONER

## 2023-08-16 PROCEDURE — 3078F DIAST BP <80 MM HG: CPT | Mod: CPTII,,, | Performed by: NURSE PRACTITIONER

## 2023-08-16 PROCEDURE — 99999 PR PBB SHADOW E&M-EST. PATIENT-LVL V: CPT | Mod: PBBFAC,,, | Performed by: NURSE PRACTITIONER

## 2023-08-16 PROCEDURE — 99214 PR OFFICE/OUTPT VISIT, EST, LEVL IV, 30-39 MIN: ICD-10-PCS | Mod: S$PBB,,, | Performed by: NURSE PRACTITIONER

## 2023-08-16 PROCEDURE — 99999 PR PBB SHADOW E&M-EST. PATIENT-LVL V: ICD-10-PCS | Mod: PBBFAC,,, | Performed by: NURSE PRACTITIONER

## 2023-08-16 PROCEDURE — 3008F BODY MASS INDEX DOCD: CPT | Mod: CPTII,,, | Performed by: NURSE PRACTITIONER

## 2023-08-16 PROCEDURE — 3008F PR BODY MASS INDEX (BMI) DOCUMENTED: ICD-10-PCS | Mod: CPTII,,, | Performed by: NURSE PRACTITIONER

## 2023-08-16 RX ORDER — TRAZODONE HYDROCHLORIDE 100 MG/1
100 TABLET ORAL NIGHTLY
Qty: 30 TABLET | Refills: 2 | Status: SHIPPED | OUTPATIENT
Start: 2023-08-16 | End: 2023-09-11

## 2023-08-16 RX ORDER — CARIPRAZINE 4.5 MG/1
4.5 CAPSULE, GELATIN COATED ORAL DAILY
Qty: 30 CAPSULE | Refills: 2 | Status: SHIPPED | OUTPATIENT
Start: 2023-08-16 | End: 2023-09-05

## 2023-08-16 NOTE — PROGRESS NOTES
Subjective:       Patient ID: Eva Lane is a 47 y.o. female.    Chief Complaint: Follow-up (Check up-results/)    Patient is known, to me and presents with   Chief Complaint   Patient presents with    Follow-up     Check up-results     .  Denies chest pain and shortness of breath.  Patient presents with check up and results. Doing much better with diabetes. Also won't see her psy doc until November and needs increase in medication plus something for insomnia. Mood is stable with no sihi noted   Follow-up  Pertinent negatives include no arthralgias, chest pain, congestion, coughing, fatigue, fever, headaches, joint swelling, neck pain, numbness, vomiting or weakness.     Review of Systems   Constitutional:  Positive for activity change. Negative for appetite change, fatigue, fever and unexpected weight change.   HENT:  Positive for trouble swallowing. Negative for congestion, ear discharge, ear pain, hearing loss, postnasal drip, rhinorrhea and tinnitus.    Eyes:  Negative for photophobia, pain, discharge and visual disturbance.   Respiratory:  Negative for cough, chest tightness, shortness of breath, wheezing and stridor.    Cardiovascular:  Negative for chest pain, palpitations and leg swelling.   Gastrointestinal:  Negative for abdominal distention, blood in stool, constipation, diarrhea and vomiting.   Endocrine: Positive for polydipsia and polyuria.   Genitourinary:  Negative for difficulty urinating, dysuria, frequency, hematuria, menstrual problem and urgency.   Musculoskeletal:  Negative for arthralgias, back pain, gait problem, joint swelling and neck pain.   Skin: Negative.    Neurological:  Negative for dizziness, seizures, syncope, weakness, light-headedness, numbness and headaches.   Hematological:  Negative for adenopathy. Does not bruise/bleed easily.   Psychiatric/Behavioral:  Positive for dysphoric mood and sleep disturbance. Negative for agitation, behavioral problems, confusion,  decreased concentration, hallucinations, self-injury and suicidal ideas. The patient is nervous/anxious. The patient is not hyperactive.        Objective:      Physical Exam  Constitutional:       General: She is not in acute distress.     Appearance: She is well-developed.   HENT:      Head: Normocephalic and atraumatic.      Right Ear: Tympanic membrane and external ear normal.      Left Ear: Tympanic membrane and external ear normal.      Nose: Nose normal.      Mouth/Throat:      Mouth: Mucous membranes are moist.      Pharynx: No oropharyngeal exudate.   Eyes:      General: No scleral icterus.        Right eye: No discharge.         Left eye: No discharge.      Conjunctiva/sclera: Conjunctivae normal.      Pupils: Pupils are equal, round, and reactive to light.   Neck:      Thyroid: No thyromegaly.      Vascular: No JVD.   Cardiovascular:      Rate and Rhythm: Normal rate and regular rhythm.      Pulses:           Dorsalis pedis pulses are 2+ on the right side and 2+ on the left side.        Posterior tibial pulses are 2+ on the right side and 2+ on the left side.      Heart sounds: Normal heart sounds. No murmur heard.     No friction rub. No gallop.   Pulmonary:      Effort: Pulmonary effort is normal. No respiratory distress.      Breath sounds: Normal breath sounds. No stridor. No wheezing or rales.   Chest:      Chest wall: No tenderness.   Abdominal:      General: Bowel sounds are normal. There is no distension.      Palpations: Abdomen is soft. There is no mass.      Tenderness: There is no abdominal tenderness. There is no right CVA tenderness, left CVA tenderness, guarding or rebound.      Hernia: No hernia is present.   Musculoskeletal:         General: No swelling, tenderness, deformity or signs of injury. Normal range of motion.      Cervical back: Normal range of motion and neck supple.      Right lower leg: No edema.      Left lower leg: No edema.      Right foot: Normal range of motion. No  deformity, bunion, Charcot foot, foot drop or prominent metatarsal heads.      Left foot: Normal range of motion. No deformity, bunion, Charcot foot, foot drop or prominent metatarsal heads.   Feet:      Right foot:      Protective Sensation: 10 sites tested.  10 sites sensed.      Skin integrity: Dry skin present.      Toenail Condition: Fungal disease present.     Left foot:      Protective Sensation: 10 sites tested.  10 sites sensed.      Skin integrity: Dry skin present.      Toenail Condition: Fungal disease present.  Lymphadenopathy:      Cervical: No cervical adenopathy.   Skin:     General: Skin is warm and dry.      Capillary Refill: Capillary refill takes less than 2 seconds.      Coloration: Skin is not jaundiced or pale.      Findings: No bruising, erythema, lesion or rash.   Neurological:      General: No focal deficit present.      Mental Status: She is alert and oriented to person, place, and time.      Cranial Nerves: No cranial nerve deficit.      Sensory: No sensory deficit.      Motor: No weakness or abnormal muscle tone.      Coordination: Coordination normal.      Gait: Gait normal.      Deep Tendon Reflexes: Reflexes are normal and symmetric. Reflexes normal.   Psychiatric:         Attention and Perception: Attention and perception normal. She is attentive. She does not perceive auditory or visual hallucinations.         Mood and Affect: Mood is anxious. Mood is not depressed or elated. Affect is not labile, blunt, flat, angry, tearful or inappropriate.         Speech: Speech normal.         Behavior: Behavior normal. Behavior is cooperative.         Thought Content: Thought content normal. Thought content is not paranoid or delusional. Thought content does not include homicidal or suicidal ideation. Thought content does not include homicidal or suicidal plan.         Cognition and Memory: Cognition and memory normal.         Judgment: Judgment normal.         Assessment:       1. Uncontrolled  "type 2 diabetes mellitus with hyperglycemia    2. Essential hypertension    3. Mixed hyperlipidemia    4. Bipolar 2 disorder    5. Primary insomnia        Plan:   1. Uncontrolled type 2 diabetes mellitus with hyperglycemia  -     CBC Auto Differential; Future; Expected date: 11/14/2023  -     Comprehensive Metabolic Panel; Future; Expected date: 11/14/2023  -     Microalbumin/Creatinine Ratio, Urine; Future; Expected date: 11/14/2023  -     Hemoglobin A1C; Future; Expected date: 11/14/2023    2. Essential hypertension  -     CBC Auto Differential; Future; Expected date: 11/14/2023  -     Comprehensive Metabolic Panel; Future; Expected date: 11/14/2023  -     Microalbumin/Creatinine Ratio, Urine; Future; Expected date: 11/14/2023    3. Mixed hyperlipidemia  -     CBC Auto Differential; Future; Expected date: 11/14/2023  -     Comprehensive Metabolic Panel; Future; Expected date: 11/14/2023  -     TSH; Future; Expected date: 11/14/2023  -     Lipid Panel; Future; Expected date: 11/14/2023    4. Bipolar 2 disorder  -     CBC Auto Differential; Future; Expected date: 11/14/2023  -     Comprehensive Metabolic Panel; Future; Expected date: 11/14/2023  -     cariprazine (VRAYLAR) 4.5 mg Cap; Take 1 capsule (4.5 mg total) by mouth Daily.  Dispense: 30 capsule; Refill: 2    5. Primary insomnia  -     traZODone (DESYREL) 100 MG tablet; Take 1 tablet (100 mg total) by mouth every evening.  Dispense: 30 tablet; Refill: 2       "This note will not be shared with the patient."  Check CBC, CMP, TSH, Fasting lipid profile, HgA1c, Microalbuminuria in three months.  Medication compliance was discussed with the patient.  The patient was instructed to monitor glucoses closely using glucometer at home and to record the values in a log.  The patient was instructed to obtain an Optometry exam and microalbumin q year.  The patient was instructed to obtain a hemoglobin A1C q 3-4 months.  The patient was instructed to check the feet visually " daily and to monitor for infection.  The patient was instructed to exercise at least 3 times a week.  The patient was instructed to follow a 1800 ADA diet.  The patient was instructed to monitor weight daily and try to keep it as close to ideal body weight as possible.  The patient was instructed on using exercise frequently to reduce BP.  The patient was instructed on using a low salt diet.  Monitor BP at home and to record the values in a log.  RTC in three months.

## 2023-08-21 ENCOUNTER — TELEPHONE (OUTPATIENT)
Dept: INTERNAL MEDICINE | Facility: CLINIC | Age: 48
End: 2023-08-21

## 2023-08-22 ENCOUNTER — TELEPHONE (OUTPATIENT)
Dept: INTERNAL MEDICINE | Facility: CLINIC | Age: 48
End: 2023-08-22

## 2023-08-22 DIAGNOSIS — I10 BENIGN ESSENTIAL HTN: Primary | ICD-10-CM

## 2023-08-22 RX ORDER — LISINOPRIL 10 MG/1
10 TABLET ORAL DAILY
Qty: 30 TABLET | Refills: 3 | Status: SHIPPED | OUTPATIENT
Start: 2023-08-22 | End: 2023-08-30

## 2023-08-22 NOTE — TELEPHONE ENCOUNTER
Pt states she has been having elevated BP x 1wk with headache and dizziness. Reading this AM was 135/113  She is requesting if BP meds can be sent to pharmacy.

## 2023-08-30 ENCOUNTER — OFFICE VISIT (OUTPATIENT)
Dept: INTERNAL MEDICINE | Facility: CLINIC | Age: 48
End: 2023-08-30
Payer: MEDICAID

## 2023-08-30 VITALS
WEIGHT: 121.69 LBS | BODY MASS INDEX: 21.56 KG/M2 | DIASTOLIC BLOOD PRESSURE: 82 MMHG | SYSTOLIC BLOOD PRESSURE: 116 MMHG | OXYGEN SATURATION: 96 % | HEIGHT: 63 IN | HEART RATE: 110 BPM | TEMPERATURE: 98 F | RESPIRATION RATE: 18 BRPM

## 2023-08-30 DIAGNOSIS — I10 ESSENTIAL HYPERTENSION: Primary | ICD-10-CM

## 2023-08-30 DIAGNOSIS — H60.391 OTHER INFECTIVE ACUTE OTITIS EXTERNA OF RIGHT EAR: ICD-10-CM

## 2023-08-30 PROCEDURE — 3079F DIAST BP 80-89 MM HG: CPT | Mod: CPTII,,, | Performed by: NURSE PRACTITIONER

## 2023-08-30 PROCEDURE — 99214 PR OFFICE/OUTPT VISIT, EST, LEVL IV, 30-39 MIN: ICD-10-PCS | Mod: S$PBB,,, | Performed by: NURSE PRACTITIONER

## 2023-08-30 PROCEDURE — 99214 OFFICE O/P EST MOD 30 MIN: CPT | Mod: S$PBB,,, | Performed by: NURSE PRACTITIONER

## 2023-08-30 PROCEDURE — 3008F PR BODY MASS INDEX (BMI) DOCUMENTED: ICD-10-PCS | Mod: CPTII,,, | Performed by: NURSE PRACTITIONER

## 2023-08-30 PROCEDURE — 3074F PR MOST RECENT SYSTOLIC BLOOD PRESSURE < 130 MM HG: ICD-10-PCS | Mod: CPTII,,, | Performed by: NURSE PRACTITIONER

## 2023-08-30 PROCEDURE — 4010F ACE/ARB THERAPY RXD/TAKEN: CPT | Mod: CPTII,,, | Performed by: NURSE PRACTITIONER

## 2023-08-30 PROCEDURE — 1159F MED LIST DOCD IN RCRD: CPT | Mod: CPTII,,, | Performed by: NURSE PRACTITIONER

## 2023-08-30 PROCEDURE — 3066F PR DOCUMENTATION OF TREATMENT FOR NEPHROPATHY: ICD-10-PCS | Mod: CPTII,,, | Performed by: NURSE PRACTITIONER

## 2023-08-30 PROCEDURE — 3061F NEG MICROALBUMINURIA REV: CPT | Mod: CPTII,,, | Performed by: NURSE PRACTITIONER

## 2023-08-30 PROCEDURE — 99999 PR PBB SHADOW E&M-EST. PATIENT-LVL III: CPT | Mod: PBBFAC,,, | Performed by: NURSE PRACTITIONER

## 2023-08-30 PROCEDURE — 99999 PR PBB SHADOW E&M-EST. PATIENT-LVL III: ICD-10-PCS | Mod: PBBFAC,,, | Performed by: NURSE PRACTITIONER

## 2023-08-30 PROCEDURE — 3052F HG A1C>EQUAL 8.0%<EQUAL 9.0%: CPT | Mod: CPTII,,, | Performed by: NURSE PRACTITIONER

## 2023-08-30 PROCEDURE — 3066F NEPHROPATHY DOC TX: CPT | Mod: CPTII,,, | Performed by: NURSE PRACTITIONER

## 2023-08-30 PROCEDURE — 1159F PR MEDICATION LIST DOCUMENTED IN MEDICAL RECORD: ICD-10-PCS | Mod: CPTII,,, | Performed by: NURSE PRACTITIONER

## 2023-08-30 PROCEDURE — 3074F SYST BP LT 130 MM HG: CPT | Mod: CPTII,,, | Performed by: NURSE PRACTITIONER

## 2023-08-30 PROCEDURE — 3052F PR MOST RECENT HEMOGLOBIN A1C LEVEL 8.0 - < 9.0%: ICD-10-PCS | Mod: CPTII,,, | Performed by: NURSE PRACTITIONER

## 2023-08-30 PROCEDURE — 99213 OFFICE O/P EST LOW 20 MIN: CPT | Mod: PBBFAC,PN | Performed by: NURSE PRACTITIONER

## 2023-08-30 PROCEDURE — 3061F PR NEG MICROALBUMINURIA RESULT DOCUMENTED/REVIEW: ICD-10-PCS | Mod: CPTII,,, | Performed by: NURSE PRACTITIONER

## 2023-08-30 PROCEDURE — 3079F PR MOST RECENT DIASTOLIC BLOOD PRESSURE 80-89 MM HG: ICD-10-PCS | Mod: CPTII,,, | Performed by: NURSE PRACTITIONER

## 2023-08-30 PROCEDURE — 4010F PR ACE/ARB THEARPY RXD/TAKEN: ICD-10-PCS | Mod: CPTII,,, | Performed by: NURSE PRACTITIONER

## 2023-08-30 PROCEDURE — 3008F BODY MASS INDEX DOCD: CPT | Mod: CPTII,,, | Performed by: NURSE PRACTITIONER

## 2023-08-30 RX ORDER — OFLOXACIN 3 MG/ML
5 SOLUTION AURICULAR (OTIC) DAILY
Qty: 10 ML | Refills: 0 | Status: SHIPPED | OUTPATIENT
Start: 2023-08-30 | End: 2023-09-05

## 2023-08-30 RX ORDER — LOSARTAN POTASSIUM 50 MG/1
50 TABLET ORAL DAILY
Qty: 30 TABLET | Refills: 2 | Status: SHIPPED | OUTPATIENT
Start: 2023-08-30 | End: 2024-02-19

## 2023-08-30 NOTE — PROGRESS NOTES
Orders placed   Subjective:       Patient ID: Eva Lane is a 47 y.o. female.    Chief Complaint: Follow-up (Check up -for BP med) and Otalgia (R. Ear- x 1 wk )    Patient is known, to me and presents with   Chief Complaint   Patient presents with    Follow-up     Check up -for BP med    Otalgia     R. Ear- x 1 wk    .  Denies chest pain and shortness of breath.  Patient presents with HTN follow up and is allergic to lisinopril. Had a rash so stopped taking it. Also with right ear pain and thinks that it may be swollen  Follow-up  Pertinent negatives include no arthralgias, chest pain, congestion, coughing, fatigue, fever, headaches, joint swelling, neck pain, numbness or weakness.   Otalgia   Pertinent negatives include no coughing, ear discharge, headaches, hearing loss or neck pain.     Review of Systems   Constitutional:  Negative for activity change, appetite change, fatigue, fever and unexpected weight change.   HENT:  Positive for ear pain. Negative for congestion, ear discharge, hearing loss, postnasal drip and tinnitus.    Eyes:  Negative for photophobia, pain and visual disturbance.   Respiratory:  Negative for cough, shortness of breath, wheezing and stridor.    Cardiovascular:  Negative for chest pain, palpitations and leg swelling.   Gastrointestinal:  Negative for abdominal distention.   Genitourinary:  Negative for difficulty urinating, dysuria, frequency, hematuria and urgency.   Musculoskeletal:  Negative for arthralgias, back pain, gait problem, joint swelling and neck pain.   Skin: Negative.    Neurological:  Negative for dizziness, seizures, syncope, weakness, light-headedness, numbness and headaches.   Hematological:  Negative for adenopathy. Does not bruise/bleed easily.   Psychiatric/Behavioral:  Negative for behavioral problems, confusion, dysphoric mood, hallucinations, sleep disturbance and suicidal ideas. The patient is not nervous/anxious.        Objective:      Physical Exam  Constitutional:        General: She is not in acute distress.     Appearance: She is well-developed.   HENT:      Head: Normocephalic and atraumatic.      Right Ear: Tympanic membrane and external ear normal. Swelling and tenderness present.      Left Ear: Tympanic membrane and external ear normal.      Ears:      Comments: Some tenderness to right ear canal and erythema with mild swelling      Nose: Nose normal.      Mouth/Throat:      Mouth: Mucous membranes are moist.      Pharynx: No oropharyngeal exudate.   Eyes:      General: No scleral icterus.        Right eye: No discharge.         Left eye: No discharge.      Conjunctiva/sclera: Conjunctivae normal.      Pupils: Pupils are equal, round, and reactive to light.   Neck:      Thyroid: No thyromegaly.      Vascular: No JVD.   Cardiovascular:      Rate and Rhythm: Normal rate and regular rhythm.      Heart sounds: Normal heart sounds. No murmur heard.     No friction rub. No gallop.   Pulmonary:      Effort: Pulmonary effort is normal. No respiratory distress.      Breath sounds: Normal breath sounds. No stridor. No wheezing or rales.   Chest:      Chest wall: No tenderness.   Abdominal:      General: Bowel sounds are normal. There is no distension.      Palpations: Abdomen is soft. There is no mass.      Tenderness: There is no abdominal tenderness. There is no right CVA tenderness, left CVA tenderness, guarding or rebound.      Hernia: No hernia is present.   Musculoskeletal:         General: No swelling, tenderness, deformity or signs of injury. Normal range of motion.      Cervical back: Normal range of motion and neck supple.      Right lower leg: No edema.      Left lower leg: No edema.   Lymphadenopathy:      Cervical: No cervical adenopathy.   Skin:     General: Skin is warm and dry.      Capillary Refill: Capillary refill takes less than 2 seconds.      Coloration: Skin is not jaundiced or pale.      Findings: No bruising, erythema, lesion or rash.   Neurological:       "General: No focal deficit present.      Mental Status: She is alert and oriented to person, place, and time.      Cranial Nerves: No cranial nerve deficit.      Sensory: No sensory deficit.      Motor: No weakness or abnormal muscle tone.      Coordination: Coordination normal.      Gait: Gait normal.      Deep Tendon Reflexes: Reflexes are normal and symmetric. Reflexes normal.   Psychiatric:         Mood and Affect: Mood and affect normal. Mood is not anxious or depressed. Affect is not tearful.         Behavior: Behavior normal.         Thought Content: Thought content normal. Thought content does not include homicidal or suicidal ideation. Thought content does not include homicidal or suicidal plan.         Judgment: Judgment normal.         Assessment:       1. Essential hypertension    2. Other infective acute otitis externa of right ear        Plan:   1. Essential hypertension  -     losartan (COZAAR) 50 MG tablet; Take 1 tablet (50 mg total) by mouth once daily.  Dispense: 30 tablet; Refill: 2    2. Other infective acute otitis externa of right ear  -     ofloxacin (FLOXIN) 0.3 % otic solution; Place 5 drops into the right ear once daily.  Dispense: 10 mL; Refill: 0       "This note will not be shared with the patient."  Refills on meds.  Medication compliance was discussed with the patient.   Medication side effects were discussed.  The patient was instructed on using exercise frequently to reduce blood pressure.  Thirty to forty-five minutes of brisk walking three to four times a week is often helpful to lower your blood pressure.  Monitor blood pressures at home and to record the values in a log.  The patient was instructed to monitor weight closely and to try to keep it as close to ideal body weight as possible.  Reduce salt intake to less than 2 grams per day.  Do not add salt to food at the table.  Reduce or get rid of salt used in cooking.  Limit processed and fast foods.  Read package labels for " amount of salt (soduim) in foods.  Losing weight, even just 10 pounds, of can decrease blood pressure.   Rtc as scheduled

## 2023-09-05 ENCOUNTER — OFFICE VISIT (OUTPATIENT)
Dept: NEUROLOGY | Facility: CLINIC | Age: 48
End: 2023-09-05
Payer: MEDICAID

## 2023-09-05 VITALS
HEART RATE: 98 BPM | RESPIRATION RATE: 16 BRPM | HEIGHT: 63 IN | DIASTOLIC BLOOD PRESSURE: 74 MMHG | OXYGEN SATURATION: 98 % | BODY MASS INDEX: 23.14 KG/M2 | SYSTOLIC BLOOD PRESSURE: 112 MMHG | WEIGHT: 130.63 LBS

## 2023-09-05 DIAGNOSIS — M54.50 CHRONIC LOW BACK PAIN, UNSPECIFIED BACK PAIN LATERALITY, UNSPECIFIED WHETHER SCIATICA PRESENT: Primary | ICD-10-CM

## 2023-09-05 DIAGNOSIS — F44.5 PSYCHOGENIC NONEPILEPTIC SEIZURE: ICD-10-CM

## 2023-09-05 DIAGNOSIS — F43.10 PTSD (POST-TRAUMATIC STRESS DISORDER): ICD-10-CM

## 2023-09-05 DIAGNOSIS — M54.16 LUMBAR RADICULOPATHY: ICD-10-CM

## 2023-09-05 DIAGNOSIS — F31.81 BIPOLAR 2 DISORDER: ICD-10-CM

## 2023-09-05 DIAGNOSIS — F41.9 ANXIETY: ICD-10-CM

## 2023-09-05 DIAGNOSIS — G62.9 PERIPHERAL POLYNEUROPATHY: ICD-10-CM

## 2023-09-05 DIAGNOSIS — G89.29 CHRONIC LOW BACK PAIN, UNSPECIFIED BACK PAIN LATERALITY, UNSPECIFIED WHETHER SCIATICA PRESENT: Primary | ICD-10-CM

## 2023-09-05 DIAGNOSIS — G43.709 CHRONIC MIGRAINE WITHOUT AURA WITHOUT STATUS MIGRAINOSUS, NOT INTRACTABLE: ICD-10-CM

## 2023-09-05 DIAGNOSIS — E11.42 TYPE 2 DIABETES MELLITUS WITH DIABETIC POLYNEUROPATHY, WITHOUT LONG-TERM CURRENT USE OF INSULIN: ICD-10-CM

## 2023-09-05 DIAGNOSIS — G44.219 EPISODIC TENSION-TYPE HEADACHE, NOT INTRACTABLE: ICD-10-CM

## 2023-09-05 PROBLEM — R56.9 SEIZURE-LIKE ACTIVITY: Status: RESOLVED | Noted: 2022-12-04 | Resolved: 2023-09-05

## 2023-09-05 PROCEDURE — 99214 OFFICE O/P EST MOD 30 MIN: CPT | Mod: S$PBB,,, | Performed by: NURSE PRACTITIONER

## 2023-09-05 PROCEDURE — 3008F PR BODY MASS INDEX (BMI) DOCUMENTED: ICD-10-PCS | Mod: CPTII,,, | Performed by: NURSE PRACTITIONER

## 2023-09-05 PROCEDURE — 3074F SYST BP LT 130 MM HG: CPT | Mod: CPTII,,, | Performed by: NURSE PRACTITIONER

## 2023-09-05 PROCEDURE — 3008F BODY MASS INDEX DOCD: CPT | Mod: CPTII,,, | Performed by: NURSE PRACTITIONER

## 2023-09-05 PROCEDURE — 3078F PR MOST RECENT DIASTOLIC BLOOD PRESSURE < 80 MM HG: ICD-10-PCS | Mod: CPTII,,, | Performed by: NURSE PRACTITIONER

## 2023-09-05 PROCEDURE — 4010F PR ACE/ARB THEARPY RXD/TAKEN: ICD-10-PCS | Mod: CPTII,,, | Performed by: NURSE PRACTITIONER

## 2023-09-05 PROCEDURE — 3066F PR DOCUMENTATION OF TREATMENT FOR NEPHROPATHY: ICD-10-PCS | Mod: CPTII,,, | Performed by: NURSE PRACTITIONER

## 2023-09-05 PROCEDURE — 3074F PR MOST RECENT SYSTOLIC BLOOD PRESSURE < 130 MM HG: ICD-10-PCS | Mod: CPTII,,, | Performed by: NURSE PRACTITIONER

## 2023-09-05 PROCEDURE — 3061F NEG MICROALBUMINURIA REV: CPT | Mod: CPTII,,, | Performed by: NURSE PRACTITIONER

## 2023-09-05 PROCEDURE — 99214 PR OFFICE/OUTPT VISIT, EST, LEVL IV, 30-39 MIN: ICD-10-PCS | Mod: S$PBB,,, | Performed by: NURSE PRACTITIONER

## 2023-09-05 PROCEDURE — 4010F ACE/ARB THERAPY RXD/TAKEN: CPT | Mod: CPTII,,, | Performed by: NURSE PRACTITIONER

## 2023-09-05 PROCEDURE — 3052F HG A1C>EQUAL 8.0%<EQUAL 9.0%: CPT | Mod: CPTII,,, | Performed by: NURSE PRACTITIONER

## 2023-09-05 PROCEDURE — 3078F DIAST BP <80 MM HG: CPT | Mod: CPTII,,, | Performed by: NURSE PRACTITIONER

## 2023-09-05 PROCEDURE — 3052F PR MOST RECENT HEMOGLOBIN A1C LEVEL 8.0 - < 9.0%: ICD-10-PCS | Mod: CPTII,,, | Performed by: NURSE PRACTITIONER

## 2023-09-05 PROCEDURE — 3066F NEPHROPATHY DOC TX: CPT | Mod: CPTII,,, | Performed by: NURSE PRACTITIONER

## 2023-09-05 PROCEDURE — 1159F MED LIST DOCD IN RCRD: CPT | Mod: CPTII,,, | Performed by: NURSE PRACTITIONER

## 2023-09-05 PROCEDURE — 3061F PR NEG MICROALBUMINURIA RESULT DOCUMENTED/REVIEW: ICD-10-PCS | Mod: CPTII,,, | Performed by: NURSE PRACTITIONER

## 2023-09-05 PROCEDURE — 99215 OFFICE O/P EST HI 40 MIN: CPT | Mod: PBBFAC | Performed by: NURSE PRACTITIONER

## 2023-09-05 PROCEDURE — 1159F PR MEDICATION LIST DOCUMENTED IN MEDICAL RECORD: ICD-10-PCS | Mod: CPTII,,, | Performed by: NURSE PRACTITIONER

## 2023-09-05 PROCEDURE — 99999 PR PBB SHADOW E&M-EST. PATIENT-LVL V: ICD-10-PCS | Mod: PBBFAC,,, | Performed by: NURSE PRACTITIONER

## 2023-09-05 PROCEDURE — 99999 PR PBB SHADOW E&M-EST. PATIENT-LVL V: CPT | Mod: PBBFAC,,, | Performed by: NURSE PRACTITIONER

## 2023-09-05 RX ORDER — GALCANEZUMAB 120 MG/ML
120 INJECTION, SOLUTION SUBCUTANEOUS
Qty: 1 EACH | Refills: 5 | Status: SHIPPED | OUTPATIENT
Start: 2023-09-05 | End: 2023-09-05

## 2023-09-05 RX ORDER — GALCANEZUMAB 120 MG/ML
240 INJECTION, SOLUTION SUBCUTANEOUS SEE ADMIN INSTRUCTIONS
Qty: 2 EACH | Refills: 0 | Status: SHIPPED | OUTPATIENT
Start: 2023-09-05 | End: 2023-09-05

## 2023-09-05 RX ORDER — FREMANEZUMAB-VFRM 225 MG/1.5ML
225 INJECTION SUBCUTANEOUS
Qty: 1 EACH | Refills: 5 | Status: SHIPPED | OUTPATIENT
Start: 2023-09-05 | End: 2023-12-12

## 2023-09-05 RX ORDER — BUTALBITAL, ACETAMINOPHEN AND CAFFEINE 50; 325; 40 MG/1; MG/1; MG/1
1 TABLET ORAL DAILY PRN
Qty: 10 TABLET | Refills: 11 | Status: SHIPPED | OUTPATIENT
Start: 2023-09-05
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
## 2023-09-05 NOTE — PATIENT INSTRUCTIONS
May also try Alpha Lipoic Acid 600 mg one each day to help with Neuropathy. You can buy this over the counter.     Try Blue Emu with Lidocaine or any other Lidocaine containing cream over the counter for your neuropathy.     Please speak with Odalis about ongoing anxiety and insomnia, as both could be worsening your headaches.     Please speak to Dr. Bansal about getting another CPAP for your sleep apnea. Not treating the sleep apnea can cause headaches.

## 2023-09-10 ENCOUNTER — PATIENT MESSAGE (OUTPATIENT)
Dept: INTERNAL MEDICINE | Facility: CLINIC | Age: 48
End: 2023-09-10

## 2023-09-11 ENCOUNTER — OFFICE VISIT (OUTPATIENT)
Dept: INTERNAL MEDICINE | Facility: CLINIC | Age: 48
End: 2023-09-11
Payer: MEDICAID

## 2023-09-11 VITALS
WEIGHT: 132.25 LBS | HEART RATE: 93 BPM | DIASTOLIC BLOOD PRESSURE: 84 MMHG | SYSTOLIC BLOOD PRESSURE: 122 MMHG | OXYGEN SATURATION: 97 % | BODY MASS INDEX: 23.43 KG/M2 | HEIGHT: 63 IN | RESPIRATION RATE: 18 BRPM

## 2023-09-11 DIAGNOSIS — Z12.39 ENCOUNTER FOR SCREENING FOR MALIGNANT NEOPLASM OF BREAST, UNSPECIFIED SCREENING MODALITY: ICD-10-CM

## 2023-09-11 DIAGNOSIS — F41.1 GAD (GENERALIZED ANXIETY DISORDER): ICD-10-CM

## 2023-09-11 DIAGNOSIS — M54.50 ACUTE BILATERAL LOW BACK PAIN WITHOUT SCIATICA: ICD-10-CM

## 2023-09-11 DIAGNOSIS — F99 INSOMNIA DUE TO OTHER MENTAL DISORDER: Primary | ICD-10-CM

## 2023-09-11 DIAGNOSIS — F51.05 INSOMNIA DUE TO OTHER MENTAL DISORDER: Primary | ICD-10-CM

## 2023-09-11 PROCEDURE — 96372 THER/PROPH/DIAG INJ SC/IM: CPT | Mod: PBBFAC,PN

## 2023-09-11 PROCEDURE — 3079F DIAST BP 80-89 MM HG: CPT | Mod: CPTII,,, | Performed by: NURSE PRACTITIONER

## 2023-09-11 PROCEDURE — 99999PBSHW PR PBB SHADOW TECHNICAL ONLY FILED TO HB: ICD-10-PCS | Mod: PBBFAC,,,

## 2023-09-11 PROCEDURE — 99999 PR PBB SHADOW E&M-EST. PATIENT-LVL V: ICD-10-PCS | Mod: PBBFAC,,, | Performed by: NURSE PRACTITIONER

## 2023-09-11 PROCEDURE — 3066F PR DOCUMENTATION OF TREATMENT FOR NEPHROPATHY: ICD-10-PCS | Mod: CPTII,,, | Performed by: NURSE PRACTITIONER

## 2023-09-11 PROCEDURE — 99215 OFFICE O/P EST HI 40 MIN: CPT | Mod: 25,PBBFAC,PN | Performed by: NURSE PRACTITIONER

## 2023-09-11 PROCEDURE — 3074F PR MOST RECENT SYSTOLIC BLOOD PRESSURE < 130 MM HG: ICD-10-PCS | Mod: CPTII,,, | Performed by: NURSE PRACTITIONER

## 2023-09-11 PROCEDURE — 3074F SYST BP LT 130 MM HG: CPT | Mod: CPTII,,, | Performed by: NURSE PRACTITIONER

## 2023-09-11 PROCEDURE — 3079F PR MOST RECENT DIASTOLIC BLOOD PRESSURE 80-89 MM HG: ICD-10-PCS | Mod: CPTII,,, | Performed by: NURSE PRACTITIONER

## 2023-09-11 PROCEDURE — 3061F PR NEG MICROALBUMINURIA RESULT DOCUMENTED/REVIEW: ICD-10-PCS | Mod: CPTII,,, | Performed by: NURSE PRACTITIONER

## 2023-09-11 PROCEDURE — 1159F PR MEDICATION LIST DOCUMENTED IN MEDICAL RECORD: ICD-10-PCS | Mod: CPTII,,, | Performed by: NURSE PRACTITIONER

## 2023-09-11 PROCEDURE — 3052F HG A1C>EQUAL 8.0%<EQUAL 9.0%: CPT | Mod: CPTII,,, | Performed by: NURSE PRACTITIONER

## 2023-09-11 PROCEDURE — 99999 PR PBB SHADOW E&M-EST. PATIENT-LVL V: CPT | Mod: PBBFAC,,, | Performed by: NURSE PRACTITIONER

## 2023-09-11 PROCEDURE — 1159F MED LIST DOCD IN RCRD: CPT | Mod: CPTII,,, | Performed by: NURSE PRACTITIONER

## 2023-09-11 PROCEDURE — 3052F PR MOST RECENT HEMOGLOBIN A1C LEVEL 8.0 - < 9.0%: ICD-10-PCS | Mod: CPTII,,, | Performed by: NURSE PRACTITIONER

## 2023-09-11 PROCEDURE — 3008F PR BODY MASS INDEX (BMI) DOCUMENTED: ICD-10-PCS | Mod: CPTII,,, | Performed by: NURSE PRACTITIONER

## 2023-09-11 PROCEDURE — 4010F ACE/ARB THERAPY RXD/TAKEN: CPT | Mod: CPTII,,, | Performed by: NURSE PRACTITIONER

## 2023-09-11 PROCEDURE — 3061F NEG MICROALBUMINURIA REV: CPT | Mod: CPTII,,, | Performed by: NURSE PRACTITIONER

## 2023-09-11 PROCEDURE — 99214 OFFICE O/P EST MOD 30 MIN: CPT | Mod: S$PBB,,, | Performed by: NURSE PRACTITIONER

## 2023-09-11 PROCEDURE — 99999PBSHW PR PBB SHADOW TECHNICAL ONLY FILED TO HB: Mod: PBBFAC,,,

## 2023-09-11 PROCEDURE — 4010F PR ACE/ARB THEARPY RXD/TAKEN: ICD-10-PCS | Mod: CPTII,,, | Performed by: NURSE PRACTITIONER

## 2023-09-11 PROCEDURE — 99214 PR OFFICE/OUTPT VISIT, EST, LEVL IV, 30-39 MIN: ICD-10-PCS | Mod: S$PBB,,, | Performed by: NURSE PRACTITIONER

## 2023-09-11 PROCEDURE — 3008F BODY MASS INDEX DOCD: CPT | Mod: CPTII,,, | Performed by: NURSE PRACTITIONER

## 2023-09-11 PROCEDURE — 3066F NEPHROPATHY DOC TX: CPT | Mod: CPTII,,, | Performed by: NURSE PRACTITIONER

## 2023-09-11 RX ORDER — CYCLOBENZAPRINE HCL 5 MG
5 TABLET ORAL 3 TIMES DAILY PRN
Qty: 20 TABLET | Refills: 0 | Status: SHIPPED | OUTPATIENT
Start: 2023-09-11

## 2023-09-11 RX ORDER — QUETIAPINE FUMARATE 400 MG/1
400 TABLET, FILM COATED ORAL 2 TIMES DAILY
Qty: 60 TABLET | Refills: 2 | Status: SHIPPED | OUTPATIENT
Start: 2023-09-11 | End: 2024-03-11 | Stop reason: SDUPTHER

## 2023-09-11 RX ORDER — IBUPROFEN 800 MG/1
800 TABLET ORAL 3 TIMES DAILY PRN
Qty: 90 TABLET | Refills: 0 | Status: SHIPPED | OUTPATIENT
Start: 2023-09-11 | End: 2024-03-09 | Stop reason: SDUPTHER

## 2023-09-11 RX ORDER — HYDROXYZINE PAMOATE 25 MG/1
25 CAPSULE ORAL 2 TIMES DAILY PRN
Qty: 60 CAPSULE | Refills: 2 | Status: SHIPPED | OUTPATIENT
Start: 2023-09-11 | End: 2023-11-28

## 2023-09-11 RX ORDER — KETOROLAC TROMETHAMINE 30 MG/ML
60 INJECTION, SOLUTION INTRAMUSCULAR; INTRAVENOUS
Status: COMPLETED | OUTPATIENT
Start: 2023-09-11 | End: 2023-09-11

## 2023-09-11 RX ADMIN — KETOROLAC TROMETHAMINE 60 MG: 60 INJECTION, SOLUTION INTRAMUSCULAR at 09:09

## 2023-09-11 NOTE — PROGRESS NOTES
Subjective:       Patient ID: Eva Lane is a 47 y.o. female.    Chief Complaint: Insomnia (With anxiety getting worse ) and Back Pain (X 3 days PS:10)    Patient is known, to me and presents with   Chief Complaint   Patient presents with    Insomnia     With anxiety getting worse     Back Pain     X 3 days PS:10   .  Denies chest pain and shortness of breath.  Patient presents with worsening insomnia and would like to get back on seroquel since that helped before. She also would like something for anxiety as needed,. No sihi noted. States that she is having some low back pain as well. No urinary problems noted   Back Pain  Pertinent negatives include no chest pain, dysuria, fever, headaches, numbness or weakness.     Review of Systems   Constitutional:  Negative for activity change, appetite change, fatigue, fever and unexpected weight change.   HENT:  Negative for congestion, ear discharge, ear pain, hearing loss, postnasal drip and tinnitus.    Eyes:  Negative for photophobia, pain and visual disturbance.   Respiratory:  Negative for cough, shortness of breath, wheezing and stridor.    Cardiovascular:  Negative for chest pain, palpitations and leg swelling.   Gastrointestinal:  Negative for abdominal distention.   Genitourinary:  Negative for difficulty urinating, dysuria, frequency, hematuria and urgency.   Musculoskeletal:  Positive for back pain. Negative for arthralgias, gait problem, joint swelling and neck pain.   Skin: Negative.    Neurological:  Negative for dizziness, seizures, syncope, weakness, light-headedness, numbness and headaches.   Hematological:  Negative for adenopathy. Does not bruise/bleed easily.   Psychiatric/Behavioral:  Positive for sleep disturbance. Negative for behavioral problems, confusion, dysphoric mood, hallucinations and suicidal ideas. The patient is nervous/anxious.        Objective:      Physical Exam  Constitutional:       General: She is not in acute distress.      Appearance: She is well-developed.   HENT:      Head: Normocephalic and atraumatic.      Right Ear: Tympanic membrane and external ear normal.      Left Ear: Tympanic membrane and external ear normal.      Nose: Nose normal.      Mouth/Throat:      Mouth: Mucous membranes are moist.      Pharynx: No oropharyngeal exudate.   Eyes:      General: No scleral icterus.        Right eye: No discharge.         Left eye: No discharge.      Conjunctiva/sclera: Conjunctivae normal.      Pupils: Pupils are equal, round, and reactive to light.   Neck:      Thyroid: No thyromegaly.      Vascular: No JVD.   Cardiovascular:      Rate and Rhythm: Normal rate and regular rhythm.      Heart sounds: Normal heart sounds. No murmur heard.     No friction rub. No gallop.   Pulmonary:      Effort: Pulmonary effort is normal. No respiratory distress.      Breath sounds: Normal breath sounds. No stridor. No wheezing or rales.   Chest:      Chest wall: No tenderness.   Abdominal:      General: Bowel sounds are normal. There is no distension.      Palpations: Abdomen is soft. There is no mass.      Tenderness: There is no abdominal tenderness. There is no right CVA tenderness, left CVA tenderness, guarding or rebound.      Hernia: No hernia is present.   Musculoskeletal:         General: Tenderness present. No swelling, deformity or signs of injury.      Cervical back: Normal range of motion and neck supple.      Lumbar back: Tenderness present. Decreased range of motion.        Back:       Right lower leg: No edema.      Left lower leg: No edema.   Lymphadenopathy:      Cervical: No cervical adenopathy.   Skin:     General: Skin is warm and dry.      Capillary Refill: Capillary refill takes less than 2 seconds.      Coloration: Skin is not jaundiced or pale.      Findings: No bruising, erythema, lesion or rash.   Neurological:      General: No focal deficit present.      Mental Status: She is alert and oriented to person, place, and time.       Cranial Nerves: No cranial nerve deficit.      Sensory: No sensory deficit.      Motor: No weakness or abnormal muscle tone.      Coordination: Coordination normal.      Gait: Gait normal.      Deep Tendon Reflexes: Reflexes are normal and symmetric. Reflexes normal.   Psychiatric:         Attention and Perception: Attention and perception normal. She does not perceive auditory or visual hallucinations.         Mood and Affect: Affect normal. Mood is anxious. Mood is not depressed. Affect is not tearful.         Speech: Speech normal.         Behavior: Behavior normal. Behavior is cooperative.         Thought Content: Thought content normal. Thought content is not paranoid or delusional. Thought content does not include homicidal or suicidal ideation. Thought content does not include homicidal or suicidal plan.         Cognition and Memory: Cognition and memory normal.         Judgment: Judgment normal.         Assessment:       1. Insomnia due to other mental disorder    2. HILARIA (generalized anxiety disorder)    3. Acute bilateral low back pain without sciatica    4. Encounter for screening for malignant neoplasm of breast, unspecified screening modality        Plan:   1. Insomnia due to other mental disorder  -     QUEtiapine (SEROQUEL) 400 MG tablet; Take 1 tablet (400 mg total) by mouth 2 (two) times daily.  Dispense: 60 tablet; Refill: 2    2. HILARIA (generalized anxiety disorder)  -     hydrOXYzine pamoate (VISTARIL) 25 MG Cap; Take 1 capsule (25 mg total) by mouth 2 (two) times daily as needed (anxiety).  Dispense: 60 capsule; Refill: 2    3. Acute bilateral low back pain without sciatica  -     ketorolac injection 60 mg  -     cyclobenzaprine (FLEXERIL) 5 MG tablet; Take 1 tablet (5 mg total) by mouth 3 (three) times daily as needed for Muscle spasms.  Dispense: 20 tablet; Refill: 0  -     ibuprofen (ADVIL,MOTRIN) 800 MG tablet; Take 1 tablet (800 mg total) by mouth 3 (three) times daily as needed.  Dispense:  "90 tablet; Refill: 0    4. Encounter for screening for malignant neoplasm of breast, unspecified screening modality  -     Mammo Digital Screening Bilat w/ [facility]; Future; Expected date: 09/11/2023       "This note will not be shared with the patient."  Will try seroquel again and stop trazodone  Try vistaril as needed anxiety  Rtc as scheduled  "

## 2023-11-02 DIAGNOSIS — G62.9 NEUROPATHY: ICD-10-CM

## 2023-11-03 RX ORDER — PREGABALIN 200 MG/1
200 CAPSULE ORAL 2 TIMES DAILY
Qty: 60 CAPSULE | Refills: 2 | Status: SHIPPED | OUTPATIENT
Start: 2023-11-03 | End: 2024-02-28

## 2023-11-14 ENCOUNTER — OFFICE VISIT (OUTPATIENT)
Dept: PSYCHIATRY | Facility: CLINIC | Age: 48
End: 2023-11-14
Payer: MEDICAID

## 2023-11-14 VITALS
SYSTOLIC BLOOD PRESSURE: 124 MMHG | HEIGHT: 63 IN | DIASTOLIC BLOOD PRESSURE: 78 MMHG | OXYGEN SATURATION: 97 % | HEART RATE: 90 BPM | BODY MASS INDEX: 25.66 KG/M2 | RESPIRATION RATE: 17 BRPM | WEIGHT: 144.81 LBS

## 2023-11-14 DIAGNOSIS — F41.9 ANXIETY: ICD-10-CM

## 2023-11-14 DIAGNOSIS — F20.3 UNDIFFERENTIATED SCHIZOPHRENIA: Primary | ICD-10-CM

## 2023-11-14 DIAGNOSIS — F51.04 CHRONIC INSOMNIA: ICD-10-CM

## 2023-11-14 DIAGNOSIS — F60.3 BORDERLINE PERSONALITY DISORDER: ICD-10-CM

## 2023-11-14 DIAGNOSIS — F33.1 MDD (MAJOR DEPRESSIVE DISORDER), RECURRENT EPISODE, MODERATE: ICD-10-CM

## 2023-11-14 PROCEDURE — 99214 OFFICE O/P EST MOD 30 MIN: CPT | Mod: S$PBB,,,

## 2023-11-14 PROCEDURE — 90833 PSYTX W PT W E/M 30 MIN: CPT | Mod: ,,,

## 2023-11-14 PROCEDURE — 90833 PR PSYCHOTHERAPY W/PATIENT W/E&M, 30 MIN (ADD ON): ICD-10-PCS | Mod: ,,,

## 2023-11-14 PROCEDURE — 3078F PR MOST RECENT DIASTOLIC BLOOD PRESSURE < 80 MM HG: ICD-10-PCS | Mod: CPTII,,,

## 2023-11-14 PROCEDURE — 4010F PR ACE/ARB THEARPY RXD/TAKEN: ICD-10-PCS | Mod: CPTII,,,

## 2023-11-14 PROCEDURE — 1160F PR REVIEW ALL MEDS BY PRESCRIBER/CLIN PHARMACIST DOCUMENTED: ICD-10-PCS | Mod: CPTII,,,

## 2023-11-14 PROCEDURE — 99999 PR PBB SHADOW E&M-EST. PATIENT-LVL III: ICD-10-PCS | Mod: PBBFAC,,,

## 2023-11-14 PROCEDURE — 3052F PR MOST RECENT HEMOGLOBIN A1C LEVEL 8.0 - < 9.0%: ICD-10-PCS | Mod: CPTII,,,

## 2023-11-14 PROCEDURE — 1159F MED LIST DOCD IN RCRD: CPT | Mod: CPTII,,,

## 2023-11-14 PROCEDURE — 3008F BODY MASS INDEX DOCD: CPT | Mod: CPTII,,,

## 2023-11-14 PROCEDURE — 3061F PR NEG MICROALBUMINURIA RESULT DOCUMENTED/REVIEW: ICD-10-PCS | Mod: CPTII,,,

## 2023-11-14 PROCEDURE — 3074F SYST BP LT 130 MM HG: CPT | Mod: CPTII,,,

## 2023-11-14 PROCEDURE — 1159F PR MEDICATION LIST DOCUMENTED IN MEDICAL RECORD: ICD-10-PCS | Mod: CPTII,,,

## 2023-11-14 PROCEDURE — 3066F PR DOCUMENTATION OF TREATMENT FOR NEPHROPATHY: ICD-10-PCS | Mod: CPTII,,,

## 2023-11-14 PROCEDURE — 3078F DIAST BP <80 MM HG: CPT | Mod: CPTII,,,

## 2023-11-14 PROCEDURE — 99999 PR PBB SHADOW E&M-EST. PATIENT-LVL III: CPT | Mod: PBBFAC,,,

## 2023-11-14 PROCEDURE — 3052F HG A1C>EQUAL 8.0%<EQUAL 9.0%: CPT | Mod: CPTII,,,

## 2023-11-14 PROCEDURE — 3074F PR MOST RECENT SYSTOLIC BLOOD PRESSURE < 130 MM HG: ICD-10-PCS | Mod: CPTII,,,

## 2023-11-14 PROCEDURE — 99214 PR OFFICE/OUTPT VISIT, EST, LEVL IV, 30-39 MIN: ICD-10-PCS | Mod: S$PBB,,,

## 2023-11-14 PROCEDURE — 3061F NEG MICROALBUMINURIA REV: CPT | Mod: CPTII,,,

## 2023-11-14 PROCEDURE — 3066F NEPHROPATHY DOC TX: CPT | Mod: CPTII,,,

## 2023-11-14 PROCEDURE — 4010F ACE/ARB THERAPY RXD/TAKEN: CPT | Mod: CPTII,,,

## 2023-11-14 PROCEDURE — 3008F PR BODY MASS INDEX (BMI) DOCUMENTED: ICD-10-PCS | Mod: CPTII,,,

## 2023-11-14 PROCEDURE — 99213 OFFICE O/P EST LOW 20 MIN: CPT | Mod: PBBFAC

## 2023-11-14 PROCEDURE — 1160F RVW MEDS BY RX/DR IN RCRD: CPT | Mod: CPTII,,,

## 2023-11-14 RX ORDER — BREXPIPRAZOLE 1 MG/1
1 TABLET ORAL DAILY
Qty: 30 TABLET | Refills: 1 | Status: SHIPPED | OUTPATIENT
Start: 2023-11-14 | End: 2024-01-08

## 2023-11-14 RX ORDER — LORAZEPAM 0.5 MG/1
0.5 TABLET ORAL EVERY 12 HOURS PRN
Qty: 60 TABLET | Refills: 0 | Status: SHIPPED | OUTPATIENT
Start: 2023-11-14 | End: 2024-03-13

## 2023-11-14 NOTE — PROGRESS NOTES
Outpatient Psychiatry Follow-Up Visit (MD/NP)    11/14/2023    Clinical Status of Patient:  Outpatient (Ambulatory)    Chief Complaint:  Eva Lane is a 47 y.o. female who presents today for follow-up of depression, mood disorder, and anxiety.  Met with patient.      Interval History and Content of Current Session:  Interim Events/Subjective Report/Content of Current Session:     Psychotherapy:  Target symptoms: mood swings  Why chosen therapy is appropriate versus another modality: relevant to diagnosis, patient responds to this modality, evidence based practice  Outcome monitoring methods: self-report, observation  Therapeutic intervention type: insight oriented psychotherapy, behavior modifying psychotherapy, supportive psychotherapy, interactive psychotherapy  Topics discussed/themes: building skills sets for symptom management, symptom recognition  The patient's response to the intervention is accepting.   The patient's progress toward treatment goals is poor.   Duration of intervention: 19 minutes.      Psychotherapy Add-On + [id number]  16-37 minutes   Time: 19 minutes  Participants: Patient       She has non-compliant with treatment. She denies any adverse side effects and reports good tolerability and efficacy when she was taking her medications.    The patient reports that she has not been doing well.   She reports she has been having visual and auditory hallucinations since she stopped taking her medications.   She states she has been having trouble sleeping and having problems with anxiety    No new psychological stressors have occurred since her last appointment. Her family is stable and supportive.     No new medical issue has occurred since her last appointment.     -She has had one ER visits since her last appointment    She reports her appetite is good, she denies changes in appetite  She reports diminished mood, anxiety, no elevation in mood; normal interest; no expansiveness; +  irritability.   Her concentration and energy have decreased.   She denies excessive, guilt and/or worry.    She denies suicidal/homicidal ideations. No psychosis noted.    We discussed medicatios as below; the patient is in agreement to treatment as documented below.  Review of Systems   Psychiatric: Pertinent items are noted in this encounter narrative   Constitutional: No weight gain or loss   Musculoskeletal: Denies pain or stiffness in joints   Neurological: No weakness, sensory changes, seizures, confusion, memory loss, tremor, or other abnormal   Endocrine: Denies polydipsia or polyuria   Integumentary: No rashes or lacerations   Eyes: No exophthalmos, jaundice, or blindness   ENT: Denies dizziness, tinnitus or hearing loss    Respiratory: Denies shortness of breath   Cardiovascular: No chest pain or tachycardia   Gastrointestinal: Denies nausea, vomiting, diarrhea, constipation or pain  Genitourinary: Denies dysuria, frequency, or sexual dysfunction   Hematologic/Lymphatic: Denies excessive bleeding, prolonged or excessive bleeding after invasive procedures, dental extraction or injury   Allergic/Immunologic: Denies allergic response to foods, animals or other materials at this time    Past Medical, Family and Social History: The patient's past medical, family and social history have been reviewed and updated as appropriate within the electronic medical record - see encounter notes.    Compliance: no    Side effects: see above    Risk Parameters:  Patient reports no suicidal ideation  Patient reports no homicidal ideation  Patient reports no self-injurious behavior  Patient reports no violent behavior    Exam (detailed: at least 9 elements; comprehensive: all 15 elements)   Constitutional  Vitals:  Most recent vital signs, dated less than 90 days prior to this appointment, were reviewed.   Vitals:    11/14/23 1356   BP: 124/78   Pulse: 90   Resp: 17   SpO2: 97%   Weight: 65.7 kg (144 lb 12.8 oz)   Height: 5'  "3" (1.6 m)        General:  unremarkable, age appropriate, well nourished, casually dressed     Musculoskeletal  Muscle Strength/Tone:  no spasicity, no rigidity, no cogwheeling, no flaccidity, no paratonia, no dyskinesia, no dystonia, no tremor, no tic, no choreoathetosis, no atrophy   Gait & Station:  non-ataxic     Psychiatric  Speech:  no latency; no press   Mood & Affect:  euthymic  congruent and appropriate   Thought Process:  normal and logical, racing   Associations:  intact   Thought Content:  hallucinations: (auditory: Yes, visual: Yes), suicidal thoughts: (active-No, passive-No), homicidal thoughts: (active-No, passive-No)   Insight:  intact, limited awareness of illness   Judgement: behavior is adequate to circumstances, age appropriate   Orientation:  grossly intact   Memory: intact for content of interview, grossly intact   Language: grossly intact   Attention Span & Concentration:  distracted   Fund of Knowledge:  intact and appropriate to age and level of education, familiar with aspects of current personal life     Assessment and Diagnosis   Status/Progress: Based on the examination today, the patient's problem(s) is/are inadequately controlled.  New problems have been presented today.   Co-morbidities and Lack of compliance are complicating management of the primary condition.  There are no active rule-out diagnoses for this patient at this time.     General Impression:       ICD-10-CM ICD-9-CM   1. Undifferentiated schizophrenia  F20.3 295.90   2. Chronic insomnia  F51.04 780.52   3. MDD (major depressive disorder), recurrent episode, moderate  F33.1 296.32   4. Borderline personality disorder  F60.3 301.83   5. Anxiety  F41.9 300.00       Intervention/Counseling/Treatment Plan   Medication Management: The risks and benefits of medication were discussed with the patient.  Counseling provided with patient as follows: importance of compliance with chosen treatment options was emphasized, risks and " benefits of treatment options, including medications, were discussed with the patient, patient education, instructions for  management, treatment, and follow-up were reviewed      Plan   Schizophrenia  Start Rexulti 1MG PO daily  Continue Seroquel 400MG PO BID   Refer for psychotherapy, psychotherapy provided during this encounter  Pt. counseled  MDD  Start Rexulti 1Mg PO daily  Ativan 0.5Mg PO BID   Continue Seroquel 400MG PO BID   Refer for psychotherapy, psychotherapy provided during this encounter  Pt. counseled  Chronic Insomnia   Develop bedtime routine  Start Rexulti 1MG PO daily  Continue Seroquel 400MG PO BID   Refer for psychotherapy, psychotherapy provided during this encounter  Pt. counseled  Anxiety     Start Rexulti 1Mg PO daily  Ativan 0.5Mg PO BID PRN anxiety   Refer for psychotherapy, psychotherapy provided during this encounter  Pt. counseled    Therapy   Refer for weekly psychotherapy  Psychotherapy provided today    Medical stressors:  Patient counseled, continue medications/treatment as scheduled f/u with providers as scheduled     Goals   Develop coping skills to help manage anxiety and depression  Learn skills to improve productivity      Discussed with patient informed consent, risks vs. benefits, alternative treatments, side effect profile and the inherent unpredictability of individual responses to these treatments. The patient expresses understanding of the above and displays the capacity to agree with this current plan and had no other questions. The patient also agrees that currently, the benefits outweigh the risks and would like to pursue treatment at this time  Encouraged Patient to keep future appointments.  Take medications as prescribed and abstain from substance abuse.   Safety plan reviewed with patient for worsening condition or suicidal ideations. In the event of an emergency patient was advised to go to the emergency room.    The patient was seen and examined. Her chart was  reviewed. Reviewed notes by Cintia Gustafson NP on 3/23/2023 at 2:45 PM, 1/20/2023 at 10:00 AM, 12/6/2022 at 9:45 AM, and Marlena Jaeger MD on 12/4/2022      Approximately 52 minutes of total time spent on this encounter, which includes face to face time, and non-face to face time preparing to see the patient (eg, review of labs/tests), obtaining and or reviewing separately obtained history, documenting clinical information in the electronic or other health record, independently interpreting results (not separately reported) and communicating results to the patient/family/caregiver or care coordination (not separately reported). Approximately 33 minutes were spent as above with an additional 19 minutes spent on psychotherapy.       Return to Clinic: 1 month or sooner if needed

## 2023-11-17 ENCOUNTER — CLINICAL SUPPORT (OUTPATIENT)
Dept: INTERNAL MEDICINE | Facility: CLINIC | Age: 48
End: 2023-11-17
Payer: MEDICAID

## 2023-11-17 DIAGNOSIS — E78.2 MIXED HYPERLIPIDEMIA: ICD-10-CM

## 2023-11-17 DIAGNOSIS — E11.65 UNCONTROLLED TYPE 2 DIABETES MELLITUS WITH HYPERGLYCEMIA: ICD-10-CM

## 2023-11-17 DIAGNOSIS — I10 ESSENTIAL HYPERTENSION: ICD-10-CM

## 2023-11-17 DIAGNOSIS — F31.81 BIPOLAR 2 DISORDER: ICD-10-CM

## 2023-11-17 LAB
ALBUMIN SERPL BCP-MCNC: 3.2 G/DL (ref 3.5–5.2)
ALBUMIN/CREAT UR: NORMAL UG/MG (ref 0–30)
ALP SERPL-CCNC: 92 U/L (ref 55–135)
ALT SERPL W/O P-5'-P-CCNC: 11 U/L (ref 10–44)
ANION GAP SERPL CALC-SCNC: 7 MMOL/L (ref 8–16)
AST SERPL-CCNC: 18 U/L (ref 10–40)
BASOPHILS # BLD AUTO: 0.04 K/UL (ref 0–0.2)
BASOPHILS NFR BLD: 0.5 % (ref 0–1.9)
BILIRUB SERPL-MCNC: 0.3 MG/DL (ref 0.1–1)
BUN SERPL-MCNC: 15 MG/DL (ref 6–20)
CALCIUM SERPL-MCNC: 8.8 MG/DL (ref 8.7–10.5)
CHLORIDE SERPL-SCNC: 105 MMOL/L (ref 95–110)
CHOLEST SERPL-MCNC: 194 MG/DL (ref 120–199)
CHOLEST/HDLC SERPL: 4.7 {RATIO} (ref 2–5)
CO2 SERPL-SCNC: 28 MMOL/L (ref 23–29)
CREAT SERPL-MCNC: 0.8 MG/DL (ref 0.5–1.4)
CREAT UR-MCNC: 61 MG/DL (ref 15–325)
DIFFERENTIAL METHOD: ABNORMAL
EOSINOPHIL # BLD AUTO: 0.3 K/UL (ref 0–0.5)
EOSINOPHIL NFR BLD: 3.5 % (ref 0–8)
ERYTHROCYTE [DISTWIDTH] IN BLOOD BY AUTOMATED COUNT: 14.8 % (ref 11.5–14.5)
EST. GFR  (NO RACE VARIABLE): >60 ML/MIN/1.73 M^2
ESTIMATED AVG GLUCOSE: 186 MG/DL (ref 68–131)
GLUCOSE SERPL-MCNC: 175 MG/DL (ref 70–110)
HBA1C MFR BLD: 8.1 % (ref 4–5.6)
HCT VFR BLD AUTO: 38.6 % (ref 37–48.5)
HDLC SERPL-MCNC: 41 MG/DL (ref 40–75)
HDLC SERPL: 21.1 % (ref 20–50)
HGB BLD-MCNC: 12.4 G/DL (ref 12–16)
IMM GRANULOCYTES # BLD AUTO: 0.08 K/UL (ref 0–0.04)
IMM GRANULOCYTES NFR BLD AUTO: 0.9 % (ref 0–0.5)
LDLC SERPL CALC-MCNC: 113.2 MG/DL (ref 63–159)
LYMPHOCYTES # BLD AUTO: 3.2 K/UL (ref 1–4.8)
LYMPHOCYTES NFR BLD: 37.2 % (ref 18–48)
MCH RBC QN AUTO: 30.2 PG (ref 27–31)
MCHC RBC AUTO-ENTMCNC: 32.1 G/DL (ref 32–36)
MCV RBC AUTO: 94 FL (ref 82–98)
MICROALBUMIN UR DL<=1MG/L-MCNC: <5 UG/ML
MONOCYTES # BLD AUTO: 0.7 K/UL (ref 0.3–1)
MONOCYTES NFR BLD: 8.2 % (ref 4–15)
NEUTROPHILS # BLD AUTO: 4.2 K/UL (ref 1.8–7.7)
NEUTROPHILS NFR BLD: 49.7 % (ref 38–73)
NONHDLC SERPL-MCNC: 153 MG/DL
NRBC BLD-RTO: 0 /100 WBC
PLATELET # BLD AUTO: 200 K/UL (ref 150–450)
PMV BLD AUTO: 12.5 FL (ref 9.2–12.9)
POTASSIUM SERPL-SCNC: 4.3 MMOL/L (ref 3.5–5.1)
PROT SERPL-MCNC: 6.8 G/DL (ref 6–8.4)
RBC # BLD AUTO: 4.11 M/UL (ref 4–5.4)
SODIUM SERPL-SCNC: 140 MMOL/L (ref 136–145)
TRIGL SERPL-MCNC: 199 MG/DL (ref 30–150)
TSH SERPL DL<=0.005 MIU/L-ACNC: 2.04 UIU/ML (ref 0.4–4)
WBC # BLD AUTO: 8.53 K/UL (ref 3.9–12.7)

## 2023-11-17 PROCEDURE — 85025 COMPLETE CBC W/AUTO DIFF WBC: CPT | Performed by: NURSE PRACTITIONER

## 2023-11-17 PROCEDURE — 36415 COLL VENOUS BLD VENIPUNCTURE: CPT | Performed by: NURSE PRACTITIONER

## 2023-11-17 PROCEDURE — 83036 HEMOGLOBIN GLYCOSYLATED A1C: CPT | Performed by: NURSE PRACTITIONER

## 2023-11-17 PROCEDURE — 82043 UR ALBUMIN QUANTITATIVE: CPT | Performed by: NURSE PRACTITIONER

## 2023-11-17 PROCEDURE — 84443 ASSAY THYROID STIM HORMONE: CPT | Performed by: NURSE PRACTITIONER

## 2023-11-17 PROCEDURE — 80061 LIPID PANEL: CPT | Performed by: NURSE PRACTITIONER

## 2023-11-17 PROCEDURE — 80053 COMPREHEN METABOLIC PANEL: CPT | Performed by: NURSE PRACTITIONER

## 2023-11-22 DIAGNOSIS — E11.9 TYPE 2 DIABETES MELLITUS WITHOUT COMPLICATION, UNSPECIFIED WHETHER LONG TERM INSULIN USE: ICD-10-CM

## 2023-11-26 DIAGNOSIS — F41.1 GAD (GENERALIZED ANXIETY DISORDER): ICD-10-CM

## 2023-11-28 RX ORDER — HYDROXYZINE PAMOATE 25 MG/1
CAPSULE ORAL
Qty: 60 CAPSULE | Refills: 0 | Status: SHIPPED | OUTPATIENT
Start: 2023-11-28 | End: 2023-12-29

## 2023-12-12 ENCOUNTER — OFFICE VISIT (OUTPATIENT)
Dept: NEUROLOGY | Facility: CLINIC | Age: 48
End: 2023-12-12
Payer: MEDICAID

## 2023-12-12 VITALS
DIASTOLIC BLOOD PRESSURE: 92 MMHG | WEIGHT: 147.69 LBS | HEART RATE: 67 BPM | BODY MASS INDEX: 26.17 KG/M2 | OXYGEN SATURATION: 100 % | SYSTOLIC BLOOD PRESSURE: 122 MMHG | HEIGHT: 63 IN | RESPIRATION RATE: 20 BRPM

## 2023-12-12 DIAGNOSIS — F44.5 PSYCHOGENIC NONEPILEPTIC SEIZURE: Primary | ICD-10-CM

## 2023-12-12 DIAGNOSIS — R25.1 SPELLS OF TREMBLING: ICD-10-CM

## 2023-12-12 PROCEDURE — 3080F DIAST BP >= 90 MM HG: CPT | Mod: CPTII,,, | Performed by: NURSE PRACTITIONER

## 2023-12-12 PROCEDURE — 3052F PR MOST RECENT HEMOGLOBIN A1C LEVEL 8.0 - < 9.0%: ICD-10-PCS | Mod: CPTII,,, | Performed by: NURSE PRACTITIONER

## 2023-12-12 PROCEDURE — 3074F PR MOST RECENT SYSTOLIC BLOOD PRESSURE < 130 MM HG: ICD-10-PCS | Mod: CPTII,,, | Performed by: NURSE PRACTITIONER

## 2023-12-12 PROCEDURE — 3061F PR NEG MICROALBUMINURIA RESULT DOCUMENTED/REVIEW: ICD-10-PCS | Mod: CPTII,,, | Performed by: NURSE PRACTITIONER

## 2023-12-12 PROCEDURE — 3061F NEG MICROALBUMINURIA REV: CPT | Mod: CPTII,,, | Performed by: NURSE PRACTITIONER

## 2023-12-12 PROCEDURE — 3074F SYST BP LT 130 MM HG: CPT | Mod: CPTII,,, | Performed by: NURSE PRACTITIONER

## 2023-12-12 PROCEDURE — 99999 PR PBB SHADOW E&M-EST. PATIENT-LVL IV: ICD-10-PCS | Mod: PBBFAC,,, | Performed by: NURSE PRACTITIONER

## 2023-12-12 PROCEDURE — 3052F HG A1C>EQUAL 8.0%<EQUAL 9.0%: CPT | Mod: CPTII,,, | Performed by: NURSE PRACTITIONER

## 2023-12-12 PROCEDURE — 1159F MED LIST DOCD IN RCRD: CPT | Mod: CPTII,,, | Performed by: NURSE PRACTITIONER

## 2023-12-12 PROCEDURE — 4010F PR ACE/ARB THEARPY RXD/TAKEN: ICD-10-PCS | Mod: CPTII,,, | Performed by: NURSE PRACTITIONER

## 2023-12-12 PROCEDURE — 99214 OFFICE O/P EST MOD 30 MIN: CPT | Mod: S$PBB,,, | Performed by: NURSE PRACTITIONER

## 2023-12-12 PROCEDURE — 3066F NEPHROPATHY DOC TX: CPT | Mod: CPTII,,, | Performed by: NURSE PRACTITIONER

## 2023-12-12 PROCEDURE — 3008F BODY MASS INDEX DOCD: CPT | Mod: CPTII,,, | Performed by: NURSE PRACTITIONER

## 2023-12-12 PROCEDURE — 1159F PR MEDICATION LIST DOCUMENTED IN MEDICAL RECORD: ICD-10-PCS | Mod: CPTII,,, | Performed by: NURSE PRACTITIONER

## 2023-12-12 PROCEDURE — 4010F ACE/ARB THERAPY RXD/TAKEN: CPT | Mod: CPTII,,, | Performed by: NURSE PRACTITIONER

## 2023-12-12 PROCEDURE — 3008F PR BODY MASS INDEX (BMI) DOCUMENTED: ICD-10-PCS | Mod: CPTII,,, | Performed by: NURSE PRACTITIONER

## 2023-12-12 PROCEDURE — 99214 PR OFFICE/OUTPT VISIT, EST, LEVL IV, 30-39 MIN: ICD-10-PCS | Mod: S$PBB,,, | Performed by: NURSE PRACTITIONER

## 2023-12-12 PROCEDURE — 99999 PR PBB SHADOW E&M-EST. PATIENT-LVL IV: CPT | Mod: PBBFAC,,, | Performed by: NURSE PRACTITIONER

## 2023-12-12 PROCEDURE — 3066F PR DOCUMENTATION OF TREATMENT FOR NEPHROPATHY: ICD-10-PCS | Mod: CPTII,,, | Performed by: NURSE PRACTITIONER

## 2023-12-12 PROCEDURE — 99214 OFFICE O/P EST MOD 30 MIN: CPT | Mod: PBBFAC | Performed by: NURSE PRACTITIONER

## 2023-12-12 PROCEDURE — 3080F PR MOST RECENT DIASTOLIC BLOOD PRESSURE >= 90 MM HG: ICD-10-PCS | Mod: CPTII,,, | Performed by: NURSE PRACTITIONER
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
## 2023-12-15 ENCOUNTER — OFFICE VISIT (OUTPATIENT)
Dept: INTERNAL MEDICINE | Facility: CLINIC | Age: 48
End: 2023-12-15
Payer: MEDICAID

## 2023-12-15 VITALS
RESPIRATION RATE: 18 BRPM | BODY MASS INDEX: 25.98 KG/M2 | HEIGHT: 63 IN | WEIGHT: 146.63 LBS | SYSTOLIC BLOOD PRESSURE: 118 MMHG | OXYGEN SATURATION: 98 % | DIASTOLIC BLOOD PRESSURE: 80 MMHG | HEART RATE: 89 BPM

## 2023-12-15 DIAGNOSIS — I10 ESSENTIAL HYPERTENSION: ICD-10-CM

## 2023-12-15 DIAGNOSIS — E78.2 MIXED HYPERLIPIDEMIA: ICD-10-CM

## 2023-12-15 DIAGNOSIS — E11.65 UNCONTROLLED TYPE 2 DIABETES MELLITUS WITH HYPERGLYCEMIA: Primary | ICD-10-CM

## 2023-12-15 DIAGNOSIS — Z23 NEED FOR VACCINATION: ICD-10-CM

## 2023-12-15 PROCEDURE — 3066F NEPHROPATHY DOC TX: CPT | Mod: CPTII,,, | Performed by: NURSE PRACTITIONER

## 2023-12-15 PROCEDURE — 99214 OFFICE O/P EST MOD 30 MIN: CPT | Mod: S$PBB,,, | Performed by: NURSE PRACTITIONER

## 2023-12-15 PROCEDURE — 90686 IIV4 VACC NO PRSV 0.5 ML IM: CPT | Mod: PBBFAC,PN

## 2023-12-15 PROCEDURE — 3052F PR MOST RECENT HEMOGLOBIN A1C LEVEL 8.0 - < 9.0%: ICD-10-PCS | Mod: CPTII,,, | Performed by: NURSE PRACTITIONER

## 2023-12-15 PROCEDURE — 3074F SYST BP LT 130 MM HG: CPT | Mod: CPTII,,, | Performed by: NURSE PRACTITIONER

## 2023-12-15 PROCEDURE — 3061F NEG MICROALBUMINURIA REV: CPT | Mod: CPTII,,, | Performed by: NURSE PRACTITIONER

## 2023-12-15 PROCEDURE — 3079F DIAST BP 80-89 MM HG: CPT | Mod: CPTII,,, | Performed by: NURSE PRACTITIONER

## 2023-12-15 PROCEDURE — 99999PBSHW FLU VACCINE (QUAD) GREATER THAN OR EQUAL TO 3YO PRESERVATIVE FREE IM: Mod: PBBFAC,,,

## 2023-12-15 PROCEDURE — 4010F PR ACE/ARB THEARPY RXD/TAKEN: ICD-10-PCS | Mod: CPTII,,, | Performed by: NURSE PRACTITIONER

## 2023-12-15 PROCEDURE — 3074F PR MOST RECENT SYSTOLIC BLOOD PRESSURE < 130 MM HG: ICD-10-PCS | Mod: CPTII,,, | Performed by: NURSE PRACTITIONER

## 2023-12-15 PROCEDURE — 3061F PR NEG MICROALBUMINURIA RESULT DOCUMENTED/REVIEW: ICD-10-PCS | Mod: CPTII,,, | Performed by: NURSE PRACTITIONER

## 2023-12-15 PROCEDURE — 3008F PR BODY MASS INDEX (BMI) DOCUMENTED: ICD-10-PCS | Mod: CPTII,,, | Performed by: NURSE PRACTITIONER

## 2023-12-15 PROCEDURE — 99999 PR PBB SHADOW E&M-EST. PATIENT-LVL V: ICD-10-PCS | Mod: PBBFAC,,, | Performed by: NURSE PRACTITIONER

## 2023-12-15 PROCEDURE — 99999 PR PBB SHADOW E&M-EST. PATIENT-LVL V: CPT | Mod: PBBFAC,,, | Performed by: NURSE PRACTITIONER

## 2023-12-15 PROCEDURE — 99999PBSHW FLU VACCINE (QUAD) GREATER THAN OR EQUAL TO 3YO PRESERVATIVE FREE IM: ICD-10-PCS | Mod: PBBFAC,,,

## 2023-12-15 PROCEDURE — 99214 PR OFFICE/OUTPT VISIT, EST, LEVL IV, 30-39 MIN: ICD-10-PCS | Mod: S$PBB,,, | Performed by: NURSE PRACTITIONER

## 2023-12-15 PROCEDURE — 3052F HG A1C>EQUAL 8.0%<EQUAL 9.0%: CPT | Mod: CPTII,,, | Performed by: NURSE PRACTITIONER

## 2023-12-15 PROCEDURE — 1159F MED LIST DOCD IN RCRD: CPT | Mod: CPTII,,, | Performed by: NURSE PRACTITIONER

## 2023-12-15 PROCEDURE — 3066F PR DOCUMENTATION OF TREATMENT FOR NEPHROPATHY: ICD-10-PCS | Mod: CPTII,,, | Performed by: NURSE PRACTITIONER

## 2023-12-15 PROCEDURE — 3008F BODY MASS INDEX DOCD: CPT | Mod: CPTII,,, | Performed by: NURSE PRACTITIONER

## 2023-12-15 PROCEDURE — 4010F ACE/ARB THERAPY RXD/TAKEN: CPT | Mod: CPTII,,, | Performed by: NURSE PRACTITIONER

## 2023-12-15 PROCEDURE — 99215 OFFICE O/P EST HI 40 MIN: CPT | Mod: PBBFAC,PN | Performed by: NURSE PRACTITIONER

## 2023-12-15 PROCEDURE — 1159F PR MEDICATION LIST DOCUMENTED IN MEDICAL RECORD: ICD-10-PCS | Mod: CPTII,,, | Performed by: NURSE PRACTITIONER

## 2023-12-15 PROCEDURE — 3079F PR MOST RECENT DIASTOLIC BLOOD PRESSURE 80-89 MM HG: ICD-10-PCS | Mod: CPTII,,, | Performed by: NURSE PRACTITIONER

## 2023-12-15 NOTE — PROGRESS NOTES
Subjective:       Patient ID: Eva Lane is a 48 y.o. female.    Chief Complaint: Follow-up (Check up- results/)    Patient is known, to me and presents with   Chief Complaint   Patient presents with    Follow-up     Check up- results     .  Denies chest pain and shortness of breath.  Presents with check up and labs. Doing well and diabetes is improving. Would like to have flu shot   Follow-up  Pertinent negatives include no arthralgias, chest pain, congestion, coughing, fatigue, fever, headaches, joint swelling, neck pain, numbness or weakness.     Review of Systems   Constitutional:  Negative for activity change, appetite change, fatigue, fever and unexpected weight change.   HENT:  Negative for congestion, ear discharge, ear pain, hearing loss, postnasal drip and tinnitus.    Eyes:  Negative for photophobia, pain and visual disturbance.   Respiratory:  Negative for cough, shortness of breath, wheezing and stridor.    Cardiovascular:  Negative for chest pain, palpitations and leg swelling.   Gastrointestinal:  Negative for abdominal distention.   Genitourinary:  Negative for difficulty urinating, dysuria, frequency, hematuria and urgency.   Musculoskeletal:  Negative for arthralgias, back pain, gait problem, joint swelling and neck pain.   Skin: Negative.    Neurological:  Negative for dizziness, seizures, syncope, weakness, light-headedness, numbness and headaches.   Hematological:  Negative for adenopathy. Does not bruise/bleed easily.   Psychiatric/Behavioral:  Negative for behavioral problems, confusion, dysphoric mood, hallucinations, sleep disturbance and suicidal ideas. The patient is not nervous/anxious.        Objective:      Physical Exam  Constitutional:       General: She is not in acute distress.     Appearance: She is well-developed.   HENT:      Head: Normocephalic and atraumatic.      Right Ear: Tympanic membrane and external ear normal.      Left Ear: Tympanic membrane and external ear  normal.      Nose: Nose normal.      Mouth/Throat:      Mouth: Mucous membranes are moist.      Pharynx: No oropharyngeal exudate.   Eyes:      General: No scleral icterus.        Right eye: No discharge.         Left eye: No discharge.      Conjunctiva/sclera: Conjunctivae normal.      Pupils: Pupils are equal, round, and reactive to light.   Neck:      Thyroid: No thyromegaly.      Vascular: No JVD.   Cardiovascular:      Rate and Rhythm: Normal rate and regular rhythm.      Heart sounds: Normal heart sounds. No murmur heard.     No friction rub. No gallop.   Pulmonary:      Effort: Pulmonary effort is normal. No respiratory distress.      Breath sounds: Normal breath sounds. No stridor. No wheezing or rales.   Chest:      Chest wall: No tenderness.   Abdominal:      General: Bowel sounds are normal. There is no distension.      Palpations: Abdomen is soft. There is no mass.      Tenderness: There is no abdominal tenderness. There is no right CVA tenderness, left CVA tenderness, guarding or rebound.      Hernia: No hernia is present.   Musculoskeletal:         General: No swelling, tenderness, deformity or signs of injury. Normal range of motion.      Cervical back: Normal range of motion and neck supple.      Right lower leg: No edema.      Left lower leg: No edema.   Lymphadenopathy:      Cervical: No cervical adenopathy.   Skin:     General: Skin is warm and dry.      Capillary Refill: Capillary refill takes less than 2 seconds.      Coloration: Skin is not jaundiced or pale.      Findings: No bruising, erythema, lesion or rash.   Neurological:      General: No focal deficit present.      Mental Status: She is alert and oriented to person, place, and time.      Cranial Nerves: No cranial nerve deficit.      Sensory: No sensory deficit.      Motor: No weakness or abnormal muscle tone.      Coordination: Coordination normal.      Gait: Gait normal.      Deep Tendon Reflexes: Reflexes are normal and symmetric.  "Reflexes normal.   Psychiatric:         Attention and Perception: She does not perceive auditory or visual hallucinations.         Mood and Affect: Mood and affect normal. Mood is not anxious or depressed. Affect is not tearful.         Behavior: Behavior normal.         Thought Content: Thought content normal. Thought content does not include homicidal or suicidal ideation. Thought content does not include homicidal or suicidal plan.         Judgment: Judgment normal.         Assessment:       1. Uncontrolled type 2 diabetes mellitus with hyperglycemia    2. Essential hypertension    3. Mixed hyperlipidemia        Plan:   1. Uncontrolled type 2 diabetes mellitus with hyperglycemia  -     Comprehensive Metabolic Panel; Future; Expected date: 03/14/2024  -     Hemoglobin A1C; Future; Expected date: 03/14/2024    2. Essential hypertension  -     Comprehensive Metabolic Panel; Future; Expected date: 03/14/2024    3. Mixed hyperlipidemia  -     Comprehensive Metabolic Panel; Future; Expected date: 03/14/2024       "This note will not be shared with the patient."  Check CBC, CMP, TSH, Fasting lipid profile, HgA1c, Microalbuminuria in three months.  Medication compliance was discussed with the patient.  The patient was instructed to monitor glucoses closely using glucometer at home and to record the values in a log.  The patient was instructed to obtain an Optometry exam and microalbumin q year.  The patient was instructed to obtain a hemoglobin A1C q 3-4 months.  The patient was instructed to check the feet visually daily and to monitor for infection.  The patient was instructed to exercise at least 3 times a week.  The patient was instructed to follow a 1800 ADA diet.  The patient was instructed to monitor weight daily and try to keep it as close to ideal body weight as possible.  The patient was instructed on using exercise frequently to reduce BP.  The patient was instructed on using a low salt diet.  Monitor BP at " home and to record the values in a log.  RTC in three months.

## 2023-12-29 DIAGNOSIS — F41.1 GAD (GENERALIZED ANXIETY DISORDER): ICD-10-CM

## 2023-12-29 RX ORDER — HYDROXYZINE PAMOATE 25 MG/1
CAPSULE ORAL
Qty: 60 CAPSULE | Refills: 5 | Status: SHIPPED | OUTPATIENT
Start: 2023-12-29

## 2024-01-07 DIAGNOSIS — F33.1 MDD (MAJOR DEPRESSIVE DISORDER), RECURRENT EPISODE, MODERATE: ICD-10-CM

## 2024-01-07 DIAGNOSIS — F60.3 BORDERLINE PERSONALITY DISORDER: ICD-10-CM

## 2024-01-07 DIAGNOSIS — F20.3 UNDIFFERENTIATED SCHIZOPHRENIA: ICD-10-CM

## 2024-01-08 RX ORDER — BREXPIPRAZOLE 1 MG/1
1 TABLET ORAL
Qty: 30 TABLET | Refills: 0 | Status: SHIPPED | OUTPATIENT
Start: 2024-01-08

## 2024-01-08 NOTE — TELEPHONE ENCOUNTER
Attempted to contact patient, no answer, left a detailed voicemail to return call to clinic and that no further refills will be approved until appt is scheduled.

## 2024-01-24 ENCOUNTER — TELEPHONE (OUTPATIENT)
Dept: INTERNAL MEDICINE | Facility: CLINIC | Age: 49
End: 2024-01-24
Payer: COMMERCIAL

## 2024-01-24 RX ORDER — MOXIFLOXACIN 5 MG/ML
1 SOLUTION/ DROPS OPHTHALMIC 3 TIMES DAILY
Qty: 3 ML | Refills: 0 | Status: SHIPPED | OUTPATIENT
Start: 2024-01-24

## 2024-01-24 NOTE — TELEPHONE ENCOUNTER
Pt called with pink eye in her R. Eye started this morning seeing if meds can be sent in for this  Please advise  Thanks!

## 2024-02-01 ENCOUNTER — HOSPITAL ENCOUNTER (EMERGENCY)
Facility: HOSPITAL | Age: 49
Discharge: HOME | End: 2024-02-01
Attending: SURGERY
Payer: COMMERCIAL

## 2024-02-01 VITALS
DIASTOLIC BLOOD PRESSURE: 93 MMHG | OXYGEN SATURATION: 99 % | HEART RATE: 79 BPM | WEIGHT: 139.31 LBS | TEMPERATURE: 98 F | RESPIRATION RATE: 18 BRPM | SYSTOLIC BLOOD PRESSURE: 167 MMHG | BODY MASS INDEX: 24.68 KG/M2

## 2024-02-01 DIAGNOSIS — R74.8 ELEVATED LIPASE: ICD-10-CM

## 2024-02-01 DIAGNOSIS — B34.9 VIRAL SYNDROME: Primary | ICD-10-CM

## 2024-02-01 DIAGNOSIS — N28.9 LESION OF LEFT NATIVE KIDNEY: ICD-10-CM

## 2024-02-01 DIAGNOSIS — R11.0 NAUSEA: ICD-10-CM

## 2024-02-01 LAB
ALBUMIN SERPL BCP-MCNC: 4.9 G/DL (ref 3.5–5.2)
ALP SERPL-CCNC: 115 U/L (ref 55–135)
ALT SERPL W/O P-5'-P-CCNC: 13 U/L (ref 10–44)
ANION GAP SERPL CALC-SCNC: 18 MMOL/L (ref 8–16)
AST SERPL-CCNC: 17 U/L (ref 10–40)
BACTERIA #/AREA URNS HPF: ABNORMAL /HPF
BASOPHILS # BLD AUTO: 0.03 K/UL (ref 0–0.2)
BASOPHILS NFR BLD: 0.2 % (ref 0–1.9)
BILIRUB SERPL-MCNC: 0.7 MG/DL (ref 0.1–1)
BILIRUB UR QL STRIP: ABNORMAL
BUN SERPL-MCNC: 26 MG/DL (ref 6–20)
CALCIUM SERPL-MCNC: 10.2 MG/DL (ref 8.7–10.5)
CHLORIDE SERPL-SCNC: 92 MMOL/L (ref 95–110)
CLARITY UR: CLEAR
CO2 SERPL-SCNC: 28 MMOL/L (ref 23–29)
COLOR UR: YELLOW
CREAT SERPL-MCNC: 1.2 MG/DL (ref 0.5–1.4)
DIFFERENTIAL METHOD BLD: ABNORMAL
EOSINOPHIL # BLD AUTO: 0.1 K/UL (ref 0–0.5)
EOSINOPHIL NFR BLD: 0.7 % (ref 0–8)
ERYTHROCYTE [DISTWIDTH] IN BLOOD BY AUTOMATED COUNT: 11.8 % (ref 11.5–14.5)
EST. GFR  (NO RACE VARIABLE): 55 ML/MIN/1.73 M^2
GLUCOSE SERPL-MCNC: 340 MG/DL (ref 70–110)
GLUCOSE UR QL STRIP: ABNORMAL
GROUP A STREP, MOLECULAR: NEGATIVE
HCT VFR BLD AUTO: 46.5 % (ref 37–48.5)
HGB BLD-MCNC: 16.5 G/DL (ref 12–16)
HGB UR QL STRIP: NEGATIVE
HYALINE CASTS #/AREA URNS LPF: 0 /LPF
IMM GRANULOCYTES # BLD AUTO: 0.04 K/UL (ref 0–0.04)
IMM GRANULOCYTES NFR BLD AUTO: 0.3 % (ref 0–0.5)
INFLUENZA A, MOLECULAR: NEGATIVE
INFLUENZA B, MOLECULAR: NEGATIVE
KETONES UR QL STRIP: ABNORMAL
LEUKOCYTE ESTERASE UR QL STRIP: NEGATIVE
LIPASE SERPL-CCNC: 182 U/L (ref 4–60)
LYMPHOCYTES # BLD AUTO: 2.9 K/UL (ref 1–4.8)
LYMPHOCYTES NFR BLD: 21.5 % (ref 18–48)
MCH RBC QN AUTO: 31.4 PG (ref 27–31)
MCHC RBC AUTO-ENTMCNC: 35.5 G/DL (ref 32–36)
MCV RBC AUTO: 89 FL (ref 82–98)
MICROSCOPIC COMMENT: ABNORMAL
MONOCYTES # BLD AUTO: 0.9 K/UL (ref 0.3–1)
MONOCYTES NFR BLD: 6.4 % (ref 4–15)
NEUTROPHILS # BLD AUTO: 9.4 K/UL (ref 1.8–7.7)
NEUTROPHILS NFR BLD: 70.9 % (ref 38–73)
NITRITE UR QL STRIP: NEGATIVE
NRBC BLD-RTO: 0 /100 WBC
PH UR STRIP: 6 [PH] (ref 5–8)
PLATELET # BLD AUTO: 302 K/UL (ref 150–450)
PMV BLD AUTO: 11.2 FL (ref 9.2–12.9)
POCT GLUCOSE: 255 MG/DL (ref 70–110)
POCT GLUCOSE: 274 MG/DL (ref 70–110)
POTASSIUM SERPL-SCNC: 4 MMOL/L (ref 3.5–5.1)
PROT SERPL-MCNC: 9.2 G/DL (ref 6–8.4)
PROT UR QL STRIP: ABNORMAL
RBC # BLD AUTO: 5.25 M/UL (ref 4–5.4)
RBC #/AREA URNS HPF: 0 /HPF (ref 0–4)
SARS-COV-2 RDRP RESP QL NAA+PROBE: NEGATIVE
SODIUM SERPL-SCNC: 138 MMOL/L (ref 136–145)
SP GR UR STRIP: >=1.03 (ref 1–1.03)
SPECIMEN SOURCE: NORMAL
TROPONIN I SERPL DL<=0.01 NG/ML-MCNC: 0.02 NG/ML (ref 0–0.03)
URN SPEC COLLECT METH UR: ABNORMAL
UROBILINOGEN UR STRIP-ACNC: 1 EU/DL
WBC # BLD AUTO: 13.28 K/UL (ref 3.9–12.7)
WBC #/AREA URNS HPF: 1 /HPF (ref 0–5)

## 2024-02-01 PROCEDURE — 82962 GLUCOSE BLOOD TEST: CPT

## 2024-02-01 PROCEDURE — 63600175 PHARM REV CODE 636 W HCPCS: Performed by: NURSE PRACTITIONER

## 2024-02-01 PROCEDURE — 63600175 PHARM REV CODE 636 W HCPCS: Performed by: SURGERY

## 2024-02-01 PROCEDURE — 25000003 PHARM REV CODE 250: Performed by: NURSE PRACTITIONER

## 2024-02-01 PROCEDURE — 25500020 PHARM REV CODE 255: Performed by: SURGERY

## 2024-02-01 PROCEDURE — 93010 ELECTROCARDIOGRAM REPORT: CPT | Mod: ,,, | Performed by: INTERNAL MEDICINE

## 2024-02-01 PROCEDURE — U0002 COVID-19 LAB TEST NON-CDC: HCPCS | Performed by: SURGERY

## 2024-02-01 PROCEDURE — 93005 ELECTROCARDIOGRAM TRACING: CPT

## 2024-02-01 PROCEDURE — 81000 URINALYSIS NONAUTO W/SCOPE: CPT | Performed by: NURSE PRACTITIONER

## 2024-02-01 PROCEDURE — 99285 EMERGENCY DEPT VISIT HI MDM: CPT | Mod: 25

## 2024-02-01 PROCEDURE — 85025 COMPLETE CBC W/AUTO DIFF WBC: CPT | Performed by: NURSE PRACTITIONER

## 2024-02-01 PROCEDURE — 87651 STREP A DNA AMP PROBE: CPT | Performed by: SURGERY

## 2024-02-01 PROCEDURE — 80053 COMPREHEN METABOLIC PANEL: CPT | Performed by: NURSE PRACTITIONER

## 2024-02-01 PROCEDURE — 84484 ASSAY OF TROPONIN QUANT: CPT | Performed by: NURSE PRACTITIONER

## 2024-02-01 PROCEDURE — 96375 TX/PRO/DX INJ NEW DRUG ADDON: CPT

## 2024-02-01 PROCEDURE — 83690 ASSAY OF LIPASE: CPT | Performed by: NURSE PRACTITIONER

## 2024-02-01 PROCEDURE — 96374 THER/PROPH/DIAG INJ IV PUSH: CPT

## 2024-02-01 PROCEDURE — 96361 HYDRATE IV INFUSION ADD-ON: CPT

## 2024-02-01 PROCEDURE — 87502 INFLUENZA DNA AMP PROBE: CPT | Performed by: SURGERY

## 2024-02-01 RX ORDER — ONDANSETRON HYDROCHLORIDE 2 MG/ML
4 INJECTION, SOLUTION INTRAVENOUS
Status: COMPLETED | OUTPATIENT
Start: 2024-02-01 | End: 2024-02-01

## 2024-02-01 RX ORDER — SODIUM CHLORIDE 9 MG/ML
1000 INJECTION, SOLUTION INTRAVENOUS
Status: COMPLETED | OUTPATIENT
Start: 2024-02-01 | End: 2024-02-01

## 2024-02-01 RX ORDER — DIPHENHYDRAMINE HYDROCHLORIDE 50 MG/ML
25 INJECTION INTRAMUSCULAR; INTRAVENOUS
Status: COMPLETED | OUTPATIENT
Start: 2024-02-01 | End: 2024-02-01

## 2024-02-01 RX ORDER — ONDANSETRON 4 MG/1
4 TABLET, ORALLY DISINTEGRATING ORAL EVERY 8 HOURS PRN
Qty: 9 TABLET | Refills: 0 | Status: SHIPPED | OUTPATIENT
Start: 2024-02-01 | End: 2024-02-04

## 2024-02-01 RX ORDER — DICYCLOMINE HYDROCHLORIDE 20 MG/1
20 TABLET ORAL 4 TIMES DAILY PRN
Qty: 20 TABLET | Refills: 0 | Status: SHIPPED | OUTPATIENT
Start: 2024-02-01 | End: 2024-02-06

## 2024-02-01 RX ORDER — DEXAMETHASONE SODIUM PHOSPHATE 4 MG/ML
4 INJECTION, SOLUTION INTRA-ARTICULAR; INTRALESIONAL; INTRAMUSCULAR; INTRAVENOUS; SOFT TISSUE
Status: COMPLETED | OUTPATIENT
Start: 2024-02-01 | End: 2024-02-01

## 2024-02-01 RX ADMIN — SODIUM CHLORIDE 1000 ML: 9 INJECTION, SOLUTION INTRAVENOUS at 05:02

## 2024-02-01 RX ADMIN — IOHEXOL 75 ML: 350 INJECTION, SOLUTION INTRAVENOUS at 06:02

## 2024-02-01 RX ADMIN — SODIUM CHLORIDE 1000 ML: 9 INJECTION, SOLUTION INTRAVENOUS at 04:02

## 2024-02-01 RX ADMIN — DEXAMETHASONE SODIUM PHOSPHATE 4 MG: 4 INJECTION, SOLUTION INTRA-ARTICULAR; INTRALESIONAL; INTRAMUSCULAR; INTRAVENOUS; SOFT TISSUE at 05:02

## 2024-02-01 RX ADMIN — ONDANSETRON 4 MG: 2 INJECTION INTRAMUSCULAR; INTRAVENOUS at 04:02

## 2024-02-01 RX ADMIN — DIPHENHYDRAMINE HYDROCHLORIDE 25 MG: 50 INJECTION, SOLUTION INTRAMUSCULAR; INTRAVENOUS at 05:02

## 2024-02-01 NOTE — ED TRIAGE NOTES
Patient to ER CC of vomiting, coughing, congestion, body aches, and bilateral ear pain states this has been going on for the past few days

## 2024-02-01 NOTE — ED PROVIDER NOTES
Encounter Date: 2/1/2024       History     Chief Complaint   Patient presents with    General Illness     Eva Lane is a 48 y.o. female with PMH of ADHD, anxiety, asthma, CHF, bipolar do, COPD, DMII, fatigue, Hyperlipidemia, HTN, CVA, thyroid disease presents to the ED for evaluation of URI symptoms and vomiting. Patient reports that for the past 3 days she developed NC, Martir ear pain and a cough. For the past 2 days she has been nauseous with multiple episodes of bilious, nonbloody emesis.  She reports that she is unable to tolerate water or food intake without vomiting.  She also notes generalized abdominal pain that is constant, aching, currently rated 5/10 in severity.  She denies loose stool.  Denies UTI symptoms.  Denies sick contacts at home.  She reports that she feels dehydrated.  She does have a history of diabetes and reports that her blood sugars have been in the 200 range lately.     The history is provided by the patient.     Review of patient's allergies indicates:   Allergen Reactions    Penicillins Swelling and Rash    Neosporin [neomycin-bacitracin-polymyxin] Rash    Shellfish containing products     Shrimp Hives    Bacticin Blisters, Hives and Rash    Lisinopril Rash     Past Medical History:   Diagnosis Date    Addiction to drug     ADHD (attention deficit hyperactivity disorder)     Anxiety     Arthritis     Asthma     Behavioral problem     Bipolar 1 disorder     Borderline personality disorder 09/23/2015    CHF (congestive heart failure)     Conversion disorder 09/23/2015    COPD (chronic obstructive pulmonary disease)     Depression     Diabetes mellitus type II     Fatigue     Hallucination     Headache     History of psychiatric hospitalization     Hx of psychiatric care     Hyperlipidemia     Hypertension     Lumbar radiculopathy     Allie     Neuralgia     Neuritis     Panic disorder     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    Psychiatric problem     Psychosis     PTSD (post-traumatic  stress disorder)     Seizures     Seizure-last 2015-shake and fall-doesnt remember anything    Self-harming behavior     Sleep apnea     on CPAP    Sleep difficulties     Social anxiety disorder 2016    Stroke 2015    Stroke     Suicide attempt     Therapy     Thyroid disease      Past Surgical History:   Procedure Laterality Date    COLONOSCOPY N/A 2016    Procedure: COLONOSCOPY;  Surgeon: Robert Hernandez MD;  Location: [address]);  Service: Endoscopy;  Laterality: N/A;  Schedule for next available CRS physician - EGD @ 8:30 with Dr. Naylor    CYST REMOVAL      Fatty cyst on right face cheek and left upper arm     DILATION AND CURETTAGE OF UTERUS      Miscarriage    HYSTERECTOMY      DUB - Total    OOPHORECTOMY  2008    TOTAL ABDOMINAL HYSTERECTOMY  2008    TUBAL LIGATION       Family History   Problem Relation Age of Onset    No Known Problems Mother     Heart disease Father     Hyperlipidemia Father     Hypertension Father     Diabetes Father     Dementia Father     Breast cancer Neg Hx     Colon cancer Neg Hx     Ovarian cancer Neg Hx      Social History     Tobacco Use    Smoking status: Some Days     Current packs/day: 0.00     Average packs/day: 0.3 packs/day for 36.7 years (9.2 ttl pk-yrs)     Types: Cigarettes     Start date: 1986     Last attempt to quit: 3/30/2023     Years since quittin.8    Smokeless tobacco: Never    Tobacco comments:     told about [facility] smoking cessation program-given smoking clinic pamphlet   Substance Use Topics    Alcohol use: No    Drug use: No     Review of Systems   Constitutional:  Positive for appetite change and chills. Negative for activity change and fever.   HENT:  Positive for congestion and ear pain (bilateral). Negative for ear discharge, postnasal drip, sinus pressure, sinus pain and sore throat.    Respiratory:  Positive for cough. Negative for chest tightness and shortness of breath.    Cardiovascular:  Negative  for chest pain.   Gastrointestinal:  Positive for abdominal pain, nausea and vomiting. Negative for abdominal distention.   Endocrine: Negative.    Genitourinary:  Negative for dysuria, frequency and urgency.   Musculoskeletal:  Positive for myalgias. Negative for back pain.   Skin:  Negative for rash.   Allergic/Immunologic: Negative.    Neurological:  Negative for dizziness, weakness, light-headedness and numbness.   Hematological:  Does not bruise/bleed easily.       Physical Exam     Initial Vitals [02/01/24 1421]   BP Pulse Resp Temp SpO2   120/72 (!) 117 20 97.9 °F (36.6 °C) 97 %      MAP       --         Physical Exam    Nursing note and vitals reviewed.  Constitutional: She appears well-developed and well-nourished.   HENT:   Head: Normocephalic and atraumatic.   Right Ear: Tympanic membrane, external ear and ear canal normal. Tympanic membrane is not erythematous. No middle ear effusion.   Left Ear: Tympanic membrane, external ear and ear canal normal. Tympanic membrane is not erythematous.  No middle ear effusion.   Nose: Nose normal.   Mouth/Throat: Uvula is midline and oropharynx is clear and moist. Mucous membranes are dry.   Eyes: Conjunctivae and EOM are normal. Pupils are equal, round, and reactive to light.   Neck: Neck supple.   Normal range of motion.  Cardiovascular:  Regular rhythm, normal heart sounds and intact distal pulses.   Tachycardia present.         Pulmonary/Chest: Effort normal and breath sounds normal. She has no decreased breath sounds. She has no wheezes. She has no rhonchi. She has no rales.   Abdominal: Abdomen is soft. Bowel sounds are normal. There is generalized abdominal tenderness. There is guarding.   Musculoskeletal:         General: Normal range of motion.      Cervical back: Normal range of motion and neck supple.     Neurological: She is alert and oriented to person, place, and time. She has normal strength. She displays normal reflexes. No cranial nerve deficit or  sensory deficit.   Skin: Skin is warm and dry. Capillary refill takes less than 2 seconds. No rash noted.   Psychiatric: She has a normal mood and affect. Her behavior is normal. Judgment and thought content normal.         ED Course   Procedures  Labs Reviewed   CBC W/ AUTO DIFFERENTIAL - Abnormal; Notable for the following components:       Result Value    WBC 13.28 (*)     Hemoglobin 16.5 (*)     MCH 31.4 (*)     Gran # (ANC) 9.4 (*)     All other components within normal limits   COMPREHENSIVE METABOLIC PANEL - Abnormal; Notable for the following components:    Chloride 92 (*)     Glucose 340 (*)     BUN 26 (*)     Total Protein 9.2 (*)     eGFR 55 (*)     Anion Gap 18 (*)     All other components within normal limits   LIPASE - Abnormal; Notable for the following components:    Lipase 182 (*)     All other components within normal limits   URINALYSIS, REFLEX TO URINE CULTURE - Abnormal; Notable for the following components:    Specific Gravity, UA >=1.030 (*)     Protein, UA 2+ (*)     Glucose, UA 2+ (*)     Ketones, UA 3+ (*)     Bilirubin (UA) 2+ (*)     All other components within normal limits    Narrative:     Specimen Source->Urine   URINALYSIS MICROSCOPIC - Abnormal; Notable for the following components:    Bacteria Few (*)     All other components within normal limits    Narrative:     Specimen Source->Urine   POCT GLUCOSE - Abnormal; Notable for the following components:    POCT Glucose 274 (*)     All other components within normal limits   POCT GLUCOSE - Abnormal; Notable for the following components:    POCT Glucose 255 (*)     All other components within normal limits   GROUP A STREP, MOLECULAR   INFLUENZA A & B BY MOLECULAR   SARS-COV-2 RNA AMPLIFICATION, QUAL   TROPONIN I   POCT GLUCOSE MONITORING CONTINUOUS   POCT GLUCOSE MONITORING CONTINUOUS          Imaging Results              CT Abdomen Pelvis With IV Contrast NO Oral Contrast (Final result)  Result time 02/01/24 18:55:17      Final result  by Julee Guevara MD (02/01/24 18:55:17)                   Impression:      No acute findings.    Small hypodense lesion in the left kidney which is not a simple cyst.  Furthermore this is new from 08/29/2016.  Recommend follow-up ultrasound to exclude solid lesion.      Electronically signed by: Julee Guevara  Date:    02/01/2024  Time:    18:55               Narrative:    EXAMINATION:  CT ABDOMEN PELVIS WITH IV CONTRAST    CLINICAL HISTORY:  Abdominal pain, acute, nonlocalized;    TECHNIQUE:  Low dose axial images, sagittal and coronal reformations were obtained from the lung bases to the pubic symphysis following the IV administration of 75 mL of Omnipaque 350 .  Oral contrast was not administered.    COMPARISON:  01/01/2022 and 08/29/2016    FINDINGS:  Abdomen:    - Lower thorax:Unremarkable.    - Lung bases: Emphysematous changes and bibasilar atelectasis.    - Liver: No focal mass.    - Gallbladder: No calcified gallstones.    - Bile Ducts: No evidence of intra or extra hepatic biliary ductal dilation.    - Spleen: Negative.    - Kidneys: Small hypodense lesion in the left kidney which does not demonstrate features of a simple cyst.  Kidneys are otherwise unremarkable.    - Adrenals: Unremarkable.    - Pancreas: No mass or peripancreatic fat stranding.    - Retroperitoneum:  No significant adenopathy.    - Vascular: No abdominal aortic aneurysm.    - Abdominal wall:  Unremarkable.    Pelvis:    No pelvic mass, adenopathy, or free fluid.    Bowel/Mesentery:    No evidence of bowel obstruction or inflammation.    Bones:  No acute osseous abnormality and no suspicious lytic or blastic lesion.                                       X-Ray Chest AP Portable (Final result)  Result time 02/01/24 16:30:53      Final result by Richelle Jessica MD (02/01/24 16:30:53)                   Impression:      No acute cardiopulmonary disease.      Electronically signed by: Richelle Jessica  MD  Date:    02/01/2024  Time:    16:30               Narrative:    EXAMINATION:  XR CHEST AP PORTABLE    CLINICAL HISTORY:  Cough, unspecified    TECHNIQUE:  Single frontal view of the chest was performed.    COMPARISON:  05/18/2023    FINDINGS:  The heart is not enlarged the lungs are clear of infiltrate.  There is no pleural effusion.  There is small retrocardiac opacity questioning small hiatal hernia                                       Medications   0.9%  NaCl infusion (0 mLs Intravenous Stopped 2/1/24 1720)   ondansetron injection 4 mg (4 mg Intravenous Given 2/1/24 1611)   0.9%  NaCl infusion (0 mLs Intravenous Stopped 2/1/24 1940)   dexAMETHasone injection 4 mg (4 mg Intravenous Given 2/1/24 1748)   diphenhydrAMINE injection 25 mg (25 mg Intravenous Given 2/1/24 1748)   iohexoL (OMNIPAQUE 350) injection 75 mL (75 mLs Intravenous Given 2/1/24 1800)     Medical Decision Making  Evaluation of a 48-year-old female with URI symptoms, vomiting, and abdominal pain.  Symptoms have been present for the past 2-3 days.  She presents tachycardic with a heart rate of 117.  She is normotensive.  She has diffuse, generalized abdominal tenderness on exam.  She does have dry mucous membranes. + history of DM, blood sugar 274    Differential diagnosis includes viral syndrome, COVID, flu, DKA, pancreatitis, gastritis, colitis, cholecystitis, electrolyte derangement, UTI    Problems Addressed:  Viral syndrome: acute illness or injury    Amount and/or Complexity of Data Reviewed  Labs: ordered. Decision-making details documented in ED Course.     Details: Lab workup with mild leukocytosis, WBC 13.2 a  BUN 26  Lipase 182  Radiology: ordered. Decision-making details documented in ED Course.     Details: Negative chest x-ray  CT abdomen pelvis shows Small hypodense lesion in the left kidney which is not a simple cyst.  Furthermore this is new from 08/29/2016.  Recommend follow-up ultrasound to exclude solid lesion.  ECG/medicine  tests: ordered and independent interpretation performed.     Details: Sinus tachycardia, rate 104.  Normal [place] and QRS intervals.  No STEMI.  No significant change when compared to EKG of May 2023    Risk  Prescription drug management.  Risk Details: Patient appears dry clinically. Lab workup with mild leukocytosis, likely due to hemoconcentration from dehydration.  She does have diffuse abdominal tenderness therefore a CT abdomen pelvis was ordered that showed an incidental finding of a left kidney lesion. She does have a mildly elevated lipase with no hx of ETOH abuse or new medications and no signs of pancreatitis on CT AP. She is feeling better after IV fluids and is tolerating PO intake with no further episodes of vomiting.  CT results reviewed with patient and patient instructed on importance of outpatient follow-up.  She will be discharged home with pain control. Instructed liquid diet until pain subsides given elevated lipase. Strict return precautions discussed; Patient/caregiver voices understanding and feels comfortable with discharge plan.      The patient acknowledges that close follow up with medical provider is required. Instructed to follow up with PCP within 2 days. Patient was given specific return precautions. The patient agrees to comply with all instruction and directions given in the ER.                                        Clinical Impression:  Final diagnoses:  [R11.0] Nausea  [N28.9] Lesion of left native kidney  [R74.8] Elevated lipase  [B34.9] Viral syndrome (Primary)          ED Disposition Condition    Discharge Stable          ED Prescriptions       Medication Sig Dispense Start Date End Date Auth. Provider    ondansetron (ZOFRAN-ODT) 4 MG TbDL Take 1 tablet (4 mg total) by mouth every 8 (eight) hours as needed (nausea). 9 tablet 2/1/2024 2/4/2024 Virgie Pinedo NP    dicyclomine (BENTYL) 20 mg tablet Take 1 tablet (20 mg total) by mouth 4 (four) times daily as needed (abdominal  cramping). Take 30 mins before meals and then at bedtime. 20 tablet 2/1/2024 2/6/2024 Virgie Pinedo NP          Follow-up Information       Follow up With Specialties Details Why Contact Info    Cintia Gustafson NP Family Medicine Schedule an appointment as soon as possible for a visit in 1 day  [address] [facility] [address]  [phone]               Virgie Pinedo NP  02/01/24 1956

## 2024-02-06 ENCOUNTER — TELEPHONE (OUTPATIENT)
Dept: INTERNAL MEDICINE | Facility: CLINIC | Age: 49
End: 2024-02-06
Payer: COMMERCIAL

## 2024-02-06 ENCOUNTER — HOSPITAL ENCOUNTER (OUTPATIENT)
Dept: RADIOLOGY | Facility: HOSPITAL | Age: 49
Discharge: HOME | End: 2024-02-06
Attending: NURSE PRACTITIONER
Payer: COMMERCIAL

## 2024-02-06 ENCOUNTER — OFFICE VISIT (OUTPATIENT)
Dept: INTERNAL MEDICINE | Facility: CLINIC | Age: 49
End: 2024-02-06
Payer: COMMERCIAL

## 2024-02-06 VITALS
RESPIRATION RATE: 18 BRPM | DIASTOLIC BLOOD PRESSURE: 78 MMHG | SYSTOLIC BLOOD PRESSURE: 116 MMHG | BODY MASS INDEX: 26.57 KG/M2 | WEIGHT: 149.94 LBS | HEIGHT: 63 IN | HEART RATE: 88 BPM | OXYGEN SATURATION: 98 %

## 2024-02-06 DIAGNOSIS — J98.11 MILD BIBASILAR ATELECTASIS: ICD-10-CM

## 2024-02-06 DIAGNOSIS — J45.909 ASTHMA, UNSPECIFIED ASTHMA SEVERITY, UNSPECIFIED WHETHER COMPLICATED, UNSPECIFIED WHETHER PERSISTENT: Primary | ICD-10-CM

## 2024-02-06 DIAGNOSIS — N28.9 KIDNEY LESION, NATIVE, LEFT: ICD-10-CM

## 2024-02-06 DIAGNOSIS — N28.9 KIDNEY LESION, NATIVE, LEFT: Primary | ICD-10-CM

## 2024-02-06 DIAGNOSIS — J43.9 PULMONARY EMPHYSEMA, UNSPECIFIED EMPHYSEMA TYPE: ICD-10-CM

## 2024-02-06 PROCEDURE — 3008F BODY MASS INDEX DOCD: CPT | Mod: CPTII,S$GLB,, | Performed by: NURSE PRACTITIONER

## 2024-02-06 PROCEDURE — 99214 OFFICE O/P EST MOD 30 MIN: CPT | Mod: S$GLB,,, | Performed by: NURSE PRACTITIONER

## 2024-02-06 PROCEDURE — 76775 US EXAM ABDO BACK WALL LIM: CPT | Mod: 26,,, | Performed by: RADIOLOGY

## 2024-02-06 PROCEDURE — 99999 PR PBB SHADOW E&M-EST. PATIENT-LVL V: CPT | Mod: PBBFAC,,, | Performed by: NURSE PRACTITIONER

## 2024-02-06 PROCEDURE — 3074F SYST BP LT 130 MM HG: CPT | Mod: CPTII,S$GLB,, | Performed by: NURSE PRACTITIONER

## 2024-02-06 PROCEDURE — 76775 US EXAM ABDO BACK WALL LIM: CPT | Mod: TC

## 2024-02-06 PROCEDURE — 4010F ACE/ARB THERAPY RXD/TAKEN: CPT | Mod: CPTII,S$GLB,, | Performed by: NURSE PRACTITIONER

## 2024-02-06 PROCEDURE — 3078F DIAST BP <80 MM HG: CPT | Mod: CPTII,S$GLB,, | Performed by: NURSE PRACTITIONER

## 2024-02-06 RX ORDER — AZITHROMYCIN 250 MG/1
TABLET, FILM COATED ORAL
Qty: 6 TABLET | Refills: 0 | Status: SHIPPED | OUTPATIENT
Start: 2024-02-06 | End: 2024-02-11

## 2024-02-06 RX ORDER — ALBUTEROL SULFATE 90 UG/1
2 AEROSOL, METERED RESPIRATORY (INHALATION) EVERY 6 HOURS PRN
Qty: 18 G | Refills: 0 | Status: SHIPPED | OUTPATIENT
Start: 2024-02-06 | End: 2024-05-23 | Stop reason: SDUPTHER

## 2024-02-06 RX ORDER — ALBUTEROL SULFATE 90 UG/1
2 AEROSOL, METERED RESPIRATORY (INHALATION) EVERY 6 HOURS PRN
Qty: 18 G | Refills: 0 | Status: SHIPPED | OUTPATIENT
Start: 2024-02-06 | End: 2024-02-06

## 2024-02-06 NOTE — TELEPHONE ENCOUNTER
Due to insurance this is the Rx that needs to be sent in to [facility] instead-  VENTOLIN HFA INH 18GM W/COUNT 90MCG   Please advise  Thanks!

## 2024-02-06 NOTE — PROGRESS NOTES
Subjective:       Patient ID: Eva Lane is a 48 y.o. female.    Chief Complaint: Follow-up (X hospital - discuss results for kidney )    Patient is known, to me and presents with   Chief Complaint   Patient presents with    Follow-up     X hospital - discuss results for kidney    .  Denies chest pain and shortness of breath.  Patient presents with post ER visit. States that she had a CT done and showed she had something on her left kidney that needs to be evaluated. She also still having some mild sob and states that as of two days ago she quit smoking. Does have mild cough   Follow-up  Associated symptoms include coughing. Pertinent negatives include no arthralgias, chest pain, congestion, fatigue, fever, headaches, joint swelling, neck pain, numbness or weakness.     Review of Systems   Constitutional:  Negative for activity change, appetite change, fatigue, fever and unexpected weight change.   HENT:  Negative for congestion, ear discharge, ear pain, hearing loss, postnasal drip and tinnitus.    Eyes:  Negative for photophobia, pain and visual disturbance.   Respiratory:  Positive for cough and shortness of breath. Negative for wheezing and stridor.    Cardiovascular:  Negative for chest pain, palpitations and leg swelling.   Gastrointestinal:  Negative for abdominal distention.   Genitourinary:  Negative for difficulty urinating, dysuria, frequency, hematuria and urgency.   Musculoskeletal:  Positive for back pain. Negative for arthralgias, gait problem, joint swelling and neck pain.   Skin: Negative.    Neurological:  Negative for dizziness, seizures, syncope, weakness, light-headedness, numbness and headaches.   Hematological:  Negative for adenopathy. Does not bruise/bleed easily.   Psychiatric/Behavioral:  Negative for behavioral problems, confusion, dysphoric mood, hallucinations, sleep disturbance and suicidal ideas. The patient is not nervous/anxious.        Objective:      Physical  Exam  Constitutional:       General: She is not in acute distress.     Appearance: She is well-developed.   HENT:      Head: Normocephalic and atraumatic.      Right Ear: Tympanic membrane and external ear normal.      Left Ear: Tympanic membrane and external ear normal.      Nose: Nose normal.      Mouth/Throat:      Mouth: Mucous membranes are moist.      Dentition: Dental caries present.      Pharynx: No oropharyngeal exudate.   Eyes:      General: No scleral icterus.        Right eye: No discharge.         Left eye: No discharge.      Conjunctiva/sclera: Conjunctivae normal.      Pupils: Pupils are equal, round, and reactive to light.   Neck:      Thyroid: No thyromegaly.      Vascular: No JVD.   Cardiovascular:      Rate and Rhythm: Normal rate and regular rhythm.      Heart sounds: Normal heart sounds. No murmur heard.     No friction rub. No gallop.   Pulmonary:      Effort: Pulmonary effort is normal. No respiratory distress.      Breath sounds: No stridor. Examination of the left-lower field reveals rhonchi. Rhonchi present. No wheezing or rales.   Chest:      Chest wall: No tenderness.   Abdominal:      General: Bowel sounds are normal. There is no distension.      Palpations: Abdomen is soft. There is no mass.      Tenderness: There is no abdominal tenderness. There is no right CVA tenderness, left CVA tenderness, guarding or rebound.      Hernia: No hernia is present.   Musculoskeletal:         General: No swelling, tenderness, deformity or signs of injury. Normal range of motion.      Cervical back: Normal range of motion and neck supple.      Right lower leg: No edema.      Left lower leg: No edema.   Lymphadenopathy:      Cervical: No cervical adenopathy.   Skin:     General: Skin is warm and dry.      Capillary Refill: Capillary refill takes less than 2 seconds.      Coloration: Skin is not jaundiced or pale.      Findings: No bruising, erythema, lesion or rash.   Neurological:      General: No focal  "deficit present.      Mental Status: She is alert and oriented to person, place, and time.      Cranial Nerves: No cranial nerve deficit.      Sensory: No sensory deficit.      Motor: No weakness or abnormal muscle tone.      Coordination: Coordination normal.      Gait: Gait normal.      Deep Tendon Reflexes: Reflexes are normal and symmetric. Reflexes normal.   Psychiatric:         Attention and Perception: She does not perceive auditory or visual hallucinations.         Mood and Affect: Mood and affect normal. Mood is not anxious or depressed. Affect is not tearful.         Behavior: Behavior normal.         Thought Content: Thought content normal. Thought content does not include homicidal or suicidal ideation. Thought content does not include homicidal or suicidal plan.         Judgment: Judgment normal.         Assessment:       1. Kidney lesion, native, left    2. Pulmonary emphysema, unspecified emphysema type    3. Mild bibasilar atelectasis        Plan:   1. Kidney lesion, native, left  -      Kidney; Future; Expected date: 02/06/2024    2. Pulmonary emphysema, unspecified emphysema type  -     Discontinue: albuterol (PROVENTIL HFA) 90 mcg/actuation inhaler; Inhale 2 puffs into the lungs every 6 (six) hours as needed for Wheezing. Rescue  Dispense: 18 g; Refill: 0    3. Mild bibasilar atelectasis  -     azithromycin (Z-LARS) 250 MG tablet; Take 2 tablets by mouth on day 1; Take 1 tablet by mouth on days 2-5  Dispense: 6 tablet; Refill: 0    "This note will not be shared with the patient."   Continue with smoking cessation  Will treat with zithromax due to atelectasis and rhonchi noted on exam  I will call patient with results and make necessary referral in regards to the left kidney   Rtc as scheduled  "

## 2024-02-09 ENCOUNTER — TELEPHONE (OUTPATIENT)
Dept: INTERNAL MEDICINE | Facility: CLINIC | Age: 49
End: 2024-02-09
Payer: COMMERCIAL

## 2024-02-09 ENCOUNTER — PATIENT MESSAGE (OUTPATIENT)
Dept: INTERNAL MEDICINE | Facility: CLINIC | Age: 49
End: 2024-02-09
Payer: COMMERCIAL

## 2024-02-09 DIAGNOSIS — N28.9 KIDNEY LESION, NATIVE, LEFT: Primary | ICD-10-CM

## 2024-02-09 NOTE — TELEPHONE ENCOUNTER
I will refer her to Dr. Cartagena for the kidney results. Left kidney has a growth on it. And will need further evaluation

## 2024-02-16 ENCOUNTER — HOSPITAL ENCOUNTER (OUTPATIENT)
Dept: RADIOLOGY | Facility: HOSPITAL | Age: 49
Discharge: HOME | End: 2024-02-16
Attending: NURSE PRACTITIONER
Payer: COMMERCIAL

## 2024-02-16 ENCOUNTER — OFFICE VISIT (OUTPATIENT)
Dept: UROLOGY | Facility: CLINIC | Age: 49
End: 2024-02-16
Attending: SPECIALIST
Payer: COMMERCIAL

## 2024-02-16 VITALS
BODY MASS INDEX: 26.01 KG/M2 | DIASTOLIC BLOOD PRESSURE: 72 MMHG | WEIGHT: 146.81 LBS | HEART RATE: 86 BPM | HEIGHT: 63 IN | OXYGEN SATURATION: 99 % | SYSTOLIC BLOOD PRESSURE: 110 MMHG

## 2024-02-16 VITALS — WEIGHT: 149 LBS | HEIGHT: 63 IN | BODY MASS INDEX: 26.4 KG/M2

## 2024-02-16 DIAGNOSIS — N28.1 RENAL CYST, LEFT: Primary | ICD-10-CM

## 2024-02-16 DIAGNOSIS — Z12.39 ENCOUNTER FOR SCREENING FOR MALIGNANT NEOPLASM OF BREAST, UNSPECIFIED SCREENING MODALITY: ICD-10-CM

## 2024-02-16 PROCEDURE — 3008F BODY MASS INDEX DOCD: CPT | Mod: CPTII,S$GLB,, | Performed by: SPECIALIST

## 2024-02-16 PROCEDURE — 3078F DIAST BP <80 MM HG: CPT | Mod: CPTII,S$GLB,, | Performed by: SPECIALIST

## 2024-02-16 PROCEDURE — 77067 SCR MAMMO BI INCL CAD: CPT | Mod: TC

## 2024-02-16 PROCEDURE — 99203 OFFICE O/P NEW LOW 30 MIN: CPT | Mod: S$GLB,,, | Performed by: SPECIALIST

## 2024-02-16 PROCEDURE — 77067 SCR MAMMO BI INCL CAD: CPT | Mod: 26,,, | Performed by: RADIOLOGY

## 2024-02-16 PROCEDURE — 1159F MED LIST DOCD IN RCRD: CPT | Mod: CPTII,S$GLB,, | Performed by: SPECIALIST

## 2024-02-16 PROCEDURE — 4010F ACE/ARB THERAPY RXD/TAKEN: CPT | Mod: CPTII,S$GLB,, | Performed by: SPECIALIST

## 2024-02-16 PROCEDURE — 99999 PR PBB SHADOW E&M-EST. PATIENT-LVL V: CPT | Mod: PBBFAC,,, | Performed by: SPECIALIST

## 2024-02-16 PROCEDURE — 3074F SYST BP LT 130 MM HG: CPT | Mod: CPTII,S$GLB,, | Performed by: SPECIALIST

## 2024-02-16 PROCEDURE — 77063 BREAST TOMOSYNTHESIS BI: CPT | Mod: 26,,, | Performed by: RADIOLOGY

## 2024-02-16 RX ORDER — KETOROLAC TROMETHAMINE 10 MG/1
10 TABLET, FILM COATED ORAL EVERY 6 HOURS
Qty: 12 TABLET | Refills: 0 | Status: SHIPPED | OUTPATIENT
Start: 2024-02-16 | End: 2024-02-21

## 2024-02-16 NOTE — PROGRESS NOTES
[facility] - Urology   Clinic Note    SUBJECTIVE:     Chief Complaint   Patient presents with    Other     Patient coming in to discuss recent findings on Ultrasound.        Referral from: Cintia Gustafson NP.    History of Present Illness:  Eva Lane is a 48 y.o. female who presents to clinic for incidental 2 cm left upper pole renal lesion discovered on CT scan in the ED.  There was also a suggestion of mild right hydronephrosis, no obstructing stone.  Follow up renal US documents a Bosniak Class IIF left renal cyst as well as resolution of (mild) hydronephrosis.  Patient c/o moderate right flank pain and would like pain medication.  Denies n/v, f/c, or hematuria.  PMHx of bipolar, ADHD, allie, and addiction noted. Fortunately, she seems to be doing well and appears to be in no acute distress.    Past Medical History:   Diagnosis Date    Addiction to drug     ADHD (attention deficit hyperactivity disorder)     Anxiety     Arthritis     Asthma     Behavioral problem     Bipolar 1 disorder     Borderline personality disorder 09/23/2015    CHF (congestive heart failure)     Conversion disorder 09/23/2015    COPD (chronic obstructive pulmonary disease)     Depression     Diabetes mellitus type II     Fatigue     Hallucination     Headache     History of psychiatric hospitalization     Hx of psychiatric care     Hyperlipidemia     Hypertension     Lumbar radiculopathy     Allie     Neuralgia     Neuritis     Panic disorder     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 90 mL 3    albuterol (VENTOLIN HFA) 90 mcg/actuation inhaler Inhale 2 puffs into the lungs every 6 (six) hours as needed for Wheezing. Rescue 18 g 0    atorvastatin (LIPITOR) 20 MG tablet SMARTSI Tablet(s) By Mouth Every Evening      blood sugar diagnostic Strp Use 1  True Metrix test strip three times per day to check blood sugar ( single use only ) 100 strip 5    blood-glucose meter (TRUE METRIX GLUCOSE METER) Misc Use three times per day to check blood sugar 1 each 0    butalbital-acetaminophen-caffeine -40 mg (FIORICET, ESGIC) -40 mg per tablet Take 1 tablet by mouth daily as needed for Headaches. Limit to 2 days per week max 10 tablet 11    cyclobenzaprine (FLEXERIL) 5 MG tablet Take 1 tablet (5 mg total) by mouth 3 (three) times daily as needed for Muscle spasms. 20 tablet 0    divalproex 500 MG Tb24 Take 1 tablet (500 mg total) by mouth once daily. 30 tablet 5    hydrOXYzine pamoate (VISTARIL) 25 MG Cap TAKE 1 CAPSULE BY MOUTH TWICE DAILY AS NEEDED FOR ANXIETY 60 capsule 5    ibuprofen (ADVIL,MOTRIN) 800 MG tablet Take 1 tablet (800 mg total) by mouth 3 (three) times daily as needed. 90 tablet 0    insulin detemir U-100, Levemir, (LEVEMIR FLEXPEN) 100 unit/mL (3 mL) InPn pen Inject 53 Units into the skin every evening. 5 packet 3    insulin lispro (HUMALOG KWIKPEN INSULIN) 100 unit/mL pen Inject 16 Units into the skin 2 (two) times daily with meals. 9.6 mL 5    ipratropium (ATROVENT) 0.02 % nebulizer solution       losartan (COZAAR) 50 MG tablet Take 1 tablet (50 mg total) by mouth once daily. 30 tablet 2    moxifloxacin (VIGAMOX) 0.5 % ophthalmic solution Place 1 drop into both eyes 3 (three) times daily. 3 mL 0    pen needle, diabetic (COMFORT EZ PEN NEEDLES) 33 gauge x 5/16" Ndle 1 Stick by Misc.(Non-Drug; Combo Route) route 3 (three) times daily. 100 each 5    " "pen needle, diabetic 31 gauge x 5/16" Ndle Inject 1 Stick into the skin once daily. 50 each 5    pregabalin (LYRICA) 200 MG Cap Take 1 capsule (200 mg total) by mouth 2 (two) times daily. 60 capsule 2    QUEtiapine (SEROQUEL) 400 MG tablet Take 1 tablet (400 mg total) by mouth 2 (two) times daily. 60 tablet 2    REXULTI 1 mg Tab Take 1 tablet by mouth once daily 30 tablet 0    TRUEPLUS LANCETS 33 gauge Misc USE 1 LANCET TO CHECK GLUCOSE THREE TIMES DAILY SINGLE USE ONLY      epinephrine (EPIPEN 2-LARS) 0.3 mg/0.3 mL (1:1,000) AtIn Inject 0.3 mLs (0.3 mg total) into the muscle once. 1 mL 1    LORazepam (ATIVAN) 0.5 MG tablet Take 1 tablet (0.5 mg total) by mouth every 12 (twelve) hours as needed for Anxiety. 60 tablet 0    metFORMIN (GLUCOPHAGE) 500 MG tablet Take 1 tablet (500 mg total) by mouth daily with breakfast. 90 tablet 3    [DISCONTINUED] valsartan (DIOVAN) 40 MG tablet Take 40 mg by mouth once daily.       No current facility-administered medications on file prior to visit.         OBJECTIVE:     Estimated body mass index is 26.01 kg/m² as calculated from the following:    Height as of this encounter: 5' 3" (1.6 m).    Weight as of this encounter: 66.6 kg (146 lb 13.2 oz).    Vital Signs (Most Recent)  Vitals:    02/16/24 1443   BP: 110/72   Pulse: 86       Physical Exam:    Physical Exam     GENERAL: patient sitting comfortably  PSYCH: appropriate mood and affect    LABS:     Lab Results   Component Value Date    BUN 26 (H) 02/01/2024    CREATININE 1.2 02/01/2024    WBC 13.28 (H) 02/01/2024    HGB 16.5 (H) 02/01/2024    HCT 46.5 02/01/2024     02/01/2024    AST 17 02/01/2024    ALT 13 02/01/2024    ALKPHOS 115 02/01/2024    ALBUMIN 4.9 02/01/2024    HGBA1C 8.1 (H) 11/17/2023    INR 1.2 12/04/2022    INR 1.2 12/04/2022        Urinalysis:   No results found for: "UAREFLEX"       Imaging:  I have personally reviewed all relevant imaging studies.    Results for orders placed or performed during the " hospital encounter of 02/01/24 (from the past 2160 hour(s))   CT Abdomen Pelvis With IV Contrast NO Oral Contrast    Narrative    EXAMINATION:  CT ABDOMEN PELVIS WITH IV CONTRAST    CLINICAL HISTORY:  Abdominal pain, acute, nonlocalized;    TECHNIQUE:  Low dose axial images, sagittal and coronal reformations were obtained from the lung bases to the pubic symphysis following the IV administration of 75 mL of Omnipaque 350 .  Oral contrast was not administered.    COMPARISON:  01/01/2022 and 08/29/2016    FINDINGS:  Abdomen:    - Lower thorax:Unremarkable.    - Lung bases: Emphysematous changes and bibasilar atelectasis.    - Liver: No focal mass.    - Gallbladder: No calcified gallstones.    - Bile Ducts: No evidence of intra or extra hepatic biliary ductal dilation.    - Spleen: Negative.    - Kidneys: Small hypodense lesion in the left kidney which does not demonstrate features of a simple cyst.  Kidneys are otherwise unremarkable.    - Adrenals: Unremarkable.    - Pancreas: No mass or peripancreatic fat stranding.    - Retroperitoneum:  No significant adenopathy.    - Vascular: No abdominal aortic aneurysm.    - Abdominal wall:  Unremarkable.    Pelvis:    No pelvic mass, adenopathy, or free fluid.    Bowel/Mesentery:    No evidence of bowel obstruction or inflammation.    Bones:  No acute osseous abnormality and no suspicious lytic or blastic lesion.      Impression    No acute findings.    Small hypodense lesion in the left kidney which is not a simple cyst.  Furthermore this is new from 08/29/2016.  Recommend follow-up ultrasound to exclude solid lesion.      Electronically signed by: Julee Guevara  Date:    02/01/2024  Time:    18:55          ASSESSMENT     1. Renal cyst, left        PLAN:     Bosniak Class II F left upper pole cyst, 2  No other acute urologic pathology  Follow up one year with renal ultrasound.  30 minutes total time  Yovani Redd MD  Urology  [facility]     Disclaimer:  This note has been generated using voice-recognition software. There may be typographical errors that have been missed during proof-reading.

## 2024-02-17 DIAGNOSIS — I10 ESSENTIAL HYPERTENSION: ICD-10-CM

## 2024-02-19 RX ORDER — LOSARTAN POTASSIUM 50 MG/1
50 TABLET ORAL
Qty: 30 TABLET | Refills: 5 | Status: SHIPPED | OUTPATIENT
Start: 2024-02-19

## 2024-02-20 ENCOUNTER — LAB VISIT (OUTPATIENT)
Dept: LAB | Facility: HOSPITAL | Age: 49
End: 2024-02-20
Attending: NURSE PRACTITIONER
Payer: COMMERCIAL

## 2024-02-20 ENCOUNTER — OFFICE VISIT (OUTPATIENT)
Dept: INTERNAL MEDICINE | Facility: CLINIC | Age: 49
End: 2024-02-20
Payer: COMMERCIAL

## 2024-02-20 VITALS
BODY MASS INDEX: 25.24 KG/M2 | HEART RATE: 96 BPM | WEIGHT: 142.44 LBS | SYSTOLIC BLOOD PRESSURE: 130 MMHG | HEIGHT: 63 IN | RESPIRATION RATE: 20 BRPM | DIASTOLIC BLOOD PRESSURE: 82 MMHG

## 2024-02-20 DIAGNOSIS — R30.0 DYSURIA: ICD-10-CM

## 2024-02-20 DIAGNOSIS — N30.01 ACUTE CYSTITIS WITH HEMATURIA: ICD-10-CM

## 2024-02-20 DIAGNOSIS — R10.9 LEFT FLANK PAIN: ICD-10-CM

## 2024-02-20 DIAGNOSIS — R10.9 LEFT FLANK PAIN: Primary | ICD-10-CM

## 2024-02-20 LAB
BILIRUB SERPL-MCNC: ABNORMAL MG/DL
BLOOD URINE, POC: ABNORMAL
CLARITY, POC UA: ABNORMAL
COLOR, POC UA: YELLOW
GLUCOSE UR QL STRIP: 1000
KETONES UR QL STRIP: ABNORMAL
LEUKOCYTE ESTERASE URINE, POC: ABNORMAL
NITRITE, POC UA: ABNORMAL
PH, POC UA: 5
PROTEIN, POC: ABNORMAL
SPECIFIC GRAVITY, POC UA: 1.02
UROBILINOGEN, POC UA: NORMAL

## 2024-02-20 PROCEDURE — 4010F ACE/ARB THERAPY RXD/TAKEN: CPT | Mod: CPTII,S$GLB,, | Performed by: NURSE PRACTITIONER

## 2024-02-20 PROCEDURE — 3008F BODY MASS INDEX DOCD: CPT | Mod: CPTII,S$GLB,, | Performed by: NURSE PRACTITIONER

## 2024-02-20 PROCEDURE — 81002 URINALYSIS NONAUTO W/O SCOPE: CPT | Mod: S$GLB,,, | Performed by: NURSE PRACTITIONER

## 2024-02-20 PROCEDURE — 3079F DIAST BP 80-89 MM HG: CPT | Mod: CPTII,S$GLB,, | Performed by: NURSE PRACTITIONER

## 2024-02-20 PROCEDURE — 87086 URINE CULTURE/COLONY COUNT: CPT | Performed by: NURSE PRACTITIONER

## 2024-02-20 PROCEDURE — 1159F MED LIST DOCD IN RCRD: CPT | Mod: CPTII,S$GLB,, | Performed by: NURSE PRACTITIONER

## 2024-02-20 PROCEDURE — 99999 PR PBB SHADOW E&M-EST. PATIENT-LVL V: CPT | Mod: PBBFAC,,, | Performed by: NURSE PRACTITIONER

## 2024-02-20 PROCEDURE — 3075F SYST BP GE 130 - 139MM HG: CPT | Mod: CPTII,S$GLB,, | Performed by: NURSE PRACTITIONER

## 2024-02-20 PROCEDURE — 99213 OFFICE O/P EST LOW 20 MIN: CPT | Mod: S$GLB,,, | Performed by: NURSE PRACTITIONER

## 2024-02-20 RX ORDER — TRAMADOL HYDROCHLORIDE 50 MG/1
50 TABLET ORAL EVERY 8 HOURS PRN
Qty: 21 EACH | Refills: 0 | Status: SHIPPED | OUTPATIENT
Start: 2024-02-20 | End: 2024-02-27

## 2024-02-20 RX ORDER — NITROFURANTOIN (MACROCRYSTALS) 100 MG/1
100 CAPSULE ORAL EVERY 12 HOURS
Qty: 10 CAPSULE | Refills: 0 | Status: SHIPPED | OUTPATIENT
Start: 2024-02-20 | End: 2024-02-25

## 2024-02-20 NOTE — PROGRESS NOTES
Subjective:       Patient ID: Eva Lane is a 48 y.o. female.    Chief Complaint: Follow-up    Patient is known, to me and presents with   Chief Complaint   Patient presents with    Follow-up   .  Denies chest pain and shortness of breath.  Patient continues with left flank pain. Did see urology but was told the cysts are benign and should not be causing her the pain. However, it was noted she did have a uti in the ER but was not given antibx. Explained to her this could be the problem.      HPI  Review of Systems   Constitutional: Negative.  Negative for activity change, appetite change, chills, diaphoresis, fatigue, fever and unexpected weight change.   Respiratory: Negative.     Cardiovascular: Negative.    Genitourinary:  Positive for dysuria and frequency. Negative for urgency.   Musculoskeletal:  Positive for back pain.   Skin: Negative.        Objective:      Physical Exam  Constitutional:       General: She is not in acute distress.     Appearance: Normal appearance. She is not ill-appearing, toxic-appearing or diaphoretic.   Cardiovascular:      Rate and Rhythm: Normal rate and regular rhythm.      Heart sounds: Normal heart sounds. No murmur heard.  Pulmonary:      Effort: Pulmonary effort is normal. No respiratory distress.      Breath sounds: Normal breath sounds. No stridor. No wheezing, rhonchi or rales.   Chest:      Chest wall: No tenderness.   Abdominal:      General: Abdomen is flat. Bowel sounds are normal. There is no distension.      Palpations: Abdomen is soft. There is no mass.      Tenderness: There is no abdominal tenderness. There is no right CVA tenderness, left CVA tenderness, guarding or rebound.      Hernia: No hernia is present.   Genitourinary:     Comments: See dip  Musculoskeletal:         General: No swelling, tenderness, deformity or signs of injury. Normal range of motion.      Right lower leg: No edema.      Left lower leg: No edema.   Skin:     General: Skin is warm  "and dry.      Capillary Refill: Capillary refill takes less than 2 seconds.      Coloration: Skin is not jaundiced or pale.      Findings: No bruising, erythema, lesion or rash.   Neurological:      Mental Status: She is alert.         Assessment:       1. Left flank pain    2. Acute cystitis with hematuria    3. Dysuria        Plan:   1. Left flank pain  -     traMADoL (ULTRAM) 50 mg tablet; Take 1 tablet (50 mg total) by mouth every 8 (eight) hours as needed for Pain.  Dispense: 21 each; Refill: 0  -     Urine culture; Future; Expected date: 02/20/2024    2. Acute cystitis with hematuria  -     Urine culture; Future; Expected date: 02/20/2024  -     nitrofurantoin (MACRODANTIN) 100 MG capsule; Take 1 capsule (100 mg total) by mouth every 12 (twelve) hours. for 5 days  Dispense: 10 capsule; Refill: 0  -     Urine culture; Future; Expected date: 02/20/2024    3. Dysuria  -     POCT urine dipstick without microscope  -     Urine culture; Future; Expected date: 02/20/2024     "This note will not be shared with the patient."  Will treat as above   If after treatment still with flank pain she will let me know   Keep hydrated with water  Rtc as scheduled  "

## 2024-02-22 LAB
BACTERIA UR CULT: NORMAL
BACTERIA UR CULT: NORMAL

## 2024-02-26 ENCOUNTER — OFFICE VISIT (OUTPATIENT)
Dept: INTERNAL MEDICINE | Facility: CLINIC | Age: 49
End: 2024-02-26
Payer: COMMERCIAL

## 2024-02-26 VITALS
RESPIRATION RATE: 18 BRPM | WEIGHT: 139.56 LBS | OXYGEN SATURATION: 98 % | BODY MASS INDEX: 24.73 KG/M2 | DIASTOLIC BLOOD PRESSURE: 82 MMHG | HEART RATE: 80 BPM | SYSTOLIC BLOOD PRESSURE: 116 MMHG | HEIGHT: 63 IN

## 2024-02-26 DIAGNOSIS — N30.01 ACUTE CYSTITIS WITH HEMATURIA: Primary | ICD-10-CM

## 2024-02-26 PROCEDURE — 99213 OFFICE O/P EST LOW 20 MIN: CPT | Mod: S$GLB,,, | Performed by: NURSE PRACTITIONER

## 2024-02-26 PROCEDURE — 3079F DIAST BP 80-89 MM HG: CPT | Mod: CPTII,S$GLB,, | Performed by: NURSE PRACTITIONER

## 2024-02-26 PROCEDURE — 3008F BODY MASS INDEX DOCD: CPT | Mod: CPTII,S$GLB,, | Performed by: NURSE PRACTITIONER

## 2024-02-26 PROCEDURE — 99999 PR PBB SHADOW E&M-EST. PATIENT-LVL V: CPT | Mod: PBBFAC,,, | Performed by: NURSE PRACTITIONER

## 2024-02-26 PROCEDURE — 3074F SYST BP LT 130 MM HG: CPT | Mod: CPTII,S$GLB,, | Performed by: NURSE PRACTITIONER

## 2024-02-26 PROCEDURE — 1159F MED LIST DOCD IN RCRD: CPT | Mod: CPTII,S$GLB,, | Performed by: NURSE PRACTITIONER

## 2024-02-26 PROCEDURE — 4010F ACE/ARB THERAPY RXD/TAKEN: CPT | Mod: CPTII,S$GLB,, | Performed by: NURSE PRACTITIONER

## 2024-02-26 RX ORDER — NITROFURANTOIN (MACROCRYSTALS) 100 MG/1
100 CAPSULE ORAL EVERY 12 HOURS
Qty: 10 CAPSULE | Refills: 0 | Status: SHIPPED | OUTPATIENT
Start: 2024-02-26 | End: 2024-03-02

## 2024-02-26 NOTE — PROGRESS NOTES
"Subjective:       Patient ID: Eva Laen is a 48 y.o. female.    Chief Complaint: Follow-up (X UTI- still having back pain PS:6)    Patient is known, to me and presents with   Chief Complaint   Patient presents with    Follow-up     X UTI- still having back pain PS:6   .  Denies chest pain and shortness of breath.  Patient presents with UTI follow up and is feeling better but needs it rechecked. Back pain is much better. But thinks another round of antibx will help   Follow-up      Review of Systems   Constitutional: Negative.    Respiratory: Negative.     Cardiovascular: Negative.    Gastrointestinal: Negative.    Genitourinary:  Positive for frequency. Negative for dysuria and urgency.   Musculoskeletal: Negative.  Negative for back pain.   Skin: Negative.        Objective:      Physical Exam  Constitutional:       General: She is not in acute distress.     Appearance: Normal appearance. She is not ill-appearing, toxic-appearing or diaphoretic.   Cardiovascular:      Rate and Rhythm: Normal rate and regular rhythm.      Heart sounds: Normal heart sounds. No murmur heard.  Pulmonary:      Effort: Pulmonary effort is normal. No respiratory distress.      Breath sounds: Normal breath sounds. No stridor. No wheezing, rhonchi or rales.   Chest:      Chest wall: No tenderness.   Abdominal:      Tenderness: There is no right CVA tenderness or left CVA tenderness.   Skin:     General: Skin is warm and dry.      Capillary Refill: Capillary refill takes less than 2 seconds.      Coloration: Skin is not jaundiced or pale.      Findings: No bruising, erythema, lesion or rash.   Neurological:      Mental Status: She is alert.         Assessment:       1. Acute cystitis with hematuria        Plan:   1. Acute cystitis with hematuria  -     nitrofurantoin (MACRODANTIN) 100 MG capsule; Take 1 capsule (100 mg total) by mouth every 12 (twelve) hours. for 5 days  Dispense: 10 capsule; Refill: 0     "This note will not be " "shared with the patient."  Keep hydrated with water  Rtc as scheduled  "

## 2024-02-28 DIAGNOSIS — G62.9 NEUROPATHY: ICD-10-CM

## 2024-02-28 RX ORDER — PREGABALIN 200 MG/1
200 CAPSULE ORAL 2 TIMES DAILY
Qty: 60 CAPSULE | Refills: 2 | Status: SHIPPED | OUTPATIENT
Start: 2024-02-28 | End: 2024-03-30 | Stop reason: SDUPTHER

## 2024-03-04 ENCOUNTER — OFFICE VISIT (OUTPATIENT)
Dept: INTERNAL MEDICINE | Facility: CLINIC | Age: 49
End: 2024-03-04
Payer: COMMERCIAL

## 2024-03-04 VITALS
DIASTOLIC BLOOD PRESSURE: 74 MMHG | SYSTOLIC BLOOD PRESSURE: 118 MMHG | HEART RATE: 78 BPM | OXYGEN SATURATION: 98 % | BODY MASS INDEX: 26.57 KG/M2 | WEIGHT: 149.94 LBS | TEMPERATURE: 98 F | RESPIRATION RATE: 18 BRPM | HEIGHT: 63 IN

## 2024-03-04 DIAGNOSIS — R60.9 EDEMA, UNSPECIFIED TYPE: ICD-10-CM

## 2024-03-04 DIAGNOSIS — T14.8XXA PUNCTURE WOUND: Primary | ICD-10-CM

## 2024-03-04 PROCEDURE — 3078F DIAST BP <80 MM HG: CPT | Mod: CPTII,S$GLB,, | Performed by: NURSE PRACTITIONER

## 2024-03-04 PROCEDURE — 4010F ACE/ARB THERAPY RXD/TAKEN: CPT | Mod: CPTII,S$GLB,, | Performed by: NURSE PRACTITIONER

## 2024-03-04 PROCEDURE — 1159F MED LIST DOCD IN RCRD: CPT | Mod: CPTII,S$GLB,, | Performed by: NURSE PRACTITIONER

## 2024-03-04 PROCEDURE — 99214 OFFICE O/P EST MOD 30 MIN: CPT | Mod: S$GLB,,, | Performed by: NURSE PRACTITIONER

## 2024-03-04 PROCEDURE — 99999 PR PBB SHADOW E&M-EST. PATIENT-LVL V: CPT | Mod: PBBFAC,,, | Performed by: NURSE PRACTITIONER

## 2024-03-04 PROCEDURE — 3074F SYST BP LT 130 MM HG: CPT | Mod: CPTII,S$GLB,, | Performed by: NURSE PRACTITIONER

## 2024-03-04 PROCEDURE — 3008F BODY MASS INDEX DOCD: CPT | Mod: CPTII,S$GLB,, | Performed by: NURSE PRACTITIONER

## 2024-03-04 RX ORDER — CLINDAMYCIN HYDROCHLORIDE 300 MG/1
300 CAPSULE ORAL EVERY 8 HOURS
Qty: 21 CAPSULE | Refills: 0 | Status: SHIPPED | OUTPATIENT
Start: 2024-03-04 | End: 2024-03-11

## 2024-03-04 RX ORDER — MUPIROCIN 20 MG/G
OINTMENT TOPICAL 3 TIMES DAILY
Qty: 30 G | Refills: 1 | Status: SHIPPED | OUTPATIENT
Start: 2024-03-04 | End: 2024-03-11

## 2024-03-04 RX ORDER — HYDROCHLOROTHIAZIDE 25 MG/1
25 TABLET ORAL DAILY
Qty: 30 TABLET | Refills: 2 | Status: SHIPPED | OUTPATIENT
Start: 2024-03-04 | End: 2025-03-04

## 2024-03-04 NOTE — PROGRESS NOTES
Subjective:       Patient ID: Eva Lane is a 48 y.o. female.    Chief Complaint: Laceration (L. Foot- x sat. PS:10)    Patient is known, to me and presents with   Chief Complaint   Patient presents with    Laceration     L. Foot- x sat. PS:10   .  Denies chest pain and shortness of breath.  Patient presents with puncture would to left under foot. Walked barefoot in her house and stepped on a piece of wood that was sharp. Now with puncture wound and some mild erythema noted to area. No drainage. Also having some edema to LE and would like something for that.    Laceration       Review of Systems   Constitutional:  Negative for activity change, appetite change, fatigue, fever and unexpected weight change.   HENT:  Negative for congestion, ear discharge, ear pain, hearing loss, postnasal drip and tinnitus.    Eyes:  Negative for photophobia, pain and visual disturbance.   Respiratory:  Negative for cough, shortness of breath, wheezing and stridor.    Cardiovascular:  Positive for leg swelling. Negative for chest pain and palpitations.   Gastrointestinal:  Negative for abdominal distention.   Genitourinary:  Negative for difficulty urinating, dysuria, frequency, hematuria and urgency.   Musculoskeletal:  Negative for arthralgias, back pain, gait problem, joint swelling and neck pain.   Skin:  Positive for color change and wound. Negative for pallor and rash.   Neurological:  Negative for dizziness, seizures, syncope, weakness, light-headedness, numbness and headaches.   Hematological:  Negative for adenopathy. Does not bruise/bleed easily.   Psychiatric/Behavioral:  Negative for behavioral problems, confusion and hallucinations.        Objective:      Physical Exam  Constitutional:       General: She is not in acute distress.     Appearance: She is well-developed.   HENT:      Head: Normocephalic and atraumatic.      Right Ear: Tympanic membrane and external ear normal.      Left Ear: Tympanic membrane and  external ear normal.      Nose: Nose normal.      Mouth/Throat:      Mouth: Mucous membranes are moist.      Pharynx: No oropharyngeal exudate.   Eyes:      General: No scleral icterus.        Right eye: No discharge.         Left eye: No discharge.      Conjunctiva/sclera: Conjunctivae normal.      Pupils: Pupils are equal, round, and reactive to light.   Neck:      Thyroid: No thyromegaly.      Vascular: No JVD.   Cardiovascular:      Rate and Rhythm: Normal rate and regular rhythm.      Heart sounds: Normal heart sounds. No murmur heard.     No friction rub. No gallop.   Pulmonary:      Effort: Pulmonary effort is normal. No respiratory distress.      Breath sounds: Normal breath sounds. No stridor. No wheezing, rhonchi or rales.   Chest:      Chest wall: No tenderness.   Abdominal:      General: Bowel sounds are normal. There is no distension.      Palpations: Abdomen is soft. There is no mass.      Tenderness: There is no abdominal tenderness. There is no right CVA tenderness, left CVA tenderness, guarding or rebound.      Hernia: No hernia is present.   Musculoskeletal:         General: No swelling, tenderness, deformity or signs of injury. Normal range of motion.      Cervical back: Normal range of motion and neck supple.      Right lower leg: Edema present.      Left lower leg: Edema present.        Feet:       Comments: Trace edema to LE   Lymphadenopathy:      Cervical: No cervical adenopathy.   Skin:     General: Skin is warm and dry.      Capillary Refill: Capillary refill takes less than 2 seconds.      Coloration: Skin is not jaundiced or pale.      Findings: Erythema, laceration and wound present. No abrasion, abscess, acne, bruising, burn, ecchymosis, signs of injury, lesion, petechiae or rash.      Comments: Small laceration to left underfoot. No drainage and some mild erythema noted.    Neurological:      General: No focal deficit present.      Mental Status: She is alert and oriented to person,  "place, and time.      Cranial Nerves: No cranial nerve deficit.      Sensory: No sensory deficit.      Motor: No weakness or abnormal muscle tone.      Coordination: Coordination normal.      Gait: Gait normal.      Deep Tendon Reflexes: Reflexes are normal and symmetric. Reflexes normal.   Psychiatric:         Attention and Perception: She does not perceive auditory or visual hallucinations.         Mood and Affect: Mood and affect normal. Mood is not anxious or depressed. Affect is not tearful.         Behavior: Behavior normal.         Thought Content: Thought content normal. Thought content does not include homicidal or suicidal ideation. Thought content does not include homicidal or suicidal plan.         Judgment: Judgment normal.         Assessment:       1. Puncture wound    2. Edema, unspecified type        Plan:   1. Puncture wound  -     clindamycin (CLEOCIN) 300 MG capsule; Take 1 capsule (300 mg total) by mouth every 8 (eight) hours. for 7 days  Dispense: 21 capsule; Refill: 0  -     mupirocin (BACTROBAN) 2 % ointment; Apply topically 3 (three) times daily. for 7 days  Dispense: 30 g; Refill: 1  -     mupirocin (BACTROBAN) 2 % ointment; Apply topically 3 (three) times daily. for 7 days  Dispense: 30 g; Refill: 1  -     Ambulatory referral/consult to Podiatry; Future; Expected date: 03/11/2024    2. Edema, unspecified type  -     hydroCHLOROthiazide (HYDRODIURIL) 25 MG tablet; Take 1 tablet (25 mg total) by mouth once daily.  Dispense: 30 tablet; Refill: 2       "This note will not be shared with the patient."  See above plan of care  Will send to podiatry as well   Rtc as scheduled  "

## 2024-03-09 DIAGNOSIS — M54.50 ACUTE BILATERAL LOW BACK PAIN WITHOUT SCIATICA: ICD-10-CM

## 2024-03-11 DIAGNOSIS — F51.05 INSOMNIA DUE TO OTHER MENTAL DISORDER: ICD-10-CM

## 2024-03-11 DIAGNOSIS — F99 INSOMNIA DUE TO OTHER MENTAL DISORDER: ICD-10-CM

## 2024-03-11 RX ORDER — IBUPROFEN 800 MG/1
800 TABLET ORAL 3 TIMES DAILY PRN
Qty: 90 TABLET | Refills: 0 | Status: SHIPPED | OUTPATIENT
Start: 2024-03-11 | End: 2024-04-21 | Stop reason: SDUPTHER

## 2024-03-12 RX ORDER — QUETIAPINE FUMARATE 400 MG/1
400 TABLET, FILM COATED ORAL 2 TIMES DAILY
Qty: 60 TABLET | Refills: 2 | Status: SHIPPED | OUTPATIENT
Start: 2024-03-12 | End: 2024-04-21 | Stop reason: SDUPTHER

## 2024-03-13 ENCOUNTER — OFFICE VISIT (OUTPATIENT)
Dept: INTERNAL MEDICINE | Facility: CLINIC | Age: 49
End: 2024-03-13
Payer: COMMERCIAL

## 2024-03-13 VITALS
BODY MASS INDEX: 24.84 KG/M2 | HEIGHT: 63 IN | SYSTOLIC BLOOD PRESSURE: 94 MMHG | WEIGHT: 140.19 LBS | HEART RATE: 120 BPM | RESPIRATION RATE: 17 BRPM | DIASTOLIC BLOOD PRESSURE: 66 MMHG

## 2024-03-13 DIAGNOSIS — I95.9 HYPOTENSION, UNSPECIFIED HYPOTENSION TYPE: ICD-10-CM

## 2024-03-13 DIAGNOSIS — R00.0 SINUS TACHYCARDIA: Primary | ICD-10-CM

## 2024-03-13 PROCEDURE — 3078F DIAST BP <80 MM HG: CPT | Mod: CPTII,S$GLB,, | Performed by: NURSE PRACTITIONER

## 2024-03-13 PROCEDURE — 3074F SYST BP LT 130 MM HG: CPT | Mod: CPTII,S$GLB,, | Performed by: NURSE PRACTITIONER

## 2024-03-13 PROCEDURE — 3008F BODY MASS INDEX DOCD: CPT | Mod: CPTII,S$GLB,, | Performed by: NURSE PRACTITIONER

## 2024-03-13 PROCEDURE — 99214 OFFICE O/P EST MOD 30 MIN: CPT | Mod: S$GLB,,, | Performed by: NURSE PRACTITIONER

## 2024-03-13 PROCEDURE — 4010F ACE/ARB THERAPY RXD/TAKEN: CPT | Mod: CPTII,S$GLB,, | Performed by: NURSE PRACTITIONER

## 2024-03-13 PROCEDURE — 1159F MED LIST DOCD IN RCRD: CPT | Mod: CPTII,S$GLB,, | Performed by: NURSE PRACTITIONER

## 2024-03-13 PROCEDURE — 99999 PR PBB SHADOW E&M-EST. PATIENT-LVL V: CPT | Mod: PBBFAC,,, | Performed by: NURSE PRACTITIONER

## 2024-03-15 NOTE — PROGRESS NOTES
Subjective:       Patient ID: Eva Lane is a 48 y.o. female.    Chief Complaint: Hypotension    Patient is known, to me and presents with   Chief Complaint   Patient presents with    Hypotension   .  Denies chest pain and shortness of breath.  Patient presents with low blood pressure this am. She has not see her cardiologists is some time. States that she does feel like her heart beats fast at times.   HPI  Review of Systems   Constitutional:  Positive for fatigue. Negative for activity change, appetite change, fever and unexpected weight change.   HENT:  Negative for congestion, ear discharge, ear pain, hearing loss, postnasal drip and tinnitus.    Eyes:  Negative for photophobia, pain and visual disturbance.   Respiratory:  Negative for cough, shortness of breath, wheezing and stridor.    Cardiovascular:  Negative for chest pain, palpitations and leg swelling.   Gastrointestinal:  Negative for abdominal distention.   Genitourinary:  Negative for difficulty urinating, dysuria, frequency, hematuria and urgency.   Musculoskeletal:  Negative for arthralgias, back pain, gait problem, joint swelling and neck pain.   Skin: Negative.    Neurological:  Negative for dizziness, seizures, syncope, weakness, light-headedness, numbness and headaches.   Hematological:  Negative for adenopathy. Does not bruise/bleed easily.   Psychiatric/Behavioral:  Negative for behavioral problems, confusion, dysphoric mood, hallucinations, sleep disturbance and suicidal ideas. The patient is not nervous/anxious.        Objective:      Physical Exam  Constitutional:       General: She is not in acute distress.     Appearance: She is well-developed.   HENT:      Head: Normocephalic and atraumatic.      Right Ear: Tympanic membrane and external ear normal.      Left Ear: Tympanic membrane and external ear normal.      Nose: Nose normal.      Mouth/Throat:      Mouth: Mucous membranes are moist.      Pharynx: No oropharyngeal exudate.    Eyes:      General: No scleral icterus.        Right eye: No discharge.         Left eye: No discharge.      Conjunctiva/sclera: Conjunctivae normal.      Pupils: Pupils are equal, round, and reactive to light.   Neck:      Thyroid: No thyromegaly.      Vascular: No JVD.   Cardiovascular:      Rate and Rhythm: Regular rhythm. Tachycardia present.      Heart sounds: Normal heart sounds. No murmur heard.     No friction rub. No gallop.   Pulmonary:      Effort: Pulmonary effort is normal. No respiratory distress.      Breath sounds: Normal breath sounds. No stridor. No wheezing or rales.   Chest:      Chest wall: No tenderness.   Abdominal:      General: Bowel sounds are normal. There is no distension.      Palpations: Abdomen is soft. There is no mass.      Tenderness: There is no abdominal tenderness. There is no right CVA tenderness, left CVA tenderness, guarding or rebound.      Hernia: No hernia is present.   Musculoskeletal:         General: No swelling, tenderness, deformity or signs of injury. Normal range of motion.      Cervical back: Normal range of motion and neck supple.      Right lower leg: No edema.      Left lower leg: No edema.   Lymphadenopathy:      Cervical: No cervical adenopathy.   Skin:     General: Skin is warm and dry.      Capillary Refill: Capillary refill takes less than 2 seconds.      Coloration: Skin is not jaundiced or pale.      Findings: No bruising, erythema, lesion or rash.   Neurological:      General: No focal deficit present.      Mental Status: She is alert and oriented to person, place, and time.      Cranial Nerves: No cranial nerve deficit.      Sensory: No sensory deficit.      Motor: No weakness or abnormal muscle tone.      Coordination: Coordination normal.      Gait: Gait normal.      Deep Tendon Reflexes: Reflexes are normal and symmetric. Reflexes normal.   Psychiatric:         Attention and Perception: She does not perceive auditory or visual hallucinations.         " Mood and Affect: Mood and affect normal. Mood is not anxious or depressed. Affect is not tearful.         Behavior: Behavior normal.         Thought Content: Thought content does not include homicidal or suicidal ideation. Thought content does not include homicidal or suicidal plan.         Judgment: Judgment normal.         Assessment:       1. Sinus tachycardia    2. Hypotension, unspecified hypotension type        Plan:   1. Sinus tachycardia  -     EKG 12-lead; Future    2. Hypotension, unspecified hypotension type  -     EKG 12-lead; Future       "This note will not be shared with the patient."  EKG showed ST no other changes noted  She will see Dr. Curtis this week or next week  Hold losartan and hctz for now   If any worsening go to ER  Rtc as scheduled      "

## 2024-03-21 ENCOUNTER — HOSPITAL ENCOUNTER (OUTPATIENT)
Dept: RADIOLOGY | Facility: HOSPITAL | Age: 49
Discharge: HOME | End: 2024-03-21
Attending: INTERNAL MEDICINE
Payer: COMMERCIAL

## 2024-03-21 DIAGNOSIS — R79.89 ELEVATED D-DIMER: ICD-10-CM

## 2024-03-21 PROCEDURE — 25500020 PHARM REV CODE 255: Performed by: INTERNAL MEDICINE

## 2024-03-21 PROCEDURE — 71275 CT ANGIOGRAPHY CHEST: CPT | Mod: TC

## 2024-03-21 PROCEDURE — 71275 CT ANGIOGRAPHY CHEST: CPT | Mod: 26,,, | Performed by: RADIOLOGY

## 2024-03-21 RX ADMIN — IOHEXOL 75 ML: 350 INJECTION, SOLUTION INTRAVENOUS at 12:03

## 2024-03-30 DIAGNOSIS — G62.9 NEUROPATHY: ICD-10-CM

## 2024-03-30 DIAGNOSIS — F51.04 CHRONIC INSOMNIA: ICD-10-CM

## 2024-03-30 DIAGNOSIS — R56.9 SEIZURE-LIKE ACTIVITY: ICD-10-CM

## 2024-03-30 DIAGNOSIS — F41.9 ANXIETY: ICD-10-CM

## 2024-04-01 RX ORDER — DIVALPROEX SODIUM 500 MG/1
500 TABLET, FILM COATED, EXTENDED RELEASE ORAL DAILY
Qty: 30 TABLET | Refills: 5 | Status: SHIPPED | OUTPATIENT
Start: 2024-04-01

## 2024-04-01 RX ORDER — PREGABALIN 200 MG/1
200 CAPSULE ORAL 2 TIMES DAILY
Qty: 60 CAPSULE | Refills: 2 | Status: SHIPPED | OUTPATIENT
Start: 2024-04-01 | End: 2024-04-03

## 2024-04-01 RX ORDER — LORAZEPAM 0.5 MG/1
0.5 TABLET ORAL EVERY 12 HOURS PRN
Qty: 60 TABLET | Refills: 0 | OUTPATIENT
Start: 2024-04-01 | End: 2024-05-01

## 2024-04-03 ENCOUNTER — OFFICE VISIT (OUTPATIENT)
Dept: INTERNAL MEDICINE | Facility: CLINIC | Age: 49
End: 2024-04-03
Payer: COMMERCIAL

## 2024-04-03 VITALS
HEIGHT: 63 IN | RESPIRATION RATE: 18 BRPM | HEART RATE: 97 BPM | SYSTOLIC BLOOD PRESSURE: 108 MMHG | OXYGEN SATURATION: 98 % | WEIGHT: 149.25 LBS | BODY MASS INDEX: 26.45 KG/M2 | DIASTOLIC BLOOD PRESSURE: 74 MMHG

## 2024-04-03 DIAGNOSIS — B02.8 HERPES ZOSTER WITH COMPLICATION: Primary | ICD-10-CM

## 2024-04-03 PROCEDURE — 4010F ACE/ARB THERAPY RXD/TAKEN: CPT | Mod: CPTII,S$GLB,, | Performed by: NURSE PRACTITIONER

## 2024-04-03 PROCEDURE — 1159F MED LIST DOCD IN RCRD: CPT | Mod: CPTII,S$GLB,, | Performed by: NURSE PRACTITIONER

## 2024-04-03 PROCEDURE — 3074F SYST BP LT 130 MM HG: CPT | Mod: CPTII,S$GLB,, | Performed by: NURSE PRACTITIONER

## 2024-04-03 PROCEDURE — 99999 PR PBB SHADOW E&M-EST. PATIENT-LVL V: CPT | Mod: PBBFAC,,, | Performed by: NURSE PRACTITIONER

## 2024-04-03 PROCEDURE — 3078F DIAST BP <80 MM HG: CPT | Mod: CPTII,S$GLB,, | Performed by: NURSE PRACTITIONER

## 2024-04-03 PROCEDURE — 99213 OFFICE O/P EST LOW 20 MIN: CPT | Mod: S$GLB,,, | Performed by: NURSE PRACTITIONER

## 2024-04-03 PROCEDURE — 3008F BODY MASS INDEX DOCD: CPT | Mod: CPTII,S$GLB,, | Performed by: NURSE PRACTITIONER

## 2024-04-03 RX ORDER — CIMETIDINE 300 MG/1
300 TABLET, FILM COATED ORAL ONCE
COMMUNITY
Start: 2024-03-20

## 2024-04-03 RX ORDER — LANOLIN ALCOHOL/MO/W.PET/CERES
1 CREAM (GRAM) TOPICAL 2 TIMES DAILY
COMMUNITY
Start: 2024-03-15

## 2024-04-03 RX ORDER — VALACYCLOVIR HYDROCHLORIDE 1 G/1
1000 TABLET, FILM COATED ORAL 3 TIMES DAILY
Qty: 21 TABLET | Refills: 0 | Status: SHIPPED | OUTPATIENT
Start: 2024-04-03 | End: 2024-04-10

## 2024-04-03 RX ORDER — PREGABALIN 200 MG/1
200 CAPSULE ORAL 3 TIMES DAILY
Qty: 90 CAPSULE | Refills: 5 | Status: SHIPPED | OUTPATIENT
Start: 2024-04-03 | End: 2024-04-16 | Stop reason: SDUPTHER

## 2024-04-03 NOTE — PROGRESS NOTES
"Subjective:       Patient ID: Eva Lane is a 48 y.o. female.    Chief Complaint: Rash (X Monday- on upper back /shoulder area PS:10)    Patient is known, to me and presents with   Chief Complaint   Patient presents with    Rash     X Monday- on upper back /shoulder area PS:10   .  Denies chest pain and shortness of breath.  Patient presents with vesicular like rash under right axilla area. Started with pain and rash on Monday. Never had shingles before. Taking lyrica   Rash      Review of Systems   Constitutional: Negative.    Respiratory: Negative.     Cardiovascular: Negative.    Musculoskeletal:  Positive for back pain.   Skin:  Positive for rash. Negative for color change, pallor and wound.       Objective:      Physical Exam  Constitutional:       General: She is not in acute distress.     Appearance: Normal appearance. She is not ill-appearing, toxic-appearing or diaphoretic.   Cardiovascular:      Rate and Rhythm: Normal rate and regular rhythm.      Heart sounds: Normal heart sounds. No murmur heard.  Pulmonary:      Effort: Pulmonary effort is normal. No respiratory distress.      Breath sounds: Normal breath sounds. No stridor. No wheezing, rhonchi or rales.   Chest:      Chest wall: No tenderness.   Skin:     General: Skin is warm and dry.      Capillary Refill: Capillary refill takes less than 2 seconds.      Coloration: Skin is not jaundiced or pale.      Findings: Erythema and rash present. No bruising or lesion. Rash is vesicular.          Neurological:      Mental Status: She is alert.         Assessment:       1. Herpes zoster with complication        Plan:   1. Herpes zoster with complication  -     valACYclovir (VALTREX) 1000 MG tablet; Take 1 tablet (1,000 mg total) by mouth 3 (three) times daily. for 7 days  Dispense: 21 tablet; Refill: 0  -     pregabalin (LYRICA) 200 MG Cap; Take 1 capsule (200 mg total) by mouth 3 (three) times daily.  Dispense: 90 capsule; Refill: 5       "This " "note will not be shared with the patient."  Will increase lyrica to tid due to pain  Take valtrex as ordered  Avoid pregnant women and elderly   Rtc as scheduled  "

## 2024-04-16 DIAGNOSIS — B02.8 HERPES ZOSTER WITH COMPLICATION: ICD-10-CM

## 2024-04-18 RX ORDER — PREGABALIN 200 MG/1
200 CAPSULE ORAL 3 TIMES DAILY
Qty: 90 CAPSULE | Refills: 5 | Status: SHIPPED | OUTPATIENT
Start: 2024-04-18 | End: 2024-05-17 | Stop reason: SDUPTHER

## 2024-04-21 DIAGNOSIS — F99 INSOMNIA DUE TO OTHER MENTAL DISORDER: ICD-10-CM

## 2024-04-21 DIAGNOSIS — M54.50 ACUTE BILATERAL LOW BACK PAIN WITHOUT SCIATICA: ICD-10-CM

## 2024-04-21 DIAGNOSIS — F51.05 INSOMNIA DUE TO OTHER MENTAL DISORDER: ICD-10-CM

## 2024-04-22 RX ORDER — QUETIAPINE FUMARATE 400 MG/1
400 TABLET, FILM COATED ORAL 2 TIMES DAILY
Qty: 60 TABLET | Refills: 2 | Status: SHIPPED | OUTPATIENT
Start: 2024-04-22 | End: 2025-04-22

## 2024-04-22 RX ORDER — IBUPROFEN 800 MG/1
800 TABLET ORAL 3 TIMES DAILY PRN
Qty: 90 TABLET | Refills: 0 | Status: SHIPPED | OUTPATIENT
Start: 2024-04-22 | End: 2024-04-24 | Stop reason: SDUPTHER

## 2024-04-24 DIAGNOSIS — M54.50 ACUTE BILATERAL LOW BACK PAIN WITHOUT SCIATICA: ICD-10-CM

## 2024-04-29 RX ORDER — IBUPROFEN 800 MG/1
800 TABLET ORAL 3 TIMES DAILY PRN
Qty: 90 TABLET | Refills: 0 | Status: SHIPPED | OUTPATIENT
Start: 2024-04-29 | End: 2024-05-30 | Stop reason: SDUPTHER

## 2024-05-09 ENCOUNTER — OFFICE VISIT (OUTPATIENT)
Dept: INTERNAL MEDICINE | Facility: CLINIC | Age: 49
End: 2024-05-09
Payer: COMMERCIAL

## 2024-05-09 VITALS
BODY MASS INDEX: 24.61 KG/M2 | TEMPERATURE: 98 F | OXYGEN SATURATION: 95 % | WEIGHT: 138.88 LBS | SYSTOLIC BLOOD PRESSURE: 126 MMHG | RESPIRATION RATE: 18 BRPM | DIASTOLIC BLOOD PRESSURE: 82 MMHG | HEIGHT: 63 IN | HEART RATE: 87 BPM

## 2024-05-09 DIAGNOSIS — B97.89 VIRAL RESPIRATORY INFECTION: Primary | ICD-10-CM

## 2024-05-09 DIAGNOSIS — J98.8 VIRAL RESPIRATORY INFECTION: Primary | ICD-10-CM

## 2024-05-09 PROCEDURE — 99999 PR PBB SHADOW E&M-EST. PATIENT-LVL III: CPT | Mod: PBBFAC,,, | Performed by: NURSE PRACTITIONER

## 2024-05-09 PROCEDURE — 3079F DIAST BP 80-89 MM HG: CPT | Mod: CPTII,S$GLB,, | Performed by: NURSE PRACTITIONER

## 2024-05-09 PROCEDURE — 99213 OFFICE O/P EST LOW 20 MIN: CPT | Mod: S$GLB,,, | Performed by: NURSE PRACTITIONER

## 2024-05-09 PROCEDURE — 3008F BODY MASS INDEX DOCD: CPT | Mod: CPTII,S$GLB,, | Performed by: NURSE PRACTITIONER

## 2024-05-09 PROCEDURE — 3074F SYST BP LT 130 MM HG: CPT | Mod: CPTII,S$GLB,, | Performed by: NURSE PRACTITIONER

## 2024-05-09 PROCEDURE — 4010F ACE/ARB THERAPY RXD/TAKEN: CPT | Mod: CPTII,S$GLB,, | Performed by: NURSE PRACTITIONER

## 2024-05-09 PROCEDURE — 1159F MED LIST DOCD IN RCRD: CPT | Mod: CPTII,S$GLB,, | Performed by: NURSE PRACTITIONER

## 2024-05-09 NOTE — PROGRESS NOTES
Subjective:       Patient ID: Eva Lane is a 48 y.o. female.    Chief Complaint: Cough (With congestion and vomiting - x last night )    Patient is known, to me and presents with   Chief Complaint   Patient presents with    Cough     With congestion and vomiting - x last night    .  Denies chest pain and shortness of breath.  Patient presents with URI and missed work so needs to have excuse. No fever, chills, or body aches.   Cough  Associated symptoms include postnasal drip. Pertinent negatives include no chills, fever or sore throat.     Review of Systems   Constitutional: Negative.  Negative for activity change, appetite change, chills, diaphoresis, fatigue, fever and unexpected weight change.   HENT:  Positive for congestion and postnasal drip. Negative for sore throat.    Eyes: Negative.    Respiratory:  Positive for cough.    Cardiovascular: Negative.    Skin: Negative.    Hematological: Negative.  Negative for adenopathy.       Objective:      Physical Exam  Constitutional:       General: She is not in acute distress.     Appearance: Normal appearance. She is not ill-appearing, toxic-appearing or diaphoretic.   HENT:      Head: Normocephalic and atraumatic.      Right Ear: Tympanic membrane normal.      Left Ear: Tympanic membrane normal.      Nose: Congestion present.      Mouth/Throat:      Dentition: Abnormal dentition. Dental caries present.      Pharynx: Oropharynx is clear. No pharyngeal swelling or posterior oropharyngeal erythema.   Eyes:      General:         Right eye: No discharge.         Left eye: No discharge.      Conjunctiva/sclera: Conjunctivae normal.   Neck:      Vascular: No carotid bruit.   Cardiovascular:      Rate and Rhythm: Regular rhythm.      Heart sounds: Normal heart sounds. No murmur heard.  Pulmonary:      Effort: Pulmonary effort is normal. No respiratory distress.      Breath sounds: Normal breath sounds. No stridor. No wheezing, rhonchi or rales.   Chest:       "Chest wall: No tenderness.   Musculoskeletal:      Cervical back: Normal range of motion and neck supple. No rigidity or tenderness.   Lymphadenopathy:      Cervical: No cervical adenopathy.   Skin:     General: Skin is warm and dry.      Capillary Refill: Capillary refill takes less than 2 seconds.      Coloration: Skin is not jaundiced or pale.      Findings: No bruising, erythema, lesion or rash.   Neurological:      Mental Status: She is alert.         Assessment:       1. Viral respiratory infection        Plan:   1. Viral respiratory infection     "This note will not be shared with the patient."  Work excuse given   Take mucinex bid  Rtc as scheduled  "

## 2024-05-17 ENCOUNTER — PATIENT MESSAGE (OUTPATIENT)
Dept: ADMINISTRATIVE | Facility: HOSPITAL | Age: 49
End: 2024-05-17
Payer: COMMERCIAL

## 2024-05-17 DIAGNOSIS — B02.8 HERPES ZOSTER WITH COMPLICATION: ICD-10-CM

## 2024-05-17 DIAGNOSIS — E11.42 TYPE 2 DIABETES MELLITUS WITH DIABETIC POLYNEUROPATHY, WITHOUT LONG-TERM CURRENT USE OF INSULIN: ICD-10-CM

## 2024-05-17 RX ORDER — PREGABALIN 200 MG/1
200 CAPSULE ORAL 3 TIMES DAILY
Qty: 90 CAPSULE | Refills: 5 | Status: SHIPPED | OUTPATIENT
Start: 2024-05-17 | End: 2024-05-18 | Stop reason: SDUPTHER

## 2024-05-17 RX ORDER — METFORMIN HYDROCHLORIDE 500 MG/1
500 TABLET ORAL
Qty: 90 TABLET | Refills: 3 | Status: SHIPPED | OUTPATIENT
Start: 2024-05-17 | End: 2025-05-17

## 2024-05-18 DIAGNOSIS — B02.8 HERPES ZOSTER WITH COMPLICATION: ICD-10-CM

## 2024-05-20 RX ORDER — PREGABALIN 200 MG/1
200 CAPSULE ORAL 3 TIMES DAILY
Qty: 90 CAPSULE | Refills: 5 | Status: SHIPPED | OUTPATIENT
Start: 2024-05-20 | End: 2024-05-30 | Stop reason: SDUPTHER

## 2024-05-22 ENCOUNTER — OFFICE VISIT (OUTPATIENT)
Dept: INTERNAL MEDICINE | Facility: CLINIC | Age: 49
End: 2024-05-22
Payer: COMMERCIAL

## 2024-05-22 VITALS
HEIGHT: 63 IN | RESPIRATION RATE: 18 BRPM | BODY MASS INDEX: 25.39 KG/M2 | OXYGEN SATURATION: 98 % | SYSTOLIC BLOOD PRESSURE: 124 MMHG | DIASTOLIC BLOOD PRESSURE: 84 MMHG | HEART RATE: 95 BPM | WEIGHT: 143.31 LBS

## 2024-05-22 DIAGNOSIS — T14.8XXA WOUND INFECTION: Primary | ICD-10-CM

## 2024-05-22 DIAGNOSIS — L08.9 WOUND INFECTION: Primary | ICD-10-CM

## 2024-05-22 PROCEDURE — 99213 OFFICE O/P EST LOW 20 MIN: CPT | Mod: S$GLB,,, | Performed by: NURSE PRACTITIONER

## 2024-05-22 PROCEDURE — 4010F ACE/ARB THERAPY RXD/TAKEN: CPT | Mod: CPTII,S$GLB,, | Performed by: NURSE PRACTITIONER

## 2024-05-22 PROCEDURE — 1159F MED LIST DOCD IN RCRD: CPT | Mod: CPTII,S$GLB,, | Performed by: NURSE PRACTITIONER

## 2024-05-22 PROCEDURE — 3008F BODY MASS INDEX DOCD: CPT | Mod: CPTII,S$GLB,, | Performed by: NURSE PRACTITIONER

## 2024-05-22 PROCEDURE — 3074F SYST BP LT 130 MM HG: CPT | Mod: CPTII,S$GLB,, | Performed by: NURSE PRACTITIONER

## 2024-05-22 PROCEDURE — 3079F DIAST BP 80-89 MM HG: CPT | Mod: CPTII,S$GLB,, | Performed by: NURSE PRACTITIONER

## 2024-05-22 PROCEDURE — 99999 PR PBB SHADOW E&M-EST. PATIENT-LVL V: CPT | Mod: PBBFAC,,, | Performed by: NURSE PRACTITIONER

## 2024-05-22 RX ORDER — TRAZODONE HYDROCHLORIDE 100 MG/1
100 TABLET ORAL
COMMUNITY
Start: 2024-05-20

## 2024-05-22 RX ORDER — DOXYCYCLINE HYCLATE 100 MG
100 TABLET ORAL EVERY 12 HOURS
Qty: 14 TABLET | Refills: 0 | Status: SHIPPED | OUTPATIENT
Start: 2024-05-22 | End: 2024-05-29

## 2024-05-23 DIAGNOSIS — B96.89 ACUTE BACTERIAL BRONCHITIS: ICD-10-CM

## 2024-05-23 DIAGNOSIS — J45.909 ASTHMA, UNSPECIFIED ASTHMA SEVERITY, UNSPECIFIED WHETHER COMPLICATED, UNSPECIFIED WHETHER PERSISTENT: ICD-10-CM

## 2024-05-23 DIAGNOSIS — J20.8 ACUTE BACTERIAL BRONCHITIS: ICD-10-CM

## 2024-05-23 RX ORDER — ALBUTEROL SULFATE 90 UG/1
2 AEROSOL, METERED RESPIRATORY (INHALATION) EVERY 6 HOURS PRN
Qty: 18 G | Refills: 0 | Status: SHIPPED | OUTPATIENT
Start: 2024-05-23 | End: 2025-05-23

## 2024-05-23 RX ORDER — ALBUTEROL SULFATE 0.83 MG/ML
SOLUTION RESPIRATORY (INHALATION)
Qty: 90 ML | Refills: 3 | Status: SHIPPED | OUTPATIENT
Start: 2024-05-23

## 2024-05-23 NOTE — PROGRESS NOTES
"Subjective:       Patient ID: Eva Lane is a 48 y.o. female.    Chief Complaint: Foot Pain (L. Foot- with a sore x 3 days PS:9)    Patient is known, to me and presents with   Chief Complaint   Patient presents with    Foot Pain     L. Foot- with a sore x 3 days PS:9   .  Denies chest pain and shortness of breath.  Patient noticed a blister under left foot under her great toe and now with some erythema and she did pop the blister. No drainage at this time  Foot Pain  Pertinent negatives include no rash.     Review of Systems   Constitutional: Negative.    Respiratory: Negative.     Cardiovascular: Negative.    Skin:  Positive for color change and wound. Negative for pallor and rash.       Objective:      Physical Exam  Constitutional:       General: She is not in acute distress.     Appearance: Normal appearance. She is not ill-appearing, toxic-appearing or diaphoretic.   Cardiovascular:      Rate and Rhythm: Normal rate and regular rhythm.      Heart sounds: Normal heart sounds. No murmur heard.  Pulmonary:      Effort: Pulmonary effort is normal. No respiratory distress.      Breath sounds: Normal breath sounds. No stridor. No wheezing, rhonchi or rales.   Chest:      Chest wall: No tenderness.   Skin:     General: Skin is warm and dry.      Capillary Refill: Capillary refill takes less than 2 seconds.      Coloration: Skin is not jaundiced or pale.      Findings: Erythema and wound present. No bruising, lesion or rash.      Comments: Quarter size area of ruptured blister with mild erythema noted. No drainage     No eschar noted   Neurological:      Mental Status: She is alert.         Assessment:       1. Wound infection        Plan:   1. Wound infection  -     doxycycline (VIBRA-TABS) 100 MG tablet; Take 1 tablet (100 mg total) by mouth every 12 (twelve) hours. for 7 days  Dispense: 14 tablet; Refill: 0       "This note will not be shared with the patient."  Will let me know if no improvement   Do not " self rupture blister if occurs again  Rtc as scheduled

## 2024-05-30 DIAGNOSIS — M54.50 ACUTE BILATERAL LOW BACK PAIN WITHOUT SCIATICA: ICD-10-CM

## 2024-05-30 DIAGNOSIS — B02.8 HERPES ZOSTER WITH COMPLICATION: ICD-10-CM

## 2024-05-30 RX ORDER — PREGABALIN 200 MG/1
200 CAPSULE ORAL 3 TIMES DAILY
Qty: 90 CAPSULE | Refills: 5 | Status: SHIPPED | OUTPATIENT
Start: 2024-05-30 | End: 2024-11-28

## 2024-05-30 RX ORDER — IBUPROFEN 800 MG/1
800 TABLET ORAL 3 TIMES DAILY PRN
Qty: 90 TABLET | Refills: 0 | Status: SHIPPED | OUTPATIENT
Start: 2024-05-30

## 2024-06-24 ENCOUNTER — PATIENT OUTREACH (OUTPATIENT)
Dept: ADMINISTRATIVE | Facility: HOSPITAL | Age: 49
End: 2024-06-24
Payer: COMMERCIAL

## 2024-06-25 ENCOUNTER — TELEPHONE (OUTPATIENT)
Dept: INTERNAL MEDICINE | Facility: CLINIC | Age: 49
End: 2024-06-25
Payer: COMMERCIAL

## 2024-06-25 DIAGNOSIS — I10 ESSENTIAL HYPERTENSION: ICD-10-CM

## 2024-06-25 DIAGNOSIS — E11.65 UNCONTROLLED TYPE 2 DIABETES MELLITUS WITH HYPERGLYCEMIA: ICD-10-CM

## 2024-06-25 DIAGNOSIS — E78.2 MIXED HYPERLIPIDEMIA: Primary | ICD-10-CM

## 2024-06-25 NOTE — TELEPHONE ENCOUNTER
[facility] sent a msg regarding pt about an A1c that she is due for can you put in what is due for her now so she can get it all done if needed  Please advise  Thanks!

## 2024-06-26 ENCOUNTER — OFFICE VISIT (OUTPATIENT)
Dept: INTERNAL MEDICINE | Facility: CLINIC | Age: 49
End: 2024-06-26
Payer: COMMERCIAL

## 2024-06-26 VITALS
OXYGEN SATURATION: 95 % | TEMPERATURE: 97 F | WEIGHT: 143.06 LBS | RESPIRATION RATE: 18 BRPM | BODY MASS INDEX: 25.35 KG/M2 | HEART RATE: 104 BPM | HEIGHT: 63 IN

## 2024-06-26 DIAGNOSIS — J20.8 ACUTE BACTERIAL BRONCHITIS: Primary | ICD-10-CM

## 2024-06-26 DIAGNOSIS — E11.42 TYPE 2 DIABETES MELLITUS WITH DIABETIC POLYNEUROPATHY, WITHOUT LONG-TERM CURRENT USE OF INSULIN: ICD-10-CM

## 2024-06-26 DIAGNOSIS — E11.65 UNCONTROLLED TYPE 2 DIABETES MELLITUS WITH HYPERGLYCEMIA: ICD-10-CM

## 2024-06-26 DIAGNOSIS — I70.0 AORTIC ATHEROSCLEROSIS: ICD-10-CM

## 2024-06-26 DIAGNOSIS — B96.89 ACUTE BACTERIAL BRONCHITIS: Primary | ICD-10-CM

## 2024-06-26 DIAGNOSIS — F20.3 UNDIFFERENTIATED SCHIZOPHRENIA: ICD-10-CM

## 2024-06-26 PROBLEM — I50.32 CHRONIC DIASTOLIC CONGESTIVE HEART FAILURE: Status: RESOLVED | Noted: 2022-08-21 | Resolved: 2024-06-26

## 2024-06-26 PROCEDURE — 4010F ACE/ARB THERAPY RXD/TAKEN: CPT | Mod: CPTII,S$GLB,, | Performed by: NURSE PRACTITIONER

## 2024-06-26 PROCEDURE — 1159F MED LIST DOCD IN RCRD: CPT | Mod: CPTII,S$GLB,, | Performed by: NURSE PRACTITIONER

## 2024-06-26 PROCEDURE — 96372 THER/PROPH/DIAG INJ SC/IM: CPT | Mod: S$GLB,,, | Performed by: NURSE PRACTITIONER

## 2024-06-26 PROCEDURE — 99213 OFFICE O/P EST LOW 20 MIN: CPT | Mod: 25,S$GLB,, | Performed by: NURSE PRACTITIONER

## 2024-06-26 PROCEDURE — 99999 PR PBB SHADOW E&M-EST. PATIENT-LVL V: CPT | Mod: PBBFAC,,, | Performed by: NURSE PRACTITIONER

## 2024-06-26 PROCEDURE — 3008F BODY MASS INDEX DOCD: CPT | Mod: CPTII,S$GLB,, | Performed by: NURSE PRACTITIONER

## 2024-06-26 RX ORDER — DOXYCYCLINE HYCLATE 100 MG
100 TABLET ORAL EVERY 12 HOURS
Qty: 14 TABLET | Refills: 0 | Status: SHIPPED | OUTPATIENT
Start: 2024-06-26 | End: 2024-07-03

## 2024-06-26 RX ORDER — TRIAMCINOLONE ACETONIDE 40 MG/ML
40 INJECTION, SUSPENSION INTRA-ARTICULAR; INTRAMUSCULAR
Status: COMPLETED | OUTPATIENT
Start: 2024-06-26 | End: 2024-06-26

## 2024-06-26 RX ORDER — PROMETHAZINE HYDROCHLORIDE AND DEXTROMETHORPHAN HYDROBROMIDE 6.25; 15 MG/5ML; MG/5ML
5 SYRUP ORAL EVERY 6 HOURS PRN
Qty: 120 ML | Refills: 0 | Status: SHIPPED | OUTPATIENT
Start: 2024-06-26 | End: 2024-07-03

## 2024-06-26 RX ADMIN — TRIAMCINOLONE ACETONIDE 40 MG: 40 INJECTION, SUSPENSION INTRA-ARTICULAR; INTRAMUSCULAR at 01:06

## 2024-06-26 NOTE — PROGRESS NOTES
Subjective:       Patient ID: Eva Lane is a 48 y.o. female.    Chief Complaint: Nasal Congestion (With cough- x 1.5 weeks )    Patient is known, to me and presents with   Chief Complaint   Patient presents with    Nasal Congestion     With cough- x 1.5 weeks    .  Denies chest pain and shortness of breath.  Patient presents with two week hx of cough and congestion. No fever, chills, or body aches.    HPI  Review of Systems   Constitutional:  Negative for activity change, appetite change, chills, diaphoresis, fatigue, fever and unexpected weight change.   HENT:  Positive for congestion, postnasal drip and sore throat. Negative for ear discharge, ear pain, hearing loss and tinnitus.    Eyes:  Negative for photophobia, pain and visual disturbance.   Respiratory:  Positive for cough. Negative for shortness of breath, wheezing and stridor.    Cardiovascular:  Negative for chest pain, palpitations and leg swelling.   Gastrointestinal:  Negative for abdominal distention.   Genitourinary:  Negative for difficulty urinating, dysuria, frequency, hematuria and urgency.   Musculoskeletal:  Negative for arthralgias, back pain, gait problem, joint swelling and neck pain.   Skin: Negative.    Neurological:  Negative for dizziness, seizures, syncope, weakness, light-headedness, numbness and headaches.   Hematological:  Negative for adenopathy. Does not bruise/bleed easily.   Psychiatric/Behavioral:  Negative for agitation, behavioral problems, confusion, decreased concentration, dysphoric mood, hallucinations, self-injury, sleep disturbance and suicidal ideas. The patient is not nervous/anxious and is not hyperactive.        Objective:      Physical Exam  Constitutional:       General: She is not in acute distress.     Appearance: She is well-developed.   HENT:      Head: Normocephalic and atraumatic.      Right Ear: External ear normal. Tympanic membrane has decreased mobility.      Left Ear: External ear normal.  Tympanic membrane has decreased mobility.      Nose: Congestion present.      Mouth/Throat:      Mouth: Mucous membranes are moist.      Pharynx: Oropharynx is clear. No pharyngeal swelling, oropharyngeal exudate or posterior oropharyngeal erythema.   Eyes:      General: No scleral icterus.        Right eye: No discharge.         Left eye: No discharge.      Conjunctiva/sclera: Conjunctivae normal.      Pupils: Pupils are equal, round, and reactive to light.   Neck:      Thyroid: No thyromegaly.      Vascular: No JVD.   Cardiovascular:      Rate and Rhythm: Normal rate and regular rhythm.      Heart sounds: Normal heart sounds. No murmur heard.     No friction rub. No gallop.   Pulmonary:      Effort: Pulmonary effort is normal. No respiratory distress.      Breath sounds: Normal breath sounds. No stridor. No wheezing, rhonchi or rales.   Chest:      Chest wall: No tenderness.   Abdominal:      General: Bowel sounds are normal. There is no distension.      Palpations: Abdomen is soft. There is no mass.      Tenderness: There is no abdominal tenderness. There is no right CVA tenderness, left CVA tenderness, guarding or rebound.      Hernia: No hernia is present.   Musculoskeletal:         General: No swelling, tenderness, deformity or signs of injury. Normal range of motion.      Cervical back: Normal range of motion and neck supple.      Right lower leg: No edema.      Left lower leg: No edema.   Lymphadenopathy:      Cervical: No cervical adenopathy.   Skin:     General: Skin is warm and dry.      Capillary Refill: Capillary refill takes less than 2 seconds.      Coloration: Skin is not jaundiced or pale.      Findings: No bruising, erythema, lesion or rash.   Neurological:      General: No focal deficit present.      Mental Status: She is alert and oriented to person, place, and time.      Cranial Nerves: No cranial nerve deficit.      Sensory: No sensory deficit.      Motor: No weakness or abnormal muscle tone.  "     Coordination: Coordination normal.      Gait: Gait normal.      Deep Tendon Reflexes: Reflexes are normal and symmetric. Reflexes normal.   Psychiatric:         Attention and Perception: She does not perceive auditory or visual hallucinations.         Mood and Affect: Mood and affect normal. Mood is not anxious or depressed. Affect is not tearful.         Behavior: Behavior normal.         Thought Content: Thought content normal. Thought content does not include homicidal or suicidal ideation. Thought content does not include homicidal or suicidal plan.         Judgment: Judgment normal.         Assessment:       1. Acute bacterial bronchitis    2. Uncontrolled type 2 diabetes mellitus with hyperglycemia    3. Type 2 diabetes mellitus with diabetic polyneuropathy, without long-term current use of insulin    4. Aortic atherosclerosis    5. Undifferentiated schizophrenia        Plan:   1. Acute bacterial bronchitis  -     triamcinolone acetonide injection 40 mg  -     promethazine-dextromethorphan (PROMETHAZINE-DM) 6.25-15 mg/5 mL Syrp; Take 5 mLs by mouth every 6 (six) hours as needed.  Dispense: 120 mL; Refill: 0  -     doxycycline (VIBRA-TABS) 100 MG tablet; Take 1 tablet (100 mg total) by mouth every 12 (twelve) hours. for 7 days  Dispense: 14 tablet; Refill: 0    2. Uncontrolled type 2 diabetes mellitus with hyperglycemia  Assessment & Plan:  Controlled with insulin lispro and levemir      3. Type 2 diabetes mellitus with diabetic polyneuropathy, without long-term current use of insulin  Assessment & Plan:  Controlled with insulin lispro and levemir      4. Aortic atherosclerosis  Overview:  CT A/P 8/29/16    Assessment & Plan:  On statin therapy      5. Undifferentiated schizophrenia  Assessment & Plan:  Controlled with medication         "This note will not be shared with the patient."  Keep hydrated  Mucinex bid   Rtc as scheduled  "

## 2024-07-01 ENCOUNTER — OFFICE VISIT (OUTPATIENT)
Dept: INTERNAL MEDICINE | Facility: CLINIC | Age: 49
End: 2024-07-01
Payer: COMMERCIAL

## 2024-07-01 VITALS
RESPIRATION RATE: 20 BRPM | BODY MASS INDEX: 25.5 KG/M2 | WEIGHT: 143.94 LBS | HEART RATE: 94 BPM | TEMPERATURE: 97 F | OXYGEN SATURATION: 97 % | HEIGHT: 63 IN

## 2024-07-01 DIAGNOSIS — H65.191 OTHER NON-RECURRENT ACUTE NONSUPPURATIVE OTITIS MEDIA OF RIGHT EAR: Primary | ICD-10-CM

## 2024-07-01 PROCEDURE — 99213 OFFICE O/P EST LOW 20 MIN: CPT | Mod: S$GLB,,, | Performed by: NURSE PRACTITIONER

## 2024-07-01 PROCEDURE — 99999 PR PBB SHADOW E&M-EST. PATIENT-LVL V: CPT | Mod: PBBFAC,,, | Performed by: NURSE PRACTITIONER

## 2024-07-01 PROCEDURE — 4010F ACE/ARB THERAPY RXD/TAKEN: CPT | Mod: CPTII,S$GLB,, | Performed by: NURSE PRACTITIONER

## 2024-07-01 PROCEDURE — 3008F BODY MASS INDEX DOCD: CPT | Mod: CPTII,S$GLB,, | Performed by: NURSE PRACTITIONER

## 2024-07-01 PROCEDURE — 1159F MED LIST DOCD IN RCRD: CPT | Mod: CPTII,S$GLB,, | Performed by: NURSE PRACTITIONER

## 2024-07-01 RX ORDER — DOXYCYCLINE HYCLATE 100 MG
100 TABLET ORAL EVERY 12 HOURS
Qty: 14 TABLET | Refills: 0 | Status: SHIPPED | OUTPATIENT
Start: 2024-07-01 | End: 2024-07-08

## 2024-07-01 NOTE — PROGRESS NOTES
Subjective:       Patient ID: Eva Lane is a 48 y.o. female.    Chief Complaint: Follow-up (Not feeling any better- congestion and cough /)    Patient is known, to me and presents with   Chief Complaint   Patient presents with    Follow-up     Not feeling any better- congestion and cough      .  Denies chest pain and shortness of breath.  Patient presents with right ear pain.   Follow-up  Associated symptoms include congestion. Pertinent negatives include no coughing or sore throat.     Review of Systems   Constitutional: Negative.    HENT:  Positive for congestion, ear pain and postnasal drip. Negative for sore throat.    Eyes: Negative.    Respiratory: Negative.  Negative for cough and shortness of breath.    Cardiovascular: Negative.    Skin: Negative.    Hematological: Negative.  Negative for adenopathy.       Objective:      Physical Exam  Constitutional:       General: She is not in acute distress.     Appearance: Normal appearance. She is not ill-appearing, toxic-appearing or diaphoretic.   HENT:      Head: Normocephalic and atraumatic.      Right Ear: Tympanic membrane is erythematous and retracted. Tympanic membrane has decreased mobility.      Left Ear: Tympanic membrane is not erythematous or retracted. Tympanic membrane has normal mobility.      Nose: Congestion present.      Mouth/Throat:      Pharynx: Oropharynx is clear. No pharyngeal swelling or posterior oropharyngeal erythema.   Eyes:      General:         Right eye: No discharge.         Left eye: No discharge.      Conjunctiva/sclera: Conjunctivae normal.   Neck:      Vascular: No carotid bruit.   Cardiovascular:      Rate and Rhythm: Regular rhythm.      Heart sounds: Normal heart sounds. No murmur heard.  Pulmonary:      Effort: Pulmonary effort is normal. No respiratory distress.      Breath sounds: Normal breath sounds. No stridor. No wheezing, rhonchi or rales.   Chest:      Chest wall: No tenderness.   Musculoskeletal:       "Cervical back: Normal range of motion and neck supple. No rigidity or tenderness.   Lymphadenopathy:      Cervical: No cervical adenopathy.   Skin:     General: Skin is warm and dry.      Capillary Refill: Capillary refill takes less than 2 seconds.      Coloration: Skin is not jaundiced or pale.      Findings: No bruising, erythema, lesion or rash.   Neurological:      Mental Status: She is alert.         Assessment:       1. Other non-recurrent acute nonsuppurative otitis media of right ear        Plan:   1. Other non-recurrent acute nonsuppurative otitis media of right ear  -     doxycycline (VIBRA-TABS) 100 MG tablet; Take 1 tablet (100 mg total) by mouth every 12 (twelve) hours. for 7 days  Dispense: 14 tablet; Refill: 0       "This note will not be shared with the patient."  Rtc as scheduled  "

## 2024-07-03 DIAGNOSIS — B02.8 HERPES ZOSTER WITH COMPLICATION: ICD-10-CM

## 2024-07-03 DIAGNOSIS — M54.50 ACUTE BILATERAL LOW BACK PAIN WITHOUT SCIATICA: ICD-10-CM

## 2024-07-05 RX ORDER — PREGABALIN 200 MG/1
200 CAPSULE ORAL 3 TIMES DAILY
Qty: 90 CAPSULE | Refills: 5 | Status: SHIPPED | OUTPATIENT
Start: 2024-07-05 | End: 2025-01-03

## 2024-07-05 RX ORDER — IBUPROFEN 800 MG/1
800 TABLET ORAL 3 TIMES DAILY PRN
Qty: 90 TABLET | Refills: 0 | Status: SHIPPED | OUTPATIENT
Start: 2024-07-05

## 2024-07-28 DIAGNOSIS — E11.42 TYPE 2 DIABETES MELLITUS WITH DIABETIC POLYNEUROPATHY, WITHOUT LONG-TERM CURRENT USE OF INSULIN: ICD-10-CM

## 2024-07-28 DIAGNOSIS — R56.9 SEIZURE-LIKE ACTIVITY: ICD-10-CM

## 2024-07-28 DIAGNOSIS — E11.65 UNCONTROLLED TYPE 2 DIABETES MELLITUS WITH HYPERGLYCEMIA: ICD-10-CM

## 2024-07-28 DIAGNOSIS — F51.05 INSOMNIA DUE TO OTHER MENTAL DISORDER: ICD-10-CM

## 2024-07-28 DIAGNOSIS — Z88.9 HISTORY OF ALLERGIC REACTION: ICD-10-CM

## 2024-07-28 DIAGNOSIS — B02.8 HERPES ZOSTER WITH COMPLICATION: ICD-10-CM

## 2024-07-28 DIAGNOSIS — F99 INSOMNIA DUE TO OTHER MENTAL DISORDER: ICD-10-CM

## 2024-07-29 RX ORDER — DIVALPROEX SODIUM 500 MG/1
500 TABLET, FILM COATED, EXTENDED RELEASE ORAL DAILY
Qty: 30 TABLET | Refills: 5 | Status: SHIPPED | OUTPATIENT
Start: 2024-07-29

## 2024-07-29 RX ORDER — INSULIN LISPRO 100 [IU]/ML
16 INJECTION, SOLUTION INTRAVENOUS; SUBCUTANEOUS 2 TIMES DAILY WITH MEALS
Qty: 9.6 ML | Refills: 5 | Status: SHIPPED | OUTPATIENT
Start: 2024-07-29 | End: 2024-08-02

## 2024-07-29 RX ORDER — QUETIAPINE FUMARATE 400 MG/1
400 TABLET, FILM COATED ORAL 2 TIMES DAILY
Qty: 60 TABLET | Refills: 2 | Status: SHIPPED | OUTPATIENT
Start: 2024-07-29 | End: 2025-07-29

## 2024-07-29 RX ORDER — EPINEPHRINE 0.3 MG/.3ML
1 INJECTION SUBCUTANEOUS ONCE
Qty: 0.3 ML | Refills: 5 | Status: SHIPPED | OUTPATIENT
Start: 2024-07-29 | End: 2024-07-29

## 2024-07-29 RX ORDER — PEN NEEDLE, DIABETIC 30 GX3/16"
1 NEEDLE, DISPOSABLE MISCELLANEOUS DAILY
Qty: 50 EACH | Refills: 5 | Status: SHIPPED | OUTPATIENT
Start: 2024-07-29

## 2024-07-29 RX ORDER — METFORMIN HYDROCHLORIDE 500 MG/1
500 TABLET ORAL
Qty: 90 TABLET | Refills: 3 | Status: SHIPPED | OUTPATIENT
Start: 2024-07-29 | End: 2025-07-29

## 2024-07-29 RX ORDER — ALCOHOL ANTISEPTIC PADS
1 PADS, MEDICATED (EA) TOPICAL 3 TIMES DAILY
Qty: 100 EACH | Refills: 5 | Status: SHIPPED | OUTPATIENT
Start: 2024-07-29

## 2024-07-29 RX ORDER — VALACYCLOVIR HYDROCHLORIDE 1 G/1
1000 TABLET, FILM COATED ORAL 3 TIMES DAILY
Qty: 21 TABLET | Refills: 0 | Status: SHIPPED | OUTPATIENT
Start: 2024-07-29 | End: 2024-08-05

## 2024-07-29 RX ORDER — INSULIN DETEMIR 100 [IU]/ML
53 INJECTION, SOLUTION SUBCUTANEOUS NIGHTLY
Qty: 5 EACH | Refills: 3 | Status: SHIPPED | OUTPATIENT
Start: 2024-07-29

## 2024-07-30 ENCOUNTER — TELEPHONE (OUTPATIENT)
Dept: INTERNAL MEDICINE | Facility: CLINIC | Age: 49
End: 2024-07-30
Payer: COMMERCIAL

## 2024-07-30 NOTE — TELEPHONE ENCOUNTER
Pt did get in touch with them and she is scheduled with them but isnt until September and on a wait list for them  Please advise  Thanks!

## 2024-07-30 NOTE — TELEPHONE ENCOUNTER
I know she sees mental health for her psy meds. I would advise her to contact them on what would be best to place her on since she is already on medications for this.

## 2024-07-30 NOTE — TELEPHONE ENCOUNTER
Pt is unable to come in this afternoon for her appt suffering with depression/anxiety with her daughter who left out seeing if she can get some meds sent in for this   Please advise  Thanks!

## 2024-07-30 NOTE — TELEPHONE ENCOUNTER
She needs to set up appt with me because this is too much to discuss. She has been on so many meds before that I am not sure what will work for her. Have her see me sometime this week.

## 2024-08-02 ENCOUNTER — TELEPHONE (OUTPATIENT)
Dept: INTERNAL MEDICINE | Facility: CLINIC | Age: 49
End: 2024-08-02
Payer: COMMERCIAL

## 2024-08-02 DIAGNOSIS — E11.65 UNCONTROLLED TYPE 2 DIABETES MELLITUS WITH HYPERGLYCEMIA: Primary | ICD-10-CM

## 2024-08-02 DIAGNOSIS — E11.65 UNCONTROLLED TYPE 2 DIABETES MELLITUS WITH HYPERGLYCEMIA: ICD-10-CM

## 2024-08-02 RX ORDER — INSULIN ASPART 100 [IU]/ML
16 INJECTION, SOLUTION INTRAVENOUS; SUBCUTANEOUS 2 TIMES DAILY WITH MEALS
Qty: 9.6 ML | Refills: 5 | Status: SHIPPED | OUTPATIENT
Start: 2024-08-02 | End: 2024-08-02

## 2024-08-02 RX ORDER — INSULIN ASPART 100 [IU]/ML
INJECTION, SOLUTION INTRAVENOUS; SUBCUTANEOUS
Qty: 9 ML | Refills: 5 | Status: SHIPPED | OUTPATIENT
Start: 2024-08-02

## 2024-08-16 ENCOUNTER — PATIENT MESSAGE (OUTPATIENT)
Dept: ADMINISTRATIVE | Facility: HOSPITAL | Age: 49
End: 2024-08-16
Payer: COMMERCIAL

## 2024-08-16 DIAGNOSIS — E11.9 TYPE 2 DIABETES MELLITUS WITHOUT COMPLICATION, UNSPECIFIED WHETHER LONG TERM INSULIN USE: ICD-10-CM

## 2024-08-20 ENCOUNTER — PATIENT OUTREACH (OUTPATIENT)
Dept: ADMINISTRATIVE | Facility: HOSPITAL | Age: 49
End: 2024-08-20
Payer: COMMERCIAL

## 2024-08-20 ENCOUNTER — PATIENT MESSAGE (OUTPATIENT)
Dept: ADMINISTRATIVE | Facility: HOSPITAL | Age: 49
End: 2024-08-20
Payer: COMMERCIAL

## 2024-08-27 ENCOUNTER — OFFICE VISIT (OUTPATIENT)
Dept: INTERNAL MEDICINE | Facility: CLINIC | Age: 49
End: 2024-08-27
Payer: COMMERCIAL

## 2024-08-27 VITALS
BODY MASS INDEX: 26.49 KG/M2 | WEIGHT: 149.5 LBS | HEART RATE: 107 BPM | OXYGEN SATURATION: 98 % | RESPIRATION RATE: 20 BRPM | HEIGHT: 63 IN

## 2024-08-27 DIAGNOSIS — K52.9 GASTROENTERITIS: Primary | ICD-10-CM

## 2024-08-27 PROCEDURE — 99213 OFFICE O/P EST LOW 20 MIN: CPT | Mod: S$GLB,,, | Performed by: NURSE PRACTITIONER

## 2024-08-27 PROCEDURE — 1159F MED LIST DOCD IN RCRD: CPT | Mod: CPTII,S$GLB,, | Performed by: NURSE PRACTITIONER

## 2024-08-27 PROCEDURE — 99999 PR PBB SHADOW E&M-EST. PATIENT-LVL V: CPT | Mod: PBBFAC,,, | Performed by: NURSE PRACTITIONER

## 2024-08-27 PROCEDURE — 3008F BODY MASS INDEX DOCD: CPT | Mod: CPTII,S$GLB,, | Performed by: NURSE PRACTITIONER

## 2024-08-27 PROCEDURE — 4010F ACE/ARB THERAPY RXD/TAKEN: CPT | Mod: CPTII,S$GLB,, | Performed by: NURSE PRACTITIONER

## 2024-08-27 RX ORDER — METRONIDAZOLE 500 MG/1
500 TABLET ORAL EVERY 12 HOURS
Qty: 14 TABLET | Refills: 0 | Status: SHIPPED | OUTPATIENT
Start: 2024-08-27 | End: 2024-09-03

## 2024-08-27 RX ORDER — ONDANSETRON 4 MG/1
8 TABLET, ORALLY DISINTEGRATING ORAL EVERY 8 HOURS PRN
Qty: 30 TABLET | Refills: 0 | Status: SHIPPED | OUTPATIENT
Start: 2024-08-27

## 2024-08-28 NOTE — ADDENDUM NOTE
Addended by: ALYCE MONTENEGRO on: 11/6/2018 09:16 AM     Modules accepted: Orders    
No indicators present

## 2024-08-29 NOTE — PROGRESS NOTES
Subjective:       Patient ID: Eva Lane is a 48 y.o. female.    Chief Complaint: Abdominal Cramping (With nausea/vomiting - x Sunday )    Patient is known, to me and presents with   Chief Complaint   Patient presents with    Abdominal Cramping     With nausea/vomiting - x Sunday    .  Denies chest pain and shortness of breath.  Patient presents with gastroenteritis symptoms after eating shepards pie her DIL made. No other family members are ill.  No diarrhea but having nausea with vomiting and abdominal pain.   Abdominal Cramping  Associated symptoms include nausea and vomiting. Pertinent negatives include no constipation or diarrhea.     Review of Systems   Constitutional: Negative.    Respiratory: Negative.     Cardiovascular: Negative.    Gastrointestinal:  Positive for abdominal pain, nausea and vomiting. Negative for abdominal distention, anal bleeding, blood in stool, constipation, diarrhea and rectal pain.       Objective:      Physical Exam  Constitutional:       General: She is not in acute distress.     Appearance: Normal appearance. She is not ill-appearing, toxic-appearing or diaphoretic.   Cardiovascular:      Rate and Rhythm: Normal rate and regular rhythm.      Heart sounds: Normal heart sounds. No murmur heard.  Pulmonary:      Effort: Pulmonary effort is normal. No respiratory distress.      Breath sounds: Normal breath sounds. No stridor. No wheezing, rhonchi or rales.   Chest:      Chest wall: No tenderness.   Abdominal:      General: Abdomen is flat. Bowel sounds are increased. There is no distension.      Palpations: Abdomen is soft. There is no mass.      Tenderness: There is generalized abdominal tenderness. There is no right CVA tenderness, left CVA tenderness, guarding or rebound.      Hernia: No hernia is present.   Skin:     General: Skin is warm and dry.      Capillary Refill: Capillary refill takes less than 2 seconds.      Coloration: Skin is not jaundiced or pale.       "Findings: No bruising, erythema, lesion or rash.   Neurological:      Mental Status: She is alert.         Assessment:       1. Gastroenteritis        Plan:   1. Gastroenteritis  -     metroNIDAZOLE (FLAGYL) 500 MG tablet; Take 1 tablet (500 mg total) by mouth every 12 (twelve) hours. for 7 days  Dispense: 14 tablet; Refill: 0  -     ondansetron (ZOFRAN-ODT) 4 MG TbDL; Take 2 tablets (8 mg total) by mouth every 8 (eight) hours as needed (nausea).  Dispense: 30 tablet; Refill: 0       "This note will not be shared with the patient."    Keep hydrated with Pedialyte and gradual increase with toast etc...    Rtc as scheduled  "

## 2024-09-04 ENCOUNTER — OFFICE VISIT (OUTPATIENT)
Dept: INTERNAL MEDICINE | Facility: CLINIC | Age: 49
End: 2024-09-04
Payer: COMMERCIAL

## 2024-09-04 VITALS
BODY MASS INDEX: 25.78 KG/M2 | RESPIRATION RATE: 18 BRPM | OXYGEN SATURATION: 96 % | TEMPERATURE: 100 F | HEIGHT: 63 IN | HEART RATE: 110 BPM | WEIGHT: 145.5 LBS

## 2024-09-04 DIAGNOSIS — U07.1 COVID-19: Primary | ICD-10-CM

## 2024-09-04 LAB
CTP QC/QA: YES
SARS-COV-2 RDRP RESP QL NAA+PROBE: POSITIVE

## 2024-09-04 PROCEDURE — 99999 PR PBB SHADOW E&M-EST. PATIENT-LVL III: CPT | Mod: PBBFAC,,, | Performed by: NURSE PRACTITIONER

## 2024-09-04 PROCEDURE — 3008F BODY MASS INDEX DOCD: CPT | Mod: CPTII,S$GLB,, | Performed by: NURSE PRACTITIONER

## 2024-09-04 PROCEDURE — 4010F ACE/ARB THERAPY RXD/TAKEN: CPT | Mod: CPTII,S$GLB,, | Performed by: NURSE PRACTITIONER

## 2024-09-04 PROCEDURE — 99213 OFFICE O/P EST LOW 20 MIN: CPT | Mod: S$GLB,,, | Performed by: NURSE PRACTITIONER

## 2024-09-04 PROCEDURE — 87635 SARS-COV-2 COVID-19 AMP PRB: CPT | Mod: QW,S$GLB,, | Performed by: NURSE PRACTITIONER

## 2024-09-04 NOTE — PROGRESS NOTES
Subjective:       Patient ID: Eva Lane is a 48 y.o. female.    Chief Complaint: Nasal Congestion (X ongoing - with vomiting/) and Leg Pain (X few days- both thighs - PS:10)    Patient is known, to me and presents with   Chief Complaint   Patient presents with    Nasal Congestion     X ongoing - with vomiting      Leg Pain     X few days- both thighs - PS:10   .  Denies chest pain and shortness of breath.  Patient presents with above complaints since yesterday.   Leg Pain       Review of Systems   Constitutional:  Positive for activity change, appetite change, chills, fatigue and fever. Negative for diaphoresis and unexpected weight change.   HENT:  Positive for congestion and postnasal drip. Negative for sore throat.    Eyes: Negative.    Respiratory:  Positive for cough. Negative for shortness of breath and wheezing.    Cardiovascular: Negative.    Skin: Negative.    Hematological:  Negative for adenopathy.       Objective:      Physical Exam  Constitutional:       General: She is not in acute distress.     Appearance: She is normal weight. She is ill-appearing. She is not toxic-appearing or diaphoretic.   HENT:      Head: Normocephalic and atraumatic.      Right Ear: Tympanic membrane has decreased mobility.      Left Ear: Tympanic membrane has decreased mobility.      Nose: Congestion present.      Mouth/Throat:      Pharynx: Oropharynx is clear. No pharyngeal swelling or posterior oropharyngeal erythema.   Eyes:      General:         Right eye: No discharge.         Left eye: No discharge.      Conjunctiva/sclera: Conjunctivae normal.   Neck:      Vascular: No carotid bruit.   Cardiovascular:      Rate and Rhythm: Regular rhythm.      Heart sounds: Normal heart sounds. No murmur heard.  Pulmonary:      Effort: Pulmonary effort is normal. No respiratory distress.      Breath sounds: Normal breath sounds. No stridor. No wheezing, rhonchi or rales.   Chest:      Chest wall: No tenderness.  "  Musculoskeletal:      Cervical back: Normal range of motion and neck supple. No rigidity or tenderness.   Lymphadenopathy:      Cervical: No cervical adenopathy.   Skin:     General: Skin is warm and dry.      Capillary Refill: Capillary refill takes less than 2 seconds.      Coloration: Skin is not jaundiced or pale.      Findings: No bruising, erythema, lesion or rash.   Neurological:      Mental Status: She is alert.         Assessment:       1. COVID-19        Plan:   1. COVID-19  -     POCT COVID-19 Rapid Screening; Future; Expected date: 09/04/2024       "This note will not be shared with the patient."    Keep hydrated     Take mucinex bid     Tylenol prn     Rtc as scheduled  "

## 2024-09-20 ENCOUNTER — OFFICE VISIT (OUTPATIENT)
Dept: INTERNAL MEDICINE | Facility: CLINIC | Age: 49
End: 2024-09-20
Payer: COMMERCIAL

## 2024-09-20 VITALS
DIASTOLIC BLOOD PRESSURE: 82 MMHG | TEMPERATURE: 98 F | RESPIRATION RATE: 20 BRPM | WEIGHT: 153 LBS | BODY MASS INDEX: 27.11 KG/M2 | HEIGHT: 63 IN | OXYGEN SATURATION: 97 % | SYSTOLIC BLOOD PRESSURE: 124 MMHG | HEART RATE: 110 BPM

## 2024-09-20 DIAGNOSIS — L03.011 INFECTION OF NAIL BED OF FINGER OF RIGHT HAND: Primary | ICD-10-CM

## 2024-09-20 PROCEDURE — 99213 OFFICE O/P EST LOW 20 MIN: CPT | Mod: S$GLB,,, | Performed by: NURSE PRACTITIONER

## 2024-09-20 PROCEDURE — 4010F ACE/ARB THERAPY RXD/TAKEN: CPT | Mod: CPTII,S$GLB,, | Performed by: NURSE PRACTITIONER

## 2024-09-20 PROCEDURE — 1159F MED LIST DOCD IN RCRD: CPT | Mod: CPTII,S$GLB,, | Performed by: NURSE PRACTITIONER

## 2024-09-20 PROCEDURE — 3079F DIAST BP 80-89 MM HG: CPT | Mod: CPTII,S$GLB,, | Performed by: NURSE PRACTITIONER

## 2024-09-20 PROCEDURE — 3008F BODY MASS INDEX DOCD: CPT | Mod: CPTII,S$GLB,, | Performed by: NURSE PRACTITIONER

## 2024-09-20 PROCEDURE — 3074F SYST BP LT 130 MM HG: CPT | Mod: CPTII,S$GLB,, | Performed by: NURSE PRACTITIONER

## 2024-09-20 PROCEDURE — 99999 PR PBB SHADOW E&M-EST. PATIENT-LVL V: CPT | Mod: PBBFAC,,, | Performed by: NURSE PRACTITIONER

## 2024-09-20 RX ORDER — DOXYCYCLINE HYCLATE 100 MG
100 TABLET ORAL EVERY 12 HOURS
Qty: 14 TABLET | Refills: 0 | Status: SHIPPED | OUTPATIENT
Start: 2024-09-20 | End: 2024-09-27

## 2024-09-20 RX ORDER — CARIPRAZINE 4.5 MG/1
4.5 CAPSULE, GELATIN COATED ORAL
COMMUNITY
Start: 2024-08-02

## 2024-09-24 ENCOUNTER — OFFICE VISIT (OUTPATIENT)
Dept: INTERNAL MEDICINE | Facility: CLINIC | Age: 49
End: 2024-09-24
Payer: COMMERCIAL

## 2024-09-24 VITALS
OXYGEN SATURATION: 99 % | HEART RATE: 88 BPM | RESPIRATION RATE: 20 BRPM | HEIGHT: 63 IN | WEIGHT: 147.25 LBS | BODY MASS INDEX: 26.09 KG/M2 | TEMPERATURE: 97 F

## 2024-09-24 DIAGNOSIS — J06.9 UPPER RESPIRATORY INFECTION, VIRAL: Primary | ICD-10-CM

## 2024-09-24 LAB
CTP QC/QA: YES
POC MOLECULAR INFLUENZA A AGN: NEGATIVE
POC MOLECULAR INFLUENZA B AGN: NEGATIVE

## 2024-09-24 PROCEDURE — 3008F BODY MASS INDEX DOCD: CPT | Mod: CPTII,S$GLB,, | Performed by: NURSE PRACTITIONER

## 2024-09-24 PROCEDURE — 99999 PR PBB SHADOW E&M-EST. PATIENT-LVL III: CPT | Mod: PBBFAC,,, | Performed by: NURSE PRACTITIONER

## 2024-09-24 PROCEDURE — 4010F ACE/ARB THERAPY RXD/TAKEN: CPT | Mod: CPTII,S$GLB,, | Performed by: NURSE PRACTITIONER

## 2024-09-24 PROCEDURE — 99213 OFFICE O/P EST LOW 20 MIN: CPT | Mod: S$GLB,,, | Performed by: NURSE PRACTITIONER

## 2024-09-24 PROCEDURE — 87502 INFLUENZA DNA AMP PROBE: CPT | Mod: QW,S$GLB,, | Performed by: NURSE PRACTITIONER

## 2024-09-24 RX ORDER — PROMETHAZINE HYDROCHLORIDE AND DEXTROMETHORPHAN HYDROBROMIDE 6.25; 15 MG/5ML; MG/5ML
5 SYRUP ORAL EVERY 6 HOURS PRN
Qty: 120 ML | Refills: 0 | Status: SHIPPED | OUTPATIENT
Start: 2024-09-24 | End: 2024-10-01

## 2024-09-24 NOTE — PROGRESS NOTES
Subjective:       Patient ID: Eva Lane is a 48 y.o. female.    Chief Complaint: Nasal Congestion (With cough- x few days )    Patient is known, to me and presents with   Chief Complaint   Patient presents with    Nasal Congestion     With cough- x few days    .  Denies chest pain and shortness of breath.  Presents with above complaints. Had covid earlier this month. Cough is keeping her up at night    HPI  Review of Systems   Constitutional: Negative.    HENT:  Positive for congestion and postnasal drip.    Eyes: Negative.    Respiratory:  Positive for cough. Negative for shortness of breath and wheezing.    Cardiovascular: Negative.    Skin: Negative.    Hematological: Negative.  Negative for adenopathy.       Objective:      Physical Exam  Constitutional:       General: She is not in acute distress.     Appearance: Normal appearance. She is not ill-appearing, toxic-appearing or diaphoretic.   HENT:      Head: Normocephalic and atraumatic.      Right Ear: Tympanic membrane normal.      Left Ear: Tympanic membrane normal.      Nose: Congestion present.      Mouth/Throat:      Mouth: Mucous membranes are moist.      Pharynx: Oropharynx is clear. No posterior oropharyngeal erythema.   Eyes:      General:         Right eye: No discharge.         Left eye: No discharge.      Conjunctiva/sclera: Conjunctivae normal.   Neck:      Vascular: No carotid bruit.   Cardiovascular:      Rate and Rhythm: Regular rhythm.      Heart sounds: Normal heart sounds. No murmur heard.  Pulmonary:      Effort: Pulmonary effort is normal. No respiratory distress.      Breath sounds: Normal breath sounds. No stridor. No wheezing, rhonchi or rales.   Chest:      Chest wall: No tenderness.   Musculoskeletal:      Cervical back: Normal range of motion and neck supple. No rigidity or tenderness.   Lymphadenopathy:      Cervical: No cervical adenopathy.   Skin:     General: Skin is warm and dry.      Capillary Refill: Capillary refill  "takes less than 2 seconds.      Coloration: Skin is not jaundiced or pale.      Findings: No bruising, erythema, lesion or rash.   Neurological:      Mental Status: She is alert.         Assessment:       1. Upper respiratory infection, viral        Plan:   1. Upper respiratory infection, viral  -     POCT Influenza A/B Molecular  -     promethazine-dextromethorphan (PROMETHAZINE-DM) 6.25-15 mg/5 mL Syrp; Take 5 mLs by mouth every 6 (six) hours as needed.  Dispense: 120 mL; Refill: 0       "This note will not be shared with the patient."    Negative flu    Keep hydrated    Rtc as scheduled  "

## 2024-09-30 NOTE — PROGRESS NOTES
Subjective:       Patient ID: Eva Lane is a 48 y.o. female.    Chief Complaint: redness in finger (X 3 days- R. Middle finger PS:9)    Patient is known, to me and presents with   Chief Complaint   Patient presents with    redness in finger     X 3 days- R. Middle finger PS:9   .  Denies chest pain and shortness of breath.  Patient presents with above complaints. Has been worsening for the past three days.   HPI  Review of Systems   Constitutional:  Negative for activity change and unexpected weight change.   HENT:  Negative for hearing loss, rhinorrhea and trouble swallowing.    Eyes:  Negative for discharge and visual disturbance.   Respiratory:  Negative for chest tightness and wheezing.    Cardiovascular:  Negative for chest pain and palpitations.   Gastrointestinal:  Negative for blood in stool, constipation, diarrhea and vomiting.   Endocrine: Negative for polydipsia and polyuria.   Genitourinary:  Negative for difficulty urinating, dysuria, hematuria and menstrual problem.   Musculoskeletal:  Negative for arthralgias, joint swelling and neck pain.   Skin:  Positive for color change. Negative for pallor, rash and wound.   Neurological:  Negative for weakness and headaches.   Psychiatric/Behavioral:  Negative for confusion and dysphoric mood.        Objective:      Physical Exam  Constitutional:       General: She is not in acute distress.     Appearance: Normal appearance. She is not ill-appearing, toxic-appearing or diaphoretic.   Cardiovascular:      Rate and Rhythm: Normal rate and regular rhythm.      Heart sounds: Normal heart sounds. No murmur heard.  Pulmonary:      Effort: Pulmonary effort is normal. No respiratory distress.      Breath sounds: Normal breath sounds. No stridor. No wheezing, rhonchi or rales.   Chest:      Chest wall: No tenderness.   Skin:     General: Skin is warm and dry.      Capillary Refill: Capillary refill takes less than 2 seconds.      Coloration: Skin is not  "jaundiced or pale.      Findings: Erythema present. No bruising, lesion or rash.             Comments: Erythema to right middle finger nail cuticle region with tenderness and no drainage       Neurological:      Mental Status: She is alert.         Assessment:       1. Infection of nail bed of finger of right hand        Plan:   1. Infection of nail bed of finger of right hand  -     doxycycline (VIBRA-TABS) 100 MG tablet; Take 1 tablet (100 mg total) by mouth every 12 (twelve) hours. for 7 days  Dispense: 14 tablet; Refill: 0     "This note will not be shared with the patient."    Keep area clean and dry    Soak in warm soapy water    Rtc as scheduled    "

## 2024-10-04 ENCOUNTER — HOSPITAL ENCOUNTER (EMERGENCY)
Facility: HOSPITAL | Age: 49
Discharge: HOME | End: 2024-10-04
Payer: COMMERCIAL

## 2024-10-04 VITALS
HEIGHT: 63 IN | TEMPERATURE: 98 F | OXYGEN SATURATION: 99 % | RESPIRATION RATE: 18 BRPM | BODY MASS INDEX: 26.05 KG/M2 | HEART RATE: 81 BPM | SYSTOLIC BLOOD PRESSURE: 134 MMHG | WEIGHT: 147 LBS | DIASTOLIC BLOOD PRESSURE: 94 MMHG

## 2024-10-04 DIAGNOSIS — R29.90 NEUROLOGICAL COMPLAINT: Primary | ICD-10-CM

## 2024-10-04 DIAGNOSIS — R06.02 SOB (SHORTNESS OF BREATH): ICD-10-CM

## 2024-10-04 LAB
ALBUMIN SERPL BCP-MCNC: 4.3 G/DL (ref 3.5–5.2)
ALP SERPL-CCNC: 84 U/L (ref 55–135)
ALT SERPL W/O P-5'-P-CCNC: 14 U/L (ref 10–44)
ANION GAP SERPL CALC-SCNC: 14 MMOL/L (ref 8–16)
AST SERPL-CCNC: 12 U/L (ref 10–40)
BASOPHILS # BLD AUTO: 0.05 K/UL (ref 0–0.2)
BASOPHILS NFR BLD: 0.5 % (ref 0–1.9)
BILIRUB SERPL-MCNC: 0.6 MG/DL (ref 0.1–1)
BNP SERPL-MCNC: 58 PG/ML (ref 0–99)
BUN SERPL-MCNC: 12 MG/DL (ref 6–20)
CALCIUM SERPL-MCNC: 9.9 MG/DL (ref 8.7–10.5)
CHLORIDE SERPL-SCNC: 107 MMOL/L (ref 95–110)
CHOLEST SERPL-MCNC: 233 MG/DL (ref 120–199)
CHOLEST/HDLC SERPL: 5.4 {RATIO} (ref 2–5)
CO2 SERPL-SCNC: 20 MMOL/L (ref 23–29)
CREAT SERPL-MCNC: 0.9 MG/DL (ref 0.5–1.4)
DIFFERENTIAL METHOD BLD: NORMAL
EOSINOPHIL # BLD AUTO: 0.2 K/UL (ref 0–0.5)
EOSINOPHIL NFR BLD: 1.5 % (ref 0–8)
ERYTHROCYTE [DISTWIDTH] IN BLOOD BY AUTOMATED COUNT: 13.1 % (ref 11.5–14.5)
EST. GFR  (NO RACE VARIABLE): >60 ML/MIN/1.73 M^2
GLUCOSE SERPL-MCNC: 186 MG/DL (ref 70–110)
HCT VFR BLD AUTO: 39.8 % (ref 37–48.5)
HDLC SERPL-MCNC: 43 MG/DL (ref 40–75)
HDLC SERPL: 18.5 % (ref 20–50)
HGB BLD-MCNC: 13.8 G/DL (ref 12–16)
IMM GRANULOCYTES # BLD AUTO: 0.03 K/UL (ref 0–0.04)
IMM GRANULOCYTES NFR BLD AUTO: 0.3 % (ref 0–0.5)
INR PPP: 1.1 (ref 0.8–1.2)
LDLC SERPL CALC-MCNC: 147.8 MG/DL (ref 63–159)
LYMPHOCYTES # BLD AUTO: 3.7 K/UL (ref 1–4.8)
LYMPHOCYTES NFR BLD: 37.1 % (ref 18–48)
MCH RBC QN AUTO: 29.9 PG (ref 27–31)
MCHC RBC AUTO-ENTMCNC: 34.7 G/DL (ref 32–36)
MCV RBC AUTO: 86 FL (ref 82–98)
MONOCYTES # BLD AUTO: 0.7 K/UL (ref 0.3–1)
MONOCYTES NFR BLD: 6.5 % (ref 4–15)
NEUTROPHILS # BLD AUTO: 5.4 K/UL (ref 1.8–7.7)
NEUTROPHILS NFR BLD: 54.1 % (ref 38–73)
NONHDLC SERPL-MCNC: 190 MG/DL
NRBC BLD-RTO: 0 /100 WBC
OHS QRS DURATION: 78 MS
OHS QTC CALCULATION: 439 MS
PLATELET # BLD AUTO: 357 K/UL (ref 150–450)
PMV BLD AUTO: 11.2 FL (ref 9.2–12.9)
POC PH VENOUS: 7.54
POCT GLUCOSE: 185 MG/DL (ref 70–110)
POTASSIUM SERPL-SCNC: 3.6 MMOL/L (ref 3.5–5.1)
PROT SERPL-MCNC: 8.2 G/DL (ref 6–8.4)
PROTHROMBIN TIME: 11.8 SEC (ref 9–12.5)
RBC # BLD AUTO: 4.61 M/UL (ref 4–5.4)
SODIUM SERPL-SCNC: 141 MMOL/L (ref 136–145)
TRIGL SERPL-MCNC: 211 MG/DL (ref 30–150)
TROPONIN I SERPL DL<=0.01 NG/ML-MCNC: 0.01 NG/ML (ref 0–0.03)
TSH SERPL DL<=0.005 MIU/L-ACNC: 1.46 UIU/ML (ref 0.4–4)
WBC # BLD AUTO: 9.96 K/UL (ref 3.9–12.7)

## 2024-10-04 PROCEDURE — 82962 GLUCOSE BLOOD TEST: CPT

## 2024-10-04 PROCEDURE — 96375 TX/PRO/DX INJ NEW DRUG ADDON: CPT | Mod: 59

## 2024-10-04 PROCEDURE — 82800 BLOOD PH: CPT

## 2024-10-04 PROCEDURE — 25500020 PHARM REV CODE 255

## 2024-10-04 PROCEDURE — 85025 COMPLETE CBC W/AUTO DIFF WBC: CPT

## 2024-10-04 PROCEDURE — 93005 ELECTROCARDIOGRAM TRACING: CPT

## 2024-10-04 PROCEDURE — 36415 COLL VENOUS BLD VENIPUNCTURE: CPT

## 2024-10-04 PROCEDURE — 80053 COMPREHEN METABOLIC PANEL: CPT

## 2024-10-04 PROCEDURE — 93010 ELECTROCARDIOGRAM REPORT: CPT | Mod: ,,, | Performed by: INTERNAL MEDICINE

## 2024-10-04 PROCEDURE — 84484 ASSAY OF TROPONIN QUANT: CPT

## 2024-10-04 PROCEDURE — 83880 ASSAY OF NATRIURETIC PEPTIDE: CPT

## 2024-10-04 PROCEDURE — 99285 EMERGENCY DEPT VISIT HI MDM: CPT | Mod: 25

## 2024-10-04 PROCEDURE — 25000003 PHARM REV CODE 250

## 2024-10-04 PROCEDURE — 84443 ASSAY THYROID STIM HORMONE: CPT

## 2024-10-04 PROCEDURE — 85610 PROTHROMBIN TIME: CPT

## 2024-10-04 PROCEDURE — 99900035 HC TECH TIME PER 15 MIN (STAT)

## 2024-10-04 PROCEDURE — 96374 THER/PROPH/DIAG INJ IV PUSH: CPT

## 2024-10-04 PROCEDURE — 99900031 HC PATIENT EDUCATION (STAT)

## 2024-10-04 PROCEDURE — 94760 N-INVAS EAR/PLS OXIMETRY 1: CPT

## 2024-10-04 PROCEDURE — 80061 LIPID PANEL: CPT

## 2024-10-04 PROCEDURE — 63600175 PHARM REV CODE 636 W HCPCS

## 2024-10-04 PROCEDURE — G0426 INPT/ED TELECONSULT50: HCPCS | Mod: GT,,, | Performed by: STUDENT IN AN ORGANIZED HEALTH CARE EDUCATION/TRAINING PROGRAM

## 2024-10-04 RX ORDER — DIPHENHYDRAMINE HYDROCHLORIDE 50 MG/ML
50 INJECTION INTRAMUSCULAR; INTRAVENOUS ONCE
Status: COMPLETED | OUTPATIENT
Start: 2024-10-04 | End: 2024-10-04

## 2024-10-04 RX ORDER — HYDROXYZINE HYDROCHLORIDE 10 MG/1
10 TABLET, FILM COATED ORAL
Status: COMPLETED | OUTPATIENT
Start: 2024-10-04 | End: 2024-10-04

## 2024-10-04 RX ORDER — DIPHENHYDRAMINE HYDROCHLORIDE 50 MG/ML
50 INJECTION INTRAMUSCULAR; INTRAVENOUS ONCE
Status: DISCONTINUED | OUTPATIENT
Start: 2024-10-04 | End: 2024-10-04

## 2024-10-04 RX ADMIN — DIPHENHYDRAMINE HYDROCHLORIDE 50 MG: 50 INJECTION INTRAMUSCULAR; INTRAVENOUS at 01:10

## 2024-10-04 RX ADMIN — IOHEXOL 100 ML: 350 INJECTION, SOLUTION INTRAVENOUS at 03:10

## 2024-10-04 RX ADMIN — HYDROCORTISONE SODIUM SUCCINATE 200 MG: 100 INJECTION, POWDER, FOR SOLUTION INTRAMUSCULAR; INTRAVENOUS at 01:10

## 2024-10-04 RX ADMIN — HYDROXYZINE HYDROCHLORIDE 10 MG: 10 TABLET ORAL at 05:10

## 2024-10-04 NOTE — TELEMEDICINE CONSULT
[facility]  Vascular Neurology  Comprehensive Stroke Center  TeleVascular Neurology Acute Consultation Note        Consult Information  Consult to Telemedicine - Acute Stroke  Consult performed by: Princess Tracey MD  Consult ordered by: Krzysztof Lopez MD          Consulting Provider: KRZYSZTOF LOPEZ   Current Providers  No providers found    Patient Location:  [facility] EMERGENCY DEPARTMENT Emergency Department    Spoke hospital nurse at bedside with patient assisting consultant.  Patient information was obtained from patient and relative(s).       Stroke Documentation  Acute Stroke Times   Last Known Normal Date: 10/04/24  Last Known Normal Time: 2330  Symptom Onset Date: 10/04/24  Unknown Symptom Onset Time: Unknown Time  Stroke Team Called Date: 10/04/24  Stroke Team Called Time: 0042  Stroke Team Arrival Date: 10/04/24  Stroke Team Arrival Time: 0053  CT Interpretation Time: 0052  Thrombolytic Therapy Recommended: No    NIH Scale:  1a. Level of Consciousness: 0-->Alert, keenly responsive  1b. LOC Questions: 0-->Answers both questions correctly  1c. LOC Commands: 0-->Performs both tasks correctly  2. Best Gaze: 0-->Normal  3. Visual: 0-->No visual loss  4. Facial Palsy: 0-->Normal symmetrical movements  5a. Motor Arm, Left: 0-->No drift, limb holds 90 (or 45) degrees for full 10 secs  5b. Motor Arm, Right: 3-->No effort against gravity, limb falls (Fluctuating weakness, improves w/ encouragement)  6a. Motor Leg, Left: 0-->No drift, leg holds 30 degree position for full 5 secs  6b. Motor Leg, Right: 3-->No effort against gravity, leg falls to bed immediately  7. Limb Ataxia: 0-->Absent  8. Sensory: 2-->Severe to total sensory loss, patient is not aware of being touched in the face, arm, and leg  9. Best Language: 0-->No aphasia, normal  10. Dysarthria: 0-->Normal  11. Extinction and Inattention (formerly Neglect): 0-->No abnormality  Total (NIH Stroke Scale): 8      Modified Faby:  "Score: 1  [place] Coma Scale:     ABCD2 Score:    [id number] Score:    HAS -BLED Score:    ICH Score:    Hunt & Crabtree Classification:      Blood pressure (!) 158/82, pulse 83, temperature 98.2 °F (36.8 °C), temperature source Oral, resp. rate (!) 28, height 5' 3" (1.6 m), weight 66.7 kg (147 lb), last menstrual period 12/11/2006, SpO2 100%, not currently breastfeeding.      In my opinion, this was a: Tier 2; VAN Stroke Assessment: Negative     Medical Decision Making  W.HPI:  48 y.o. female  PMHX of HLD, Tobacco use, Borderline personality disorder, Bipolar 2 disorder/ undifferentiated schizophrenia, conversion disorder, DM II, HTN, who presents w/ RSW and R sided numbness.   Reports history of "stress stroke" that presented w/ similar complaints.   Reports that her children were in an argument and she had to get in between them and that's when she noted worsening of her symptoms.      Images personally reviewed and interpreted:  Study: Head CT  Study Interpretation: No acute intracranial abnormalities, specifically no early signs of ischemia and no intracranial hemorrhage.        Assessment and plan:  NIHSS 8, w/ no facial involvement, a definitive midline cut for reported complete sensory loss to the R hemibody and brendan-face, RUE and RLE paralysis, that improves w/ encouragement.   Low suspicion for a vascular event based on motor improvement w/ encouragement, absence of facial involvement and non-physiologic stutter.    Reasonable to obtain vessel imaging to ensure no LVO, regardless.   Monitor for resolution of symptoms, may require PT/OT eval.     Lytics recommendation: Thrombolytic therapy not recommended due to Suspected stroke mimic   Thrombectomy recommendation: No; at this time symptoms not suggestive of large vessel occlusion  Placement recommendation: pending further studies  admit to inpatient               ROS  Physical Exam  Past Medical History:   Diagnosis Date    Addiction to drug     ADHD " (attention deficit hyperactivity disorder)     Anxiety     Arthritis     Asthma     Behavioral problem     Bipolar 1 disorder     Borderline personality disorder 09/23/2015    CHF (congestive heart failure)     Conversion disorder 09/23/2015    COPD (chronic obstructive pulmonary disease)     Depression     Diabetes mellitus type II     Fatigue     Hallucination     Headache     History of psychiatric hospitalization     Hx of psychiatric care     Hyperlipidemia     Hypertension     Lumbar radiculopathy     Allie     Neuralgia     Neuritis     Panic disorder     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    Psychiatric problem     Psychosis     PTSD (post-traumatic stress disorder)     Seizures     Seizure-last 8/2015-shake and fall-doesnt remember anything    Self-harming behavior     Sleep apnea     on CPAP    Sleep difficulties     Social anxiety disorder 02/04/2016    Stroke 09/22/2015    Stroke     Suicide attempt     Therapy     Thyroid disease      Past Surgical History:   Procedure Laterality Date    COLONOSCOPY N/A 7/1/2016    Procedure: COLONOSCOPY;  Surgeon: Robert Hernandez MD;  Location: [facility] ([address] [place] [address]);  Service: Endoscopy;  Laterality: N/A;  Schedule for next available CRS physician - EGD @ 8:30 with Dr. Naylor    CYST REMOVAL  2000    Fatty cyst on right face cheek and left upper arm     DILATION AND CURETTAGE OF UTERUS  2006    Miscarriage    HYSTERECTOMY  7/08    DUB - Total    OOPHORECTOMY  7/2008    TOTAL ABDOMINAL HYSTERECTOMY  7/2008    TUBAL LIGATION  2007     Family History   Problem Relation Name Age of Onset    No Known Problems Mother      Heart disease Father      Hyperlipidemia Father      Hypertension Father      Diabetes Father      Dementia Father      Breast cancer Neg Hx      Colon cancer Neg Hx      Ovarian cancer Neg Hx         Diagnoses  No problems updated.    Princess Tracey MD      Emergent/Acute neurological consultation requested by spoke provider due to critical concerns for possible  cerebrovascular event that could result in permanent loss of neurologic/bodily function, severe disability or death of this patient.  Immediate/timely evaluation by a highly prepared expert is paramount for optimal outcomes  High risk for neurological deterioration if not properly diagnosed  High risk for neurological deterioration if not treated promplty/as soon as possible  Complex diagnostic evaluation may be required (advanced imaging)  High risk treatment options (thrombolytics and/or thrombectomy)    Patient care was coordinated with spoke provider, including but not limted to    Discussing likely diagnosis/etiology of symptoms  Making recommendations for further diagnostic studies  Making recommendations for intravenous thrombolytics or other advanced therapies  Making recommendations for disposition (admission/transfer for higher level of care)
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
## 2024-10-04 NOTE — Clinical Note
"Eva Lane (Dana) was seen and treated in our emergency department on 10/4/2024.  She may return to work on 10/05/2024.       If you have any questions or concerns, please don't hesitate to call.       RN    "
1.84
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
## 2024-10-04 NOTE — DISCHARGE INSTRUCTIONS
Thank you for coming to our Emergency Department today!     -return to the ER for weakness, numbness, facial droop  -Follow-up with primary care doctor within 3-7 days to discuss your recent ER visit and any additional concerns that you may have.    Return to the Emergency Department for symptoms including but not limited to: persistence or worsening of symptoms, shortness of breath or chest pain, inability to drink without vomiting, passing out/fainting/ loss of consciousness, or if you have other concerns.

## 2024-10-04 NOTE — ED NOTES
[facility] contacted by Dr. Lopez and tele-monitor placed at bedside and powered on and ready for tele-stroke consult.

## 2024-10-04 NOTE — ED NOTES
Dr. Tracey online with patient for tele-stroke consult at this time. Will RN and MONA Eisenberg at bedside for assessment assistance.

## 2024-10-15 ENCOUNTER — PATIENT OUTREACH (OUTPATIENT)
Dept: ADMINISTRATIVE | Facility: HOSPITAL | Age: 49
End: 2024-10-15
Payer: COMMERCIAL

## 2024-10-17 ENCOUNTER — TELEPHONE (OUTPATIENT)
Dept: INTERNAL MEDICINE | Facility: CLINIC | Age: 49
End: 2024-10-17
Payer: COMMERCIAL

## 2024-10-17 DIAGNOSIS — N30.01 ACUTE CYSTITIS WITH HEMATURIA: Primary | ICD-10-CM

## 2024-10-17 RX ORDER — FLUCONAZOLE 150 MG/1
150 TABLET ORAL ONCE
Qty: 2 TABLET | Refills: 0 | Status: SHIPPED | OUTPATIENT
Start: 2024-10-17 | End: 2024-10-17

## 2024-10-17 RX ORDER — NITROFURANTOIN (MACROCRYSTALS) 100 MG/1
100 CAPSULE ORAL EVERY 12 HOURS
Qty: 10 CAPSULE | Refills: 0 | Status: SHIPPED | OUTPATIENT
Start: 2024-10-17 | End: 2024-10-22

## 2024-10-17 NOTE — TELEPHONE ENCOUNTER
Pt called with burning when she urinates for 3 days with itching seeing if meds can be sent in  Please advise  Thanks!  WM

## 2024-10-21 ENCOUNTER — OFFICE VISIT (OUTPATIENT)
Dept: INTERNAL MEDICINE | Facility: CLINIC | Age: 49
End: 2024-10-21
Payer: COMMERCIAL

## 2024-10-21 VITALS
TEMPERATURE: 98 F | RESPIRATION RATE: 20 BRPM | OXYGEN SATURATION: 99 % | HEIGHT: 63 IN | WEIGHT: 149.69 LBS | HEART RATE: 120 BPM | SYSTOLIC BLOOD PRESSURE: 114 MMHG | BODY MASS INDEX: 26.52 KG/M2 | DIASTOLIC BLOOD PRESSURE: 62 MMHG

## 2024-10-21 DIAGNOSIS — H60.503 ACUTE OTITIS EXTERNA OF BOTH EARS, UNSPECIFIED TYPE: ICD-10-CM

## 2024-10-21 DIAGNOSIS — R51.9 NONINTRACTABLE HEADACHE, UNSPECIFIED CHRONICITY PATTERN, UNSPECIFIED HEADACHE TYPE: Primary | ICD-10-CM

## 2024-10-21 PROCEDURE — 3008F BODY MASS INDEX DOCD: CPT | Mod: CPTII,S$GLB,, | Performed by: NURSE PRACTITIONER

## 2024-10-21 PROCEDURE — 99999 PR PBB SHADOW E&M-EST. PATIENT-LVL V: CPT | Mod: PBBFAC,,, | Performed by: NURSE PRACTITIONER

## 2024-10-21 PROCEDURE — 3078F DIAST BP <80 MM HG: CPT | Mod: CPTII,S$GLB,, | Performed by: NURSE PRACTITIONER

## 2024-10-21 PROCEDURE — 96372 THER/PROPH/DIAG INJ SC/IM: CPT | Mod: S$GLB,,, | Performed by: NURSE PRACTITIONER

## 2024-10-21 PROCEDURE — 4010F ACE/ARB THERAPY RXD/TAKEN: CPT | Mod: CPTII,S$GLB,, | Performed by: NURSE PRACTITIONER

## 2024-10-21 PROCEDURE — 1159F MED LIST DOCD IN RCRD: CPT | Mod: CPTII,S$GLB,, | Performed by: NURSE PRACTITIONER

## 2024-10-21 PROCEDURE — 3074F SYST BP LT 130 MM HG: CPT | Mod: CPTII,S$GLB,, | Performed by: NURSE PRACTITIONER

## 2024-10-21 PROCEDURE — 99213 OFFICE O/P EST LOW 20 MIN: CPT | Mod: S$GLB,,, | Performed by: NURSE PRACTITIONER

## 2024-10-21 RX ORDER — NEOMYCIN SULFATE, POLYMYXIN B SULFATE AND HYDROCORTISONE 10; 3.5; 1 MG/ML; MG/ML; [USP'U]/ML
3 SUSPENSION/ DROPS AURICULAR (OTIC) 3 TIMES DAILY
Qty: 10 ML | Refills: 0 | Status: SHIPPED | OUTPATIENT
Start: 2024-10-21

## 2024-10-21 RX ORDER — KETOROLAC TROMETHAMINE 30 MG/ML
60 INJECTION, SOLUTION INTRAMUSCULAR; INTRAVENOUS
Status: COMPLETED | OUTPATIENT
Start: 2024-10-21 | End: 2024-10-21

## 2024-10-21 RX ADMIN — KETOROLAC TROMETHAMINE 60 MG: 30 INJECTION, SOLUTION INTRAMUSCULAR; INTRAVENOUS at 02:10

## 2024-10-21 NOTE — PROGRESS NOTES
Subjective:       Patient ID: Eva Lane is a 48 y.o. female.    Chief Complaint: Otalgia ( Both ears and congestion with diarrhea - x ongoing )    Patient is known, to me and presents with   Chief Complaint   Patient presents with    Otalgia      Both ears and congestion with diarrhea - x ongoing    .  Denies chest pain and shortness of breath.  Presents with above and also headache. Recently got off antibx for uti. That is resolved.        Otalgia   Associated symptoms include headaches.     Review of Systems   Constitutional: Negative.  Negative for chills, fatigue and fever.   HENT:  Positive for ear pain.    Eyes: Negative.  Negative for visual disturbance.   Respiratory: Negative.     Cardiovascular: Negative.    Skin: Negative.    Neurological:  Positive for headaches. Negative for dizziness, weakness and numbness.       Objective:      Physical Exam  Constitutional:       General: She is not in acute distress.     Appearance: Normal appearance. She is not ill-appearing, toxic-appearing or diaphoretic.   HENT:      Head: Normocephalic and atraumatic.      Right Ear: Tenderness present.      Left Ear: Tenderness present.      Nose: No congestion.      Mouth/Throat:      Pharynx: Oropharynx is clear. No pharyngeal swelling, posterior oropharyngeal erythema or postnasal drip.   Eyes:      General:         Right eye: No discharge.         Left eye: No discharge.      Conjunctiva/sclera: Conjunctivae normal.   Neck:      Vascular: No carotid bruit.   Cardiovascular:      Rate and Rhythm: Regular rhythm.      Heart sounds: Normal heart sounds. No murmur heard.  Pulmonary:      Effort: Pulmonary effort is normal. No respiratory distress.      Breath sounds: Normal breath sounds. No stridor. No wheezing, rhonchi or rales.   Chest:      Chest wall: No tenderness.   Musculoskeletal:      Cervical back: Normal range of motion and neck supple. No rigidity or tenderness.   Lymphadenopathy:      Cervical: No  "cervical adenopathy.   Skin:     General: Skin is warm and dry.      Capillary Refill: Capillary refill takes less than 2 seconds.      Coloration: Skin is not jaundiced or pale.      Findings: No bruising, erythema, lesion or rash.   Neurological:      General: No focal deficit present.      Mental Status: She is alert and oriented to person, place, and time.      Cranial Nerves: No cranial nerve deficit.      Sensory: No sensory deficit.      Motor: No weakness.      Coordination: Coordination normal.      Gait: Gait normal.      Deep Tendon Reflexes: Reflexes normal.         Assessment:       1. Nonintractable headache, unspecified chronicity pattern, unspecified headache type    2. Acute otitis externa of both ears, unspecified type        Plan:   1. Nonintractable headache, unspecified chronicity pattern, unspecified headache type  -     ketorolac injection 60 mg    2. Acute otitis externa of both ears, unspecified type  -     neomycin-polymyxin-hydrocortisone (CORTISPORIN) 3.5-10,000-1 mg/mL-unit/mL-% otic suspension; Place 3 drops into both ears 3 (three) times daily.  Dispense: 10 mL; Refill: 0       "This note will not be shared with the patient."      Any worsening let me know      Rtc as scheduled   "

## 2024-10-29 ENCOUNTER — PATIENT MESSAGE (OUTPATIENT)
Dept: ADMINISTRATIVE | Facility: HOSPITAL | Age: 49
End: 2024-10-29
Payer: COMMERCIAL

## 2024-11-06 DIAGNOSIS — E11.9 TYPE 2 DIABETES MELLITUS WITHOUT COMPLICATION, UNSPECIFIED WHETHER LONG TERM INSULIN USE: ICD-10-CM

## 2024-11-07 ENCOUNTER — OFFICE VISIT (OUTPATIENT)
Dept: INTERNAL MEDICINE | Facility: CLINIC | Age: 49
End: 2024-11-07
Payer: COMMERCIAL

## 2024-11-07 VITALS
DIASTOLIC BLOOD PRESSURE: 80 MMHG | OXYGEN SATURATION: 98 % | WEIGHT: 154.75 LBS | HEART RATE: 110 BPM | TEMPERATURE: 99 F | HEIGHT: 63 IN | BODY MASS INDEX: 27.42 KG/M2 | SYSTOLIC BLOOD PRESSURE: 134 MMHG | RESPIRATION RATE: 20 BRPM

## 2024-11-07 DIAGNOSIS — F51.04 CHRONIC INSOMNIA: ICD-10-CM

## 2024-11-07 DIAGNOSIS — F41.9 ANXIETY: ICD-10-CM

## 2024-11-07 DIAGNOSIS — R11.0 NAUSEA: Primary | ICD-10-CM

## 2024-11-07 PROCEDURE — 3008F BODY MASS INDEX DOCD: CPT | Mod: CPTII,S$GLB,, | Performed by: NURSE PRACTITIONER

## 2024-11-07 PROCEDURE — 4010F ACE/ARB THERAPY RXD/TAKEN: CPT | Mod: CPTII,S$GLB,, | Performed by: NURSE PRACTITIONER

## 2024-11-07 PROCEDURE — 3075F SYST BP GE 130 - 139MM HG: CPT | Mod: CPTII,S$GLB,, | Performed by: NURSE PRACTITIONER

## 2024-11-07 PROCEDURE — 1159F MED LIST DOCD IN RCRD: CPT | Mod: CPTII,S$GLB,, | Performed by: NURSE PRACTITIONER

## 2024-11-07 PROCEDURE — 1160F RVW MEDS BY RX/DR IN RCRD: CPT | Mod: CPTII,S$GLB,, | Performed by: NURSE PRACTITIONER

## 2024-11-07 PROCEDURE — 99999 PR PBB SHADOW E&M-EST. PATIENT-LVL V: CPT | Mod: PBBFAC,,, | Performed by: NURSE PRACTITIONER

## 2024-11-07 PROCEDURE — 3079F DIAST BP 80-89 MM HG: CPT | Mod: CPTII,S$GLB,, | Performed by: NURSE PRACTITIONER

## 2024-11-07 PROCEDURE — 99214 OFFICE O/P EST MOD 30 MIN: CPT | Mod: S$GLB,,, | Performed by: NURSE PRACTITIONER

## 2024-11-07 RX ORDER — ONDANSETRON 8 MG/1
8 TABLET, ORALLY DISINTEGRATING ORAL EVERY 6 HOURS PRN
Qty: 30 TABLET | Refills: 0 | Status: SHIPPED | OUTPATIENT
Start: 2024-11-07

## 2024-11-07 RX ORDER — LORAZEPAM 0.5 MG/1
0.5 TABLET ORAL EVERY 12 HOURS PRN
Qty: 60 TABLET | Refills: 2 | Status: SHIPPED | OUTPATIENT
Start: 2024-11-07 | End: 2024-12-07

## 2024-11-09 DIAGNOSIS — J20.8 ACUTE BACTERIAL BRONCHITIS: ICD-10-CM

## 2024-11-09 DIAGNOSIS — B96.89 ACUTE BACTERIAL BRONCHITIS: ICD-10-CM

## 2024-11-09 DIAGNOSIS — M54.50 ACUTE BILATERAL LOW BACK PAIN WITHOUT SCIATICA: ICD-10-CM

## 2024-11-09 DIAGNOSIS — J45.909 ASTHMA, UNSPECIFIED ASTHMA SEVERITY, UNSPECIFIED WHETHER COMPLICATED, UNSPECIFIED WHETHER PERSISTENT: ICD-10-CM

## 2024-11-11 RX ORDER — ALBUTEROL SULFATE 0.83 MG/ML
SOLUTION RESPIRATORY (INHALATION)
Qty: 90 ML | Refills: 3 | Status: SHIPPED | OUTPATIENT
Start: 2024-11-11

## 2024-11-11 RX ORDER — IBUPROFEN 800 MG/1
800 TABLET ORAL 3 TIMES DAILY PRN
Qty: 90 TABLET | Refills: 0 | Status: SHIPPED | OUTPATIENT
Start: 2024-11-11

## 2024-11-11 RX ORDER — ALBUTEROL SULFATE 90 UG/1
2 INHALANT RESPIRATORY (INHALATION) EVERY 6 HOURS PRN
Qty: 18 G | Refills: 0 | Status: SHIPPED | OUTPATIENT
Start: 2024-11-11 | End: 2025-11-11

## 2024-11-11 NOTE — PROGRESS NOTES
Subjective:       Patient ID: Eva Lane is a 48 y.o. female.    Chief Complaint: Diarrhea (With vomiting- x few days/) and Anxiety (Worse the last few weeks)    Patient is known, to me and presents with   Chief Complaint   Patient presents with    Diarrhea     With vomiting- x few days      Anxiety     Worse the last few weeks   .  Denies chest pain and shortness of breath.  Patient presents with above but believes it is due to anxiety. She is having some family issues. No sihi noted.       Diarrhea   Pertinent negatives include no arthralgias, coughing, fever or headaches.   Anxiety  Symptoms include nervous/anxious behavior. Patient reports no chest pain, confusion, dizziness, palpitations, shortness of breath or suicidal ideas.         Review of Systems   Constitutional:  Negative for activity change, appetite change, fatigue, fever and unexpected weight change.   HENT:  Negative for congestion, ear discharge, ear pain, hearing loss, postnasal drip and tinnitus.    Eyes:  Negative for photophobia, pain and visual disturbance.   Respiratory:  Negative for cough, shortness of breath, wheezing and stridor.    Cardiovascular:  Negative for chest pain, palpitations and leg swelling.   Gastrointestinal:  Positive for diarrhea. Negative for abdominal distention.   Genitourinary:  Negative for difficulty urinating, dysuria, frequency, hematuria and urgency.   Musculoskeletal:  Negative for arthralgias, back pain, gait problem, joint swelling and neck pain.   Skin: Negative.    Neurological:  Negative for dizziness, seizures, syncope, weakness, light-headedness, numbness and headaches.   Hematological:  Negative for adenopathy. Does not bruise/bleed easily.   Psychiatric/Behavioral:  Positive for sleep disturbance. Negative for behavioral problems, confusion, dysphoric mood, hallucinations and suicidal ideas. The patient is nervous/anxious.        Objective:      Physical Exam  Constitutional:       General:  She is not in acute distress.     Appearance: She is well-developed.   HENT:      Head: Normocephalic and atraumatic.      Right Ear: Tympanic membrane and external ear normal.      Left Ear: Tympanic membrane and external ear normal.      Nose: Nose normal.      Mouth/Throat:      Mouth: Mucous membranes are moist.      Pharynx: No oropharyngeal exudate.   Eyes:      General: No scleral icterus.        Right eye: No discharge.         Left eye: No discharge.      Conjunctiva/sclera: Conjunctivae normal.      Pupils: Pupils are equal, round, and reactive to light.   Neck:      Thyroid: No thyromegaly.      Vascular: No JVD.   Cardiovascular:      Rate and Rhythm: Normal rate and regular rhythm.      Heart sounds: Normal heart sounds. No murmur heard.     No friction rub. No gallop.   Pulmonary:      Effort: Pulmonary effort is normal. No respiratory distress.      Breath sounds: Normal breath sounds. No stridor. No wheezing or rales.   Chest:      Chest wall: No tenderness.   Abdominal:      General: Bowel sounds are normal. There is no distension.      Palpations: Abdomen is soft. There is no mass.      Tenderness: There is no abdominal tenderness. There is no right CVA tenderness, left CVA tenderness, guarding or rebound.      Hernia: No hernia is present.   Musculoskeletal:         General: No swelling, tenderness, deformity or signs of injury. Normal range of motion.      Cervical back: Normal range of motion and neck supple.      Right lower leg: No edema.      Left lower leg: No edema.   Lymphadenopathy:      Cervical: No cervical adenopathy.   Skin:     General: Skin is warm and dry.      Capillary Refill: Capillary refill takes less than 2 seconds.      Coloration: Skin is not jaundiced or pale.      Findings: No bruising, erythema, lesion or rash.   Neurological:      General: No focal deficit present.      Mental Status: She is alert and oriented to person, place, and time.      Cranial Nerves: No cranial  "nerve deficit.      Sensory: No sensory deficit.      Motor: No weakness or abnormal muscle tone.      Coordination: Coordination normal.      Gait: Gait normal.      Deep Tendon Reflexes: Reflexes are normal and symmetric. Reflexes normal.   Psychiatric:         Attention and Perception: She does not perceive auditory or visual hallucinations.         Mood and Affect: Mood is anxious. Mood is not depressed. Affect is tearful.         Behavior: Behavior normal.         Thought Content: Thought content normal. Thought content does not include homicidal or suicidal ideation. Thought content does not include homicidal or suicidal plan.         Judgment: Judgment normal.         Assessment:       1. Nausea    2. Chronic insomnia    3. Anxiety        Plan:   1. Nausea  -     ondansetron (ZOFRAN-ODT) 8 MG TbDL; Take 1 tablet (8 mg total) by mouth every 6 (six) hours as needed (nausea).  Dispense: 30 tablet; Refill: 0    2. Chronic insomnia  -     LORazepam (ATIVAN) 0.5 MG tablet; Take 1 tablet (0.5 mg total) by mouth every 12 (twelve) hours as needed for Anxiety.  Dispense: 60 tablet; Refill: 2    3. Anxiety  -     LORazepam (ATIVAN) 0.5 MG tablet; Take 1 tablet (0.5 mg total) by mouth every 12 (twelve) hours as needed for Anxiety.  Dispense: 60 tablet; Refill: 2       "This note will not be shared with the patient."      Will let me know how this helps her    Rtc as scheduled  "

## 2024-11-13 ENCOUNTER — PATIENT OUTREACH (OUTPATIENT)
Dept: ADMINISTRATIVE | Facility: HOSPITAL | Age: 49
End: 2024-11-13
Payer: COMMERCIAL

## 2024-11-13 DIAGNOSIS — Z71.89 COMPLEX CARE COORDINATION: Primary | ICD-10-CM

## 2024-11-13 NOTE — PROGRESS NOTES
VBHM Score: 3     Eye Exam  Hemoglobin A1c  Foot Exam    Contacted :  Reviewed SDOH  Referral sent for pharmacy assistant for insulin/ htn medication  Pt stated she quit smoking    Reminded to schedule eye exam VU  Will schedule office visit  Through portal when she is ready  Will have labs on visit  Asked if there any concerns or issues that I could assist her with She stated not at this time

## 2024-11-14 ENCOUNTER — NURSE TRIAGE (OUTPATIENT)
Dept: ADMINISTRATIVE | Facility: CLINIC | Age: 49
End: 2024-11-14
Payer: COMMERCIAL

## 2024-11-15 NOTE — TELEPHONE ENCOUNTER
Pt takes insulin for her diabetes. Within a few minutes, the whole right side of her abd is itchy and red. Nowhere else on body. No rashes, no swelling or difficulty breathing.       Dispo- see in office within 24 hours. Unable to sched appt with pcp within timeframe, message routed. Advised [facility] or UC to be seen within timeframe and care advice given. Pt VU.  Reason for Disposition   [1] Localized rash is very painful AND [2] no fever    Additional Information   Negative: [1] Sudden onset of rash (within last 2 hours) AND [2] difficulty breathing or swallowing   Negative: Sounds like a life-threatening emergency to the triager   Negative: [1] Localized purple or blood-colored spots or dots AND [2] not from injury or friction AND [3] fever   Negative: Patient sounds very sick or weak to the triager   Negative: [1] Red area or streak AND [2] fever   Negative: [1] Rash is painful to touch AND [2] fever   Negative: [1] Looks infected (e.g., spreading redness, pus) AND [2] large red area (> 2 in. or 5 cm)   Negative: [1] Looks infected (e.g., spreading redness, pus) AND [2] diabetes mellitus or weak immune system (e.g., HIV positive, cancer chemo, splenectomy, organ transplant, chronic steroids)   Negative: [1] Localized purple or blood-colored spots or dots AND [2] not from injury or friction AND [3] no fever   Negative: [1] Looks infected (e.g., spreading redness, pus) AND [2] no fever   Negative: Looks like a boil, infected sore, deep ulcer or other infected rash    Protocols used: Rash or Redness - [id number]

## 2024-11-19 ENCOUNTER — LAB VISIT (OUTPATIENT)
Dept: LAB | Facility: HOSPITAL | Age: 49
End: 2024-11-19
Attending: NURSE PRACTITIONER
Payer: COMMERCIAL

## 2024-11-19 ENCOUNTER — OFFICE VISIT (OUTPATIENT)
Dept: INTERNAL MEDICINE | Facility: CLINIC | Age: 49
End: 2024-11-19
Payer: COMMERCIAL

## 2024-11-19 VITALS
HEIGHT: 63 IN | SYSTOLIC BLOOD PRESSURE: 118 MMHG | HEART RATE: 94 BPM | WEIGHT: 154.31 LBS | DIASTOLIC BLOOD PRESSURE: 78 MMHG | OXYGEN SATURATION: 99 % | BODY MASS INDEX: 27.34 KG/M2 | TEMPERATURE: 99 F | RESPIRATION RATE: 20 BRPM

## 2024-11-19 DIAGNOSIS — N30.01 ACUTE CYSTITIS WITH HEMATURIA: Primary | ICD-10-CM

## 2024-11-19 DIAGNOSIS — N30.01 ACUTE CYSTITIS WITH HEMATURIA: ICD-10-CM

## 2024-11-19 PROCEDURE — 81002 URINALYSIS NONAUTO W/O SCOPE: CPT | Mod: S$GLB,,, | Performed by: NURSE PRACTITIONER

## 2024-11-19 PROCEDURE — 1159F MED LIST DOCD IN RCRD: CPT | Mod: CPTII,S$GLB,, | Performed by: NURSE PRACTITIONER

## 2024-11-19 PROCEDURE — 3008F BODY MASS INDEX DOCD: CPT | Mod: CPTII,S$GLB,, | Performed by: NURSE PRACTITIONER

## 2024-11-19 PROCEDURE — 3078F DIAST BP <80 MM HG: CPT | Mod: CPTII,S$GLB,, | Performed by: NURSE PRACTITIONER

## 2024-11-19 PROCEDURE — 87186 SC STD MICRODIL/AGAR DIL: CPT | Performed by: NURSE PRACTITIONER

## 2024-11-19 PROCEDURE — 3074F SYST BP LT 130 MM HG: CPT | Mod: CPTII,S$GLB,, | Performed by: NURSE PRACTITIONER

## 2024-11-19 PROCEDURE — 99213 OFFICE O/P EST LOW 20 MIN: CPT | Mod: S$GLB,,, | Performed by: NURSE PRACTITIONER

## 2024-11-19 PROCEDURE — 99999 PR PBB SHADOW E&M-EST. PATIENT-LVL V: CPT | Mod: PBBFAC,,, | Performed by: NURSE PRACTITIONER

## 2024-11-19 PROCEDURE — 87086 URINE CULTURE/COLONY COUNT: CPT | Performed by: NURSE PRACTITIONER

## 2024-11-19 PROCEDURE — 87088 URINE BACTERIA CULTURE: CPT | Performed by: NURSE PRACTITIONER

## 2024-11-19 PROCEDURE — 4010F ACE/ARB THERAPY RXD/TAKEN: CPT | Mod: CPTII,S$GLB,, | Performed by: NURSE PRACTITIONER

## 2024-11-19 RX ORDER — CIPROFLOXACIN 500 MG/1
500 TABLET ORAL EVERY 12 HOURS
Qty: 14 TABLET | Refills: 0 | Status: SHIPPED | OUTPATIENT
Start: 2024-11-19 | End: 2024-11-26

## 2024-11-19 NOTE — PROGRESS NOTES
"History of Present Illness    CHIEF COMPLAINT:  Patient presents today for urinary tract infection symptoms.    URINARY TRACT INFECTION:  She reports experiencing symptoms of a urinary tract infection for the past two days. She describes severe pain during urination, localized to the urethra, and notes the presence of blood in her urine, which prompted her to seek medical attention. She expresses significant discomfort, characterizing the infection as "severe" and "distressing."    MEDICATIONS:  She reports experiencing side effects from Seroquel, including feeling intoxicated and unsteady gait, which started approximately one week ago. A family member disposed of her Seroquel medication. She is currently taking Depakote as prescribed.      ROS:  General: -fever, -chills, -fatigue, -weight gain, -weight loss  Eyes: -vision changes, -redness, -discharge  ENT: -ear pain, -nasal congestion, -sore throat  Cardiovascular: -chest pain, -palpitations, -lower extremity edema  Respiratory: -cough, -shortness of breath  Gastrointestinal: -abdominal pain, -nausea, -vomiting, -diarrhea, -constipation, -blood in stool  Genitourinary: -dysuria, -hematuria, +frequency, +urgency, +painful urination  Musculoskeletal: -joint pain, -muscle pain  Skin: -rash, -lesion  Neurological: -headache, -dizziness, -numbness, -tingling  Psychiatric: -anxiety, -depression, -sleep difficulty          Physical Exam    General: No acute distress. Well-developed. Well-nourished.  Eyes: EOMI. Sclerae anicteric.  HENT: Normocephalic. Atraumatic. Nares patent. Moist oral mucosa.  Ears: Bilateral TMs clear. Bilateral EACs clear.  Cardiovascular: Regular rate. Regular rhythm. No murmurs. No rubs. No gallops. Normal S1, S2.  Respiratory: Normal respiratory effort. Clear to auscultation bilaterally. No rales. No rhonchi. No wheezing.  Abdomen: Soft. Non-tender. Non-distended. Normoactive bowel sounds.  Musculoskeletal: No  obvious deformity.  Extremities: No " "lower extremity edema.  Neurological: Alert & oriented x3. No slurred speech. Normal gait.  Psychiatric: Normal mood. Normal affect. Good insight. Good judgment.  Skin: Warm. Dry. No rash.          Assessment & Plan    Diagnosed severe UTI based on patient's symptoms  Determined need for strong antibiotic treatment due to severity of infection  Considered possibility that UTI symptoms may have caused recent side effects attributed to Seroquel  Opted to address UTI before reassessing psychiatric medication regimen    URINARY TRACT INFECTION:  - Explained that UTIs can cause systemic effects, potentially mimicking medication side effects.  - Started 7-day course of strong antibiotics for UTI treatment.  - Urine test ordered to be completed after finishing antibiotic course.  - Follow up for urine test the day after completing antibiotic course.  - Contact the office during working hours (avoid 12:00 to 13:00 closure) to provide urine sample.    BIPOLAR II DISORDER:  - Clarified that Seroquel is not an addictive medication.  - Continued Depakote at current dose.         This note was generated with the assistance of ambient listening technology. Verbal consent was obtained by the patient and accompanying visitor(s) for the recording of patient appointment to facilitate this note. I attest to having reviewed and edited the generated note for accuracy, though some syntax or spelling errors may persist. Please contact the author of this note for any clarification.      "This note will not be shared with the patient."    1. Acute cystitis with hematuria  POCT URINE DIPSTICK WITHOUT MICROSCOPE    Urine culture    ciprofloxacin HCl (CIPRO) 500 MG tablet       Drink water    Any worsening contact me    Rtc as scheduled  "

## 2024-11-21 LAB
BILIRUB SERPL-MCNC: NORMAL MG/DL
BLOOD URINE, POC: 250
CLARITY, POC UA: NORMAL
COLOR, POC UA: YELLOW
GLUCOSE UR QL STRIP: 1000
KETONES UR QL STRIP: NORMAL
LEUKOCYTE ESTERASE URINE, POC: NORMAL
NITRITE, POC UA: NORMAL
PH, POC UA: 7
PROTEIN, POC: NORMAL
SPECIFIC GRAVITY, POC UA: 1
UROBILINOGEN, POC UA: NORMAL

## 2024-11-22 LAB — BACTERIA UR CULT: ABNORMAL

## 2024-12-06 DIAGNOSIS — J45.909 ASTHMA, UNSPECIFIED ASTHMA SEVERITY, UNSPECIFIED WHETHER COMPLICATED, UNSPECIFIED WHETHER PERSISTENT: ICD-10-CM

## 2024-12-06 RX ORDER — ALBUTEROL SULFATE 90 UG/1
2 INHALANT RESPIRATORY (INHALATION) EVERY 6 HOURS PRN
Qty: 9 G | Refills: 0 | Status: SHIPPED | OUTPATIENT
Start: 2024-12-06

## 2024-12-27 DIAGNOSIS — Z88.9 HISTORY OF ALLERGIC REACTION: ICD-10-CM

## 2024-12-27 DIAGNOSIS — F41.9 ANXIETY: ICD-10-CM

## 2024-12-27 DIAGNOSIS — F51.04 CHRONIC INSOMNIA: ICD-10-CM

## 2024-12-27 DIAGNOSIS — M54.50 ACUTE BILATERAL LOW BACK PAIN WITHOUT SCIATICA: ICD-10-CM

## 2024-12-27 RX ORDER — IBUPROFEN 800 MG/1
800 TABLET ORAL 3 TIMES DAILY PRN
Qty: 90 TABLET | Refills: 0 | Status: SHIPPED | OUTPATIENT
Start: 2024-12-27

## 2024-12-27 RX ORDER — EPINEPHRINE 0.3 MG/.3ML
1 INJECTION SUBCUTANEOUS ONCE
Qty: 0.3 ML | Refills: 5 | Status: SHIPPED | OUTPATIENT
Start: 2024-12-27 | End: 2024-12-27

## 2024-12-27 RX ORDER — PEN NEEDLE, DIABETIC 30 GX3/16"
1 NEEDLE, DISPOSABLE MISCELLANEOUS DAILY
Qty: 50 EACH | Refills: 5 | Status: SHIPPED | OUTPATIENT
Start: 2024-12-27

## 2024-12-27 RX ORDER — LORAZEPAM 0.5 MG/1
0.5 TABLET ORAL EVERY 12 HOURS PRN
Qty: 60 TABLET | Refills: 2 | Status: SHIPPED | OUTPATIENT
Start: 2024-12-27 | End: 2025-01-26

## 2025-01-01 DIAGNOSIS — B02.8 HERPES ZOSTER WITH COMPLICATION: ICD-10-CM

## 2025-01-01 DIAGNOSIS — F99 INSOMNIA DUE TO OTHER MENTAL DISORDER: ICD-10-CM

## 2025-01-01 DIAGNOSIS — F51.05 INSOMNIA DUE TO OTHER MENTAL DISORDER: ICD-10-CM

## 2025-01-02 RX ORDER — QUETIAPINE FUMARATE 400 MG/1
400 TABLET, FILM COATED ORAL 2 TIMES DAILY
Qty: 60 TABLET | Refills: 2 | Status: SHIPPED | OUTPATIENT
Start: 2025-01-02 | End: 2026-01-02

## 2025-01-02 RX ORDER — PREGABALIN 200 MG/1
200 CAPSULE ORAL 3 TIMES DAILY
Qty: 90 CAPSULE | Refills: 5 | Status: SHIPPED | OUTPATIENT
Start: 2025-01-02 | End: 2025-07-03

## 2025-01-04 ENCOUNTER — HOSPITAL ENCOUNTER (EMERGENCY)
Facility: HOSPITAL | Age: 50
Discharge: HOME | End: 2025-01-04
Payer: COMMERCIAL

## 2025-01-04 ENCOUNTER — NURSE TRIAGE (OUTPATIENT)
Dept: ADMINISTRATIVE | Facility: CLINIC | Age: 50
End: 2025-01-04
Payer: COMMERCIAL

## 2025-01-04 ENCOUNTER — HOSPITAL ENCOUNTER (EMERGENCY)
Facility: HOSPITAL | Age: 50
Discharge: LEFT BEFORE DISCHARGE | End: 2025-01-04
Attending: SURGERY
Payer: COMMERCIAL

## 2025-01-04 VITALS
BODY MASS INDEX: 27.65 KG/M2 | SYSTOLIC BLOOD PRESSURE: 133 MMHG | TEMPERATURE: 98 F | RESPIRATION RATE: 20 BRPM | WEIGHT: 156.06 LBS | HEIGHT: 63 IN | HEART RATE: 88 BPM | DIASTOLIC BLOOD PRESSURE: 78 MMHG | OXYGEN SATURATION: 100 %

## 2025-01-04 VITALS
DIASTOLIC BLOOD PRESSURE: 68 MMHG | BODY MASS INDEX: 27.67 KG/M2 | SYSTOLIC BLOOD PRESSURE: 133 MMHG | HEART RATE: 98 BPM | WEIGHT: 156.19 LBS | RESPIRATION RATE: 18 BRPM | OXYGEN SATURATION: 98 % | TEMPERATURE: 99 F

## 2025-01-04 DIAGNOSIS — R40.4 UNRESPONSIVE EPISODE: ICD-10-CM

## 2025-01-04 DIAGNOSIS — Z53.29 LEFT AGAINST MEDICAL ADVICE: Primary | ICD-10-CM

## 2025-01-04 DIAGNOSIS — L72.3 SEBACEOUS CYST: ICD-10-CM

## 2025-01-04 DIAGNOSIS — S93.402A SPRAIN OF LEFT ANKLE, UNSPECIFIED LIGAMENT, INITIAL ENCOUNTER: ICD-10-CM
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
## 2025-01-04 DIAGNOSIS — S46.912A STRAIN OF LEFT SHOULDER, INITIAL ENCOUNTER: ICD-10-CM

## 2025-01-04 LAB
ALBUMIN SERPL BCP-MCNC: 3.2 G/DL (ref 3.5–5.2)
ALP SERPL-CCNC: 91 U/L (ref 40–150)
ALT SERPL W/O P-5'-P-CCNC: 11 U/L (ref 10–44)
AMPHET+METHAMPHET UR QL: NEGATIVE
ANION GAP SERPL CALC-SCNC: 8 MMOL/L (ref 8–16)
APAP SERPL-MCNC: <3 UG/ML (ref 10–20)
AST SERPL-CCNC: 9 U/L (ref 10–40)
BARBITURATES UR QL SCN>200 NG/ML: ABNORMAL
BASOPHILS # BLD AUTO: 0.04 K/UL (ref 0–0.2)
BASOPHILS NFR BLD: 0.4 % (ref 0–1.9)
BENZODIAZ UR QL SCN>200 NG/ML: NEGATIVE
BILIRUB SERPL-MCNC: 0.2 MG/DL (ref 0.1–1)
BILIRUB UR QL STRIP: NEGATIVE
BNP SERPL-MCNC: 48 PG/ML (ref 0–99)
BUN SERPL-MCNC: 17 MG/DL (ref 6–20)
BZE UR QL SCN: NEGATIVE
CALCIUM SERPL-MCNC: 7.6 MG/DL (ref 8.7–10.5)
CANNABINOIDS UR QL SCN: NEGATIVE
CHLORIDE SERPL-SCNC: 113 MMOL/L (ref 95–110)
CK SERPL-CCNC: 47 U/L (ref 20–180)
CLARITY UR: CLEAR
CO2 SERPL-SCNC: 20 MMOL/L (ref 23–29)
COLOR UR: YELLOW
CREAT SERPL-MCNC: 0.8 MG/DL (ref 0.5–1.4)
CREAT UR-MCNC: 71.7 MG/DL (ref 15–325)
D DIMER PPP IA.FEU-MCNC: 0.45 MG/L FEU
DIFFERENTIAL METHOD BLD: ABNORMAL
EOSINOPHIL # BLD AUTO: 0.2 K/UL (ref 0–0.5)
EOSINOPHIL NFR BLD: 1.9 % (ref 0–8)
ERYTHROCYTE [DISTWIDTH] IN BLOOD BY AUTOMATED COUNT: 12.6 % (ref 11.5–14.5)
EST. GFR  (NO RACE VARIABLE): >60 ML/MIN/1.73 M^2
ETHANOL SERPL-MCNC: <10 MG/DL
GLUCOSE SERPL-MCNC: 354 MG/DL (ref 70–110)
GLUCOSE UR QL STRIP: ABNORMAL
HCT VFR BLD AUTO: 31.8 % (ref 37–48.5)
HGB BLD-MCNC: 10.8 G/DL (ref 12–16)
HGB UR QL STRIP: NEGATIVE
IMM GRANULOCYTES # BLD AUTO: 0.03 K/UL (ref 0–0.04)
IMM GRANULOCYTES NFR BLD AUTO: 0.3 % (ref 0–0.5)
KETONES UR QL STRIP: NEGATIVE
LEUKOCYTE ESTERASE UR QL STRIP: NEGATIVE
LYMPHOCYTES # BLD AUTO: 3 K/UL (ref 1–4.8)
LYMPHOCYTES NFR BLD: 28.6 % (ref 18–48)
MCH RBC QN AUTO: 30.3 PG (ref 27–31)
MCHC RBC AUTO-ENTMCNC: 34 G/DL (ref 32–36)
MCV RBC AUTO: 89 FL (ref 82–98)
METHADONE UR QL SCN>300 NG/ML: NEGATIVE
MONOCYTES # BLD AUTO: 0.7 K/UL (ref 0.3–1)
MONOCYTES NFR BLD: 6.4 % (ref 4–15)
NEUTROPHILS # BLD AUTO: 6.6 K/UL (ref 1.8–7.7)
NEUTROPHILS NFR BLD: 62.4 % (ref 38–73)
NITRITE UR QL STRIP: NEGATIVE
NRBC BLD-RTO: 0 /100 WBC
OPIATES UR QL SCN: NEGATIVE
PCP UR QL SCN>25 NG/ML: NEGATIVE
PH UR STRIP: 6 [PH] (ref 5–8)
PLATELET # BLD AUTO: 232 K/UL (ref 150–450)
PMV BLD AUTO: 11.4 FL (ref 9.2–12.9)
POTASSIUM SERPL-SCNC: 5.1 MMOL/L (ref 3.5–5.1)
PROT SERPL-MCNC: 6.2 G/DL (ref 6–8.4)
PROT UR QL STRIP: NEGATIVE
RBC # BLD AUTO: 3.57 M/UL (ref 4–5.4)
SALICYLATES SERPL-MCNC: <5 MG/DL (ref 15–30)
SODIUM SERPL-SCNC: 141 MMOL/L (ref 136–145)
SP GR UR STRIP: 1.01 (ref 1–1.03)
TOXICOLOGY INFORMATION: ABNORMAL
TROPONIN I SERPL DL<=0.01 NG/ML-MCNC: <0.006 NG/ML (ref 0–0.03)
URN SPEC COLLECT METH UR: ABNORMAL
UROBILINOGEN UR STRIP-ACNC: NEGATIVE EU/DL
WBC # BLD AUTO: 10.59 K/UL (ref 3.9–12.7)

## 2025-01-04 PROCEDURE — 80143 DRUG ASSAY ACETAMINOPHEN: CPT | Performed by: SURGERY

## 2025-01-04 PROCEDURE — 93005 ELECTROCARDIOGRAM TRACING: CPT

## 2025-01-04 PROCEDURE — 99900029 HC O2 SETUP (STAT)

## 2025-01-04 PROCEDURE — 85379 FIBRIN DEGRADATION QUANT: CPT | Performed by: SURGERY

## 2025-01-04 PROCEDURE — 81003 URINALYSIS AUTO W/O SCOPE: CPT | Mod: 59 | Performed by: SURGERY

## 2025-01-04 PROCEDURE — 84484 ASSAY OF TROPONIN QUANT: CPT | Performed by: SURGERY

## 2025-01-04 PROCEDURE — 80053 COMPREHEN METABOLIC PANEL: CPT | Performed by: SURGERY

## 2025-01-04 PROCEDURE — 63600175 PHARM REV CODE 636 W HCPCS: Performed by: SURGERY

## 2025-01-04 PROCEDURE — 87070 CULTURE OTHR SPECIMN AEROBIC: CPT | Performed by: SURGERY

## 2025-01-04 PROCEDURE — 10021 FNA BX W/O IMG GDN 1ST LES: CPT

## 2025-01-04 PROCEDURE — 63600175 PHARM REV CODE 636 W HCPCS: Performed by: NURSE PRACTITIONER

## 2025-01-04 PROCEDURE — 93010 ELECTROCARDIOGRAM REPORT: CPT | Mod: ,,, | Performed by: INTERNAL MEDICINE

## 2025-01-04 PROCEDURE — 99284 EMERGENCY DEPT VISIT MOD MDM: CPT | Mod: 25

## 2025-01-04 PROCEDURE — 85025 COMPLETE CBC W/AUTO DIFF WBC: CPT | Performed by: SURGERY

## 2025-01-04 PROCEDURE — 27000221 HC OXYGEN, UP TO 24 HOURS

## 2025-01-04 PROCEDURE — 83880 ASSAY OF NATRIURETIC PEPTIDE: CPT | Performed by: SURGERY

## 2025-01-04 PROCEDURE — 25000003 PHARM REV CODE 250: Performed by: NURSE PRACTITIONER

## 2025-01-04 PROCEDURE — 94760 N-INVAS EAR/PLS OXIMETRY 1: CPT

## 2025-01-04 PROCEDURE — 99900031 HC PATIENT EDUCATION (STAT)

## 2025-01-04 PROCEDURE — 80179 DRUG ASSAY SALICYLATE: CPT | Performed by: SURGERY

## 2025-01-04 PROCEDURE — 99900035 HC TECH TIME PER 15 MIN (STAT)

## 2025-01-04 PROCEDURE — 36415 COLL VENOUS BLD VENIPUNCTURE: CPT | Performed by: SURGERY

## 2025-01-04 PROCEDURE — 82077 ASSAY SPEC XCP UR&BREATH IA: CPT | Performed by: SURGERY

## 2025-01-04 PROCEDURE — 82550 ASSAY OF CK (CPK): CPT | Performed by: SURGERY

## 2025-01-04 PROCEDURE — 80307 DRUG TEST PRSMV CHEM ANLYZR: CPT | Performed by: SURGERY

## 2025-01-04 PROCEDURE — 99285 EMERGENCY DEPT VISIT HI MDM: CPT | Mod: 25,27

## 2025-01-04 PROCEDURE — 96372 THER/PROPH/DIAG INJ SC/IM: CPT | Performed by: NURSE PRACTITIONER

## 2025-01-04 PROCEDURE — 96374 THER/PROPH/DIAG INJ IV PUSH: CPT

## 2025-01-04 RX ORDER — NALOXONE HYDROCHLORIDE 1 MG/ML
INJECTION INTRAMUSCULAR; INTRAVENOUS; SUBCUTANEOUS
Status: DISCONTINUED
Start: 2025-01-04 | End: 2025-01-04 | Stop reason: HOSPADM

## 2025-01-04 RX ORDER — KETOROLAC TROMETHAMINE 30 MG/ML
15 INJECTION, SOLUTION INTRAMUSCULAR; INTRAVENOUS
Status: COMPLETED | OUTPATIENT
Start: 2025-01-04 | End: 2025-01-04

## 2025-01-04 RX ORDER — CYCLOBENZAPRINE HCL 10 MG
10 TABLET ORAL
Status: COMPLETED | OUTPATIENT
Start: 2025-01-04 | End: 2025-01-04

## 2025-01-04 RX ORDER — NALOXONE HYDROCHLORIDE 1 MG/ML
2 INJECTION INTRAMUSCULAR; INTRAVENOUS; SUBCUTANEOUS
Status: COMPLETED | OUTPATIENT
Start: 2025-01-04 | End: 2025-01-04

## 2025-01-04 RX ORDER — NALOXONE HCL 0.4 MG/ML
2 VIAL (ML) INJECTION
Status: DISCONTINUED | OUTPATIENT
Start: 2025-01-04 | End: 2025-01-04

## 2025-01-04 RX ORDER — MUPIROCIN 20 MG/G
OINTMENT TOPICAL 3 TIMES DAILY
Qty: 15 G | Refills: 0 | Status: SHIPPED | OUTPATIENT
Start: 2025-01-04 | End: 2025-01-14

## 2025-01-04 RX ORDER — LIDOCAINE HYDROCHLORIDE 10 MG/ML
5 INJECTION, SOLUTION EPIDURAL; INFILTRATION; INTRACAUDAL; PERINEURAL
Status: COMPLETED | OUTPATIENT
Start: 2025-01-04 | End: 2025-01-04

## 2025-01-04 RX ORDER — CLINDAMYCIN HYDROCHLORIDE 300 MG/1
300 CAPSULE ORAL 4 TIMES DAILY
Qty: 28 CAPSULE | Refills: 0 | Status: SHIPPED | OUTPATIENT
Start: 2025-01-04 | End: 2025-01-11

## 2025-01-04 RX ORDER — TIZANIDINE 4 MG/1
4 TABLET ORAL EVERY 8 HOURS PRN
Qty: 12 TABLET | Refills: 0 | Status: SHIPPED | OUTPATIENT
Start: 2025-01-04

## 2025-01-04 RX ORDER — DICLOFENAC SODIUM 75 MG/1
75 TABLET, DELAYED RELEASE ORAL 2 TIMES DAILY PRN
Qty: 20 TABLET | Refills: 0 | Status: SHIPPED | OUTPATIENT
Start: 2025-01-04

## 2025-01-04 RX ADMIN — LIDOCAINE HYDROCHLORIDE 50 MG: 10 INJECTION, SOLUTION EPIDURAL; INFILTRATION; INTRACAUDAL; PERINEURAL at 10:01

## 2025-01-04 RX ADMIN — CYCLOBENZAPRINE HYDROCHLORIDE 10 MG: 10 TABLET, FILM COATED ORAL at 04:01

## 2025-01-04 RX ADMIN — KETOROLAC TROMETHAMINE 15 MG: 30 INJECTION, SOLUTION INTRAMUSCULAR; INTRAVENOUS at 04:01

## 2025-01-04 RX ADMIN — NALOXONE HYDROCHLORIDE 2 MG: 1 INJECTION PARENTERAL at 06:01

## 2025-01-04 NOTE — ED PROVIDER NOTES
Encounter Date: 1/4/2025       History     Chief Complaint   Patient presents with    Fall     Pt arrives to the ed with c/o left shoulder and ankle pain after a seizure x 1 week ago.      Eva Lane is a 49 y.o. female PMH of drug addiction, ADHD, anxiety, bipolar disorder, CHF, COPD, DM type 2, depression, fatigue, pseudoseizures, PTSD, thyroid disease presenting to the ED for evaluation of left shoulder and ankle pain after accidental fall.  Patient reports that she is having ellen redone at home and she tripped on a piece of wood while getting out of bed this morning. She denies any head trauma or LOC. She presents with 10/10 sharp pain to L shoulder and L lateral ankle. Pain is exacerbated with movement of left shoulder and left ankle in addition to weight-bearing of left leg.  She has associated lateral ankle swelling.  Denies numbness or tingling to left upper lower extremity.    The history is provided by the patient.     Review of patient's allergies indicates:   Allergen Reactions    Penicillins Swelling and Rash    Neosporin [neomycin-bacitracin-polymyxin] Rash    Shellfish containing products     Shrimp Hives    Bacticin Blisters, Hives and Rash    Lisinopril Rash     Past Medical History:   Diagnosis Date    Addiction to drug     ADHD (attention deficit hyperactivity disorder)     Anxiety     Arthritis     Asthma     Behavioral problem     Bipolar 1 disorder     Borderline personality disorder 09/23/2015    CHF (congestive heart failure)     Conversion disorder 09/23/2015    COPD (chronic obstructive pulmonary disease)     Depression     Diabetes mellitus type II     Fatigue     Hallucination     Headache     History of psychiatric hospitalization     Hx of psychiatric care     Hyperlipidemia     Hypertension     Lumbar radiculopathy     Allie     Neuralgia     Neuritis     Panic disorder     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    Psychiatric problem     Psychosis     PTSD (post-traumatic stress  disorder)     Seizures     Seizure-last 2015-shake and fall-doesnt remember anything    Self-harming behavior     Sleep apnea     on CPAP    Sleep difficulties     Social anxiety disorder 2016    Stroke 2015    Stroke     Suicide attempt     Therapy     Thyroid disease      Past Surgical History:   Procedure Laterality Date    COLONOSCOPY N/A 2016    Procedure: COLONOSCOPY;  Surgeon: Robert Hernandez MD;  Location: [address]);  Service: Endoscopy;  Laterality: N/A;  Schedule for next available CRS physician - EGD @ 8:30 with Dr. Naylor    CYST REMOVAL      Fatty cyst on right face cheek and left upper arm     DILATION AND CURETTAGE OF UTERUS      Miscarriage    HYSTERECTOMY      DUB - Total    OOPHORECTOMY  2008    TOTAL ABDOMINAL HYSTERECTOMY  2008    TUBAL LIGATION       Family History   Problem Relation Name Age of Onset    No Known Problems Mother      Heart disease Father      Hyperlipidemia Father      Hypertension Father      Diabetes Father      Dementia Father      Breast cancer Neg Hx      Colon cancer Neg Hx      Ovarian cancer Neg Hx       Social History     Tobacco Use    Smoking status: Some Days     Current packs/day: 0.00     Average packs/day: 0.3 packs/day for 36.7 years (9.2 ttl pk-yrs)     Types: Cigarettes     Start date: 1986     Last attempt to quit: 3/30/2023     Years since quittin.7    Smokeless tobacco: Never    Tobacco comments:     [facility]-given smoking clinic pamphlet   Substance Use Topics    Alcohol use: No    Drug use: No     Review of Systems   Constitutional:  Negative for activity change, chills and fever.   HENT:  Negative for congestion, ear discharge, ear pain, postnasal drip, sinus pressure, sinus pain and sore throat.    Respiratory:  Negative for cough, chest tightness and shortness of breath.    Cardiovascular:  Negative for chest pain.   Gastrointestinal:  Negative for abdominal  distention, abdominal pain and nausea.   Genitourinary:  Negative for dysuria, frequency and urgency.   Musculoskeletal:  Positive for arthralgias (L shoulder, L ankle) and joint swelling (L lateral ankle). Negative for back pain.   Skin: Negative.  Negative for rash.   Neurological:  Negative for dizziness, weakness, light-headedness and numbness.   Hematological:  Does not bruise/bleed easily.       Physical Exam     Initial Vitals [01/04/25 1347]   BP Pulse Resp Temp SpO2   (!) 153/116 107 18 98.3 °F (36.8 °C) 98 %      MAP       --         Physical Exam    Nursing note and vitals reviewed.  Constitutional: She appears well-developed and well-nourished.   HENT:   Head: Normocephalic and atraumatic.   Eyes: Conjunctivae and EOM are normal. Pupils are equal, round, and reactive to light.   Neck: Neck supple.   Cardiovascular:  Normal rate, regular rhythm, normal heart sounds and intact distal pulses.           Pulmonary/Chest: Breath sounds normal.   Abdominal: Abdomen is soft. Bowel sounds are normal.   Musculoskeletal:      Left shoulder: Tenderness present. No swelling. Decreased range of motion. Normal pulse.      Cervical back: Neck supple.      Left ankle: Tenderness present over the lateral malleolus. Normal pulse.      Comments: Good distal pulses      Neurological: She is alert and oriented to person, place, and time. She has normal strength.   Skin: Skin is warm and dry.   Psychiatric: She has a normal mood and affect. Her behavior is normal. Judgment and thought content normal.         ED Course   Procedures  Labs Reviewed - No data to display       Imaging Results              X-Ray Ankle Complete Left (Final result)  Result time 01/04/25 15:09:03      Final result by Richelle Jessica MD (01/04/25 15:09:03)                   Impression:      As above      Electronically signed by: Richelle Jessica MD  Date:    01/04/2025  Time:    15:09               Narrative:    EXAMINATION:  XR ANKLE COMPLETE 3 VIEW  LEFT    CLINICAL HISTORY:  Unspecified fall, initial encounter    TECHNIQUE:  AP, lateral and oblique views of the left ankle were performed.    COMPARISON:  02/22/2018    FINDINGS:  No acute fracture or dislocation.  There is soft tissue swelling laterally.                                       X-Ray Shoulder 2 or More Views Left (Final result)  Result time 01/04/25 15:08:16      Final result by Richelle Jessica MD (01/04/25 15:08:16)                   Impression:      As above      Electronically signed by: Richelle Jessica MD  Date:    01/04/2025  Time:    15:08               Narrative:    EXAMINATION:  XR SHOULDER COMPLETE 2 OR MORE VIEWS LEFT    CLINICAL HISTORY:  fall;    TECHNIQUE:  Two or three views of the left shoulder were performed.    COMPARISON:  None    FINDINGS:  Mild degenerative change.  No acute fracture or dislocation                                       Medications   ketorolac injection 15 mg (15 mg Intramuscular Given 1/4/25 1617)   cyclobenzaprine tablet 10 mg (10 mg Oral Given 1/4/25 1617)     Medical Decision Making  Evaluation of a 49-year-old female presenting with left shoulder and left lateral ankle pain after accidental fall this a.m..  She presents mildly hypertensive, otherwise stable vital signs  Physical exam with decreased range of motion of left shoulder secondary to pain.  No obvious swelling, bruising, or deformities with good distal pulses.  Left lateral ankle tenderness with associated swelling.  Good distal pulses and capillary refill    Differential diagnosis includes shoulder strain, cervical fracture, humeral head fracture, left ankle sprain, strain, contusion, fracture    Problems Addressed:  Sprain of left ankle, unspecified ligament, initial encounter: acute illness or injury    Amount and/or Complexity of Data Reviewed  Radiology: ordered. Decision-making details documented in ED Course.    Risk  Prescription drug management.  Risk Details: Stable for DC home.  Imaging of the L shoulder and ankle shows no fx, Lateral soft tissue swelling.  Toradol and Flexeril given for pain control.  Patient placed in an arm sling and boot for comfort.  Outpatient referral placed for follow-up with Ortho. Patient/caregiver voices understanding and feels comfortable with discharge plan.      The patient acknowledges that close follow up with medical provider is required. Instructed to follow up with PCP within 2 days. Patient was given specific return precautions. The patient agrees to comply with all instruction and directions given in the ER.                                        Clinical Impression:  Final diagnoses:  [W19.XXXA] Fall (Primary)  [S46.912A] Strain of left shoulder, initial encounter  [S93.402A] Sprain of left ankle, unspecified ligament, initial encounter          ED Disposition Condition    Discharge Stable          ED Prescriptions       Medication Sig Dispense Start Date End Date Auth. Provider    diclofenac (VOLTAREN) 75 MG EC tablet Take 1 tablet (75 mg total) by mouth 2 (two) times daily as needed (pain). 20 tablet 1/4/2025 -- Virgie Pinedo NP    tiZANidine (ZANAFLEX) 4 MG tablet Take 1 tablet (4 mg total) by mouth every 8 (eight) hours as needed (pain). 12 tablet 1/4/2025 -- Virgie Pniedo NP          Follow-up Information       Follow up With Specialties Details Why Contact Cintia Raines NP Family Medicine Schedule an appointment as soon as possible for a visit in 2 days  [address]  [place] [address]  [phone]      Mirna Hooper PA-C Orthopedic Surgery Schedule an appointment as soon as possible for a visit in 2 days  [address]  [place] [address]  [phone]               Virgie Pinedo NP  01/04/25 [id number]

## 2025-01-04 NOTE — ED NOTES
Walking boot applied to LLE and sling applied to LUE per provider orders. Pt tolerated well. Pt neurovascular exam remains intact to LLE with 2+ pedal pulse present. Neurovascular exam remains intact to LUE with radial pulse 2+ palpable.

## 2025-01-05 NOTE — ED PROVIDER NOTES
Encounter Date: 1/4/2025       History     Chief Complaint   Patient presents with    Unresponsive     PT to ED via [facility] unresponsive; pt was recently discharged from [facility] with Rx of Zanaflex and Diclofenac for fall.      History of Present Illness  Eva Lane is a 49 y.o. female that presents after an unresponsive episode  Patient was seen in the emergency room earlier today with left shoulder pain on review  Patient had a remote trauma over a week ago with (-) x-rays earlier today in the ER  Patient took anti-inflammatory & muscle relaxer & then went unresponsive at home  Patient has longstanding issues with conversion disorder & pseudoseizures on review  Patient awakens in the ER with sternal rub, no obvious seizure-like activity noted now  Patient is not remember the entire incident but states she is feeling fine at this time    The history is provided by the patient.     Review of patient's allergies indicates:   Allergen Reactions    Penicillins Swelling and Rash    Neosporin [neomycin-bacitracin-polymyxin] Rash    Shellfish containing products     Shrimp Hives    Bacticin Blisters, Hives and Rash    Lisinopril Rash     Past Medical History:   Diagnosis Date    Addiction to drug     ADHD (attention deficit hyperactivity disorder)     Anxiety     Arthritis     Asthma     Behavioral problem     Bipolar 1 disorder     Borderline personality disorder 09/23/2015    CHF (congestive heart failure)     Conversion disorder 09/23/2015    COPD (chronic obstructive pulmonary disease)     Depression     Diabetes mellitus type II     Fatigue     Hallucination     Headache     History of psychiatric hospitalization     Hx of psychiatric care     Hyperlipidemia     Hypertension     Lumbar radiculopathy     Allie     Neuralgia     Neuritis     Panic disorder     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    Psychiatric problem     Psychosis     PTSD (post-traumatic stress disorder)     Seizures     Seizure-last 8/2015-shake and  fall-doesnt remember anything    Self-harming behavior     Sleep apnea     on CPAP    Sleep difficulties     Social anxiety disorder 2016    Stroke 2015    Stroke     Suicide attempt     Therapy     Thyroid disease      Past Surgical History:   Procedure Laterality Date    COLONOSCOPY N/A 2016    Procedure: COLONOSCOPY;  Surgeon: Robert Hernandez MD;  Location: [facility] ([address] [place] [address]);  Service: Endoscopy;  Laterality: N/A;  Schedule for next available CRS physician - EGD @ 8:30 with Dr. Naylor    CYST REMOVAL      Fatty cyst on right face cheek and left upper arm     DILATION AND CURETTAGE OF UTERUS      Miscarriage    HYSTERECTOMY      DUB - Total    OOPHORECTOMY  2008    TOTAL ABDOMINAL HYSTERECTOMY  2008    TUBAL LIGATION       Family History   Problem Relation Name Age of Onset    No Known Problems Mother      Heart disease Father      Hyperlipidemia Father      Hypertension Father      Diabetes Father      Dementia Father      Breast cancer Neg Hx      Colon cancer Neg Hx      Ovarian cancer Neg Hx       Social History     Tobacco Use    Smoking status: Some Days     Current packs/day: 0.00     Average packs/day: 0.3 packs/day for 36.7 years (9.2 ttl pk-yrs)     Types: Cigarettes     Start date: 1986     Last attempt to quit: 3/30/2023     Years since quittin.7    Smokeless tobacco: Never    Tobacco comments:     told about [facility] smoking cessation program-given smoking clinic pamphlet   Substance Use Topics    Alcohol use: No    Drug use: No     Review of Systems   Constitutional: Negative.    HENT: Negative.     Eyes: Negative.    Respiratory: Negative.     Cardiovascular: Negative.    Gastrointestinal: Negative.    Genitourinary: Negative.    Musculoskeletal: Negative.    Skin: Negative.         (+) left ear sebaceous cyst   Neurological: Negative.    Psychiatric/Behavioral: Negative.         Physical Exam     Initial Vitals [25 1849]   BP Pulse Resp Temp SpO2    (!) 95/59 76 10 98.1 °F (36.7 °C) 100 %      MAP       --         Physical Exam    Nursing note and vitals reviewed.  Constitutional: Vital signs are normal. She appears well-developed and well-nourished. She is cooperative.   HENT:   Head: Normocephalic and atraumatic.   Eyes: Conjunctivae, EOM and lids are normal. Pupils are equal, round, and reactive to light.   Neck: Trachea normal and phonation normal. Neck supple. No JVD present.   Normal range of motion.   Full passive range of motion without pain.     Cardiovascular:  Normal rate, regular rhythm, S1 normal, S2 normal, normal heart sounds, intact distal pulses and normal pulses.           Pulmonary/Chest: Effort normal and breath sounds normal.   Abdominal: Abdomen is soft and flat. Bowel sounds are normal.   Musculoskeletal:         General: Normal range of motion.      Cervical back: Full passive range of motion without pain, normal range of motion and neck supple.     Neurological: She is alert and oriented to person, place, and time. She has normal strength.   Skin: Skin is intact. Capillary refill takes less than 2 seconds.   (+) 1 centimeter erythematous sebaceous cyst at the origin of the ER         ED Course   Critical Care    Date/Time: 1/4/2025 10:26 PM    Performed by: Nadeem Cosme MD  Authorized by: Nadeem Cosme MD  Direct patient critical care time: 25 minutes  Additional history critical care time: 5 minutes  Ordering / reviewing critical care time: 5 minutes  Documentation critical care time: 5 minutes  Other critical care time: 5 minutes  Total critical care time (exclusive of procedural time) : 45 minutes  Critical care was necessary to treat or prevent imminent or life-threatening deterioration of the following conditions: cardiac failure.  Critical care was time spent personally by me on the following activities: blood draw for specimens, development of treatment plan with patient or surrogate, discussions with consultants,  discussions with primary provider, interpretation of cardiac output measurements, evaluation of patient's response to treatment, examination of patient, obtaining history from patient or surrogate, ordering and review of radiographic studies, ordering and review of laboratory studies and pulse oximetry.        Labs Reviewed   ACETAMINOPHEN LEVEL - Abnormal       Result Value    Acetaminophen (Tylenol), Serum <3.0 (*)    SALICYLATE LEVEL - Abnormal    Salicylate Lvl <5.0 (*)    URINALYSIS, REFLEX TO URINE CULTURE - Abnormal    Specimen UA Urine, Catheterized      Color, UA Yellow      Appearance, UA Clear      pH, UA 6.0      Specific Gravity, UA 1.015      Protein, UA Negative      Glucose, UA 2+ (*)     Ketones, UA Negative      Bilirubin (UA) Negative      Occult Blood UA Negative      Nitrite, UA Negative      Urobilinogen, UA Negative      Leukocytes, UA Negative      Narrative:     Specimen Source->Urine   DRUG SCREEN PANEL, URINE EMERGENCY - Abnormal    Benzodiazepines Negative      Methadone metabolites Negative      Cocaine (Metab.) Negative      Opiate Scrn, Ur Negative      Barbiturate Screen, Ur Presumptive Positive (*)     Amphetamine Screen, Ur Negative      THC Negative      Phencyclidine Negative      Creatinine, Urine 71.7      Toxicology Information SEE COMMENT      Narrative:     Specimen Source->Urine   CBC W/ AUTO DIFFERENTIAL - Abnormal    WBC 10.59      RBC 3.57 (*)     Hemoglobin 10.8 (*)     Hematocrit 31.8 (*)     MCV 89      MCH 30.3      MCHC 34.0      RDW 12.6      Platelets 232      MPV 11.4      Immature Granulocytes 0.3      Gran # (ANC) 6.6      Immature Grans (Abs) 0.03      Lymph # 3.0      Mono # 0.7      Eos # 0.2      Baso # 0.04      nRBC 0      Gran % 62.4      Lymph % 28.6      Mono % 6.4      Eosinophil % 1.9      Basophil % 0.4      Differential Method Automated     COMPREHENSIVE METABOLIC PANEL - Abnormal    Sodium 141      Potassium 5.1      Chloride 113 (*)     CO2 20 (*)      Glucose 354 (*)     BUN 17      Creatinine 0.8      Calcium 7.6 (*)     Total Protein 6.2      Albumin 3.2 (*)     Total Bilirubin 0.2      Alkaline Phosphatase 91      AST 9 (*)     ALT 11      eGFR >60      Anion Gap 8     ALCOHOL,MEDICAL (ETHANOL)    Alcohol, Serum <10     TROPONIN I    Troponin I <0.006     CK    CPK 47     B-TYPE NATRIURETIC PEPTIDE    BNP 48     D DIMER, QUANTITATIVE   D DIMER, QUANTITATIVE    D-Dimer 0.45     TROPONIN I          Imaging Results              X-Ray Chest 1 View (Final result)  Result time 01/04/25 19:19:12      Final result by Suleiman Campo DO (01/04/25 19:19:12)                   Impression:      No acute abnormality.      Electronically signed by: Suleiman Campo  Date:    01/04/2025  Time:    19:19               Narrative:    EXAMINATION:  XR CHEST 1 VIEW    CLINICAL HISTORY:  sob;    TECHNIQUE:  Single frontal view of the chest was performed.    COMPARISON:  10/04/2024.    FINDINGS:  The lungs are well expanded and clear. No focal opacities are seen. The pleural spaces are clear. The cardiac silhouette is unremarkable. The visualized osseous structures are unremarkable.                                       Medications   naloxone injection 2 mg (2 mg Intravenous Given 1/4/25 1849)   LIDOcaine (PF) 10 mg/ml (1%) injection 50 mg (50 mg Infiltration Given by Provider 1/4/25 0519)     Needle aspiration of sebaceous cyst/left ear  -- Performed by: Nadeem Cosme M.D.  -- Date/Time: 10:25 PM 1/4/2025    -- Type:  Infected sebaceous cyst  -- Location:  Left ear  -- Anesthesia: local infiltration  -- Local anesthetic: lidocaine 1%   -- Anesthetic total: 5 ml  -- needle aspiration with 18 gauge needle  -- pus drained & sent for culture  -- Complexity: simple  -- Drainage: pus  -- Drainage amount: scant  -- cleaned dressing after procedure  -- Patient gave verbal consent before the start of the procedure  -- No complications were noted from the procedure  -- The patient  tolerated the procedure well     Medical Decision Making  Differential includes pseudo-seizure, syncopal episode, seizure, TIA, CVA, mechanical fall    Problems Addressed:  Left against medical advice: complicated acute illness or injury  Sebaceous cyst: complicated acute illness or injury  Unresponsive episode: complicated acute illness or injury    Amount and/or Complexity of Data Reviewed  Labs: ordered. Decision-making details documented in ED Course.  Radiology: ordered and independent interpretation performed.  ECG/medicine tests: ordered and independent interpretation performed.    ED Management & Risks of Complication, Morbidity, & Mortality:  Lab work within normal limits, (-) troponin, (-) D-dimer tonight  Clear chest x-ray in the emergency room, (-) urinalysis in the ER tonight  Patient responded well to IV fluids, acting normally on reassessment  Patient is refusing head CT, patient was refusing 2nd troponin now  Patient asked that I aspirate her infected left ear sebaceous cyst on DC  Aspirated & sent for culture, placed on clindamycin & Bactroban on DC  Patient signed out against medical advice refusing any further evaluation    Clinical Impression:  Final diagnoses:  [L72.3] Sebaceous cyst  [Z53.29] Left against medical advice (Primary)  [R40.4] Unresponsive episode          ED Disposition Condition    AMA Nadeem Bassett MD  01/04/25 8449

## 2025-01-05 NOTE — TELEPHONE ENCOUNTER
Patient  calling on behalf of patient.  stated his wife went to the ED today and was prescribed two pain medications, Zanaflex and Voltaren.  said about 1740 patient took one of each medications and is now not responding.  reports BP at 1600 78/55 and 79/57 HR 73.  reports he attempted to wake her several times and she does not respond or open eyes.  reports she is breathing. Advised patient of dispo to call 911. Verbalized understanding. Advised to call back if symptoms become worse or with further questions.      Reason for Disposition   Difficult to awaken or acting confused (e.g., disoriented, slurred speech)   Started suddenly after an allergic medicine, an allergic food, or bee sting    Additional Information   Negative: Shock suspected (e.g., cold/pale/clammy skin, too weak to stand, low BP, rapid pulse)    Protocols used: Blood Pressure - Low-A-

## 2025-01-05 NOTE — ED NOTES
Pt refusing troponin redraw at this time. Dr. Cosme notified and at bedside to speak witrh pt. Pt requesting for cyst on ear to be popped and then to leave. Educated pt on risk of worsening condition and/or death if leaving hospital without full treatment. Pt verbalized understandig and still would like to leave at this time. IV's removed. AMA paper signed. Pt ambulatory out of ED.

## 2025-01-06 ENCOUNTER — PATIENT MESSAGE (OUTPATIENT)
Dept: ORTHOPEDICS | Facility: CLINIC | Age: 50
End: 2025-01-06
Payer: COMMERCIAL

## 2025-01-06 ENCOUNTER — TELEPHONE (OUTPATIENT)
Dept: ORTHOPEDICS | Facility: CLINIC | Age: 50
End: 2025-01-06
Payer: COMMERCIAL

## 2025-01-06 LAB
OHS QRS DURATION: 78 MS
OHS QTC CALCULATION: 451 MS

## 2025-01-06 NOTE — TELEPHONE ENCOUNTER
"Returned pt call, pt explained she "don't know what happened she just fell and heard a bone crack and the ER did not tell her anything to come here". I tried offering next available ( tomorrow at 8) pt declined and requested for next week. Pt is scheduled for next Tuesday at 1. ER took xrays     ----- Message from [place] sent at 2025  9:42 AM CST -----  Contact: Patient  Eva Lane  MRN: [id number]  : 1975  PCP: Cintia Gustafson  Home Phone      [phone]  Work Phone      Not on file.  Mobile          [phone]      MESSAGE: Patient was seen in the ER and would like a returned call to schedule an appointment for her shoulder and ankle.     PHONE; [phone]  "

## 2025-01-08 LAB — BACTERIA SPEC AEROBE CULT: NO GROWTH

## 2025-01-10 ENCOUNTER — PATIENT OUTREACH (OUTPATIENT)
Dept: ADMINISTRATIVE | Facility: HOSPITAL | Age: 50
End: 2025-01-10
Payer: COMMERCIAL

## 2025-01-10 NOTE — PROGRESS NOTES
Contacted pt in reference to overdue DM eye exam. Pt states she will schedule an appointment soon with [facility]

## 2025-01-14 ENCOUNTER — OFFICE VISIT (OUTPATIENT)
Dept: ORTHOPEDICS | Facility: CLINIC | Age: 50
End: 2025-01-14
Payer: COMMERCIAL

## 2025-01-14 VITALS
OXYGEN SATURATION: 98 % | BODY MASS INDEX: 27.65 KG/M2 | HEIGHT: 63 IN | HEART RATE: 101 BPM | RESPIRATION RATE: 18 BRPM | DIASTOLIC BLOOD PRESSURE: 70 MMHG | WEIGHT: 156.06 LBS | SYSTOLIC BLOOD PRESSURE: 124 MMHG

## 2025-01-14 DIAGNOSIS — M25.572 ACUTE LEFT ANKLE PAIN: Primary | ICD-10-CM

## 2025-01-14 DIAGNOSIS — M25.512 ACUTE PAIN OF LEFT SHOULDER: ICD-10-CM

## 2025-01-14 DIAGNOSIS — S93.402A SPRAIN OF LEFT ANKLE, UNSPECIFIED LIGAMENT, INITIAL ENCOUNTER: ICD-10-CM
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
## 2025-01-14 PROCEDURE — 1159F MED LIST DOCD IN RCRD: CPT | Mod: CPTII,S$GLB,, | Performed by: PHYSICIAN ASSISTANT

## 2025-01-14 PROCEDURE — 3074F SYST BP LT 130 MM HG: CPT | Mod: CPTII,S$GLB,, | Performed by: PHYSICIAN ASSISTANT

## 2025-01-14 PROCEDURE — 1160F RVW MEDS BY RX/DR IN RCRD: CPT | Mod: CPTII,S$GLB,, | Performed by: PHYSICIAN ASSISTANT

## 2025-01-14 PROCEDURE — 3008F BODY MASS INDEX DOCD: CPT | Mod: CPTII,S$GLB,, | Performed by: PHYSICIAN ASSISTANT

## 2025-01-14 PROCEDURE — 99213 OFFICE O/P EST LOW 20 MIN: CPT | Mod: S$GLB,,, | Performed by: PHYSICIAN ASSISTANT

## 2025-01-14 PROCEDURE — 99999 PR PBB SHADOW E&M-EST. PATIENT-LVL III: CPT | Mod: PBBFAC,,, | Performed by: PHYSICIAN ASSISTANT

## 2025-01-14 PROCEDURE — 3078F DIAST BP <80 MM HG: CPT | Mod: CPTII,S$GLB,, | Performed by: PHYSICIAN ASSISTANT

## 2025-01-14 RX ORDER — MELOXICAM 15 MG/1
15 TABLET ORAL DAILY
Qty: 30 TABLET | Refills: 0 | Status: SHIPPED | OUTPATIENT
Start: 2025-01-14 | End: 2025-02-01 | Stop reason: ALTCHOICE

## 2025-01-14 NOTE — PROGRESS NOTES
Subjective:      Patient ID: Eva Lane is a 49 y.o. female.    Chief Complaint: Pain of the Left Shoulder and Pain of the Left Ankle    Review of patient's allergies indicates:   Allergen Reactions    Penicillins Swelling and Rash    Neosporin [neomycin-bacitracin-polymyxin] Rash    Shellfish containing products     Shrimp Hives    Bacticin Blisters, Hives and Rash    Lisinopril Rash      48 yo F presents to clinic with c/o left shoulder and left ankle pain x 2 weeks.    She reports that she tripped and fell at home and injured her left shoulder.  She was seen in ED after injury and placed in a sling which she has been wearing since.  Today she c/o soreness to anterior shoulder that is worse with movement and improves some with rest.  No numbness/tingling.  She was prescribed diclofenac from ED and says that it did not provide any relief of pain.    Left ankle pain started when she was walking her dog and he pulled away from her, causing her to twist her ankle.  She c/o sharp/achy pain and swelling to lateral ankle.  Worse with walking/weight bearing and improves some with rest.  No numbness/tingling.  No fracture on xray from ED.  No relief with diclofenac.  Wearing CAM boot with ambulation which does help with the pain.        Review of Systems   Constitutional: Negative for chills, diaphoresis and fever.   HENT:  Negative for congestion, ear discharge and ear pain.    Eyes:  Negative for blurred vision, discharge, double vision and pain.   Cardiovascular:  Negative for chest pain, claudication and cyanosis.   Respiratory:  Negative for cough, hemoptysis and shortness of breath.    Endocrine: Negative for cold intolerance and heat intolerance.   Skin:  Negative for color change, dry skin, itching and rash.   Musculoskeletal:  Positive for joint pain and joint swelling. Negative for arthritis, back pain, falls, gout, muscle weakness and neck pain.   Gastrointestinal:  Negative for abdominal pain and  change in bowel habit.   Neurological:  Negative for brief paralysis, disturbances in coordination, dizziness, numbness and paresthesias.   Psychiatric/Behavioral:  Negative for altered mental status and depression.          Objective:          General    Constitutional: She is oriented to person, place, and time. She appears well-developed and well-nourished. No distress.   HENT:   Head: Atraumatic.   Eyes: EOM are normal. Right eye exhibits no discharge. Left eye exhibits no discharge.   Cardiovascular:  Normal rate.            Pulmonary/Chest: Effort normal. No respiratory distress.   Abdominal: Soft.   Neurological: She is alert and oriented to person, place, and time.   Psychiatric: She has a normal mood and affect. Her behavior is normal.         Right Ankle/Foot Exam     Inspection   Deformity: absent  Erythema: absent  Bruising: Ankle - absent Foot - absent    Range of Motion   The patient has normal right ankle ROM.  Bello Test:  negative  First MTP Joint: normal    Muscle Strength   The patient has normal right ankle strength.    Other   Sensation: normal    Left Ankle/Foot Exam     Inspection  Deformity: absent  Erythema: absent  Bruising: Ankle - absent Foot - absent    Range of Motion   The patient has normal left ankle ROM.   Bello Test:  normal  First MTP Joint: normal    Muscle Strength   The patient has normal left ankle strength.    Other   Sensation: normal  Right Shoulder Exam     Inspection/Observation   Swelling: absent  Bruising: absent  Scars: absent  Deformity: absent    Range of Motion   Active abduction:  normal   Passive abduction:  normal   Forward Flexion:  normal   Forward Elevation: normal  Internal rotation 0 degrees:  normal     Muscle Strength   The patient has normal right shoulder strength.    Tests & Signs   Impingement: negative  Belly Press: negative  Speed's Test: negative    Other   Sensation: normal    Left Shoulder Exam     Inspection/Observation   Swelling:  absent  Bruising: absent  Scars: absent  Deformity: absent    Range of Motion   Active abduction:  abnormal   Passive abduction:  abnormal   Forward Flexion:  abnormal   Forward Elevation: abnormal  Internal rotation 0 degrees:  normal     Muscle Strength   The patient has normal left shoulder strength.    Tests & Signs   Impingement: negative  Belly Press: negative  Speed's Test: negative    Other   Sensation: normal     Comments:  Mild tenderness to anterior shoulder      Vascular Exam       Capillary Refill  Right Hand: normal capillary refill  Left Hand: normal capillary refill        Edema  Right Lower Leg: absent  Left Lower Leg: absent              Assessment:         Xray Left Shoulder 1/4/25:  Mild degenerative change. No acute fracture or dislocation     Xray Left Ankle 1/4/25:  No acute fracture or dislocation. There is soft tissue swelling laterally.       Encounter Diagnoses   Name Primary?    Acute pain of left shoulder     Acute left ankle pain Yes    Fall, initial encounter     Sprain of left ankle, unspecified ligament, initial encounter     Acute left ankle pain  -     meloxicam (MOBIC) 15 MG tablet; Take 1 tablet (15 mg total) by mouth once daily.  Dispense: 30 tablet; Refill: 0  -     Ambulatory Referral/Consult to Physical Therapy; Future; Expected date: 01/21/2025    Acute pain of left shoulder  -     Ambulatory referral/consult to Orthopedics  -     meloxicam (MOBIC) 15 MG tablet; Take 1 tablet (15 mg total) by mouth once daily.  Dispense: 30 tablet; Refill: 0  -     Ambulatory Referral/Consult to Physical Therapy; Future; Expected date: 01/21/2025    Fall, initial encounter    Sprain of left ankle, unspecified ligament, initial encounter               Plan:         The total face-to-face encounter time with this patient was 30 minutes and greater than 50% of of the encounter time was spent counseling the patient, coordinating care, and education regarding the diagnosis. We have discussed a  variety of treatment options including medications, injections, physical therapy and other alternative treatments. I also explained the indications, risks and benefits of surgery. Patient chooses to proceed with physical therapy at this time. She is also asking to try a different antiinflammatory medication.    I made the decision to obtain old records of the patient including previous notes and imaging.       1. Ice compress to the affected area 2-3x a day for 15-20 minutes as needed for pain management.  2. Mobic 15 mg 1 time daily PRN for pain management. Stop diclofenac and any other NSAIDs.  3. Ambulatory referral to physical therapy for periscapular/rotator cuff strengthening and ankle conditioning.  4. Shoulder and ankle conditioning home exercise program as directed. [facility] handout of exercises provided to patient.  5. Discontinue sling.  6. Continue CAM boot with ambulation.   7. RTC in 6 weeks for follow-up, sooner if needed.    Patient voices understanding of and agreement with treatment plan. All of the patient's questions were answered and the patient will contact us if she has any questions or concerns in the interim.

## 2025-01-24 ENCOUNTER — PATIENT MESSAGE (OUTPATIENT)
Dept: ORTHOPEDICS | Facility: CLINIC | Age: 50
End: 2025-01-24
Payer: COMMERCIAL

## 2025-01-26 DIAGNOSIS — R56.9 SEIZURE-LIKE ACTIVITY: ICD-10-CM

## 2025-01-27 RX ORDER — DIVALPROEX SODIUM 500 MG/1
500 TABLET, FILM COATED, EXTENDED RELEASE ORAL DAILY
Qty: 30 TABLET | Refills: 5 | Status: SHIPPED | OUTPATIENT
Start: 2025-01-27

## 2025-01-28 ENCOUNTER — OFFICE VISIT (OUTPATIENT)
Dept: INTERNAL MEDICINE | Facility: CLINIC | Age: 50
End: 2025-01-28
Payer: COMMERCIAL

## 2025-01-28 VITALS
TEMPERATURE: 98 F | WEIGHT: 148.13 LBS | HEART RATE: 84 BPM | BODY MASS INDEX: 26.25 KG/M2 | HEIGHT: 63 IN | OXYGEN SATURATION: 98 % | DIASTOLIC BLOOD PRESSURE: 74 MMHG | RESPIRATION RATE: 18 BRPM | SYSTOLIC BLOOD PRESSURE: 128 MMHG

## 2025-01-28 DIAGNOSIS — R10.2 PELVIC PAIN: Primary | ICD-10-CM

## 2025-01-28 DIAGNOSIS — E11.42 TYPE 2 DIABETES MELLITUS WITH DIABETIC POLYNEUROPATHY, WITHOUT LONG-TERM CURRENT USE OF INSULIN: ICD-10-CM

## 2025-01-28 DIAGNOSIS — F20.3 UNDIFFERENTIATED SCHIZOPHRENIA: ICD-10-CM

## 2025-01-28 DIAGNOSIS — J43.9 PULMONARY EMPHYSEMA, UNSPECIFIED EMPHYSEMA TYPE: ICD-10-CM

## 2025-01-28 DIAGNOSIS — R30.0 DYSURIA: ICD-10-CM

## 2025-01-28 PROCEDURE — 3008F BODY MASS INDEX DOCD: CPT | Mod: CPTII,S$GLB,, | Performed by: NURSE PRACTITIONER

## 2025-01-28 PROCEDURE — 99999 PR PBB SHADOW E&M-EST. PATIENT-LVL III: CPT | Mod: PBBFAC,,, | Performed by: NURSE PRACTITIONER

## 2025-01-28 PROCEDURE — 1159F MED LIST DOCD IN RCRD: CPT | Mod: CPTII,S$GLB,, | Performed by: NURSE PRACTITIONER

## 2025-01-28 PROCEDURE — 99214 OFFICE O/P EST MOD 30 MIN: CPT | Mod: S$GLB,,, | Performed by: NURSE PRACTITIONER

## 2025-01-28 PROCEDURE — 3074F SYST BP LT 130 MM HG: CPT | Mod: CPTII,S$GLB,, | Performed by: NURSE PRACTITIONER

## 2025-01-28 PROCEDURE — 3078F DIAST BP <80 MM HG: CPT | Mod: CPTII,S$GLB,, | Performed by: NURSE PRACTITIONER

## 2025-01-28 NOTE — PROGRESS NOTES
History of Present Illness    CHIEF COMPLAINT:  Patient presents today with urinary pressure and discomfort.    URINARY SYMPTOMS:  She reports pressure during urination and blood when wiping for the past 2-3 days. She denies changes in urine appearance.    MUSCULOSKELETAL:  She reports severe lower back pain.    SLEEP:  She reports difficulty sleeping and feeling tired as a result.    SURGICAL HISTORY:  She underwent total hysterectomy approximately 14-15 years ago.      ROS:  General: -fever, -chills, +fatigue, -weight gain, -weight loss  Eyes: -vision changes, -redness, -discharge  ENT: -ear pain, -nasal congestion, -sore throat  Cardiovascular: -chest pain, -palpitations, -lower extremity edema  Respiratory: -cough, -shortness of breath  Gastrointestinal: -abdominal pain, -nausea, -vomiting, -diarrhea, -constipation, -blood in stool  Genitourinary: -dysuria, -hematuria, -frequency, +difficulty urinating, -urine changes  Musculoskeletal: -joint pain, -muscle pain, +back pain  Skin: -rash, -lesion  Neurological: -headache, -dizziness, -numbness, -tingling  Psychiatric: -anxiety, -depression, -sleep difficulty          Physical Exam    General: No acute distress. Well-developed. Well-nourished.  Eyes: EOMI. Sclerae anicteric.  HENT: Normocephalic. Atraumatic. Nares patent. Moist oral mucosa.  Ears: Bilateral TMs clear. Bilateral EACs clear.  Cardiovascular: Regular rate. Regular rhythm. No murmurs. No rubs. No gallops. Normal S1, S2.  Respiratory: Normal respiratory effort. Clear to auscultation bilaterally. No rales. No rhonchi. No wheezing.  Abdomen: Soft. Non-tender. Non-distended. Normoactive bowel sounds.  Musculoskeletal: No  obvious deformity.  Extremities: No lower extremity edema.  Neurological: Alert & oriented x3. No slurred speech. Normal gait.  Psychiatric: Normal mood. Normal affect. Good insight. Good judgment.  Skin: Warm. Dry. No rash.          Assessment & Plan    IMPRESSION:  - Assessed urinary  "symptoms and GYN history  - Considered potential GYN etiology for urinary pressure and bleeding  - Noted history of total hysterectomy 14-15 years ago  - Determined need for GYN exam despite hysterectomy history  - Decided against prescribing antibiotics without further evaluation    URINARY SYMPTOMS:  - Patient reports feeling pressure when urinating, which started 2-3 days ago.  - Acknowledged the urinary symptoms and suggested a GYN exam.  - Decided not to prescribe antibiotics at this time.  - Referred the patient to a gynecologist for further evaluation.    HEMATURIA:  - Patient reports observing a small amount of blood when wiping after urination.  - Acknowledged the hematuria and suggested a GYN exam.  - Referred the patient to a gynecologist for further evaluation.    LOWER BACK PAIN:  - Patient reports experiencing severe lower back pain.    POST-HYSTERECTOMY CARE:  - Patient confirms having undergone a total hysterectomy approximately 14-15 years ago.  - Explained that even after a total hysterectomy, periodic GYN exams are necessary.  - Discussed that a small portion of the cervix may sometimes remain after hysterectomy.    GYNECOLOGY REFERRAL:  - Referred the patient to a gynecologist at [facility] for exam.  - Advised the patient to contact the office to schedule an appointment with the referred gynecologist.    FOLLOW UP:  - Instructed the patient to follow up after the GYN exam for further steps.         This note was generated with the assistance of ambient listening technology. Verbal consent was obtained by the patient and accompanying visitor(s) for the recording of patient appointment to facilitate this note. I attest to having reviewed and edited the generated note for accuracy, though some syntax or spelling errors may persist. Please contact the author of this note for any clarification.      "This note will not be shared with the patient."    1. Pelvic pain  POCT URINE DIPSTICK WITHOUT " MICROSCOPE    Ambulatory referral/consult to Gynecology      2. Dysuria  POCT URINE DIPSTICK WITHOUT MICROSCOPE    Ambulatory referral/consult to Gynecology      3. Pulmonary emphysema, unspecified emphysema type  Stable       4. Type 2 diabetes mellitus with diabetic polyneuropathy, without long-term current use of insulin  Stable       5. Undifferentiated schizophrenia  Stable        Rtc as scheduled

## 2025-01-29 ENCOUNTER — PATIENT OUTREACH (OUTPATIENT)
Dept: ADMINISTRATIVE | Facility: HOSPITAL | Age: 50
End: 2025-01-29
Payer: COMMERCIAL

## 2025-02-01 ENCOUNTER — HOSPITAL ENCOUNTER (EMERGENCY)
Facility: HOSPITAL | Age: 50
Discharge: HOME | End: 2025-02-01
Attending: SURGERY
Payer: COMMERCIAL

## 2025-02-01 VITALS
DIASTOLIC BLOOD PRESSURE: 81 MMHG | WEIGHT: 140 LBS | HEIGHT: 63 IN | BODY MASS INDEX: 24.8 KG/M2 | HEART RATE: 114 BPM | TEMPERATURE: 98 F | OXYGEN SATURATION: 96 % | RESPIRATION RATE: 21 BRPM | SYSTOLIC BLOOD PRESSURE: 127 MMHG

## 2025-02-01 DIAGNOSIS — M25.562 ACUTE PAIN OF BOTH KNEES: ICD-10-CM

## 2025-02-01 DIAGNOSIS — M25.561 ACUTE PAIN OF BOTH KNEES: ICD-10-CM

## 2025-02-01 DIAGNOSIS — G44.219 EPISODIC TENSION-TYPE HEADACHE, NOT INTRACTABLE: ICD-10-CM

## 2025-02-01 DIAGNOSIS — F51.05 INSOMNIA DUE TO OTHER MENTAL DISORDER: ICD-10-CM

## 2025-02-01 DIAGNOSIS — F99 INSOMNIA DUE TO OTHER MENTAL DISORDER: ICD-10-CM

## 2025-02-01 DIAGNOSIS — F45.42 PAIN DISORDER ASSOCIATED WITH PSYCHOLOGICAL AND PHYSICAL FACTORS: Primary | ICD-10-CM
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
## 2025-02-01 DIAGNOSIS — M79.632 LEFT FOREARM PAIN: ICD-10-CM

## 2025-02-01 PROCEDURE — 25000003 PHARM REV CODE 250: Performed by: SURGERY

## 2025-02-01 PROCEDURE — 99284 EMERGENCY DEPT VISIT MOD MDM: CPT | Mod: 25

## 2025-02-01 RX ORDER — IBUPROFEN 800 MG/1
800 TABLET ORAL EVERY 6 HOURS PRN
Qty: 20 TABLET | Refills: 0 | Status: SHIPPED | OUTPATIENT
Start: 2025-02-01

## 2025-02-01 RX ORDER — ACETAMINOPHEN 500 MG
1000 TABLET ORAL
Status: COMPLETED | OUTPATIENT
Start: 2025-02-01 | End: 2025-02-01

## 2025-02-01 RX ORDER — IBUPROFEN 800 MG/1
800 TABLET ORAL
Status: COMPLETED | OUTPATIENT
Start: 2025-02-01 | End: 2025-02-01

## 2025-02-01 RX ADMIN — IBUPROFEN 800 MG: 800 TABLET, FILM COATED ORAL at 11:02

## 2025-02-01 RX ADMIN — ACETAMINOPHEN 1000 MG: 500 TABLET ORAL at 11:02

## 2025-02-02 NOTE — ED PROVIDER NOTES
Encounter Date: 2/1/2025       History     Chief Complaint   Patient presents with    Fall     Pt to ED with c/o bilateral knee and left arm pain after trip and fall; denies hitting head, denies LOC.      History of Present Illness  49-year-old white female that presents with left forearm & bilateral knee pain  Patient states she had a mechanical trip & fall, denies any head trauma HX  Patient has frequent falls, states that she has to wear a left walking boot now  This makes her typically unsteady on her feet, minimal bruising noted on exam  Alert & appropriate with no signs of head trauma, denies any LOC on history  Patient describes mechanical fall rather than syncope or other mechanisms    The history is provided by the patient.     Review of patient's allergies indicates:   Allergen Reactions    Penicillins Swelling and Rash    Neosporin [neomycin-bacitracin-polymyxin] Rash    Shellfish containing products     Shrimp Hives    Bacticin Blisters, Hives and Rash    Lisinopril Rash     Past Medical History:   Diagnosis Date    Addiction to drug     ADHD (attention deficit hyperactivity disorder)     Anxiety     Arthritis     Asthma     Behavioral problem     Bipolar 1 disorder     Borderline personality disorder 09/23/2015    CHF (congestive heart failure)     Conversion disorder 09/23/2015    COPD (chronic obstructive pulmonary disease)     Depression     Diabetes mellitus type II     Fatigue     Hallucination     Headache     History of psychiatric hospitalization     Hx of psychiatric care     Hyperlipidemia     Hypertension     Lumbar radiculopathy     Allie     Neuralgia     Neuritis     Panic disorder     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    Psychiatric problem     Psychosis     PTSD (post-traumatic stress disorder)     Seizures     Seizure-last 8/2015-shake and fall-doesnt remember anything    Self-harming behavior     Sleep apnea     on CPAP    Sleep difficulties     Social anxiety disorder 02/04/2016    Stroke  2015    Stroke     Suicide attempt     Therapy     Thyroid disease      Past Surgical History:   Procedure Laterality Date    COLONOSCOPY N/A 2016    Procedure: COLONOSCOPY;  Surgeon: Robert Hernandez MD;  Location: [facility] ([address] [place] [address]);  Service: Endoscopy;  Laterality: N/A;  Schedule for next available CRS physician - EGD @ 8:30 with Dr. Naylor    CYST REMOVAL      Fatty cyst on right face cheek and left upper arm     DILATION AND CURETTAGE OF UTERUS      Miscarriage    HYSTERECTOMY      DUB - Total    OOPHORECTOMY  2008    TOTAL ABDOMINAL HYSTERECTOMY  2008    TUBAL LIGATION       Family History   Problem Relation Name Age of Onset    No Known Problems Mother      Heart disease Father      Hyperlipidemia Father      Hypertension Father      Diabetes Father      Dementia Father      Breast cancer Neg Hx      Colon cancer Neg Hx      Ovarian cancer Neg Hx       Social History     Tobacco Use    Smoking status: Some Days     Current packs/day: 0.00     Average packs/day: 0.3 packs/day for 36.7 years (9.2 ttl pk-yrs)     Types: Cigarettes     Start date: 1986     Last attempt to quit: 3/30/2023     Years since quittin.8    Smokeless tobacco: Never    Tobacco comments:     told about [facility] smoking cessation program-given smoking clinic pamphlet   Substance Use Topics    Alcohol use: No    Drug use: No     Review of Systems   Constitutional: Negative.    HENT: Negative.     Eyes: Negative.    Respiratory: Negative.     Cardiovascular: Negative.    Gastrointestinal: Negative.    Genitourinary: Negative.    Musculoskeletal:         (+) left forearm & bilateral knee pain   Skin: Negative.    Neurological: Negative.    Psychiatric/Behavioral: Negative.         Physical Exam     Initial Vitals [25 2234]   BP Pulse Resp Temp SpO2   127/81 (!) 114 (!) 21 98.2 °F (36.8 °C) 96 %      MAP       --         Physical Exam    Nursing note and vitals reviewed.  Constitutional: Vital signs are  normal. She appears well-developed and well-nourished. She is cooperative.   HENT:   Head: Normocephalic and atraumatic.   Eyes: Conjunctivae, EOM and lids are normal. Pupils are equal, round, and reactive to light.   Neck: Trachea normal and phonation normal. Neck supple. No JVD present.   Normal range of motion.   Full passive range of motion without pain.     Cardiovascular:  Normal rate, regular rhythm, S1 normal, S2 normal, normal heart sounds, intact distal pulses and normal pulses.           Pulmonary/Chest: Effort normal and breath sounds normal.   Abdominal: Abdomen is soft and flat. Bowel sounds are normal.   Musculoskeletal:      Cervical back: Full passive range of motion without pain, normal range of motion and neck supple.      Comments: (+) minimal bruising of the left forearm     Neurological: She is alert and oriented to person, place, and time. She has normal strength.   Skin: Skin is warm, dry and intact. Capillary refill takes less than 2 seconds.         ED Course   Procedures  Labs Reviewed - No data to display       Imaging Results              X-Ray Knee 1 or 2 View Bilateral (Final result)  Result time 02/01/25 23:15:10      Final result by Suleiman Campo DO (02/01/25 23:15:10)                   Impression:      No acute osseous abnormality of the bilateral knees.      Electronically signed by: Suleiman Campo  Date:    02/01/2025  Time:    23:15               Narrative:    EXAMINATION:  XR KNEE 1 OR 2 VIEW BILATERAL    CLINICAL HISTORY:  Bilateral knee pain;    TECHNIQUE:  AP and lateral radiographs of the bilateral knees.    COMPARISON:  06/24/2019.    FINDINGS:  Right knee: There is no acute fracture or dislocation. Alignment is normal. Joint spaces are preserved. No joint effusion.    Left knee: There is no acute fracture or dislocation. Alignment is normal. Joint spaces are preserved. No joint effusion.                                       X-Ray Forearm Left (Final result)  Result  time 02/01/25 23:14:46      Final result by Suleiman Campo DO (02/01/25 23:14:46)                   Impression:      No acute fracture or dislocation.      Electronically signed by: Suleiman Campo  Date:    02/01/2025  Time:    23:14               Narrative:    EXAMINATION:  XR FOREARM LEFT    CLINICAL HISTORY:  Pain in left forearm    TECHNIQUE:  AP and lateral views of the left forearm were performed.    COMPARISON:  None    FINDINGS:  No acute fracture or dislocation. Alignment is normal. Joint spaces are preserved.                                       Medications   acetaminophen tablet 1,000 mg (1,000 mg Oral Given 2/1/25 0577)   ibuprofen tablet 800 mg (800 mg Oral Given 2/1/25 7651)     Medical Decision Making  Differential includes sprain, strain, fracture, dislocation, ligament/tendon injury    Problems Addressed:  Acute pain of both knees: complicated acute illness or injury  Fall, initial encounter: complicated acute illness or injury  Left forearm pain: complicated acute illness or injury  Pain disorder associated with psychological and physical factors: complicated acute illness or injury    Amount and/or Complexity of Data Reviewed  Radiology: ordered and independent interpretation performed.    ED Management & Risks of Complication, Morbidity, & Mortality:  Left forearm & bilateral knee x-ray showed no acute findings tonight  Motrin for pain in the ER, Motrin for pain on ER discharge  Chronic pain patient with several falls on ER chart review today  Seek pain management in Orthopedics as an outpatient on discharge  Pt/Family counseled to return to the ER with any concerns on DC    Need for Hospitalization or Surgery with Social Determinants of Health:  This patient does not need to be hospitalized on ER evaluation today  The patient's diagnosis is not limited by social determinants of health  Does not require surgery or procedure (major or minor), no risk factors    Clinical Impression:  Final  diagnoses:  [M79.632] Left forearm pain  [W19.XXXA] Fall, initial encounter  [M25.561, M25.562] Acute pain of both knees  [F45.42] Pain disorder associated with psychological and physical factors (Primary)          ED Disposition Condition    Discharge Stable          ED Prescriptions       Medication Sig Dispense Start Date End Date Auth. Provider    ibuprofen (ADVIL,MOTRIN) 800 MG tablet Take 1 tablet (800 mg total) by mouth every 6 (six) hours as needed for Pain. 20 tablet 2/1/2025 -- Nadeem Cosme MD          Follow-up Information       Follow up With Specialties Details Why Contact Info    Cintia Gustafson, NP Family Medicine Schedule an appointment as soon as possible for a visit in 2 days  [address] [facility] [address]  [phone]               Nadeem Cosme MD  02/02/25 0008

## 2025-02-02 NOTE — ED NOTES
Discharge instructions, prescriptions, follow up and strict return precautions gone over with patient; verbalized understanding.  Patient wheeled out of ED in stable condition with family members.

## 2025-02-03 DIAGNOSIS — E11.65 UNCONTROLLED TYPE 2 DIABETES MELLITUS WITH HYPERGLYCEMIA: ICD-10-CM

## 2025-02-03 RX ORDER — QUETIAPINE FUMARATE 400 MG/1
400 TABLET, FILM COATED ORAL 2 TIMES DAILY
Qty: 60 TABLET | Refills: 2 | Status: SHIPPED | OUTPATIENT
Start: 2025-02-03 | End: 2025-03-03

## 2025-02-03 RX ORDER — BUTALBITAL, ACETAMINOPHEN AND CAFFEINE 50; 325; 40 MG/1; MG/1; MG/1
1 TABLET ORAL DAILY PRN
Qty: 10 TABLET | Refills: 11 | OUTPATIENT
Start: 2025-02-03

## 2025-02-04 RX ORDER — INSULIN ASPART 100 [IU]/ML
16 INJECTION, SOLUTION INTRAVENOUS; SUBCUTANEOUS 2 TIMES DAILY WITH MEALS
Qty: 9 ML | Refills: 5 | Status: SHIPPED | OUTPATIENT
Start: 2025-02-04

## 2025-02-06 ENCOUNTER — TELEPHONE (OUTPATIENT)
Dept: INTERNAL MEDICINE | Facility: CLINIC | Age: 50
End: 2025-02-06
Payer: COMMERCIAL

## 2025-02-06 DIAGNOSIS — Z12.31 ENCOUNTER FOR SCREENING MAMMOGRAM FOR MALIGNANT NEOPLASM OF BREAST: Primary | ICD-10-CM

## 2025-02-06 NOTE — TELEPHONE ENCOUNTER
----- Message from Med Assistant Farr sent at 2025  3:38 PM CST -----  Eva Lane  MRN: [id number]  : 1975  PCP: Cintia Gustafson  Home Phone      [phone]  Work Phone      Not on file.  Mobile          [phone]      MESSAGE:     Patient needs order for a mammogram.  Schedule after .    Please Advise:  [phone]

## 2025-03-02 DIAGNOSIS — F99 INSOMNIA DUE TO OTHER MENTAL DISORDER: ICD-10-CM

## 2025-03-02 DIAGNOSIS — F51.05 INSOMNIA DUE TO OTHER MENTAL DISORDER: ICD-10-CM

## 2025-03-03 RX ORDER — QUETIAPINE FUMARATE 400 MG/1
400 TABLET, FILM COATED ORAL 2 TIMES DAILY
Qty: 60 TABLET | Refills: 5 | Status: SHIPPED | OUTPATIENT
Start: 2025-03-03 | End: 2025-03-07

## 2025-03-07 ENCOUNTER — OFFICE VISIT (OUTPATIENT)
Dept: INTERNAL MEDICINE | Facility: CLINIC | Age: 50
End: 2025-03-07
Payer: COMMERCIAL

## 2025-03-07 ENCOUNTER — LAB VISIT (OUTPATIENT)
Dept: LAB | Facility: HOSPITAL | Age: 50
End: 2025-03-07
Attending: NURSE PRACTITIONER
Payer: COMMERCIAL

## 2025-03-07 VITALS
DIASTOLIC BLOOD PRESSURE: 80 MMHG | WEIGHT: 156.06 LBS | HEIGHT: 63 IN | OXYGEN SATURATION: 98 % | TEMPERATURE: 99 F | RESPIRATION RATE: 18 BRPM | BODY MASS INDEX: 27.65 KG/M2 | HEART RATE: 80 BPM | SYSTOLIC BLOOD PRESSURE: 134 MMHG

## 2025-03-07 DIAGNOSIS — M25.512 CHRONIC LEFT SHOULDER PAIN: ICD-10-CM

## 2025-03-07 DIAGNOSIS — T74.21XA SEXUAL ASSAULT OF ADULT, INITIAL ENCOUNTER: ICD-10-CM

## 2025-03-07 DIAGNOSIS — T74.21XA SEXUAL ASSAULT OF ADULT, INITIAL ENCOUNTER: Primary | ICD-10-CM

## 2025-03-07 DIAGNOSIS — G89.29 CHRONIC LEFT SHOULDER PAIN: ICD-10-CM

## 2025-03-07 DIAGNOSIS — H66.002 NON-RECURRENT ACUTE SUPPURATIVE OTITIS MEDIA OF LEFT EAR WITHOUT SPONTANEOUS RUPTURE OF TYMPANIC MEMBRANE: ICD-10-CM

## 2025-03-07 DIAGNOSIS — F31.81 BIPOLAR 2 DISORDER: ICD-10-CM

## 2025-03-07 PROCEDURE — 99999 PR PBB SHADOW E&M-EST. PATIENT-LVL V: CPT | Mod: PBBFAC,,, | Performed by: NURSE PRACTITIONER

## 2025-03-07 PROCEDURE — 3075F SYST BP GE 130 - 139MM HG: CPT | Mod: CPTII,S$GLB,, | Performed by: NURSE PRACTITIONER

## 2025-03-07 PROCEDURE — 1160F RVW MEDS BY RX/DR IN RCRD: CPT | Mod: CPTII,S$GLB,, | Performed by: NURSE PRACTITIONER

## 2025-03-07 PROCEDURE — 87591 N.GONORRHOEAE DNA AMP PROB: CPT | Performed by: NURSE PRACTITIONER

## 2025-03-07 PROCEDURE — 86593 SYPHILIS TEST NON-TREP QUANT: CPT | Performed by: NURSE PRACTITIONER

## 2025-03-07 PROCEDURE — 1159F MED LIST DOCD IN RCRD: CPT | Mod: CPTII,S$GLB,, | Performed by: NURSE PRACTITIONER

## 2025-03-07 PROCEDURE — 99214 OFFICE O/P EST MOD 30 MIN: CPT | Mod: S$GLB,,, | Performed by: NURSE PRACTITIONER

## 2025-03-07 PROCEDURE — 3008F BODY MASS INDEX DOCD: CPT | Mod: CPTII,S$GLB,, | Performed by: NURSE PRACTITIONER

## 2025-03-07 PROCEDURE — 80074 ACUTE HEPATITIS PANEL: CPT | Performed by: NURSE PRACTITIONER

## 2025-03-07 PROCEDURE — 36415 COLL VENOUS BLD VENIPUNCTURE: CPT | Performed by: NURSE PRACTITIONER

## 2025-03-07 PROCEDURE — 87389 HIV-1 AG W/HIV-1&-2 AB AG IA: CPT | Performed by: NURSE PRACTITIONER

## 2025-03-07 PROCEDURE — 3079F DIAST BP 80-89 MM HG: CPT | Mod: CPTII,S$GLB,, | Performed by: NURSE PRACTITIONER

## 2025-03-07 RX ORDER — AZITHROMYCIN 250 MG/1
TABLET, FILM COATED ORAL
Qty: 6 TABLET | Refills: 0 | Status: SHIPPED | OUTPATIENT
Start: 2025-03-07 | End: 2025-03-12

## 2025-03-07 RX ORDER — QUETIAPINE FUMARATE 100 MG/1
100 TABLET, FILM COATED ORAL 2 TIMES DAILY
Qty: 60 TABLET | Refills: 5 | Status: SHIPPED | OUTPATIENT
Start: 2025-03-07 | End: 2026-03-07

## 2025-03-07 RX ORDER — MELOXICAM 15 MG/1
15 TABLET ORAL DAILY
Qty: 30 TABLET | Refills: 5 | Status: SHIPPED | OUTPATIENT
Start: 2025-03-07
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
## 2025-03-08 LAB
HAV IGM SERPL QL IA: NORMAL
HBV CORE IGM SERPL QL IA: NORMAL
HBV SURFACE AG SERPL QL IA: NORMAL
HCV AB SERPL QL IA: NORMAL
HIV 1+2 AB+HIV1 P24 AG SERPL QL IA: NORMAL
TREPONEMA PALLIDUM IGG+IGM AB [PRESENCE] IN SERUM OR PLASMA BY IMMUNOASSAY: NONREACTIVE

## 2025-03-09 LAB
C TRACH DNA SPEC QL NAA+PROBE: NOT DETECTED
N GONORRHOEA DNA SPEC QL NAA+PROBE: NOT DETECTED

## 2025-03-10 ENCOUNTER — PATIENT MESSAGE (OUTPATIENT)
Dept: INTERNAL MEDICINE | Facility: CLINIC | Age: 50
End: 2025-03-10
Payer: COMMERCIAL

## 2025-03-11 ENCOUNTER — TELEPHONE (OUTPATIENT)
Dept: INTERNAL MEDICINE | Facility: CLINIC | Age: 50
End: 2025-03-11
Payer: COMMERCIAL

## 2025-03-11 NOTE — TELEPHONE ENCOUNTER
----- Message from Med Assistant Hilario sent at 3/11/2025  1:32 PM CDT -----  Contact: Pt    ----- Message -----  From: Susan Caban  Sent: 3/11/2025   1:15 PM CDT  To: Sj [facility] Staff    Eva Lamb GaudetMRN: [id number]: 1975PCP: Cintia Gustafson.Home Phone      [phone] Phone      Not on file.Mobile          [phone]: Pt would like lab results. Please advise [phone]

## 2025-03-11 NOTE — TELEPHONE ENCOUNTER
----- Message from Susan sent at 3/11/2025  1:15 PM CDT -----  Contact: Pt  Eva GoncalvesRN: [id number]: 1975PCP: Cintia Gustafson.Home Phone      [phone] Phone      Not on file.Mobile          [phone]: Pt would like lab results. Please advise [phone]

## 2025-03-19 ENCOUNTER — TELEPHONE (OUTPATIENT)
Dept: PSYCHIATRY | Facility: CLINIC | Age: 50
End: 2025-03-19
Payer: COMMERCIAL

## 2025-03-19 NOTE — TELEPHONE ENCOUNTER
Certified No show warning letter mailed to patient for dates 9/25/24, 11/11/24, and 2/6/25 with Odalis Willingham NP.

## 2025-03-21 ENCOUNTER — TELEPHONE (OUTPATIENT)
Dept: INTERNAL MEDICINE | Facility: CLINIC | Age: 50
End: 2025-03-21
Payer: COMMERCIAL

## 2025-03-21 RX ORDER — DICLOFENAC SODIUM 50 MG/1
50 TABLET, DELAYED RELEASE ORAL 2 TIMES DAILY PRN
Qty: 60 TABLET | Refills: 0 | Status: SHIPPED | OUTPATIENT
Start: 2025-03-21

## 2025-03-21 NOTE — TELEPHONE ENCOUNTER
----- Message from Susan sent at 3/21/2025  1:18 PM CDT -----  Contact: Pt  Eva Lamb GaromaPennieRN: [id number]: 1975PCP: Cintia Gustafson.Home Phone      [phone] Phone      Not on file.Mobile          [phone]: Pt states she has been having a head ache for 3 days now. Pt states blood pressure is fine she takes it on her arm with her machine. Pt states she would like meds for this sent to [facility] in [place]. Please advise [phone]

## 2025-03-26 DIAGNOSIS — M54.50 ACUTE BILATERAL LOW BACK PAIN WITHOUT SCIATICA: ICD-10-CM

## 2025-03-27 RX ORDER — IBUPROFEN 800 MG/1
800 TABLET ORAL
Qty: 90 TABLET | Refills: 0 | Status: SHIPPED | OUTPATIENT
Start: 2025-03-27

## 2025-03-29 DIAGNOSIS — M54.50 ACUTE BILATERAL LOW BACK PAIN WITHOUT SCIATICA: ICD-10-CM

## 2025-03-31 ENCOUNTER — NURSE TRIAGE (OUTPATIENT)
Dept: ADMINISTRATIVE | Facility: CLINIC | Age: 50
End: 2025-03-31
Payer: COMMERCIAL

## 2025-03-31 RX ORDER — IBUPROFEN 800 MG/1
800 TABLET ORAL
Qty: 90 TABLET | Refills: 0 | Status: SHIPPED | OUTPATIENT
Start: 2025-03-31

## 2025-04-01 DIAGNOSIS — E11.65 UNCONTROLLED TYPE 2 DIABETES MELLITUS WITH HYPERGLYCEMIA: ICD-10-CM
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
## 2025-04-03 RX ORDER — INSULIN ASPART 100 [IU]/ML
16 INJECTION, SOLUTION INTRAVENOUS; SUBCUTANEOUS 2 TIMES DAILY WITH MEALS
Qty: 9 ML | Refills: 5 | Status: SHIPPED | OUTPATIENT
Start: 2025-04-03

## 2025-04-28 ENCOUNTER — OFFICE VISIT (OUTPATIENT)
Dept: INTERNAL MEDICINE | Facility: CLINIC | Age: 50
End: 2025-04-28
Payer: COMMERCIAL

## 2025-04-28 VITALS
SYSTOLIC BLOOD PRESSURE: 118 MMHG | HEIGHT: 63 IN | HEART RATE: 84 BPM | OXYGEN SATURATION: 98 % | RESPIRATION RATE: 20 BRPM | WEIGHT: 145.94 LBS | BODY MASS INDEX: 25.86 KG/M2 | DIASTOLIC BLOOD PRESSURE: 74 MMHG | TEMPERATURE: 98 F

## 2025-04-28 DIAGNOSIS — E11.42 TYPE 2 DIABETES MELLITUS WITH DIABETIC POLYNEUROPATHY, WITHOUT LONG-TERM CURRENT USE OF INSULIN: ICD-10-CM

## 2025-04-28 DIAGNOSIS — J01.90 ACUTE BACTERIAL SINUSITIS: Primary | ICD-10-CM

## 2025-04-28 DIAGNOSIS — B96.89 ACUTE BACTERIAL SINUSITIS: Primary | ICD-10-CM

## 2025-04-28 DIAGNOSIS — R05.9 COUGH, UNSPECIFIED TYPE: ICD-10-CM

## 2025-04-28 PROCEDURE — 99999 PR PBB SHADOW E&M-EST. PATIENT-LVL V: CPT | Mod: PBBFAC,,, | Performed by: NURSE PRACTITIONER

## 2025-04-28 PROCEDURE — 3078F DIAST BP <80 MM HG: CPT | Mod: CPTII,S$GLB,, | Performed by: NURSE PRACTITIONER

## 2025-04-28 PROCEDURE — 1159F MED LIST DOCD IN RCRD: CPT | Mod: CPTII,S$GLB,, | Performed by: NURSE PRACTITIONER

## 2025-04-28 PROCEDURE — 3008F BODY MASS INDEX DOCD: CPT | Mod: CPTII,S$GLB,, | Performed by: NURSE PRACTITIONER

## 2025-04-28 PROCEDURE — 99214 OFFICE O/P EST MOD 30 MIN: CPT | Mod: 25,S$GLB,, | Performed by: NURSE PRACTITIONER

## 2025-04-28 PROCEDURE — 96372 THER/PROPH/DIAG INJ SC/IM: CPT | Mod: S$GLB,,, | Performed by: NURSE PRACTITIONER

## 2025-04-28 PROCEDURE — 3074F SYST BP LT 130 MM HG: CPT | Mod: CPTII,S$GLB,, | Performed by: NURSE PRACTITIONER

## 2025-04-28 RX ORDER — TRIAMCINOLONE ACETONIDE 40 MG/ML
40 INJECTION, SUSPENSION INTRA-ARTICULAR; INTRAMUSCULAR
Status: COMPLETED | OUTPATIENT
Start: 2025-04-28 | End: 2025-04-28

## 2025-04-28 RX ORDER — AZITHROMYCIN 250 MG/1
TABLET, FILM COATED ORAL
Qty: 6 TABLET | Refills: 0 | Status: SHIPPED | OUTPATIENT
Start: 2025-04-28 | End: 2025-05-03

## 2025-04-28 RX ORDER — PROMETHAZINE HYDROCHLORIDE AND DEXTROMETHORPHAN HYDROBROMIDE 6.25; 15 MG/5ML; MG/5ML
5 SYRUP ORAL EVERY 6 HOURS PRN
Qty: 120 ML | Refills: 0 | Status: SHIPPED | OUTPATIENT
Start: 2025-04-28 | End: 2025-05-05

## 2025-04-28 RX ADMIN — TRIAMCINOLONE ACETONIDE 40 MG: 40 INJECTION, SUSPENSION INTRA-ARTICULAR; INTRAMUSCULAR at 03:04

## 2025-04-28 NOTE — PROGRESS NOTES
History of Present Illness    CHIEF COMPLAINT:  Patient presents today with congestion and cough for two weeks.    HISTORY OF PRESENT ILLNESS:  She reports a 2-week history of congestion, headache, ear pain, and cough with fluctuating severity. Over-the-counter medications including Dayquil and Nyquil have not provided relief.    DIABETES:  Her blood sugars range between 103-105. She takes insulin multiple times daily with frequent snacking.      ROS:  General: -fever, -chills, -fatigue, -weight gain, -weight loss  Eyes: -vision changes, -redness, -discharge  ENT: +ear pain, +nasal congestion, -sore throat  Cardiovascular: -chest pain, -palpitations, -lower extremity edema  Respiratory: +cough, -shortness of breath  Gastrointestinal: -abdominal pain, -nausea, -vomiting, -diarrhea, -constipation, -blood in stool  Genitourinary: -dysuria, -hematuria, -frequency  Musculoskeletal: -joint pain, -muscle pain  Skin: -rash, -lesion  Neurological: +headache, -dizziness, -numbness, -tingling  Psychiatric: -anxiety, -depression, -sleep difficulty  Endocrine: +excessive thirst          Physical Exam    General: No acute distress. Well-developed. Well-nourished.  Eyes: EOMI. Sclerae anicteric.  HENT: Normocephalic. Atraumatic. Nares patent. Moist oral mucosa. Nasal congestion and frontal tenderness palpated  Ears: Bilateral TMs clear. Bilateral EACs clear.  Cardiovascular: Regular rate. Regular rhythm. No murmurs. No rubs. No gallops. Normal S1, S2.  Respiratory: Normal respiratory effort. Clear to auscultation bilaterally. No rales. No rhonchi. No wheezing.  Abdomen: Soft. Non-tender. Non-distended. Normoactive bowel sounds.  Musculoskeletal: No  obvious deformity.  Extremities: No lower extremity edema.  Neurological: Alert & oriented x3. No slurred speech. Normal gait.  Psychiatric: Normal mood. Normal affect. Good insight. Good judgment. No sihi noted  Skin: Warm. Dry. No rash.          Assessment & Plan    1. Acute  "bacterial sinusitis  azithromycin (Z-LARS) 250 MG tablet    triamcinolone acetonide injection 40 mg      2. Cough, unspecified type  promethazine-dextromethorphan (PROMETHAZINE-DM) 6.25-15 mg/5 mL Syrp      3. Type 2 diabetes mellitus with diabetic polyneuropathy, without long-term current use of insulin           IMPRESSION:  - Patient presents with 2-week history of respiratory symptoms including congestion, cough, headache, and ear pain.  - Initiated antibiotic treatment due to prolonged duration of symptoms.  - Diabetes management noted, recent glucose readings 103-105.    ACUTE UPPER RESPIRATORY INFECTION:  - Patient has been sick for approximately 2 weeks with congestion, coughing, and dysphonia with fluctuating severity.  - Due to the prolonged duration, treatment is necessary.  - Prescribed antibiotics and administered intramuscular injection in office for immediate symptom relief.  - Initiated cough suppressant therapy with prescriptions transmitted to [email or web address].  - Although patient is not febrile, educated on respiratory infection prevention measures, including covering mouth when coughing to prevent spread of illness.    HEADACHE:  - Patient reports headache as one of the presenting symptoms.  - The intramuscular injection administered for respiratory symptoms should also provide relief for the cephalgia.  - Prescribed medication will help alleviate headache symptoms.    EAR PAIN:  - Patient complains of ear pain.  - The intramuscular injection administered should also provide relief for the otalgia.  - Prescribed medication will help alleviate ear pain symptoms.    TYPE 2 DIABETES MELLITUS:  - Monitored patient's glucose levels of 103-105 mg/dL.  Steroid shot can increase blood sugars for a couple days so modify diet      "This note will not be shared with the patient."  This note was generated with the assistance of ambient listening technology. Verbal consent was obtained by the patient and " accompanying visitor(s) for the recording of patient appointment to facilitate this note. I attest to having reviewed and edited the generated note for accuracy, though some syntax or spelling errors may persist. Please contact the author of this note for any clarification.

## 2025-05-27 ENCOUNTER — OFFICE VISIT (OUTPATIENT)
Dept: INTERNAL MEDICINE | Facility: CLINIC | Age: 50
End: 2025-05-27
Payer: COMMERCIAL

## 2025-05-27 VITALS
SYSTOLIC BLOOD PRESSURE: 120 MMHG | WEIGHT: 143.94 LBS | HEART RATE: 88 BPM | RESPIRATION RATE: 18 BRPM | DIASTOLIC BLOOD PRESSURE: 72 MMHG | HEIGHT: 63 IN | BODY MASS INDEX: 25.5 KG/M2 | OXYGEN SATURATION: 97 %

## 2025-05-27 DIAGNOSIS — S39.012A STRAIN OF LUMBAR REGION, INITIAL ENCOUNTER: Primary | ICD-10-CM

## 2025-05-27 PROCEDURE — 99999 PR PBB SHADOW E&M-EST. PATIENT-LVL III: CPT | Mod: PBBFAC,,, | Performed by: NURSE PRACTITIONER

## 2025-05-27 PROCEDURE — 3008F BODY MASS INDEX DOCD: CPT | Mod: CPTII,S$GLB,, | Performed by: NURSE PRACTITIONER

## 2025-05-27 PROCEDURE — 96372 THER/PROPH/DIAG INJ SC/IM: CPT | Mod: S$GLB,,, | Performed by: NURSE PRACTITIONER

## 2025-05-27 PROCEDURE — 99213 OFFICE O/P EST LOW 20 MIN: CPT | Mod: 25,S$GLB,, | Performed by: NURSE PRACTITIONER

## 2025-05-27 PROCEDURE — 3074F SYST BP LT 130 MM HG: CPT | Mod: CPTII,S$GLB,, | Performed by: NURSE PRACTITIONER

## 2025-05-27 PROCEDURE — 3078F DIAST BP <80 MM HG: CPT | Mod: CPTII,S$GLB,, | Performed by: NURSE PRACTITIONER

## 2025-05-27 RX ORDER — KETOROLAC TROMETHAMINE 30 MG/ML
60 INJECTION, SOLUTION INTRAMUSCULAR; INTRAVENOUS
Status: COMPLETED | OUTPATIENT
Start: 2025-05-27 | End: 2025-05-27

## 2025-05-27 RX ORDER — METHOCARBAMOL 500 MG/1
500 TABLET, FILM COATED ORAL 4 TIMES DAILY
Qty: 40 TABLET | Refills: 0 | Status: SHIPPED | OUTPATIENT
Start: 2025-05-27 | End: 2025-06-06

## 2025-05-27 RX ORDER — TRIAMCINOLONE ACETONIDE 40 MG/ML
40 INJECTION, SUSPENSION INTRA-ARTICULAR; INTRAMUSCULAR
Status: COMPLETED | OUTPATIENT
Start: 2025-05-27 | End: 2025-05-27

## 2025-05-27 RX ORDER — KETOROLAC TROMETHAMINE 10 MG/1
10 TABLET, FILM COATED ORAL EVERY 6 HOURS
Qty: 20 TABLET | Refills: 0 | Status: SHIPPED | OUTPATIENT
Start: 2025-05-27 | End: 2025-06-01

## 2025-05-27 RX ADMIN — KETOROLAC TROMETHAMINE 60 MG: 30 INJECTION, SOLUTION INTRAMUSCULAR; INTRAVENOUS at 03:05

## 2025-05-27 RX ADMIN — TRIAMCINOLONE ACETONIDE 40 MG: 40 INJECTION, SUSPENSION INTRA-ARTICULAR; INTRAMUSCULAR at 03:05

## 2025-05-27 NOTE — PROGRESS NOTES
History of Present Illness    CHIEF COMPLAINT:  Patient presents today for severe back pain.    BACK PAIN:  She reports sudden onset of back pain with no known cause or precipitating event. The pain is severe enough to affect her gait, causing her to walk sideways. She denies any prior history of back problems. Both meloxicam and ibuprofen have been ineffective for pain management.    SLEEP:  She reports severe difficulty sleeping due to fear of not waking up if she falls asleep.    DIABETES:  Her blood sugars have been well controlled with highest reading of 140 on Saturday.      ROS:  General: -fever, -chills, -fatigue, -weight gain, -weight loss  Cardiovascular: -chest pain, -palpitations, -lower extremity edema  Respiratory: -cough, -shortness of breath  Genitourinary: -dysuria, -hematuria, -frequency  Musculoskeletal: -joint pain, -muscle pain, +back pain, +pain with movement, +difficulty walking  Skin: -rash, -lesion  Neurological: -headache, -dizziness, -numbness, -tingling         Physical Exam    General: No acute distress. Well-developed. Well-nourished.  Cardiovascular: Regular rate. Regular rhythm. No murmurs. No rubs. No gallops. Normal S1, S2.  Respiratory: Normal respiratory effort. Clear to auscultation bilaterally. No rales. No rhonchi. No wheezing.  Musculoskeletal: No  obvious deformity. Tenderness palpated to lumbar region  Extremities: No lower extremity edema.  Neurological: Alert & oriented x3. No slurred speech. Normal gait. No saddle anesthesia  Skin: Warm. Dry. No rash.          Assessment & Plan    1. Strain of lumbar region, initial encounter  ketorolac injection 60 mg    triamcinolone acetonide injection 40 mg    methocarbamoL (ROBAXIN) 500 MG Tab    ketorolac (TORADOL) 10 mg tablet         LOW BACK PAIN:  - Patient presents with severe back pain causing difficulty with mobility.  - No prior history of back problems.  - Administered intramuscular injection for acute pain relief and  "prescribed muscle relaxants for this sudden-onset severe pain.  - Instructed patient to discontinue both ibuprofen and meloxicam, replacing them with the newly prescribed medication.    SLEEP DISORDER:  - Patient reports insomnia, particularly since pen[facility].  - Recommend avoiding sleep-inducing medications.    TYPE 2 DIABETES:  - Blood glucose levels are well-controlled recently.  - Patient reports regular monitoring, with highest reading of 140 occurring after consuming seafood.       "This note will not be shared with the patient."  Steroid shot may increase blood sugar for a couple of days so make sure to eat low carb low sugar diet      This note was generated with the assistance of ambient listening technology. Verbal consent was obtained by the patient and accompanying visitor(s) for the recording of patient appointment to facilitate this note. I attest to having reviewed and edited the generated note for accuracy, though some syntax or spelling errors may persist. Please contact the author of this note for any clarification.        "

## 2025-05-30 ENCOUNTER — HOSPITAL ENCOUNTER (EMERGENCY)
Facility: HOSPITAL | Age: 50
Discharge: HOME | End: 2025-05-31
Payer: COMMERCIAL

## 2025-05-30 DIAGNOSIS — S61.012A LACERATION OF LEFT THUMB WITHOUT FOREIGN BODY WITHOUT DAMAGE TO NAIL, INITIAL ENCOUNTER: ICD-10-CM

## 2025-05-30 DIAGNOSIS — S61.211A LACERATION OF LEFT INDEX FINGER WITHOUT FOREIGN BODY WITHOUT DAMAGE TO NAIL, INITIAL ENCOUNTER: Primary | ICD-10-CM

## 2025-05-30 PROCEDURE — 99283 EMERGENCY DEPT VISIT LOW MDM: CPT | Mod: 25

## 2025-05-30 RX ORDER — LIDOCAINE HYDROCHLORIDE 10 MG/ML
5 INJECTION, SOLUTION EPIDURAL; INFILTRATION; INTRACAUDAL; PERINEURAL
Status: COMPLETED | OUTPATIENT
Start: 2025-05-31 | End: 2025-05-30

## 2025-05-30 RX ADMIN — LIDOCAINE HYDROCHLORIDE 50 MG: 10 INJECTION, SOLUTION EPIDURAL; INFILTRATION; INTRACAUDAL at 11:05

## 2025-05-31 VITALS
DIASTOLIC BLOOD PRESSURE: 73 MMHG | HEART RATE: 86 BPM | RESPIRATION RATE: 16 BRPM | SYSTOLIC BLOOD PRESSURE: 122 MMHG | BODY MASS INDEX: 25.73 KG/M2 | OXYGEN SATURATION: 96 % | TEMPERATURE: 99 F | WEIGHT: 145.19 LBS | HEIGHT: 63 IN

## 2025-05-31 PROCEDURE — 90471 IMMUNIZATION ADMIN: CPT

## 2025-05-31 PROCEDURE — 96372 THER/PROPH/DIAG INJ SC/IM: CPT

## 2025-05-31 PROCEDURE — 25000003 PHARM REV CODE 250

## 2025-05-31 PROCEDURE — 12002 RPR S/N/AX/GEN/TRNK2.6-7.5CM: CPT

## 2025-05-31 PROCEDURE — 63600175 PHARM REV CODE 636 W HCPCS

## 2025-05-31 PROCEDURE — 90715 TDAP VACCINE 7 YRS/> IM: CPT

## 2025-05-31 RX ORDER — MUPIROCIN 20 MG/G
1 OINTMENT TOPICAL
Status: COMPLETED | OUTPATIENT
Start: 2025-05-31 | End: 2025-05-31

## 2025-05-31 RX ORDER — MUPIROCIN 20 MG/G
OINTMENT TOPICAL 2 TIMES DAILY
Qty: 15 G | Refills: 0 | Status: SHIPPED | OUTPATIENT
Start: 2025-05-31 | End: 2025-06-10

## 2025-05-31 RX ADMIN — MUPIROCIN 1 TUBE: 20 OINTMENT TOPICAL at 12:05

## 2025-05-31 RX ADMIN — CLOSTRIDIUM TETANI TOXOID ANTIGEN (FORMALDEHYDE INACTIVATED), CORYNEBACTERIUM DIPHTHERIAE TOXOID ANTIGEN (FORMALDEHYDE INACTIVATED), BORDETELLA PERTUSSIS TOXOID ANTIGEN (GLUTARALDEHYDE INACTIVATED), BORDETELLA PERTUSSIS FILAMENTOUS HEMAGGLUTININ ANTIGEN (FORMALDEHYDE INACTIVATED), BORDETELLA PERTUSSIS PERTACTIN ANTIGEN, AND BORDETELLA PERTUSSIS FIMBRIAE 2/3 ANTIGEN 0.5 ML: 5; 2; 2.5; 5; 3; 5 INJECTION, SUSPENSION INTRAMUSCULAR at 12:05

## 2025-06-01 ENCOUNTER — HOSPITAL ENCOUNTER (EMERGENCY)
Facility: HOSPITAL | Age: 50
Discharge: HOME | End: 2025-06-01
Attending: SURGERY
Payer: COMMERCIAL

## 2025-06-01 VITALS
DIASTOLIC BLOOD PRESSURE: 76 MMHG | HEIGHT: 63 IN | BODY MASS INDEX: 26.7 KG/M2 | OXYGEN SATURATION: 98 % | WEIGHT: 150.69 LBS | HEART RATE: 83 BPM | RESPIRATION RATE: 18 BRPM | TEMPERATURE: 98 F | SYSTOLIC BLOOD PRESSURE: 165 MMHG

## 2025-06-01 DIAGNOSIS — Z48.02 VISIT FOR SUTURE REMOVAL: ICD-10-CM

## 2025-06-01 DIAGNOSIS — L03.114 CELLULITIS OF LEFT HAND: Primary | ICD-10-CM

## 2025-06-01 PROCEDURE — 99284 EMERGENCY DEPT VISIT MOD MDM: CPT | Mod: 25

## 2025-06-01 PROCEDURE — 96372 THER/PROPH/DIAG INJ SC/IM: CPT | Performed by: NURSE PRACTITIONER

## 2025-06-01 PROCEDURE — 63600175 PHARM REV CODE 636 W HCPCS: Performed by: NURSE PRACTITIONER

## 2025-06-01 PROCEDURE — 25000003 PHARM REV CODE 250: Performed by: NURSE PRACTITIONER

## 2025-06-01 RX ORDER — CLINDAMYCIN PHOSPHATE 150 MG/ML
600 INJECTION, SOLUTION INTRAVENOUS
Status: COMPLETED | OUTPATIENT
Start: 2025-06-01 | End: 2025-06-01

## 2025-06-01 RX ORDER — CLINDAMYCIN HYDROCHLORIDE 300 MG/1
300 CAPSULE ORAL 4 TIMES DAILY
Qty: 28 CAPSULE | Refills: 0 | Status: SHIPPED | OUTPATIENT
Start: 2025-06-01 | End: 2025-06-08

## 2025-06-01 RX ORDER — IBUPROFEN 800 MG/1
800 TABLET, FILM COATED ORAL EVERY 6 HOURS PRN
Qty: 20 TABLET | Refills: 0 | Status: SHIPPED | OUTPATIENT
Start: 2025-06-01

## 2025-06-01 RX ORDER — NAPROXEN 250 MG/1
500 TABLET ORAL
Status: COMPLETED | OUTPATIENT
Start: 2025-06-01 | End: 2025-06-01

## 2025-06-01 RX ADMIN — CLINDAMYCIN PHOSPHATE 600 MG: 150 INJECTION, SOLUTION INTRAMUSCULAR; INTRAVENOUS at 02:06

## 2025-06-01 RX ADMIN — NAPROXEN 500 MG: 250 TABLET ORAL at 02:06

## 2025-06-01 NOTE — ED PROVIDER NOTES
Encounter Date: 6/1/2025       History     Chief Complaint   Patient presents with    Hand Pain     Eva Lane is a 49 y.o. female with PMH of ADHD, anxiety, arthritis, CHF, COPD, DM type 2, hyperlipidemia, hypertension, PTSD, lumbar radiculopathy presenting to the ED for evaluation of finger laceration.  Patient reports that she accidentally lacerated her left thumb and index finger on a knife 2 days ago.  She was evaluated in the ED and wound was sutured. She was rx Bactroban ointment and notes that she now has increased pain, redness and swelling of finger. Swelling extends into her knuckles. Pain is throbbing, currently rated 6/10 in severity.  She reports that she did have a small amount of drainage from wound yesterday.  Denies fever.    The history is provided by the patient.     Review of patient's allergies indicates:   Allergen Reactions    Penicillins Swelling and Rash    Neosporin [neomycin-bacitracin-polymyxin] Rash    Bactroban [mupirocin]     Shellfish containing products     Shrimp Hives    Bacticin Blisters, Hives and Rash    Lisinopril Rash     Past Medical History:   Diagnosis Date    Addiction to drug     ADHD (attention deficit hyperactivity disorder)     Anxiety     Arthritis     Asthma     Behavioral problem     Bipolar 1 disorder     Borderline personality disorder 09/23/2015    CHF (congestive heart failure)     Conversion disorder 09/23/2015    COPD (chronic obstructive pulmonary disease)     Depression     Diabetes mellitus type II     Fatigue     Hallucination     Headache     History of psychiatric hospitalization     Hx of psychiatric care     Hyperlipidemia     Hypertension     Lumbar radiculopathy     Allie     Neuralgia     Neuritis     Panic disorder     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    Psychiatric problem     Psychosis     PTSD (post-traumatic stress disorder)     Seizures     Seizure-last 8/2015-shake and fall-doesnt remember anything    Self-harming behavior     Sleep apnea      on CPAP    Sleep difficulties     Social anxiety disorder 02/04/2016    Stroke 09/22/2015    Stroke     Suicide attempt     Therapy     Thyroid disease      Past Surgical History:   Procedure Laterality Date    COLONOSCOPY N/A 7/1/2016    Procedure: COLONOSCOPY;  Surgeon: Robert Hernandez MD;  Location: [facility] ([address] [place] [address]);  Service: Endoscopy;  Laterality: N/A;  Schedule for next available CRS physician - EGD @ 8:30 with Dr. Naylor    CYST REMOVAL  2000    Fatty cyst on right face cheek and left upper arm     DILATION AND CURETTAGE OF UTERUS  2006    Miscarriage    HYSTERECTOMY  7/08    DUB - Total    OOPHORECTOMY  7/2008    TOTAL ABDOMINAL HYSTERECTOMY  7/2008    TUBAL LIGATION  2007     Family History   Problem Relation Name Age of Onset    No Known Problems Mother      Heart disease Father      Hyperlipidemia Father      Hypertension Father      Diabetes Father      Dementia Father      Breast cancer Neg Hx      Colon cancer Neg Hx      Ovarian cancer Neg Hx       Social History[1]  Review of Systems   Constitutional:  Negative for activity change, chills and fever.   HENT:  Negative for congestion, ear discharge, ear pain, postnasal drip, sinus pressure, sinus pain and sore throat.    Respiratory:  Negative for cough, chest tightness and shortness of breath.    Cardiovascular:  Negative for chest pain.   Gastrointestinal:  Negative for abdominal distention, abdominal pain and nausea.   Genitourinary:  Negative for dysuria, frequency and urgency.   Musculoskeletal:  Negative for back pain.   Skin:  Positive for color change and wound (Sutures left 1st and 2nd digits). Negative for rash.   Neurological:  Negative for dizziness, weakness, light-headedness and numbness.   Hematological:  Does not bruise/bleed easily.       Physical Exam     Initial Vitals [06/01/25 1341]   BP Pulse Resp Temp SpO2   (!) 163/89 94 19 98 °F (36.7 °C) 96 %      MAP       --         Physical Exam    Nursing note and vitals  reviewed.  Constitutional: She appears well-developed and well-nourished.   HENT:   Head: Normocephalic and atraumatic.   Eyes: Conjunctivae and EOM are normal. Pupils are equal, round, and reactive to light.   Neck: Neck supple.   Cardiovascular:  Normal rate, regular rhythm, normal heart sounds and intact distal pulses.           Pulmonary/Chest: Breath sounds normal.   Abdominal: Abdomen is soft. Bowel sounds are normal.   Musculoskeletal:      Left hand: Tenderness (L 2nd digit with + TTP, redness and swelling) present. Decreased range of motion (s/t pain).      Cervical back: Neck supple.      Comments: Good cap refill      Neurological: She is alert and oriented to person, place, and time. She has normal strength.   Skin: Skin is warm and dry. Laceration (L 1st and 2nd digit sutures intact. No drainage.) noted.   Psychiatric: She has a normal mood and affect. Her behavior is normal. Judgment and thought content normal.         ED Course   Suture Removal    Date/Time: 6/1/2025 2:00 PM  Location procedure was performed: [facility] EMERGENCY DEPARTMENT    Performed by: Virgie Pinedo NP  Authorized by: Nadeem Cosme MD  Assisting provider: Nadeem Cosme MD  Pre-operative diagnosis: suture removal  Post-operative diagnosis: same  Body area: upper extremity  Location details: left hand  Description of findings: L index finger with intact sutures with + surrounding redness   Wound Appearance: clean, warm, tender, erythematous and no drainage  Sutures Removed: 4  Staples Removed: 0  Post-removal: dressing applied  Facility: sutures placed in this facility  Complications: No  Specimens: No  Implants: No      Suture Removal    Date/Time: 6/1/2025 2:01 PM  Location procedure was performed: [facility] EMERGENCY DEPARTMENT    Performed by: Virgie Pinedo NP  Authorized by: Nadeem Cosme MD  Assisting provider: Nadeem Cosme MD  Pre-operative diagnosis: suture removal  Post-operative diagnosis: same  Body area: upper  extremity  Location details: left thumb  Description of findings: Intact suture with mild redness   Wound Appearance: erythematous  Sutures Removed: 1  Staples Removed: 0  Post-removal: dressing applied  Facility: sutures placed in this facility  Complications: No  Specimens: No  Implants: No        Labs Reviewed - No data to display       Imaging Results    None          Medications   clindamycin injection 600 mg (has no administration in time range)     Medical Decision Making  Evaluation of a 49-year-old female presenting with concern for infection of left index finger  Patient accidentally lacerated her left index finger on a knife approximately 2 days ago  Wound was suture and she has since had increased pain, redness, and swelling to her left index finger.  She presents nontoxic appearing with stable vital signs  Physical exam with intact sutures with no drainage.  She does have surrounding redness, warmth, and swelling that extends into MCP joint.   + decreased range of motion secondary to pain  Good capillary refill    Differential diagnosis includes cellulitis, allergic reaction    Risk  Prescription drug management.  Risk Details: Stable for discharge home.  Sutures removed given surrounding cellulitis and swelling.  Instructed to cleanse wound with soap and water.  Will prescribe clindamycin on discharge for cellulitis.  Patient will need outpatient follow-up with Ortho.  Information provided for follow-up.  Ibuprofen for pain control. Patient/caregiver voices understanding and feels comfortable with discharge plan.      The patient acknowledges that close follow up with medical provider is required. Instructed to follow up with PCP within 2 days. Patient was given specific return precautions. The patient agrees to comply with all instruction and directions given in the ER.                                        Clinical Impression:  Final diagnoses:  [L03.114] Cellulitis of left hand (Primary)  [Z48.02]  Visit for suture removal          ED Disposition Condition    Discharge Stable          ED Prescriptions    None       Follow-up Information       Follow up With Specialties Details Why Contact Info    Adrian Gutierrez IV, MD Orthopedic Surgery Schedule an appointment as soon as possible for a visit in 2 days  [address]   [address]  [place] [address]  [phone]                     [1]   Social History  Tobacco Use    Smoking status: Some Days     Current packs/day: 0.00     Average packs/day: 0.3 packs/day for 36.7 years (9.2 ttl pk-yrs)     Types: Cigarettes     Start date: 1986     Last attempt to quit: 3/30/2023     Years since quittin.1    Smokeless tobacco: Never    Tobacco comments:     [facility]-given smoking clinic pamphlet   Substance Use Topics    Alcohol use: No    Drug use: No        Virgie Pinedo NP  [phone]

## 2025-06-01 NOTE — ED TRIAGE NOTES
Patient concerned that she is having an allergic reaction to Bactrim cream due to redness and swelling to left 2nd finger and hand. Patient with sutures to left 2nd finger from a laceration from a knife.

## 2025-06-09 ENCOUNTER — PATIENT OUTREACH (OUTPATIENT)
Dept: ADMINISTRATIVE | Facility: HOSPITAL | Age: 50
End: 2025-06-09
Payer: COMMERCIAL

## 2025-06-09 NOTE — PROGRESS NOTES
Portal active: yes  Chart reviewed, immunization record updated.  No new results noted on [facility] or [facility] web site.  Care Everywhere updated.   Smoking Cessation Program Eligibility: smoker  Patient care coordination note  LOV with PCP 5/7/25  No answer no voicemail  Patient is due for [facility] outreach and urine screening eye exam HA1C and mammogram and foot exam.

## 2025-06-16 ENCOUNTER — PATIENT OUTREACH (OUTPATIENT)
Dept: ADMINISTRATIVE | Facility: HOSPITAL | Age: 50
End: 2025-06-16
Payer: COMMERCIAL

## 2025-06-16 NOTE — PROGRESS NOTES
Portal active: Yes  Chart reviewed, immunization record updated.  No new results noted on [facility] or [facility] web site.  Care Everywhere updated.   Smoking Cessation Program Eligibility: Yes  Patient care coordination note  Next PCP visit Not scheduled  LOV with PCP 05/27/2025    Spoke to pt and introduced myself, told her I was calling to see if we can get her scheduled for her labs, she hung up. I called back again, she picked the phone up and hung up. She is on the [facility] A1c gap report. She is also due for an eye exam and mammogram. Wanted to ask for a remote BP, and offer smoking cessation.

## 2025-06-23 ENCOUNTER — HOSPITAL ENCOUNTER (OUTPATIENT)
Dept: RADIOLOGY | Facility: HOSPITAL | Age: 50
Discharge: HOME | End: 2025-06-23
Attending: NURSE PRACTITIONER
Payer: COMMERCIAL

## 2025-06-23 ENCOUNTER — OFFICE VISIT (OUTPATIENT)
Dept: INTERNAL MEDICINE | Facility: CLINIC | Age: 50
End: 2025-06-23
Payer: COMMERCIAL

## 2025-06-23 VITALS
WEIGHT: 140.44 LBS | SYSTOLIC BLOOD PRESSURE: 116 MMHG | BODY MASS INDEX: 24.88 KG/M2 | HEIGHT: 63 IN | DIASTOLIC BLOOD PRESSURE: 78 MMHG | OXYGEN SATURATION: 98 % | HEART RATE: 91 BPM | RESPIRATION RATE: 18 BRPM

## 2025-06-23 DIAGNOSIS — M54.50 LUMBAR SPINE PAIN: ICD-10-CM

## 2025-06-23 DIAGNOSIS — M54.50 LUMBAR SPINE PAIN: Primary | ICD-10-CM

## 2025-06-23 PROCEDURE — 72110 X-RAY EXAM L-2 SPINE 4/>VWS: CPT | Mod: 26,,, | Performed by: RADIOLOGY

## 2025-06-23 PROCEDURE — 96372 THER/PROPH/DIAG INJ SC/IM: CPT | Mod: S$GLB,,, | Performed by: NURSE PRACTITIONER

## 2025-06-23 PROCEDURE — 99999 PR PBB SHADOW E&M-EST. PATIENT-LVL V: CPT | Mod: PBBFAC,,, | Performed by: NURSE PRACTITIONER

## 2025-06-23 PROCEDURE — 99213 OFFICE O/P EST LOW 20 MIN: CPT | Mod: 25,S$GLB,, | Performed by: NURSE PRACTITIONER

## 2025-06-23 PROCEDURE — 72110 X-RAY EXAM L-2 SPINE 4/>VWS: CPT | Mod: TC

## 2025-06-23 PROCEDURE — 3074F SYST BP LT 130 MM HG: CPT | Mod: CPTII,S$GLB,, | Performed by: NURSE PRACTITIONER

## 2025-06-23 PROCEDURE — 3078F DIAST BP <80 MM HG: CPT | Mod: CPTII,S$GLB,, | Performed by: NURSE PRACTITIONER

## 2025-06-23 PROCEDURE — 3008F BODY MASS INDEX DOCD: CPT | Mod: CPTII,S$GLB,, | Performed by: NURSE PRACTITIONER

## 2025-06-23 PROCEDURE — 1159F MED LIST DOCD IN RCRD: CPT | Mod: CPTII,S$GLB,, | Performed by: NURSE PRACTITIONER

## 2025-06-23 RX ORDER — KETOROLAC TROMETHAMINE 30 MG/ML
60 INJECTION, SOLUTION INTRAMUSCULAR; INTRAVENOUS
Status: COMPLETED | OUTPATIENT
Start: 2025-06-23 | End: 2025-06-23

## 2025-06-23 RX ADMIN — KETOROLAC TROMETHAMINE 60 MG: 30 INJECTION, SOLUTION INTRAMUSCULAR; INTRAVENOUS at 03:06

## 2025-06-24 ENCOUNTER — PATIENT MESSAGE (OUTPATIENT)
Dept: INTERNAL MEDICINE | Facility: CLINIC | Age: 50
End: 2025-06-24
Payer: COMMERCIAL

## 2025-06-24 NOTE — PROGRESS NOTES
History of Present Illness    CHIEF COMPLAINT:  Patient presents today for back pain.    BACK PAIN:  She reports ongoing back pain for two weeks, localized to a specific spot in her back. The pain is constant and severe, characterized as short and intense. The pain significantly impacts her sleep, causing frequent position changes throughout the night. She denies any specific injury or fall, though potential etiology may be related to lifting laundry baskets. Previous pain injection provided temporary relief. The pain occurs with minimal activity, including walking from bed to chair, and is not positional. She reports no improvement in symptoms despite previous intervention. She denies any urinary problems or associated symptoms.    GI CONCERNS:  She reports watery diarrhea immediately after eating, potentially attributed to metformin.      ROS:  General: -fever, -chills, +fatigue, -weight gain, -weight loss  Cardiovascular: -chest pain, -palpitations, -lower extremity edema  Respiratory: -cough, -shortness of breath  Gastrointestinal: +abdominal pain, -nausea, -vomiting, +diarrhea, -constipation, -blood in stool  Genitourinary: -dysuria, -hematuria, -frequency  Musculoskeletal: -joint pain, -muscle pain, +back pain  Skin: -rash, -lesion  Neurological: -headache, -dizziness, -numbness, -tingling, +difficulty staying asleep, +difficulty falling asleep, +sleep disturbances         Physical Exam    General: No acute distress. Well-developed. Well-nourished.  Cardiovascular: Regular rate. Regular rhythm. No murmurs. No rubs. No gallops. Normal S1, S2.  Respiratory: Normal respiratory effort. Clear to auscultation bilaterally. No rales. No rhonchi. No wheezing.  Abdomen: Soft. Non-tender. Non-distended. Normoactive bowel sounds.  Musculoskeletal: No  obvious deformity. Tenderness palpated to lumbar region.   Extremities: No lower extremity edema.  Neurological: Alert & oriented x3. No slurred speech. Normal gait. No  "saddle anesthesia noted    Skin: Warm. Dry. No rash.          Assessment & Plan    M54.50 Low back pain, unspecified  K59.1 Functional diarrhea    LOW BACK PAIN:  - Patient reports constant sharp pain in the back, especially at night, causing sleep disturbances.  - Patient denies any recent falls or specific incidents causing the pain.  - Administered pain injection during visit for temporary relief and plan to administer another pain shot.  - Ordered X-ray of the back to evaluate the cause of persistent pain.    FUNCTIONAL DIARRHEA:  - Patient experiences watery diarrhea after eating.  - Evaluated possible connection between diet and reported symptoms.    ADVERSE EFFECT OF MEDICATIONS:  - Considered medication side effects as potential cause of reported diarrhea.  - Noted that patient is taking a significant amount of metformin, which could be causing the diarrhea.       "This note will not be shared with the patient."  This note was generated with the assistance of ambient listening technology. Verbal consent was obtained by the patient and accompanying visitor(s) for the recording of patient appointment to facilitate this note. I attest to having reviewed and edited the generated note for accuracy, though some syntax or spelling errors may persist. Please contact the author of this note for any clarification.        "

## 2025-06-25 ENCOUNTER — TELEPHONE (OUTPATIENT)
Dept: INTERNAL MEDICINE | Facility: CLINIC | Age: 50
End: 2025-06-25
Payer: COMMERCIAL

## 2025-06-25 RX ORDER — DICLOFENAC SODIUM 75 MG/1
75 TABLET, DELAYED RELEASE ORAL 2 TIMES DAILY PRN
Qty: 30 TABLET | Refills: 0 | Status: SHIPPED | OUTPATIENT
Start: 2025-06-25 | End: 2025-07-10

## 2025-06-25 NOTE — TELEPHONE ENCOUNTER
----- Message from Mariann sent at 2025  1:34 PM CDT -----  Contact: pt  Eva Lane  MRN: [id number]  : 1975  PCP: Cintia Gustafson.  Home Phone      Not on file.  Work Phone      Not on file.  Mobile          [phone]      MESSAGE:     Pt requests an update on the medication that was supposed to be called in for her back.  Please advise    [phone]    Pt called a second time very upset.  Said she is in pain.  25 3:58pm.  ----- Message -----  From: Mariann Messer  Sent: 2025   1:36 PM CDT  To: Sj Metcalf    Eva Lane  MRN: [id number]  : 1975  PCP: Cintia Gustafson.  Home Phone      Not on file.  Work Phone      Not on file.  Mobile          [phone]      MESSAGE:     Pt requests an update on the medication that was supposed to be called in for her back.  Please advise    [phone]

## 2025-06-25 NOTE — TELEPHONE ENCOUNTER
Patient came in to see Cintia on 6/23 for pulled muscles. There are messages stating that medication was sent in for patient but I am not seeing anything other than the toradol injection administered at the visit. Patient c/o severe pain in her back.   Can you advise in Cintia's absence?

## 2025-06-29 DIAGNOSIS — B02.8 HERPES ZOSTER WITH COMPLICATION: ICD-10-CM

## 2025-06-30 RX ORDER — PREGABALIN 200 MG/1
200 CAPSULE ORAL 3 TIMES DAILY
Qty: 90 CAPSULE | Refills: 5 | Status: SHIPPED | OUTPATIENT
Start: 2025-06-30

## 2025-07-16 ENCOUNTER — OFFICE VISIT (OUTPATIENT)
Dept: FAMILY MEDICINE | Facility: CLINIC | Age: 50
End: 2025-07-16
Payer: COMMERCIAL

## 2025-07-16 ENCOUNTER — CLINICAL SUPPORT (OUTPATIENT)
Dept: FAMILY MEDICINE | Facility: CLINIC | Age: 50
End: 2025-07-16
Payer: COMMERCIAL

## 2025-07-16 VITALS
WEIGHT: 138.44 LBS | HEIGHT: 63 IN | SYSTOLIC BLOOD PRESSURE: 100 MMHG | OXYGEN SATURATION: 98 % | BODY MASS INDEX: 24.53 KG/M2 | DIASTOLIC BLOOD PRESSURE: 68 MMHG | HEART RATE: 118 BPM | RESPIRATION RATE: 14 BRPM

## 2025-07-16 DIAGNOSIS — R50.9 FEVER, UNSPECIFIED FEVER CAUSE: ICD-10-CM

## 2025-07-16 DIAGNOSIS — E11.65 UNCONTROLLED TYPE 2 DIABETES MELLITUS WITH HYPERGLYCEMIA: ICD-10-CM

## 2025-07-16 DIAGNOSIS — R09.81 NASAL CONGESTION: ICD-10-CM

## 2025-07-16 DIAGNOSIS — R09.81 NASAL CONGESTION: Primary | ICD-10-CM

## 2025-07-16 DIAGNOSIS — E11.42 TYPE 2 DIABETES MELLITUS WITH DIABETIC POLYNEUROPATHY, WITHOUT LONG-TERM CURRENT USE OF INSULIN: ICD-10-CM

## 2025-07-16 DIAGNOSIS — J01.00 ACUTE NON-RECURRENT MAXILLARY SINUSITIS: ICD-10-CM

## 2025-07-16 DIAGNOSIS — R30.0 DYSURIA: ICD-10-CM

## 2025-07-16 DIAGNOSIS — R35.0 URINE FREQUENCY: Primary | ICD-10-CM

## 2025-07-16 LAB
BACTERIA SPEC CULT: NORMAL /HPF
BILIRUB SERPL-MCNC: NORMAL MG/DL
BLOOD, POC UA: NORMAL
CASTS: NORMAL
CRYSTALS: NORMAL
CTP QC/QA: YES
CTP QC/QA: YES
GLUCOSE UR QL STRIP: NORMAL
KETONES UR QL STRIP: NORMAL
LEUKOCYTE ESTERASE URINE, POC: NORMAL
NITRITE, POC UA: NORMAL
PH, POC UA: 6
POC MOLECULAR INFLUENZA A AGN: NEGATIVE
POC MOLECULAR INFLUENZA B AGN: NEGATIVE
PROTEIN, POC: NORMAL
RBC CELLS COUNTED: NORMAL
SARS-COV-2 RDRP RESP QL NAA+PROBE: NEGATIVE
SPECIFIC GRAVITY, POC UA: >1.03
UROBILINOGEN, POC UA: 1
WHITE BLOOD CELLS: NORMAL

## 2025-07-16 PROCEDURE — 80053 COMPREHEN METABOLIC PANEL: CPT

## 2025-07-16 PROCEDURE — 3060F POS MICROALBUMINURIA REV: CPT | Mod: CPTII,S$GLB,, | Performed by: STUDENT IN AN ORGANIZED HEALTH CARE EDUCATION/TRAINING PROGRAM

## 2025-07-16 PROCEDURE — 81003 URINALYSIS AUTO W/O SCOPE: CPT | Mod: QW,S$GLB,, | Performed by: FAMILY MEDICINE

## 2025-07-16 PROCEDURE — 82570 ASSAY OF URINE CREATININE: CPT

## 2025-07-16 PROCEDURE — 3046F HEMOGLOBIN A1C LEVEL >9.0%: CPT | Mod: CPTII,S$GLB,, | Performed by: STUDENT IN AN ORGANIZED HEALTH CARE EDUCATION/TRAINING PROGRAM

## 2025-07-16 PROCEDURE — 87635 SARS-COV-2 COVID-19 AMP PRB: CPT | Mod: QW,S$GLB,, | Performed by: STUDENT IN AN ORGANIZED HEALTH CARE EDUCATION/TRAINING PROGRAM

## 2025-07-16 PROCEDURE — 99999 PR PBB SHADOW E&M-EST. PATIENT-LVL V: CPT | Mod: PBBFAC,,, | Performed by: STUDENT IN AN ORGANIZED HEALTH CARE EDUCATION/TRAINING PROGRAM

## 2025-07-16 PROCEDURE — 83036 HEMOGLOBIN GLYCOSYLATED A1C: CPT

## 2025-07-16 PROCEDURE — 99214 OFFICE O/P EST MOD 30 MIN: CPT | Mod: S$GLB,,, | Performed by: STUDENT IN AN ORGANIZED HEALTH CARE EDUCATION/TRAINING PROGRAM

## 2025-07-16 PROCEDURE — 36415 COLL VENOUS BLD VENIPUNCTURE: CPT | Mod: S$GLB,,, | Performed by: STUDENT IN AN ORGANIZED HEALTH CARE EDUCATION/TRAINING PROGRAM

## 2025-07-16 PROCEDURE — 3008F BODY MASS INDEX DOCD: CPT | Mod: CPTII,S$GLB,, | Performed by: STUDENT IN AN ORGANIZED HEALTH CARE EDUCATION/TRAINING PROGRAM

## 2025-07-16 PROCEDURE — 3066F NEPHROPATHY DOC TX: CPT | Mod: CPTII,S$GLB,, | Performed by: STUDENT IN AN ORGANIZED HEALTH CARE EDUCATION/TRAINING PROGRAM

## 2025-07-16 PROCEDURE — 1159F MED LIST DOCD IN RCRD: CPT | Mod: CPTII,S$GLB,, | Performed by: STUDENT IN AN ORGANIZED HEALTH CARE EDUCATION/TRAINING PROGRAM

## 2025-07-16 PROCEDURE — 3074F SYST BP LT 130 MM HG: CPT | Mod: CPTII,S$GLB,, | Performed by: STUDENT IN AN ORGANIZED HEALTH CARE EDUCATION/TRAINING PROGRAM

## 2025-07-16 PROCEDURE — 87502 INFLUENZA DNA AMP PROBE: CPT | Mod: QW,S$GLB,, | Performed by: STUDENT IN AN ORGANIZED HEALTH CARE EDUCATION/TRAINING PROGRAM

## 2025-07-16 PROCEDURE — 81000 URINALYSIS NONAUTO W/SCOPE: CPT | Mod: S$GLB,,, | Performed by: STUDENT IN AN ORGANIZED HEALTH CARE EDUCATION/TRAINING PROGRAM

## 2025-07-16 PROCEDURE — 3078F DIAST BP <80 MM HG: CPT | Mod: CPTII,S$GLB,, | Performed by: STUDENT IN AN ORGANIZED HEALTH CARE EDUCATION/TRAINING PROGRAM

## 2025-07-16 RX ORDER — DOXYCYCLINE HYCLATE 100 MG
100 TABLET ORAL 2 TIMES DAILY
Qty: 20 TABLET | Refills: 0 | Status: SHIPPED | OUTPATIENT
Start: 2025-07-16 | End: 2025-07-26

## 2025-07-17 DIAGNOSIS — E11.65 UNCONTROLLED TYPE 2 DIABETES MELLITUS WITH HYPERGLYCEMIA: ICD-10-CM

## 2025-07-17 LAB
ALBUMIN SERPL BCP-MCNC: 4.1 G/DL (ref 3.5–5.2)
ALBUMIN/CREAT UR: 203.1 UG/MG
ALP SERPL-CCNC: 110 UNIT/L (ref 40–150)
ALT SERPL W/O P-5'-P-CCNC: 20 UNIT/L (ref 10–44)
ANION GAP (OHS): 14 MMOL/L (ref 8–16)
AST SERPL-CCNC: 17 UNIT/L (ref 11–45)
BILIRUB SERPL-MCNC: 1.3 MG/DL (ref 0.1–1)
BUN SERPL-MCNC: 31 MG/DL (ref 6–20)
CALCIUM SERPL-MCNC: 9.7 MG/DL (ref 8.7–10.5)
CHLORIDE SERPL-SCNC: 103 MMOL/L (ref 95–110)
CO2 SERPL-SCNC: 19 MMOL/L (ref 23–29)
CREAT SERPL-MCNC: 1 MG/DL (ref 0.5–1.4)
CREAT UR-MCNC: 242.2 MG/DL (ref 15–325)
EAG (OHS): 298 MG/DL (ref 68–131)
GFR SERPLBLD CREATININE-BSD FMLA CKD-EPI: >60 ML/MIN/1.73/M2
GLUCOSE SERPL-MCNC: 292 MG/DL (ref 70–110)
HBA1C MFR BLD: 12 % (ref 4–5.6)
MICROALBUMIN UR-MCNC: 492 UG/ML (ref ?–5000)
POTASSIUM SERPL-SCNC: 4.8 MMOL/L (ref 3.5–5.1)
PROT SERPL-MCNC: 8.8 GM/DL (ref 6–8.4)
SODIUM SERPL-SCNC: 136 MMOL/L (ref 136–145)

## 2025-07-17 RX ORDER — TIRZEPATIDE 2.5 MG/.5ML
2.5 INJECTION, SOLUTION SUBCUTANEOUS
Qty: 2 EACH | Refills: 2 | Status: SHIPPED | OUTPATIENT
Start: 2025-07-17

## 2025-07-17 RX ORDER — INSULIN DETEMIR 100 [IU]/ML
60 INJECTION, SOLUTION SUBCUTANEOUS NIGHTLY
Qty: 18 ML | Refills: 2 | Status: SHIPPED | OUTPATIENT
Start: 2025-07-17 | End: 2025-07-18

## 2025-07-18 RX ORDER — INSULIN GLARGINE 100 [IU]/ML
60 INJECTION, SOLUTION SUBCUTANEOUS NIGHTLY
Qty: 18 ML | Refills: 5 | Status: SHIPPED | OUTPATIENT
Start: 2025-07-18 | End: 2026-01-14

## 2025-07-18 NOTE — TELEPHONE ENCOUNTER
Refill Routing Note   Medication(s) are not appropriate for processing by [facility] for the following reason(s):        Med affordability-Levemir no longer available.  Lantus pended for your review    [facility] action(s):  Route        Medication Therapy Plan: Pended for Lantus for provider review      Appointments  past 12m or future 3m with PCP    Date Provider   Last Visit   7/16/2025 Yovani Schulz MD   Next Visit   10/17/2025 Yovani Schulz MD   ED visits in past 90 days: 2        Note composed:9:14 AM 07/18/2025

## 2025-07-21 RX ORDER — DICLOFENAC SODIUM 75 MG/1
75 TABLET, DELAYED RELEASE ORAL 2 TIMES DAILY PRN
Qty: 30 TABLET | Refills: 0 | Status: SHIPPED | OUTPATIENT
Start: 2025-07-21

## 2025-07-27 DIAGNOSIS — S39.012A STRAIN OF LUMBAR REGION, INITIAL ENCOUNTER: ICD-10-CM

## 2025-07-28 ENCOUNTER — TELEPHONE (OUTPATIENT)
Dept: FAMILY MEDICINE | Facility: CLINIC | Age: 50
End: 2025-07-28
Payer: COMMERCIAL

## 2025-07-28 RX ORDER — METHOCARBAMOL 500 MG/1
TABLET, FILM COATED ORAL
Qty: 40 TABLET | Refills: 0 | Status: SHIPPED | OUTPATIENT
Start: 2025-07-28

## 2025-07-28 NOTE — TELEPHONE ENCOUNTER
----- Message from Erik sent at 2025 12:05 PM CDT -----  Contact: Patient  Eva Lane  MRN: [id number]  : 1975  PCP: Cintia Gustafson  Home Phone      Not on file.  Work Phone      Not on file.  [facility]          [phone]      MESSAGE: migraine headache X 2 days -- requesting Rx -- uses [facility] in [place]    Call [phone]    PCP: ???

## 2025-08-11 ENCOUNTER — TELEPHONE (OUTPATIENT)
Dept: FAMILY MEDICINE | Facility: CLINIC | Age: 50
End: 2025-08-11
Payer: COMMERCIAL

## 2025-08-11 DIAGNOSIS — L40.9 PSORIASIS: Primary | ICD-10-CM

## 2025-08-12 ENCOUNTER — TELEPHONE (OUTPATIENT)
Dept: FAMILY MEDICINE | Facility: CLINIC | Age: 50
End: 2025-08-12
Payer: COMMERCIAL

## 2025-08-18 ENCOUNTER — TELEPHONE (OUTPATIENT)
Dept: FAMILY MEDICINE | Facility: CLINIC | Age: 50
End: 2025-08-18
Payer: COMMERCIAL

## 2025-08-18 DIAGNOSIS — L40.9 PSORIASIS: Primary | ICD-10-CM

## 2025-08-22 ENCOUNTER — TELEPHONE (OUTPATIENT)
Dept: INTERNAL MEDICINE | Facility: CLINIC | Age: 50
End: 2025-08-22
Payer: COMMERCIAL

## 2025-08-22 RX ORDER — CLOTRIMAZOLE AND BETAMETHASONE DIPROPIONATE 10; .64 MG/G; MG/G
CREAM TOPICAL 2 TIMES DAILY
Qty: 45 G | Refills: 2 | Status: SHIPPED | OUTPATIENT
Start: 2025-08-22

## 2025-08-27 ENCOUNTER — TELEPHONE (OUTPATIENT)
Dept: FAMILY MEDICINE | Facility: CLINIC | Age: 50
End: 2025-08-27
Payer: COMMERCIAL

## 2025-08-27 DIAGNOSIS — L40.9 PSORIASIS: Primary | ICD-10-CM

## 2025-08-30 ENCOUNTER — HOSPITAL ENCOUNTER (EMERGENCY)
Facility: HOSPITAL | Age: 50
Discharge: HOME | End: 2025-08-30
Attending: STUDENT IN AN ORGANIZED HEALTH CARE EDUCATION/TRAINING PROGRAM
Payer: COMMERCIAL

## 2025-08-30 VITALS
OXYGEN SATURATION: 97 % | DIASTOLIC BLOOD PRESSURE: 78 MMHG | TEMPERATURE: 98 F | HEART RATE: 85 BPM | WEIGHT: 152 LBS | RESPIRATION RATE: 20 BRPM | SYSTOLIC BLOOD PRESSURE: 163 MMHG | BODY MASS INDEX: 26.93 KG/M2

## 2025-08-30 DIAGNOSIS — M79.641 RIGHT HAND PAIN: ICD-10-CM

## 2025-08-30 DIAGNOSIS — M25.572 LEFT ANKLE PAIN: ICD-10-CM

## 2025-08-30 PROCEDURE — 99284 EMERGENCY DEPT VISIT MOD MDM: CPT | Mod: 25

## 2025-08-30 PROCEDURE — 25000003 PHARM REV CODE 250: Performed by: STUDENT IN AN ORGANIZED HEALTH CARE EDUCATION/TRAINING PROGRAM

## 2025-08-30 RX ORDER — ACETAMINOPHEN 500 MG
1000 TABLET ORAL
Status: DISCONTINUED | OUTPATIENT
Start: 2025-08-30 | End: 2025-08-31 | Stop reason: HOSPADM

## 2025-09-02 ENCOUNTER — OFFICE VISIT (OUTPATIENT)
Dept: INTERNAL MEDICINE | Facility: CLINIC | Age: 50
End: 2025-09-02
Payer: COMMERCIAL

## 2025-09-02 ENCOUNTER — TELEPHONE (OUTPATIENT)
Dept: ORTHOPEDICS | Facility: CLINIC | Age: 50
End: 2025-09-02
Payer: COMMERCIAL

## 2025-09-02 ENCOUNTER — TELEPHONE (OUTPATIENT)
Dept: INTERNAL MEDICINE | Facility: CLINIC | Age: 50
End: 2025-09-02

## 2025-09-02 VITALS
HEIGHT: 63 IN | BODY MASS INDEX: 27.65 KG/M2 | DIASTOLIC BLOOD PRESSURE: 84 MMHG | OXYGEN SATURATION: 97 % | WEIGHT: 156.06 LBS | RESPIRATION RATE: 18 BRPM | SYSTOLIC BLOOD PRESSURE: 130 MMHG

## 2025-09-02 DIAGNOSIS — M25.531 RIGHT WRIST PAIN: Primary | ICD-10-CM

## 2025-09-02 DIAGNOSIS — M25.572 ACUTE LEFT ANKLE PAIN: ICD-10-CM

## 2025-09-02 RX ORDER — IBUPROFEN 800 MG/1
800 TABLET, FILM COATED ORAL 3 TIMES DAILY PRN
Qty: 90 TABLET | Refills: 0 | Status: SHIPPED | OUTPATIENT
Start: 2025-09-02